# Patient Record
Sex: FEMALE | Race: WHITE | Employment: OTHER | URBAN - METROPOLITAN AREA
[De-identification: names, ages, dates, MRNs, and addresses within clinical notes are randomized per-mention and may not be internally consistent; named-entity substitution may affect disease eponyms.]

---

## 2017-10-24 ENCOUNTER — HOSPITAL ENCOUNTER (OUTPATIENT)
Dept: CT IMAGING | Age: 72
Discharge: HOME | End: 2017-10-24
Attending: INTERNAL MEDICINE
Payer: MEDICARE

## 2017-10-24 DIAGNOSIS — Z87.891 PERSONAL HISTORY OF TOBACCO USE, PRESENTING HAZARDS TO HEALTH: ICD-10-CM

## 2017-10-24 PROCEDURE — G0297 LDCT FOR LUNG CA SCREEN: HCPCS

## 2020-03-14 ENCOUNTER — HOSPITAL ENCOUNTER (EMERGENCY)
Age: 75
Discharge: HOME | End: 2020-03-14
Attending: EMERGENCY MEDICINE | Admitting: EMERGENCY MEDICINE
Payer: MEDICARE

## 2020-03-14 ENCOUNTER — APPOINTMENT (OUTPATIENT)
Dept: GENERAL RADIOLOGY | Age: 75
End: 2020-03-14
Attending: PHYSICIAN ASSISTANT
Payer: MEDICARE

## 2020-03-14 VITALS
RESPIRATION RATE: 20 BRPM | DIASTOLIC BLOOD PRESSURE: 86 MMHG | BODY MASS INDEX: 25.61 KG/M2 | SYSTOLIC BLOOD PRESSURE: 127 MMHG | HEART RATE: 94 BPM | HEIGHT: 64 IN | WEIGHT: 150 LBS | TEMPERATURE: 99.5 F | OXYGEN SATURATION: 93 %

## 2020-03-14 DIAGNOSIS — R68.83 CHILLS: Primary | ICD-10-CM

## 2020-03-14 DIAGNOSIS — R52 BODY ACHES: ICD-10-CM

## 2020-03-14 LAB
APPEARANCE UR: ABNORMAL
BACTERIA URNS QL MICRO: NEGATIVE /HPF
BILIRUB UR QL: NEGATIVE
COLOR UR: ABNORMAL
EPITH CASTS URNS QL MICRO: ABNORMAL /LPF
FLUAV AG NPH QL IA: NEGATIVE
FLUBV AG NOSE QL IA: NEGATIVE
GLUCOSE UR STRIP.AUTO-MCNC: NEGATIVE MG/DL
HGB UR QL STRIP: NEGATIVE
HYALINE CASTS URNS QL MICRO: ABNORMAL /LPF (ref 0–5)
KETONES UR QL STRIP.AUTO: NEGATIVE MG/DL
LEUKOCYTE ESTERASE UR QL STRIP.AUTO: ABNORMAL
NITRITE UR QL STRIP.AUTO: NEGATIVE
PH UR STRIP: 7 [PH] (ref 5–8)
PROT UR STRIP-MCNC: NEGATIVE MG/DL
RBC #/AREA URNS HPF: ABNORMAL /HPF (ref 0–5)
SP GR UR REFRACTOMETRY: 1.01 (ref 1–1.03)
UR CULT HOLD, URHOLD: NORMAL
UROBILINOGEN UR QL STRIP.AUTO: 0.2 EU/DL (ref 0.2–1)
WBC URNS QL MICRO: ABNORMAL /HPF (ref 0–4)

## 2020-03-14 PROCEDURE — 81001 URINALYSIS AUTO W/SCOPE: CPT

## 2020-03-14 PROCEDURE — 71046 X-RAY EXAM CHEST 2 VIEWS: CPT

## 2020-03-14 PROCEDURE — 87804 INFLUENZA ASSAY W/OPTIC: CPT

## 2020-03-14 PROCEDURE — 99283 EMERGENCY DEPT VISIT LOW MDM: CPT

## 2020-03-14 PROCEDURE — 87086 URINE CULTURE/COLONY COUNT: CPT

## 2020-03-14 RX ORDER — OSELTAMIVIR PHOSPHATE 75 MG/1
75 CAPSULE ORAL 2 TIMES DAILY
Qty: 10 CAP | Refills: 0 | Status: SHIPPED | OUTPATIENT
Start: 2020-03-14 | End: 2020-03-19

## 2020-03-14 NOTE — ED NOTES
Discharge instructions reviewed by provider and signed by patient and RN. Patient in no acute distress. Patient discharged ambulatory.

## 2020-03-14 NOTE — ED PROVIDER NOTES
66 y.o. female with significant past medical history for HTN, HLD and COPD who presents ambulatory to the ED with chief complaint of myalgias. Patient reports onset of myalgias with associated subjective fever and chills that began this morning. Patient notes that she took ASA today with some relief of her symptoms. She notes that she has some nasal congestion, but reports that she usually has some congestion secondary to seasonal allergies. She denies cough, vomiting, diarrhea, dysuria, urgency, frequency or abdominal pain. Patient denies receiving the flu shot this year. She also denies recent travel. There are no other acute medical concerns at this time. Social Hx: yes Tobacco use, no EtOH use, no Illicit Drug use  PCP: Christine Bruno MD    Note written by Juan Flores, as dictated by WESTLEY Zhang 11:53 AM      The history is provided by the patient. No  was used. No past medical history on file. No past surgical history on file. No family history on file.     Social History     Socioeconomic History    Marital status: SINGLE     Spouse name: Not on file    Number of children: Not on file    Years of education: Not on file    Highest education level: Not on file   Occupational History    Not on file   Social Needs    Financial resource strain: Not on file    Food insecurity     Worry: Not on file     Inability: Not on file    Transportation needs     Medical: Not on file     Non-medical: Not on file   Tobacco Use    Smoking status: Not on file   Substance and Sexual Activity    Alcohol use: Not on file    Drug use: Not on file    Sexual activity: Not on file   Lifestyle    Physical activity     Days per week: Not on file     Minutes per session: Not on file    Stress: Not on file   Relationships    Social connections     Talks on phone: Not on file     Gets together: Not on file     Attends Restoration service: Not on file     Active member of club or organization: Not on file     Attends meetings of clubs or organizations: Not on file     Relationship status: Not on file    Intimate partner violence     Fear of current or ex partner: Not on file     Emotionally abused: Not on file     Physically abused: Not on file     Forced sexual activity: Not on file   Other Topics Concern    Not on file   Social History Narrative    Not on file         ALLERGIES: Betadine [povidone-iodine] and Red dye    Review of Systems   Constitutional: Positive for chills and fever (subjective). HENT: Positive for congestion (baseline secondary to seasonal allergies). Negative for sore throat. Respiratory: Negative for cough and shortness of breath. Cardiovascular: Negative for chest pain. Gastrointestinal: Negative for abdominal pain, diarrhea, nausea and vomiting. Genitourinary: Negative for dysuria, flank pain, frequency and urgency. Musculoskeletal: Positive for myalgias. Neurological: Negative for syncope and light-headedness. All other systems reviewed and are negative. Vitals:    03/14/20 1148   BP: 127/86   Pulse: 94   Resp: 20   Temp: 99.5 °F (37.5 °C)   SpO2: 93%   Weight: 68 kg (150 lb)   Height: 5' 4\" (1.626 m)            Physical Exam  Vitals signs and nursing note reviewed. Constitutional:       General: She is not in acute distress. Appearance: She is well-developed. She is not diaphoretic. HENT:      Head: Normocephalic and atraumatic. Mouth/Throat:      Mouth: Mucous membranes are moist.      Pharynx: Oropharynx is clear. Uvula midline. No pharyngeal swelling, oropharyngeal exudate, posterior oropharyngeal erythema or uvula swelling. Tonsils: No tonsillar abscesses. Eyes:      General:         Right eye: No discharge. Left eye: No discharge. Conjunctiva/sclera: Conjunctivae normal.   Neck:      Musculoskeletal: Normal range of motion and neck supple.    Cardiovascular:      Rate and Rhythm: Normal rate and regular rhythm. Heart sounds: Normal heart sounds. Pulmonary:      Effort: Pulmonary effort is normal. No respiratory distress. Breath sounds: Wheezing (faint expiratory wheezes bilaterally) present. No rales. Abdominal:      General: There is no distension. Palpations: Abdomen is soft. Tenderness: There is no abdominal tenderness. There is no guarding or rebound. Lymphadenopathy:      Cervical: No cervical adenopathy. Skin:     General: Skin is warm and dry. Neurological:      Mental Status: She is alert and oriented to person, place, and time. MDM  Number of Diagnoses or Management Options  Body aches:   Chills:      Amount and/or Complexity of Data Reviewed  Clinical lab tests: ordered and reviewed  Tests in the radiology section of CPT®: ordered and reviewed  Discuss the patient with other providers: yes (Dr. Mauricio Turner, ED attending)  Independent visualization of images, tracings, or specimens: yes (CXR)    Patient Progress  Patient progress: stable         Procedures        66 y.o. female with significant past medical history for HTN, HLD and COPD who presents ambulatory to the ED with chief complaint of myalgias. The patient is well-appearing in no acute distress, does not appear toxic or septic. Vitals within normal limits. Influenza test is negative. CXR, read by radiology and independently visualized and interpreted by myself, reveals no evidence of pneumonia or other acute cardiopulmonary abnormalities. UA reflects likely contaminated specimen, doubt UTI but will add urine culture. Safe for discharge home with Rx for tamiflu to cover for potential false negative flu test, refill of albuterol inhaler for COPD. The patient has an appointment with her PCP scheduled this week and will follow up at that time. Strict ED return precautions discussed and provided in writing at time of discharge. The patient verbalized understanding and agreement with this plan.

## 2020-03-16 LAB
BACTERIA SPEC CULT: NORMAL
CC UR VC: NORMAL
SERVICE CMNT-IMP: NORMAL

## 2020-03-19 ENCOUNTER — TELEPHONE (OUTPATIENT)
Dept: FAMILY MEDICINE CLINIC | Age: 75
End: 2020-03-19

## 2020-03-19 NOTE — TELEPHONE ENCOUNTER
I called [facility] to touched base with them with regards to their routine/non-urgent scheduled visit. I asked if there are any pertinent issues or medication refills that were needed. Patient was scheduled for today with Dr. Arelis Bowles to establish care. She is currently healthy and has no concerns other than med refills. She states that she already received a call from Dr. Arelis Bowles but I do not see anything documented in the chart. Will follow up with Dr. Arelis Bowles to make sure patient's needs were addressed. Patient understands that this encounter was a temporary measure, and the importance of further follow up and examination was emphasized.   Patient verbalized understanding.       - Kecia Bauer LPN

## 2020-03-19 NOTE — TELEPHONE ENCOUNTER
I called [facility] to touched base with them with regards to their routine/non-urgent scheduled visit. I asked if there are any pertinent issues or medication refills that were needed. S: Patient states she is asymptomatic. Would like to establish care to get medications refilled. Previous PCP Dr. Giovanni Salas does not take her new insurance so would like to establish care at our practice. O: Patient speaking in complete sentences without effort. Normal speech and cooperative. A/P:Mrs. Manny Saez is a 66 y.o F with COPD and hx of breast cancer wanting to establish care at our practice. Patient is almost running out of medications and is requesting the earliest appointment available as she is scared to get exposed to COVID-19.  - Scheduled appointment on 3/20 at 8am with Dr. Marilyn Nathan. No orders of the defined types were placed in this encounter. Patient understands that this encounter was a temporary measure, and the importance of further follow up and examination was emphasized. Patient verbalized understanding.          Keshia Oneal MD

## 2020-03-20 ENCOUNTER — HOSPITAL ENCOUNTER (OUTPATIENT)
Dept: LAB | Age: 75
Discharge: HOME | End: 2020-03-20

## 2020-03-20 ENCOUNTER — OFFICE VISIT (OUTPATIENT)
Dept: FAMILY MEDICINE CLINIC | Age: 75
End: 2020-03-20

## 2020-03-20 VITALS
TEMPERATURE: 98.6 F | SYSTOLIC BLOOD PRESSURE: 117 MMHG | DIASTOLIC BLOOD PRESSURE: 80 MMHG | BODY MASS INDEX: 26.12 KG/M2 | WEIGHT: 153 LBS | OXYGEN SATURATION: 94 % | RESPIRATION RATE: 16 BRPM | HEIGHT: 64 IN | HEART RATE: 81 BPM

## 2020-03-20 DIAGNOSIS — E78.5 DYSLIPIDEMIA: ICD-10-CM

## 2020-03-20 DIAGNOSIS — I10 ESSENTIAL HYPERTENSION: ICD-10-CM

## 2020-03-20 DIAGNOSIS — F41.1 GAD (GENERALIZED ANXIETY DISORDER): ICD-10-CM

## 2020-03-20 DIAGNOSIS — G47.01 INSOMNIA DUE TO MEDICAL CONDITION: ICD-10-CM

## 2020-03-20 DIAGNOSIS — Z00.00 HEALTHCARE MAINTENANCE: ICD-10-CM

## 2020-03-20 DIAGNOSIS — M81.0 OSTEOPOROSIS, UNSPECIFIED OSTEOPOROSIS TYPE, UNSPECIFIED PATHOLOGICAL FRACTURE PRESENCE: ICD-10-CM

## 2020-03-20 DIAGNOSIS — Z85.3 HISTORY OF BREAST CANCER: ICD-10-CM

## 2020-03-20 DIAGNOSIS — J43.9 PULMONARY EMPHYSEMA, UNSPECIFIED EMPHYSEMA TYPE (HCC): ICD-10-CM

## 2020-03-20 DIAGNOSIS — I10 ESSENTIAL HYPERTENSION: Primary | ICD-10-CM

## 2020-03-20 LAB
ALBUMIN UR QL STRIP: 30 MG/L
ANION GAP SERPL CALC-SCNC: 7 MMOL/L (ref 5–15)
BUN SERPL-MCNC: 13 MG/DL (ref 6–20)
BUN/CREAT SERPL: 15 (ref 12–20)
CALCIUM SERPL-MCNC: 9 MG/DL (ref 8.5–10.1)
CHLORIDE SERPL-SCNC: 107 MMOL/L (ref 97–108)
CO2 SERPL-SCNC: 25 MMOL/L (ref 21–32)
CREAT SERPL-MCNC: 0.87 MG/DL (ref 0.55–1.02)
CREATININE, URINE POC: 200 MG/DL
GLUCOSE SERPL-MCNC: 97 MG/DL (ref 65–100)
MICROALBUMIN/CREAT RATIO POC: <30 MG/G
POTASSIUM SERPL-SCNC: 4.2 MMOL/L (ref 3.5–5.1)
SODIUM SERPL-SCNC: 139 MMOL/L (ref 136–145)

## 2020-03-20 RX ORDER — CITALOPRAM 40 MG/1
TABLET, FILM COATED ORAL
Qty: 60 TAB | Refills: 1 | Status: SHIPPED | OUTPATIENT
Start: 2020-03-20 | End: 2020-07-29

## 2020-03-20 RX ORDER — LISINOPRIL 10 MG/1
TABLET ORAL
COMMUNITY
End: 2020-03-20 | Stop reason: DRUGHIGH

## 2020-03-20 RX ORDER — ATORVASTATIN CALCIUM 40 MG/1
TABLET, FILM COATED ORAL
Qty: 90 TAB | Refills: 1 | Status: SHIPPED | OUTPATIENT
Start: 2020-03-20 | End: 2020-03-25 | Stop reason: SDUPTHER

## 2020-03-20 RX ORDER — ATORVASTATIN CALCIUM 40 MG/1
TABLET, FILM COATED ORAL
COMMUNITY
End: 2020-03-20 | Stop reason: SDUPTHER

## 2020-03-20 RX ORDER — TRIAZOLAM 0.25 MG/1
TABLET ORAL
COMMUNITY
Start: 2020-03-05 | End: 2020-03-20 | Stop reason: SDUPTHER

## 2020-03-20 RX ORDER — VERAPAMIL HYDROCHLORIDE 120 MG/1
TABLET, FILM COATED, EXTENDED RELEASE ORAL
COMMUNITY
End: 2020-12-04 | Stop reason: ALTCHOICE

## 2020-03-20 RX ORDER — LISINOPRIL 5 MG/1
5 TABLET ORAL DAILY
Qty: 90 TAB | Refills: 1 | Status: SHIPPED | OUTPATIENT
Start: 2020-03-20 | End: 2020-06-19 | Stop reason: SDUPTHER

## 2020-03-20 RX ORDER — CITALOPRAM 40 MG/1
TABLET, FILM COATED ORAL
COMMUNITY
Start: 2020-01-03 | End: 2020-03-20 | Stop reason: SDUPTHER

## 2020-03-20 RX ORDER — TRIAZOLAM 0.25 MG/1
TABLET ORAL
Qty: 30 TAB | Refills: 1 | Status: SHIPPED | OUTPATIENT
Start: 2020-03-20 | End: 2020-03-20 | Stop reason: CLARIF

## 2020-03-20 NOTE — PROGRESS NOTES
[address] [place] [address]    Subjective:       HPI:who pree  Elizabeth Clifford is a 66 y.o. female with history of COPD, Arthritis, HTN, Insomnia, Osteoperosis and Breast Cancer who presents to clinic today to establish care. She has no acute complaints or concerns at this time. She is requesting refill of many chronic medications at this time. PMH: COPD, Arthritis, HTN, Insomnia, Osteoperosis and Breast Cancer. Allergies: Red dye, betadine  Medications: Lisinopril 5mg, Lipitor 40mg, Celexa 40mg, Halcion 0.25mg  Family Hx: Non-contributory  Surgical history: Left breast mastectomy in 2012    COPD: Pulmonologist is Dr. Binh Oneal. Diagnosed with 6 yrs ago. Initially thought to be due to chemo and radiation which was later refuted. Current therapy includes Spiriva and night time oxygen @ 2L. Has PRN albuterol which she seldomly use. She denies any chest pain or shortness of breath at this time. Breast Cancer: Left breast in 2002. Lumpectomy, chemo and radiation. Had reoccurrence in 2012 and had radical mastectomy. Had resultant fatigue and chronic insomnia following radiation which is tolerabel with halcion. Last Mammogram was 1.5 yrs ago and states she is due for one. HTN: Diagnosed  35 yrs ago. Initially on Verapamil and later Lisinopril. Was admitted to [address] [place] [address] for abdominl pain 1 yr ago. Had low BPs and home BP meds were discontinued. Continued to have normal BPs until 4 months ago and Lisinopril was resumed at 5 mg. She does not check her home. Compliant with her medication and denies any medication side effects. She denies any associated headaches, chest pain, palpitations, lower extremity edema, PND or orthopnea. Insomnia and POOL: Patient endorses a history of anxiety and insomnia since 2002 after diagnosis and treatment for breast cancer. She has tried several different medications for insomnia. She is currently on Halcion which she states works for her.   She does not need a refill at this time but still will need prior authorization for her next refill. She was previously followed by a counselor for anxiety but states her symptoms have been stable on Celexa. She currently does not see a counselor. Requesting refill of Celexa. Her POOL score today is a 6. Health Maintenance:  Has had 2 normal colonoscopies. Has had shingles in past but has not had vaccine. Had bone scan per pt which showed osteoporosis. Not on any therapy for osteoporosis. .     Past Medical History:   Diagnosis Date    Arthritis     [facility] ([facility]     COPD (chronic obstructive pulmonary disease) ([facility]        Past Medical History:   Diagnosis Date    Arthritis     Breast cancer ([facility]     [facility]chronic obstructive pulmonary disease[facility] ([facility]      Allergies   Allergen Reactions    Betadine [Povidone-Iodine] Hives    Red Dye Hives     Current Outpatient Medications on File Prior to Visit   Medication Sig Dispense Refill    verapamil ER (CALAN-SR) 120 mg tablet verapamil ER (SR) 120 mg tablet,extended release      [DISCONTINUED] lisinopriL (PRINIVIL, ZESTRIL) 10 mg tablet lisinopril 10 mg tablet      [DISCONTINUED] atorvastatin (LIPITOR) 40 mg tablet atorvastatin 40 mg tablet      [DISCONTINUED] citalopram (CELEXA) 40 mg tablet TAKE 1 TABLET BY MOUTH ONCE DAILY      [DISCONTINUED] triazolam (HALCION) 0.25 mg tablet TAKE 1 TABLET BY MOUTH AT BEDTIME AS NEEDED FOR 30 DAYS      albuterol sulfate 90 mcg/actuation aepb Take 1-2 Puffs by inhalation every six (6) hours as needed for Wheezing. 1 Inhaler 0    [] oseltamivir (Tamiflu) 75 mg capsule Take 1 Cap by mouth two (2) times a day for 5 days. 10 Cap 0     No current facility-administered medications on file prior to visit. History reviewed. No pertinent family history.   Social History     Socioeconomic History    Marital status: UNKNOWN     Spouse name: Not on file    Number of children: Not on file    Years of education: Not on file    Highest education level: Not on file   Tobacco Use    Smoking status: Current Every Day Smoker    Smokeless tobacco: Never Used    Tobacco comment: [employer] jarquin   Substance and Sexual Activity    Drug use: Never     History reviewed. No pertinent surgical history. There is no problem list on file for this patient. Review of Systems   Constitutional: Negative for malaise/fatigue and weight loss. Eyes: Negative for blurred vision and double vision. Respiratory: Negative for shortness of breath. Cardiovascular: Negative for chest pain, palpitations, orthopnea, leg swelling and PND. Gastrointestinal: Negative for nausea and vomiting. Musculoskeletal: Negative for myalgias. Neurological: Negative for sensory change and headaches. Psychiatric/Behavioral: Negative for depression, hallucinations, memory loss, substance abuse and suicidal ideas. The patient has insomnia. The patient is not nervous/anxious. Objective:     Visit Vitals  /80 (BP 1 Location: Left arm, BP Patient Position: Sitting)   Pulse 81   Temp 98.6 °F (37 °C)   Resp 16   Ht 5' 4\" (1.626 m)   Wt 153 lb (69.4 kg)   SpO2 94%   BMI 26.26 kg/m²        Physical Exam  Constitutional:       Appearance: Normal appearance. HENT:      Head: Normocephalic and atraumatic. Right Ear: Tympanic membrane and ear canal normal.      Left Ear: Tympanic membrane and ear canal normal.      Nose: Nose normal. No congestion or rhinorrhea. Mouth/Throat:      Mouth: Mucous membranes are moist.   Eyes:      Extraocular Movements: Extraocular movements intact. Conjunctiva/sclera: Conjunctivae normal.      Pupils: Pupils are equal, round, and reactive to light. Neck:      Musculoskeletal: Normal range of motion. Cardiovascular:      Rate and Rhythm: Normal rate and regular rhythm. Pulses: Normal pulses. Heart sounds: Normal heart sounds. No murmur.    Pulmonary:      Effort: Pulmonary effort is normal.      Breath sounds: Normal breath sounds. Abdominal:      General: Bowel sounds are normal.      Palpations: Abdomen is soft. Musculoskeletal: Normal range of motion. General: No swelling. Right lower leg: No edema. Left lower leg: No edema. Skin:     General: Skin is warm and dry. Neurological:      General: No focal deficit present. Mental Status: She is alert and oriented to person, place, and time. Mental status is at baseline. Cranial Nerves: No cranial nerve deficit. Pertinent Labs/Studies:      Assessment and orders:     Diagnoses and all orders for this visit:    1. Essential hypertension  -Well-controlled. Plan to continue lisinopril 5 mg. Check metabolic panel and urine microalbumin today. -     METABOLIC PANEL, BASIC; Future  -     AMB POC URINE, MICROALBUMIN, SEMIQUANT (3 RESULTS); Future  -     lisinopriL (PRINIVIL, ZESTRIL) 5 mg tablet; Take 1 Tab by mouth daily. , Normal, Disp-90 Tab, R-1  -     AMB POC URINE, MICROALBUMIN, SEMIQUANT (3 RESULTS)    2. Pulmonary emphysema, unspecified emphysema type ([facility]  -Not acute exacerbation. Wean the oxygen use, albuterol and Spiriva  -Strict sanitary and isolation precautions were given to patient given current viral outbreak. 3. POOL (generalized anxiety disorder)  -Stable. Continue Celexa. encouraged patient to continue counseling.  -     citalopram (CELEXA) 40 mg tablet; TAKE 1 TABLET BY MOUTH ONCE DAILY, Normal, Disp-60 Tab, R-1    4. Dyslipidemia  -Last LDL was 80 in January 2020. Continue lipitor.  -     atorvastatin (LIPITOR) 40 mg tablet; atorvastatin 40 mg tablet, Normal, Disp-90 Tab, R-1    5. Osteoporosis, unspecified osteoporosis type, unspecified pathological fracture presence  -Initiate over-the-counter calcium and vitamin D.  -Fall precautions given. 6. History of breast cancer  -Plan for diagnostic mammogram in near future. -     [facility] MAMMOGRAM DIAG BILAT SAME DAY INCL CAD; Future    7.  Healthcare maintenance  - [facility] MAMMOGRAM DIAG BILAT SAME DAY INCL CAD; Future  -     varicella-zoster recombinant, PF, (SHINGRIX) 50 mcg/0.5 mL susr injection; Administer Dose now and repeat in 6 months, Print, Disp-0.5 mL, R-1          I have reviewed patient medical and social history and medications. I have reviewed pertinent labs results and other data. I have discussed the diagnosis with the patient and the intended plan as seen in the above orders. The patient has received an after-visit summary and questions were answered concerning future plans. I have discussed medication side effects and warnings with the patient as well.     Manda Rodriguez MD  Resident [address] [facility]  03/20/20    Patient discussed with Dr. Shaka Prasad, Attending Physician

## 2020-03-25 ENCOUNTER — TELEPHONE (OUTPATIENT)
Dept: FAMILY MEDICINE CLINIC | Age: 75
End: 2020-03-25

## 2020-03-25 DIAGNOSIS — E78.5 DYSLIPIDEMIA: ICD-10-CM

## 2020-03-25 RX ORDER — ATORVASTATIN CALCIUM 40 MG/1
TABLET, FILM COATED ORAL
Qty: 90 TAB | Refills: 0 | Status: SHIPPED | OUTPATIENT
Start: 2020-03-25 | End: 2020-06-19 | Stop reason: SDUPTHER

## 2020-03-27 DIAGNOSIS — E78.5 DYSLIPIDEMIA: ICD-10-CM

## 2020-03-27 DIAGNOSIS — I10 ESSENTIAL HYPERTENSION: ICD-10-CM

## 2020-04-03 ENCOUNTER — TELEPHONE (OUTPATIENT)
Dept: FAMILY MEDICINE CLINIC | Age: 75
End: 2020-04-03

## 2020-04-03 DIAGNOSIS — G47.00 INSOMNIA, UNSPECIFIED TYPE: Primary | ICD-10-CM

## 2020-04-03 RX ORDER — TRIAZOLAM 0.25 MG/1
0.25 TABLET ORAL
Qty: 60 TAB | Refills: 0 | Status: SHIPPED | OUTPATIENT
Start: 2020-04-03 | End: 2020-06-26 | Stop reason: SDUPTHER

## 2020-04-03 RX ORDER — TRIAZOLAM 0.25 MG/1
0.25 TABLET ORAL
Qty: 60 TAB | Refills: 0 | OUTPATIENT
Start: 2020-04-03

## 2020-04-03 NOTE — TELEPHONE ENCOUNTER
PT requesting a call from Dr. Francisco Chua on if she can get a 60 day supply.  Also, don't see the med on her list, please give PT a call    Med is Triazolam .25 mg  Needs to go to [place]

## 2020-04-20 DIAGNOSIS — G47.00 INSOMNIA, UNSPECIFIED TYPE: ICD-10-CM

## 2020-04-20 NOTE — TELEPHONE ENCOUNTER
----- Message from Ria Sands sent at 4/20/2020 11:57 AM EDT -----  Regarding: Dr. Emerald Mcnamara first and last name: Kristian Simon  Reason for call:   f/up on how doctor was supposed to give preauthroization for a sleeping medication on 3/20/2020, triazalam, to be sent to her insurance company at [phone]. Pt would also like to talk to the doctor as well.    Callback required yes/no and why: yes  Best contact number(s):  [phone]  Details to clarify the request:

## 2020-04-21 NOTE — TELEPHONE ENCOUNTER
Called and spoke with the patient of another [facility] message received in which to offer her an appointment per Dr. Becky Lazaro. She said she already had a scheduled appointment with you but was informed [facility] did not schedule any appointment but sent a message to the office to the doctor. She told me that the doctor should have already done this prior auth or ordered the medication for her at the recent visit of 3/20. Said she will not make any appointment because she has concerns about this which should have already been done. Said she will be out of medication very soon. She only wants to speak with the doctor. Dr. Edmonds Course   Received: Yesterday      Call patient   Message Contents   Dayna Yañez [facility] Front Office             Env General Message/Vendor Calls     Caller's first and last name:       Reason for call: Requesting a phone call back regarding a prior authorization for Rx.      Call back required yes/no and why: Yes     Best contact number(s): [phone]     Details to clarify the request:     Maggy Monet

## 2020-04-22 ENCOUNTER — TELEPHONE (OUTPATIENT)
Dept: FAMILY MEDICINE CLINIC | Age: 75
End: 2020-04-22

## 2020-04-22 NOTE — TELEPHONE ENCOUNTER
Prior authorization for Triazolam completed and approved. Pt notified via phone. Script available at pharmacy.

## 2020-06-19 ENCOUNTER — TELEPHONE (OUTPATIENT)
Dept: FAMILY MEDICINE CLINIC | Age: 75
End: 2020-06-19

## 2020-06-19 DIAGNOSIS — G47.00 INSOMNIA, UNSPECIFIED TYPE: ICD-10-CM

## 2020-06-19 DIAGNOSIS — E78.5 DYSLIPIDEMIA: ICD-10-CM

## 2020-06-19 DIAGNOSIS — I10 ESSENTIAL HYPERTENSION: ICD-10-CM

## 2020-06-19 NOTE — TELEPHONE ENCOUNTER
----- Message from Kerry Borrero sent at 6/19/2020  8:40 AM EDT -----  Regarding: Dr Hilliard Pro: [phone]  General Message/Vendor Calls    Caller's first and last name:Spring Vincent      Reason for call:a form will be sent from Medicare to be filled out for her breast form. Pt had a mastectomy 10 years ago.       Callback required yes/no and why:yes      Best contact number(s):[phone]      Details to clarify the request:      Kerry Borrero

## 2020-06-19 NOTE — TELEPHONE ENCOUNTER
Dr Burt/refill   Received: Today      Call patient   Message Contents   [facility], [address] [place] [address] Office   Phone Number: [phone]             Medication Refill     Caller (if not patient):       Relationship of caller (if not patient):       Best contact number(s):[phone]       Name of medication and dosage if known:Triazolam 25 mg, Lisinopril 5 mg and Atorvastatin 40 mg       Is patient out of this medication (yes/no):yes       Pharmacy name:[facility] pharmacy     Pharmacy listed in chart? (yes/no):yes   Pharmacy phone number:       Details to clarify the request:90 day supply . Pt needs the email address for the doctors as well.

## 2020-06-24 DIAGNOSIS — G47.00 INSOMNIA, UNSPECIFIED TYPE: ICD-10-CM

## 2020-06-24 RX ORDER — LISINOPRIL 5 MG/1
5 TABLET ORAL DAILY
Qty: 60 TAB | Refills: 1 | Status: SHIPPED | OUTPATIENT
Start: 2020-06-24 | End: 2020-09-17

## 2020-06-24 RX ORDER — ATORVASTATIN CALCIUM 40 MG/1
TABLET, FILM COATED ORAL
Qty: 90 TAB | Refills: 0 | Status: SHIPPED | OUTPATIENT
Start: 2020-06-24 | End: 2020-06-26 | Stop reason: SDUPTHER

## 2020-06-24 RX ORDER — TRIAZOLAM 0.25 MG/1
0.25 TABLET ORAL
Qty: 60 TAB | Refills: 0 | OUTPATIENT
Start: 2020-06-24

## 2020-06-24 NOTE — TELEPHONE ENCOUNTER
Dr. Angela Low   Received: [facility] [address] Office   Phone Number: [phone]             Caller's first and last name: N/A   Reason for call:  Pt stated she called for refills 6/19/20. Pharmacy stated they did not receive prescriptions request.  Medications: \"Triazolam .25 mg\" \"Atorvastatin 40 mg\" \"Lisinopril 5 mg\". Pt requesting 90 day refills. Callback required yes/no and why: Yes, to inform.    Best contact number(s): [phone]   Details to clarify the request: Pharmacy: [facility] number: [phone]

## 2020-06-24 NOTE — TELEPHONE ENCOUNTER
[phone]      Patient called and wants to speak with the doctor only about this medication order. Sharif Preciado it has been since last Friday request and she needs this medication.

## 2020-06-24 NOTE — TELEPHONE ENCOUNTER
[phone]  Patient called to say the pharmacy has still not received the medication orders. She was advised chart reflecting receipt of pharmacy but they do not have it. She wants the office to call the pharmacy as she wants the medication now.

## 2020-06-25 ENCOUNTER — TELEPHONE (OUTPATIENT)
Dept: FAMILY MEDICINE CLINIC | Age: 75
End: 2020-06-25

## 2020-06-25 DIAGNOSIS — E78.5 DYSLIPIDEMIA: ICD-10-CM

## 2020-06-25 NOTE — TELEPHONE ENCOUNTER
[phone]    Patient called again. She has concerns and will be out of medication tomorrow.     Medication the pharmacy did not get;  triazoliam    Medication the pharmacy said had no direction:  Atorvastatin

## 2020-06-26 ENCOUNTER — TELEPHONE (OUTPATIENT)
Dept: FAMILY MEDICINE CLINIC | Age: 75
End: 2020-06-26

## 2020-06-26 DIAGNOSIS — G47.00 INSOMNIA, UNSPECIFIED TYPE: ICD-10-CM

## 2020-06-26 DIAGNOSIS — E78.5 DYSLIPIDEMIA: ICD-10-CM

## 2020-06-26 RX ORDER — ATORVASTATIN CALCIUM 40 MG/1
TABLET, FILM COATED ORAL
Qty: 90 TAB | Refills: 0 | Status: SHIPPED | OUTPATIENT
Start: 2020-06-26 | End: 2020-06-30 | Stop reason: DRUGHIGH

## 2020-06-26 RX ORDER — TRIAZOLAM 0.25 MG/1
0.25 TABLET ORAL
Qty: 10 TAB | Refills: 0 | Status: SHIPPED | OUTPATIENT
Start: 2020-06-26 | End: 2020-07-02 | Stop reason: SDUPTHER

## 2020-06-26 RX ORDER — TRIAZOLAM 0.25 MG/1
TABLET ORAL
Qty: 30 TAB | Refills: 0 | OUTPATIENT
Start: 2020-06-26

## 2020-06-26 NOTE — TELEPHONE ENCOUNTER
[phone]    Patient called again  She wants this medication of atorvastatin corrected today at the pharmacy  She said it was resent again as wrong. The pharmacy told her is still does not have directions on it. She took her last pill yesterday and wants the medication now. She was advised of Dr. Ese Zavaleta message about the other medication order and needing an appointment. Relayed to her this message in the chart of scheduling an appointment before and she said she never got it. ([facility] to call and schedule a telephone visit per -[id number])    She will call back next week to schedule for July.

## 2020-06-26 NOTE — TELEPHONE ENCOUNTER
Will provide short term refill of Triazolam. Pt will need a follow-up appointment for further refills as she was previously instructed. Goal is to wean her off this medication and this has been previously discussed with her. Please reiterate plan with pt if and when she calls back.

## 2020-06-26 NOTE — TELEPHONE ENCOUNTER
Dr Chuckie Ortega:    [facility] -- this med was sent to the pharmacy with no directions. ... Please correct & resend to them. ...

## 2020-06-29 ENCOUNTER — TELEPHONE (OUTPATIENT)
Dept: FAMILY MEDICINE CLINIC | Age: 75
End: 2020-06-29

## 2020-06-29 NOTE — TELEPHONE ENCOUNTER
Patient called again and wants this medication corrected at the pharmacy. She did schedule an appointment for this week with you.

## 2020-06-30 RX ORDER — ATORVASTATIN CALCIUM 40 MG/1
40 TABLET, FILM COATED ORAL DAILY
Qty: 90 TAB | Refills: 1 | Status: SHIPPED | OUTPATIENT
Start: 2020-06-30 | End: 2020-12-21 | Stop reason: SDUPTHER

## 2020-07-02 ENCOUNTER — VIRTUAL VISIT (OUTPATIENT)
Dept: FAMILY MEDICINE CLINIC | Age: 75
End: 2020-07-02

## 2020-07-02 DIAGNOSIS — G89.29 CHRONIC PAIN OF RIGHT KNEE: Primary | ICD-10-CM

## 2020-07-02 DIAGNOSIS — E78.5 DYSLIPIDEMIA: ICD-10-CM

## 2020-07-02 DIAGNOSIS — G47.00 INSOMNIA, UNSPECIFIED TYPE: ICD-10-CM

## 2020-07-02 DIAGNOSIS — M25.561 CHRONIC PAIN OF RIGHT KNEE: Primary | ICD-10-CM

## 2020-07-02 RX ORDER — TRIAZOLAM 0.25 MG/1
0.25 TABLET ORAL
Qty: 30 TAB | Refills: 0 | Status: SHIPPED | OUTPATIENT
Start: 2020-07-02 | End: 2020-09-17

## 2020-07-02 RX ORDER — TRIAZOLAM 0.25 MG/1
0.25 TABLET ORAL
Qty: 30 TAB | Refills: 1 | Status: SHIPPED | OUTPATIENT
Start: 2020-07-02 | End: 2020-07-02

## 2020-07-02 NOTE — PROGRESS NOTES
Chun Leslie  66 y.o. female  1945  [address] [facility] [address] [facility] [id number]  [id number]   [address]:    Telemedicine Progress Note  Nevaeh Hernandez MD       Encounter Date and Time: July 5, 2020 at 2:25 PM    Consent: Chun Leslie, who was seen by synchronous (real-time) audio-video technology, and/or her healthcare decision maker, is aware that this patient-initiated, Telehealth encounter on 7/2/2020 is a billable service, with coverage as determined by her insurance carrier. She is aware that she may receive a bill and has provided verbal consent to proceed: Yes. CC: Medication Refill. History of Present Illness   Chun Leslie is a 66 y.o. female was evaluated by synchronous (real-time) audio-video technology from home, through a secure patient portal.    Pleasant 66 yr old female requesting medication refills. Has known hx of dyslipidemia for which she takes lipitor 10mg. Pt also requesting refill of Triazolam 0.25mg which she has been taking for 10 yrs. States she has never been weaned off medication in the past and is apprehensive to weaning off medication eventhough we have discussed this in the past.  Right knee pain: has known hx of right knee oa. Has had increased pain and decreased rom in the last month. Wants her knee evaluated in person. No recent injury. Has had limited mobility aroung her house. Review of Systems   Review of Systems   Gastrointestinal: Negative for abdominal pain, nausea and vomiting. Musculoskeletal: Positive for joint pain. Neurological: Negative for dizziness, sensory change and headaches. Psychiatric/Behavioral: Negative for substance abuse. The patient is not nervous/anxious.         Vitals/Objective:     General: alert, cooperative, no distress   Mental  status: mental status: alert, oriented to person, place, and time, normal mood, behavior, speech, dress, motor activity, and thought processes   Resp: resp: normal effort and no respiratory distress   Neuro: neuro: no gross deficits   Skin: skin: no discoloration or lesions of concern on visible areas   Due to this being a TeleHealth evaluation, many elements of the physical examination are unable to be assessed. Assessment and Plan:   Time-based coding, delete if not needed: I spent at least 40 minutes with this established patient, and >50% of the time was spent counseling and/or coordinating care regarding med refills    1. Chronic pain of right knee  - Recommended Steroid injection for OA  - Pt to make in-person clinic appt  - XR KNEE RT MAX 2 VWS; Future    2. Insomnia, unspecified type  - Plan to wean off Triazolam when due for next refill.  -  reviewed and appropriate  - Pt will likely be reluctant to weaning off medication  - CHeck uds  - triazolam (HALCION) 0.25 mg tablet; Take 1 Tab by mouth nightly as needed (Insomnia). Max Daily Amount: 0.25 mg. Dispense: 30 Tab; Refill: 0  - DRUG SCREEN, URINE; Future    3. Dyslipidemia  - Lipitor refilled      Assessment/Plan:    Time spent in direct conversation with the patient to include medical condition(s) discussed, assessment and treatment plan:       We discussed the expected course, resolution and complications of the diagnosis(es) in detail. Medication risks, benefits, costs, interactions, and alternatives were discussed as indicated. I advised her to contact the office if her condition worsens, changes or fails to improve as anticipated. She expressed understanding with the diagnosis(es) and plan. Patient understands that this encounter was a temporary measure, and the importance of further follow up and examination was emphasized. Patient verbalized understanding. Patient informed to follow up: Rafy Mccray Follow-up and Dispositions  ·   Return if symptoms worsen or fail to improve.          Electronically Signed: Nancy Noonan MD    CPT Codes 01584-63715 for Established Patients may apply to this Telehealth Visit. POS code: 18. Modifier GT    Megan Crespo is a 66 y.o. female who was evaluated by an audio-video encounter for concerns as above. Patient identification was verified prior to start of the visit. A caregiver was present when appropriate. Due to this being a TeleHealth encounter (During ESO-80 public health emergency), evaluation of the following organ systems was limited: Vitals/Constitutional/EENT/Resp/CV/GI//MS/Neuro/Skin/Heme-Lymph-Imm. Pursuant to the emergency declaration under the [address] [place] [id number], [facility] waiver authority and the [employer] Act, this Virtual Visit was conducted, with patient's (and/or legal guardian's) consent, to reduce the patient's risk of exposure to COVID-19 and provide necessary medical care. Services were provided through a synchronous discussion virtually to substitute for in-person clinic visit. I was at home. The patient was at home. History   Patients past medical, surgical and family histories were reviewed and updated. Past Medical History:   Diagnosis Date    Arthritis     [facility] ([facility]     COPD (chronic obstructive pulmonary disease) ([facility]      No past surgical history on file. No family history on file.   Social History     Socioeconomic History    Marital status: UNKNOWN     Spouse name: Not on file    Number of children: Not on file    Years of education: Not on file    Highest education level: Not on file   Occupational History    Not on file   Social Needs    Financial resource strain: Not on file    Food insecurity     Worry: Not on file     Inability: Not on file    Transportation needs     Medical: Not on file     Non-medical: Not on file   Tobacco Use    Smoking status: Current Every Day Smoker    Smokeless tobacco: Never Used    Tobacco comment: MOSES jarquin   Substance and Sexual Activity    Alcohol use: Not on file    Drug use: Never    Sexual activity: Not on file   Lifestyle    Physical activity     Days per week: Not on file     Minutes per session: Not on file    Stress: Not on file   Relationships    Social connections     Talks on phone: Not on file     Gets together: Not on file     Attends [employer] service: Not on file     Active member of club or organization: Not on file     Attends meetings of clubs or organizations: Not on file     Relationship status: Not on file    Intimate partner violence     Fear of current or ex partner: Not on file     Emotionally abused: Not on file     Physically abused: Not on file     Forced sexual activity: Not on file   Other Topics Concern    Not on file   Social History Narrative    Not on file     There is no problem list on file for this patient. Current Medications/Allergies   Medications and Allergies reviewed:    Current Outpatient Medications   Medication Sig Dispense Refill    triazolam (HALCION) 0.25 mg tablet Take 1 Tab by mouth nightly as needed (Insomnia). Max Daily Amount: 0.25 mg. 30 Tab 0    atorvastatin (LIPITOR) 40 mg tablet Take 1 Tab by mouth daily. atorvastatin 40 mg tablet 90 Tab 1    lisinopriL (PRINIVIL, ZESTRIL) 5 mg tablet Take 1 Tab by mouth daily. 60 Tab 1    verapamil ER (CALAN-SR) 120 mg tablet verapamil ER (SR) 120 mg tablet,extended release      citalopram (CELEXA) 40 mg tablet TAKE 1 TABLET BY MOUTH ONCE DAILY 60 Tab 1    varicella-zoster recombinant, PF, (SHINGRIX) 50 mcg/0.5 mL susr injection Administer Dose now and repeat in 6 months 0.5 mL 1    albuterol sulfate 90 mcg/actuation aepb Take 1-2 Puffs by inhalation every six (6) hours as needed for Wheezing.  1 Inhaler 0     Allergies   Allergen Reactions    Betadine [Povidone-Iodine] Hives    Red Dye Hives

## 2020-07-10 ENCOUNTER — TELEPHONE (OUTPATIENT)
Dept: FAMILY MEDICINE CLINIC | Age: 75
End: 2020-07-10

## 2020-07-10 NOTE — TELEPHONE ENCOUNTER
----- Message from Cuauhtemoc Barrett sent at 7/10/2020 10:57 AM EDT -----  Appointment not available    Caller's first and last name and relationship to patient (if not the patient):      Best contact number: [phone]      Preferred date and time: within the next week      Scheduled appointment date and time: N/A      Reason for appointment: injection      Details to clarify the request:      Cuauhtemoc Barrett

## 2020-07-13 ENCOUNTER — TELEPHONE (OUTPATIENT)
Dept: FAMILY MEDICINE CLINIC | Age: 75
End: 2020-07-13

## 2020-07-13 DIAGNOSIS — M25.561 CHRONIC PAIN OF RIGHT KNEE: Primary | ICD-10-CM

## 2020-07-13 DIAGNOSIS — G89.29 CHRONIC PAIN OF RIGHT KNEE: Primary | ICD-10-CM

## 2020-07-14 ENCOUNTER — OFFICE VISIT (OUTPATIENT)
Dept: FAMILY MEDICINE CLINIC | Age: 75
End: 2020-07-14

## 2020-07-14 VITALS
HEART RATE: 89 BPM | DIASTOLIC BLOOD PRESSURE: 81 MMHG | HEIGHT: 64 IN | OXYGEN SATURATION: 95 % | BODY MASS INDEX: 27.31 KG/M2 | SYSTOLIC BLOOD PRESSURE: 121 MMHG | RESPIRATION RATE: 20 BRPM | WEIGHT: 160 LBS | TEMPERATURE: 97.7 F

## 2020-07-14 DIAGNOSIS — G89.29 CHRONIC PAIN OF RIGHT KNEE: ICD-10-CM

## 2020-07-14 DIAGNOSIS — M17.11 OSTEOARTHRITIS OF RIGHT KNEE, UNSPECIFIED OSTEOARTHRITIS TYPE: ICD-10-CM

## 2020-07-14 DIAGNOSIS — R30.0 DYSURIA: Primary | ICD-10-CM

## 2020-07-14 DIAGNOSIS — M25.561 CHRONIC PAIN OF RIGHT KNEE: ICD-10-CM

## 2020-07-14 LAB
BILIRUB UR QL STRIP: NEGATIVE
GLUCOSE UR-MCNC: NEGATIVE MG/DL
KETONES P FAST UR STRIP-MCNC: NEGATIVE MG/DL
PH UR STRIP: 5.5 [PH] (ref 4.6–8)
PROT UR QL STRIP: NEGATIVE
SP GR UR STRIP: 1.01 (ref 1–1.03)
UA UROBILINOGEN AMB POC: NORMAL (ref 0.2–1)
URINALYSIS CLARITY POC: CLEAR
URINALYSIS COLOR POC: YELLOW
URINE BLOOD POC: NEGATIVE
URINE LEUKOCYTES POC: NORMAL
URINE NITRITES POC: NEGATIVE

## 2020-07-14 RX ORDER — BETAMETHASONE SODIUM PHOSPHATE AND BETAMETHASONE ACETATE 3; 3 MG/ML; MG/ML
6 INJECTION, SUSPENSION INTRA-ARTICULAR; INTRALESIONAL; INTRAMUSCULAR; SOFT TISSUE ONCE
Qty: 2 ML | Refills: 0
Start: 2020-07-14 | End: 2020-07-14

## 2020-07-14 RX ORDER — LIDOCAINE HYDROCHLORIDE 10 MG/ML
3 INJECTION INFILTRATION; PERINEURAL ONCE
Qty: 3 ML | Refills: 0
Start: 2020-07-14 | End: 2020-07-14

## 2020-07-14 NOTE — PROGRESS NOTES
Chief Complaint   Patient presents with    Knee Pain     Right knee pain with swelling and popping,. Pain is intermittent and get progressively worse with extension and standing. x 5 months     1. Have you been to the ER, urgent care clinic since your last visit? Hospitalized since your last visit? No    2. Have you seen or consulted any other health care providers outside of the [address] [place] [address] since your last visit? Include any pap smears or colon screening.  No    Visit Vitals  /81 (BP 1 Location: Right arm, BP Patient Position: Sitting)   Pulse 89   Temp 97.7 °F (36.5 °C) (Oral)   Resp 20   Ht 5' 4\" (1.626 m)   Wt 160 lb (72.6 kg)   SpO2 95%   BMI 27.46 kg/m²     [facility]  OFFICE PROCEDURE PROGRESS NOTE        Chart reviewed for the following:   I, Dr. Pippa Acosta, have reviewed the History, Physical and updated the Allergic reactions for [address] performed immediately prior to start of procedure:   I, Dr. Pippa Acosta, have performed the following reviews on Angela Stake prior to the start of the procedure:            * Patient was identified by name and date of birth   * Agreement on procedure being performed was verified  * Risks and Benefits explained to the patient  * Procedure site verified and marked as necessary  * Patient was positioned for comfort  * Consent was signed and verified     Time: 11:04am      Date of procedure: 7/14/2020    Procedure performed by:  Rocco Gaffney MD    Provider assisted by: Marlo Ware    Patient assisted by: self    How tolerated by patient: tolerated the procedure well with no complications    Post Procedural Pain Scale: 0 - No Hurt    Comments: none

## 2020-07-14 NOTE — PROGRESS NOTES
HPI:  aMria Del Carmen Poole is a 66 y.o. female who presents with right knee pain. Pain is chronically intermittent. No injury or trauma. Pain mostly over the medial and anterior knee. Intermittent dysuria. No fever. No abd pain. Past Medical History:   Diagnosis Date    Arthritis     [facility] ([facility]     COPD [facility]chronic obstructive [facility] disease) ([facility]        Current Outpatient Medications:     betamethasone (Celestone Soluspan) 6 mg/mL injection, 1 mL by Intra artICUlar route once for 1 dose. 2ml of celestone with 3ml of lidocaine for right knee injection, Disp: 2 mL, Rfl: 0    lidocaine (XYLOCAINE) 10 mg/mL (1 %) injection, 3 mL by IntraDERMal route once for 1 dose. 2ml of celestone with 3ml of lidocaine for right knee injection, Disp: 3 mL, Rfl: 0    triazolam (HALCION) 0.25 mg tablet, Take 1 Tab by mouth nightly as needed (Insomnia). Max Daily Amount: 0.25 mg., Disp: 30 Tab, Rfl: 0    atorvastatin (LIPITOR) 40 mg tablet, Take 1 Tab by mouth daily. atorvastatin 40 mg tablet, Disp: 90 Tab, Rfl: 1    lisinopriL (PRINIVIL, ZESTRIL) 5 mg tablet, Take 1 Tab by mouth daily. , Disp: 60 Tab, Rfl: 1    citalopram (CELEXA) 40 mg tablet, TAKE 1 TABLET BY MOUTH ONCE DAILY, Disp: 60 Tab, Rfl: 1    verapamil ER (CALAN-SR) 120 mg tablet, verapamil ER (SR) 120 mg tablet,extended release, Disp: , Rfl:     varicella-zoster recombinant, PF, (SHINGRIX) 50 mcg/0.5 mL susr injection, Administer Dose now and repeat in 6 months, Disp: 0.5 mL, Rfl: 1    albuterol sulfate 90 mcg/actuation aepb, Take 1-2 Puffs by inhalation every six (6) hours as needed for Wheezing., Disp: 1 Inhaler, Rfl: 0  Allergies   Allergen Reactions    Betadine [Povidone-Iodine] Hives    Red Dye Hives     Past Medical History:   Diagnosis Date    Arthritis     Breast cancer ([facility]     COPD (chronic obstructive pulmonary disease) ([facility])      No family history on file. ROS: As per HPI otherwise negative.     Objective:   Visit Vitals  /81 (BP 1 Location: Right arm, BP Patient Position: Sitting)   Pulse 89   Temp 97.7 °F (36.5 °C) (Oral)   Resp 20   Ht 5' 4\" (1.626 m)   Wt 160 lb (72.6 kg)   SpO2 95%   BMI 27.46 kg/m²     Gen: Well appearing. No apparent distress. Alert and oriented. Responds to all questions appropriately. Lungs: No labored respirations. Talking in complete sentences without difficulty. Musculoskeletal:  Knee: right  Knee Effusion: None  Quadriceps atrophy: None     ROM:  Flexion: 100  Extension: 0   Hip IR/ER: FROM without pain    Dynamic Test:  Gait: antalgic   Assistive devices: None    Palpation:   Patella tenderness: None  Patellar tendon tenderness: None  Quad tendon tenderness: None  Medial joint line tenderness: tender  Lateral joint line tenderness: None  Medial facet tenderness: tender  Lateral facet tenderness: None  Condyle tenderness: None  Tibia tubercle tenderness: None  Proximal fibula tenderness: None    Strength (0-5/5):   Flexion: Left: 5/5    Right: 5/5    Extension: Left: 5/5    Right: 5/5    Hip abduction: 5/5    Hip adduction: 5/5      Neuro/Vascular : Pulses intact, no edema, and neurologically intact . Skin: No obvious rash or skin breakdown. Recent Results (from the past 12 hour(s))   AMB POC URINALYSIS DIP STICK AUTO W/O MICRO    Collection Time: 07/14/20 10:50 AM   Result Value Ref Range    Color (UA POC) Yellow     Clarity (UA POC) Clear     Glucose (UA POC) Negative Negative    Bilirubin (UA POC) Negative Negative    Ketones (UA POC) Negative Negative    Specific gravity (UA POC) 1.015 1.001 - 1.035    Blood (UA POC) Negative Negative    pH (UA POC) 5.5 4.6 - 8.0    Protein (UA POC) Negative Negative    Urobilinogen (UA POC) 0.2 mg/dL 0.2 - 1    Nitrites (UA POC) Negative Negative    Leukocyte esterase (UA POC) Trace Negative         Imaging: Radiographs of the right knee personally reviewed and demonstrates no obvious fracture or dislocation.   Degenerative changes present most prominent in the medial compartment. PROCEDURE NOTE:     Informed consent obtained verbally and risks, benefits and alternatives discussed. Time out performed, cross checking patient ID and procedure. The right knee was cleaned and prepped with sterile technique using Chloraprep x3 and anesthetized with ethyl chloride spray. The patella, quad tendon, femur and the superior joint space were identified with ultrasound and the knee was injected from a superior-lateral approach with Celestone 12mg and 3ml of 1% lidocaine under sterile conditions using ultrasound guidance. The patient tolerated the procedure well and there were no complications. ASSESSMENT:    ICD-10-CM ICD-9-CM    1. Dysuria  R30.0 788.1 AMB POC URINALYSIS DIP STICK AUTO W/O MICRO   2. Chronic pain of right knee  M25.561 719.46 LIDOCAINE INJECTION    G89.29 338.29 BETAMETHASONE ACETATE & SODIUM PHOSPHATE INJECTION 3 MG EA.   3. Osteoarthritis of right knee, unspecified osteoarthritis type  M17.11 715.96 AMB POS US DRAIN/INJECT LARGE JOINT/BURSA       PLAN:  Knee pain: DJD  1. Home Exercise Program as per handout. 2. Ice 15 minutes, three times a day PRN and after exercise. Can alternate with heat for 15 minutes. 3. CSI as above    Medications:    1. Acetaminophen (Tylenol):  325 mg 1-2 tablets every 6 hours as needed for pain. Dysuria: UA WNL. Monitor.      RTC: PRN

## 2020-07-15 ENCOUNTER — HOSPITAL ENCOUNTER (OUTPATIENT)
Dept: LAB | Age: 75
Discharge: HOME | End: 2020-07-15

## 2020-07-15 DIAGNOSIS — G47.00 INSOMNIA, UNSPECIFIED TYPE: ICD-10-CM

## 2020-07-15 LAB
AMPHET UR QL SCN: NEGATIVE
BARBITURATES UR QL SCN: NEGATIVE
BENZODIAZ UR QL: POSITIVE
CANNABINOIDS UR QL SCN: NEGATIVE
COCAINE UR QL SCN: NEGATIVE
COMMENT, HOLDF: NORMAL
DRUG SCRN COMMENT,DRGCM: ABNORMAL
METHADONE UR QL: NEGATIVE
OPIATES UR QL: NEGATIVE
PCP UR QL: NEGATIVE
SAMPLES BEING HELD,HOLD: NORMAL

## 2020-07-28 NOTE — PROGRESS NOTES
[address] Medicine Residency Attending Addendum:  Dr. Lucia Epstein MD,  the patient and I were not physically present during this encounter. The resident and I are concurrently monitoring the patient care through appropriate telecommunication technology. I discussed the findings, assessment and plan with the resident and agree with the resident's findings and plan as documented in the resident's note.       Walker Ospina MD

## 2020-08-25 ENCOUNTER — TELEPHONE (OUTPATIENT)
Dept: FAMILY MEDICINE CLINIC | Age: 75
End: 2020-08-25

## 2020-09-04 ENCOUNTER — HOSPITAL ENCOUNTER (OUTPATIENT)
Dept: MAMMOGRAPHY | Age: 75
Discharge: HOME | End: 2020-09-04
Attending: STUDENT IN AN ORGANIZED HEALTH CARE EDUCATION/TRAINING PROGRAM
Payer: MEDICARE

## 2020-09-04 DIAGNOSIS — Z12.31 VISIT FOR SCREENING MAMMOGRAM: ICD-10-CM

## 2020-09-04 PROCEDURE — 77067 SCR MAMMO BI INCL CAD: CPT

## 2020-09-04 PROCEDURE — 77063 BREAST TOMOSYNTHESIS BI: CPT

## 2020-09-14 ENCOUNTER — VIRTUAL VISIT (OUTPATIENT)
Dept: FAMILY MEDICINE CLINIC | Age: 75
End: 2020-09-14
Payer: MEDICARE

## 2020-09-14 DIAGNOSIS — M17.11 ARTHRITIS OF RIGHT KNEE: Primary | ICD-10-CM

## 2020-09-14 DIAGNOSIS — Z00.00 HEALTHCARE MAINTENANCE: ICD-10-CM

## 2020-09-14 DIAGNOSIS — I10 ESSENTIAL HYPERTENSION: ICD-10-CM

## 2020-09-14 PROCEDURE — 99443 PR PHYS/QHP TELEPHONE EVALUATION 21-30 MIN: CPT | Performed by: STUDENT IN AN ORGANIZED HEALTH CARE EDUCATION/TRAINING PROGRAM

## 2020-09-14 RX ORDER — DICLOFENAC SODIUM 30 MG/G
GEL TOPICAL 2 TIMES DAILY
Qty: 100 G | Refills: 0 | Status: SHIPPED | OUTPATIENT
Start: 2020-09-14 | End: 2020-12-04

## 2020-09-14 NOTE — PROGRESS NOTES
Threasa Goltz  66 y.o. female  1945  [id number] [address] [address] [place] [id number]  [address]:    Telemedicine Progress Note  Francisco Toledo MD       Encounter Date and Time: September 16, 2020 at 3:19 PM    Consent: Threasa Goltz, who was seen by synchronous (real-time) audio-video technology, and/or her healthcare decision maker, is aware that this patient-initiated, Telehealth encounter on 9/14/2020 is a billable service, with coverage as determined by her insurance carrier. She is aware that she may receive a bill and has provided verbal consent to proceed: Yes. Chief Complaint   Patient presents with    Knee Pain    Follow Up Chronic Condition     History of Present Illness   Threasa Goltz is a 66 y.o. female was evaluated by synchronous (real-time) audio-video technology from home, through a secure patient portal. Patient with PMH remarkable for  Right knee OA HTN, HLD and COPD. Patient complaining of right knee pain. Patient states she has presented with R knee \"for a while\". States the right knee is worse with physical activity, located in anterior portion of the knee. Was evaluated in clinic two months ago. Xray was obtained and was remarkable for narrowing of the medial tibiofemoral joint space and osteophytes suggestive for OA. Steroid injection was placed at that time. Patient states that she initially felt relief after steroid injection however for the past three weeks has been experiencing pain again. HTN:  Patient states she occasionally monitors BP  at home and ranges from 735A-306L systolic. Today was 135/84. Does not keep log. Denies any CP, SOB, palpitations or focal neurological deficits. Insomnia:  Patient has been taking triazolam for over ten years. Dr. Spring Hale had counseled patient extensively during her last visit in July about risks associated with use of benzos.  Patient was reluctant to wean off triazolam. Today patient states she would consider weaning off as long as it is after the covid pandemic is over as she is very anxious about that and is unable to sleep as it is. States she sleeps 5-6 hours and feels if she discontinues it she would sleep even less and that would be detrimental to her health. Patient is open to transitioning to an alternative after COVID. States she understands the risks and why providers want her to stop taking it however is not ready at this point. Review of Systems   Review of Systems   Constitutional: Negative for chills and fever. HENT: Negative for congestion and sore throat. Eyes: Negative for blurred vision and double vision. Respiratory: Negative for cough, hemoptysis, sputum production, shortness of breath and wheezing. Cardiovascular: Negative for chest pain, palpitations and leg swelling. Gastrointestinal: Negative for abdominal pain, blood in stool, constipation, diarrhea, heartburn, melena, nausea and vomiting. Genitourinary: Negative for dysuria and urgency. Musculoskeletal: Negative for back pain and joint pain. Right knee pain   Skin: Negative for rash. Neurological: Negative for dizziness, focal weakness and headaches. Psychiatric/Behavioral: Negative for suicidal ideas. Vitals/Objective:     General: alert, cooperative, no distress   Mental  status: mental status: alert, oriented to person, place, and time, normal mood, behavior, speech, dress, motor activity, and thought processes   Resp: resp: normal effort and no respiratory distress   Neuro: neuro: no gross deficits   Skin: skin: no discoloration or lesions of concern on visible areas   Due to this being a TeleHealth evaluation, many elements of the physical examination are unable to be assessed.       Assessment and Plan:   Time-based coding, delete if not needed: I spent at least 25 minutes with this established patient, and >50% of the time was spent counseling and/or coordinating care regarding right knee pain and HTN      1. Arthritis of right knee: Xray was obtained and was remarkable for narrowing of the medial tibiofemoral joint space and osteophytes suggestive for OA. Received steroid injection two months ago however is presenting with R knee pain again.   - REFERRAL TO PHYSICAL THERAPY  - diclofenac (SOLARAZE) 3 % topical gel; Apply  to affected area two (2) times a day. Dispense: 100 g; Refill: 0  - Follow up with Dr. Nesha Allen. 2. HTN: BP today 138/84. Per patient usually in 120s-130s however does not measure it daily.   - Patient was advised to keep BP log at home and monitor BP twice a day. - Continue taking lisinopril 5 mg tab daily  -  METABOLIC PANEL, COMPREHENSIVE; Future  - MICROALBUMIN, UR, RAND W/ MICROALB/CREAT RATIO; Future  - Follow up in a month to review BP log. 3. Healthcare maintenance  - LIPID PANEL; Future  - HEMOGLOBIN A1C WITH EAG; Future  - CBC WITH AUTOMATED DIFF; Future  - METABOLIC PANEL, COMPREHENSIVE; Future  - MICROALBUMIN, UR, RAND W/ MICROALB/CREAT RATIO; Future  - Patient due for medicare yearly exam, will send message to front office to schedule. 4. Insomnia: Patient on Triazolam for over a decade.  appropriate. UDS was appropriately positive a month ago. - Patient is willing to wean off of triazolam after COVID pandemic and as long as an alternative is started. - Plan to wean of and transition to trazadone for next refill  - Patient expresses understanding of possible consequences of taking benzodiazepines. Time spent in direct conversation with the patient to include medical condition(s) discussed, assessment and treatment plan:       We discussed the expected course, resolution and complications of the diagnosis(es) in detail. Medication risks, benefits, costs, interactions, and alternatives were discussed as indicated.   I advised her to contact the office if her condition worsens, changes or fails to improve as anticipated. She expressed understanding with the diagnosis(es) and plan. Patient understands that this encounter was a temporary measure, and the importance of further follow up and examination was emphasized. Patient verbalized understanding. Patient informed to follow up: one month. Electronically Signed: Cristian Loo MD    CPT Codes [address]4419-[address]677 for Established Patients may apply to this Telehealth Visit. POS code: 18. Modifier GT    Zahraa Lang is a 66 y.o. female who was evaluated by an audio only encounter for concerns as above. Patient identification was verified prior to start of the visit. A caregiver was present when appropriate. Due to this being a TeleHealth encounter (During [place] [address] public health emergency), evaluation of the following organ systems was limited: Vitals/Constitutional/EENT/Resp/CV/GI//MS/Neuro/Skin/Heme-Lymph-Imm. Pursuant to the emergency declaration under the [address] [place] [address], [facility] waiver authority and the [employer] and [employer] Act, this Virtual Visit was conducted, with patient's (and/or legal guardian's) consent, to reduce the patient's risk of exposure to COVID-19 and provide necessary medical care. Services were provided through a synchronous discussion virtually to substitute for in-person clinic visit. I was at home. The patient was at home. History   Patients past medical, surgical and family histories were reviewed and updated. Past Medical History:   Diagnosis Date    Arthritis     [facility] ([facility]     COPD (chronic obstructive pulmonary disease) ([facility]      Past Surgical History:   Procedure Laterality Date    HX BREAST LUMPECTOMY Left     2003    HX MASTECTOMY Left     2013     No family history on file.   Social History     Socioeconomic History    Marital status: UNKNOWN     Spouse name: Not on file    Number of children: Not on file    Years of education: Not on file    Highest education level: Not on file   Occupational History    Not on file   Social Needs    Financial resource strain: Not on file    Food insecurity     Worry: Not on file     Inability: Not on file    Transportation needs     Medical: Not on file     Non-medical: Not on file   Tobacco Use    Smoking status: Current Every Day Smoker    Smokeless tobacco: Never Used    Tobacco comment: MOSES jarquin   Substance and Sexual Activity    Alcohol use: Not on file    Drug use: Never    Sexual activity: Not on file   Lifestyle    Physical activity     Days per week: Not on file     Minutes per session: Not on file    Stress: Not on file   Relationships    Social connections     Talks on phone: Not on file     Gets together: Not on file     Attends Yarsani service: Not on file     Active member of club or organization: Not on file     Attends meetings of clubs or organizations: Not on file     Relationship status: Not on file    Intimate partner violence     Fear of current or ex partner: Not on file     Emotionally abused: Not on file     Physically abused: Not on file     Forced sexual activity: Not on file   Other Topics Concern    Not on file   Social History Narrative    Not on file     There is no problem list on file for this patient. Current Medications/Allergies   Medications and Allergies reviewed:    Current Outpatient Medications   Medication Sig Dispense Refill    diclofenac (SOLARAZE) 3 % topical gel Apply  to affected area two (2) times a day. 100 g 0    tiotropium bromide (SPIRIVA RESPIMAT) 2.5 mcg/actuation inhaler Spiriva Respimat 2.5 mcg/actuation solution for inhalation      citalopram (CELEXA) 40 mg tablet Take 1 tablet by mouth once daily 90 Tab 0    triazolam (HALCION) 0.25 mg tablet Take 1 Tab by mouth nightly as needed (Insomnia). Max Daily Amount: 0.25 mg. 30 Tab 0    atorvastatin (LIPITOR) 40 mg tablet Take 1 Tab by mouth daily.  atorvastatin 40 mg tablet 90 Tab 1    lisinopriL (PRINIVIL, ZESTRIL) 5 mg tablet Take 1 Tab by mouth daily. 60 Tab 1    verapamil ER (CALAN-SR) 120 mg tablet verapamil ER (SR) 120 mg tablet,extended release      albuterol sulfate 90 mcg/actuation aepb Take 1-2 Puffs by inhalation every six (6) hours as needed for Wheezing.  1 Inhaler 0     Allergies   Allergen Reactions    Betadine [Povidone-Iodine] Hives    Red Dye Hives

## 2020-09-21 ENCOUNTER — TELEPHONE (OUTPATIENT)
Dept: FAMILY MEDICINE CLINIC | Age: 75
End: 2020-09-21

## 2020-09-21 DIAGNOSIS — G47.00 INSOMNIA, UNSPECIFIED TYPE: ICD-10-CM

## 2020-09-21 DIAGNOSIS — I10 ESSENTIAL HYPERTENSION: ICD-10-CM

## 2020-09-21 NOTE — TELEPHONE ENCOUNTER
Pt calling to office frustrated as she states the pharmacy sent her request to Dr. Yana Gerardo in error. She states it should have been sent to Dr. Nereida Calabrese as it was discussed she would be taking over when Dr. Jesús Niño. She states med refills were addressed incorrectly. She states the lisinopril 5 mg was to be for 90 days in that the triazolam was to be 30 days with 2 refills. She states this was discussed with Dr. Nereida Calabrese prior.       She ask that this be addressed ASAP today

## 2020-09-22 RX ORDER — TRIAZOLAM 0.25 MG/1
TABLET ORAL
Qty: 30 TAB | Refills: 2 | Status: SHIPPED | OUTPATIENT
Start: 2020-09-22 | End: 2020-11-24 | Stop reason: SDUPTHER

## 2020-09-22 RX ORDER — LISINOPRIL 5 MG/1
40 TABLET ORAL DAILY
Qty: 90 TAB | Refills: 3 | Status: SHIPPED | OUTPATIENT
Start: 2020-09-22 | End: 2020-09-25

## 2020-09-22 RX ORDER — LISINOPRIL 40 MG/1
40 TABLET ORAL DAILY
Qty: 30 TAB | Refills: 3 | Status: SHIPPED | OUTPATIENT
Start: 2020-09-22 | End: 2020-09-22

## 2020-09-22 NOTE — TELEPHONE ENCOUNTER
During previous encounter spoke to patient at length regarding discontinuing triazolam and transitioning to trazadone instead. Patient states she has been taking triazolam for over ten years and will consider switching medications once COVID is over given this is causing anxiety. States she has tried to discontinue trazolam in the past with no success. Will provide with two refill however will continue to advise patient to discontinue triazolam for future refill requests.

## 2020-09-25 ENCOUNTER — TELEPHONE (OUTPATIENT)
Dept: FAMILY MEDICINE CLINIC | Age: 75
End: 2020-09-25

## 2020-09-25 DIAGNOSIS — I10 ESSENTIAL HYPERTENSION: ICD-10-CM

## 2020-09-25 RX ORDER — LISINOPRIL 5 MG/1
5 TABLET ORAL DAILY
Qty: 90 TAB | Refills: 3 | Status: SHIPPED | OUTPATIENT
Start: 2020-09-25 | End: 2020-12-21 | Stop reason: SDUPTHER

## 2020-09-25 NOTE — PROGRESS NOTES
[address] Medicine Residency Attending Addendum:  Dr. Peggy Valles MD,  the patient and I were not physically present during this encounter. The resident and I are concurrently monitoring the patient care through appropriate telecommunication technology. I discussed the findings, assessment and plan with the resident and agree with the resident's findings and plan as documented in the resident's note.       Dylan Navas MD

## 2020-09-25 NOTE — TELEPHONE ENCOUNTER
----- Message from Dina Hickey sent at 9/24/2020 12:06 PM EDT -----  Regarding: Dr. Abdulaziz Adams (if not patient): n/a  Relationship of caller (if not patient): self  Best contact number(s): [phone]  Name of medication and dosage if known: Lisinoprel 5mg  Is patient out of this medication (yes/no): yes  Pharmacy name: [address] listed in chart? (yes/no): yes  Pharmacy phone number: [phone]  Date of last visit: 9/14/20  Details: Ms. Elodie Sandifer is calling as her Rx \"Lisinoprel 5mg\" was filled but the wrong mg 40. Ms. Elodie Sandifer needs to know has the dosage changed or will a new one Rx be called in with 5mg. Ms. Elodie Sandifer is requesting 90 day supply but only received 30 day and would like it changed back to 90 days.

## 2020-10-01 ENCOUNTER — TELEPHONE (OUTPATIENT)
Dept: FAMILY MEDICINE CLINIC | Age: 75
End: 2020-10-01

## 2020-10-01 DIAGNOSIS — Z85.3 HISTORY OF BREAST CANCER: Primary | ICD-10-CM

## 2020-10-01 NOTE — TELEPHONE ENCOUNTER
Indeterminate Mammogram. Proceed with Dx Mamm per Radiology Recs. Pt notified by Radiology. Will have this clinic notify her as well.

## 2020-10-14 ENCOUNTER — HOSPITAL ENCOUNTER (OUTPATIENT)
Dept: MAMMOGRAPHY | Age: 75
Discharge: HOME | End: 2020-10-14
Attending: STUDENT IN AN ORGANIZED HEALTH CARE EDUCATION/TRAINING PROGRAM
Payer: MEDICARE

## 2020-10-14 ENCOUNTER — TRANSCRIBE ORDER (OUTPATIENT)
Dept: MAMMOGRAPHY | Age: 75
End: 2020-10-14

## 2020-10-14 DIAGNOSIS — R92.8 ABNORMALITY OF RIGHT BREAST ON SCREENING MAMMOGRAM: ICD-10-CM

## 2020-10-14 DIAGNOSIS — R92.8 ABNORMALITY OF RIGHT BREAST ON SCREENING MAMMOGRAM: Primary | ICD-10-CM

## 2020-10-14 DIAGNOSIS — Z85.3 HISTORY OF BREAST CANCER: ICD-10-CM

## 2020-10-14 PROCEDURE — 77065 DX MAMMO INCL CAD UNI: CPT

## 2020-10-14 PROCEDURE — 76642 ULTRASOUND BREAST LIMITED: CPT

## 2020-10-16 ENCOUNTER — TELEPHONE (OUTPATIENT)
Dept: FAMILY MEDICINE CLINIC | Age: 75
End: 2020-10-16

## 2020-10-16 DIAGNOSIS — R92.8 ABNORMAL MAMMOGRAM: Primary | ICD-10-CM

## 2020-10-16 NOTE — TELEPHONE ENCOUNTER
Received request from radiology for ultrasound guided biopsy order that has already been scheduled on 10/29. Order placed.

## 2020-10-19 ENCOUNTER — TRANSCRIBE ORDER (OUTPATIENT)
Dept: MAMMOGRAPHY | Age: 75
End: 2020-10-19

## 2020-10-19 ENCOUNTER — TELEPHONE (OUTPATIENT)
Dept: FAMILY MEDICINE CLINIC | Age: 75
End: 2020-10-19

## 2020-10-19 DIAGNOSIS — R92.8 ABNORMAL MAMMOGRAM: Primary | ICD-10-CM

## 2020-10-19 NOTE — TELEPHONE ENCOUNTER
Called [facility] at [phone] to make sure a prior authorization is not needed to proceed with biopsy. She states that a prior Digna Madura is not needed. She also confirmed order is in. Spoke to Rosa Kimball. Will also put in a referral for  Dr. Rere Oliver. Spoke to patient and states she will call and make appointment.

## 2020-10-23 DIAGNOSIS — F41.1 GAD (GENERALIZED ANXIETY DISORDER): ICD-10-CM

## 2020-10-26 RX ORDER — CITALOPRAM 40 MG/1
TABLET, FILM COATED ORAL
Qty: 90 TAB | Refills: 0 | Status: SHIPPED | OUTPATIENT
Start: 2020-10-26 | End: 2020-12-21 | Stop reason: SDUPTHER

## 2020-10-28 ENCOUNTER — VIRTUAL VISIT (OUTPATIENT)
Dept: FAMILY MEDICINE CLINIC | Age: 75
End: 2020-10-28
Payer: MEDICARE

## 2020-10-28 DIAGNOSIS — F41.1 GAD (GENERALIZED ANXIETY DISORDER): ICD-10-CM

## 2020-10-28 DIAGNOSIS — I10 ESSENTIAL HYPERTENSION: Primary | ICD-10-CM

## 2020-10-28 DIAGNOSIS — E78.5 DYSLIPIDEMIA: ICD-10-CM

## 2020-10-28 PROCEDURE — 99213 OFFICE O/P EST LOW 20 MIN: CPT | Performed by: FAMILY MEDICINE

## 2020-10-28 NOTE — PROGRESS NOTES
Neil Armstrong  66 y.o. female  1945  [address] [facility] [address] [id number]  [address]:    Telemedicine Progress Note  Bo Campos MD       Encounter Date and Time: October 28, 2020 at 9:01 AM     Consent: Neil Armstrong, who was seen by synchronous (real-time) audio-video technology, and/or her healthcare decision maker, is aware that this patient-initiated, Telehealth encounter on 10/28/2020 is a billable service, with coverage as determined by her insurance carrier. She is aware that she may receive a bill and has provided verbal consent to proceed: Yes. Chief Complaint   Patient presents with    Medication Refill     History of Present Illness   Neil Armstrong is a 66 y.o. female was evaluated by synchronous (real-time) audio-video technology from home, through a secure patient portal.    HTN:   Keeps the log. SBP is 129-149s and DBP in 80-90s. This morning it was 129/79. Patient takes Lisinopril 5 mg. She checks BP right away, when sits up, uses regular size cuff. Denies HA, dizziness, palpitations, SOB, KELIN, abd pain. She was supposed to get fasting labs done in September, but she was afraid to come to clinic, and then got busy with breast cancer follow ups. Patient is scheduled to get breast biopsy tomorrow and is anxious about this, but is able to control her anxiety. She would like to come next week for labs. COVID Questions:   Experiencing any of the following symptoms: -fever, -chills, -cough, - SOB, -diarrhea, -URI symptoms. Denies any Sick contacts with fever, cough, diarrhea, SOB, URI symptoms. Denies travel out of state or out of country. Review of Systems   Review of Systems   Constitutional: Negative for chills, fever and malaise/fatigue. HENT: Negative for congestion and sore throat. Eyes: Negative for blurred vision. Respiratory: Negative for cough and shortness of breath.     Cardiovascular: Negative for chest pain, palpitations and leg swelling. Gastrointestinal: Negative for abdominal pain. Neurological: Negative for headaches. Vitals/Objective:     General: alert, cooperative, no distress   Mental  status: mental status: alert, oriented to person, place, and time, normal mood, behavior, speech, dress, motor activity, and thought processes   Resp: resp: normal effort and no respiratory distress   Neuro: neuro: no gross deficits   Skin: skin: no discoloration or lesions of concern on visible areas   Due to this being a TeleHealth evaluation, many elements of the physical examination are unable to be assessed. Assessment and Plan:   Time-based coding, delete if not needed: I spent at least 15 minutes with this established patient, and >50% of the time was spent counseling and/or coordinating care regarding plan of care    1. Essential hypertension  - Discussed proper techniques to check BP  - Stay on same dose of medication for now  - Fasting labs and follow up visit scheduled   - Continue healthy diet and physical activity as tolerated d/t knee pain  - Pt will bring her cuff to compare with office cuff, her BP in clinic tens to be in 120/80, but average home BP seems to be higher. 2. Dyslipidemia  - No changes to meds for now  - Follow up as above     3. POOL (generalized anxiety disorder)  - Patient appears reasonable and calm, she is able to cope with stress well.   -Offer counseling frequently   - Follow up closely for POOL to make sure its' well controlled, especially in lieu of stressful event with possible recurrent cancer. We discussed the expected course, resolution and complications of the diagnosis(es) in detail. Medication risks, benefits, costs, interactions, and alternatives were discussed as indicated. I advised her to contact the office if her condition worsens, changes or fails to improve as anticipated.  She expressed understanding with the diagnosis(es) and plan. Patient understands that this encounter was a temporary measure, and the importance of further follow up and examination was emphasized. Patient verbalized understanding. Patient informed to follow up: Follow-up and Dispositions  ·   Return in about 5 days (around 11/2/2020) for fasting labs. RTC to f/u with Dr Rolo Feng on labs, BP and knee pain on 11/6/20 at 10.30 am.         Electronically Signed: Melanie Sanabria MD    CPT Codes 35610-61427 for Established Patients may apply to this Telehealth Visit. POS code: 18. Modifier GT    Luis Manuel Lynch is a 66 y.o. female who was evaluated by an audio-video encounter for concerns as above. Patient identification was verified prior to start of the visit. A caregiver was present when appropriate. Due to this being a TeleHealth encounter (During [place]-[id number] public health emergency), evaluation of the following organ systems was limited: Vitals/Constitutional/EENT/Resp/CV/GI//MS/Neuro/Skin/Heme-Lymph-Imm. Pursuant to the emergency declaration under the [facility], [facility] waiver authority and the [employer] and [employer] Act, this Virtual Visit was conducted, with patient's (and/or legal guardian's) consent, to reduce the patient's risk of exposure to COVID-19 and provide necessary medical care. Services were provided through a synchronous discussion virtually to substitute for in-person clinic visit. I was at home. The patient was at home. History   Patients past medical, surgical and family histories were reviewed and updated. Past Medical History:   Diagnosis Date    Arthritis     [facility] ([facility] [address]     COPD (chronic obstructive pulmonary disease) ([facility] [address] [facility]      Past Surgical History:   Procedure Laterality Date    HX BREAST LUMPECTOMY Left     2003    HX MASTECTOMY Left     2013     No family history on file.   Social History     Socioeconomic History    Marital status: UNKNOWN     Spouse name: Not on file    Number of children: Not on file    Years of education: Not on file    Highest education level: Not on file   Occupational History    Not on file   Social Needs    Financial resource strain: Not on file    Food insecurity     Worry: Not on file     Inability: Not on file    Transportation needs     Medical: Not on file     Non-medical: Not on file   Tobacco Use    Smoking status: Current Every Day Smoker    Smokeless tobacco: Never Used    Tobacco comment: MOSES jarquin   Substance and Sexual Activity    Alcohol use: Not on file    Drug use: Never    Sexual activity: Not on file   Lifestyle    Physical activity     Days per week: Not on file     Minutes per session: Not on file    Stress: Not on file   Relationships    Social connections     Talks on phone: Not on file     Gets together: Not on file     Attends Restorationist service: Not on file     Active member of club or organization: Not on file     Attends meetings of clubs or organizations: Not on file     Relationship status: Not on file    Intimate partner violence     Fear of current or ex partner: Not on file     Emotionally abused: Not on file     Physically abused: Not on file     Forced sexual activity: Not on file   Other Topics Concern    Not on file   Social History Narrative    Not on file     There is no problem list on file for this patient. Current Medications/Allergies   Medications and Allergies reviewed:    Current Outpatient Medications   Medication Sig Dispense Refill    citalopram (CELEXA) 40 mg tablet Take 1 tablet by mouth once daily 90 Tab 0    lisinopriL (PRINIVIL, ZESTRIL) 5 mg tablet Take 1 Tab by mouth daily. 90 Tab 3    triazolam (HALCION) 0.25 mg tablet TAKE 1 TABLET BY MOUTH NIGHTLY AS NEEDED . MAX  DAILY  AMOUNT  0.25  MG 30 Tab 2    diclofenac (SOLARAZE) 3 % topical gel Apply  to affected area two (2) times a day.  100 g 0    tiotropium bromide (SPIRIVA RESPIMAT) 2.5 mcg/actuation inhaler Spiriva Respimat 2.5 mcg/actuation solution for inhalation      atorvastatin (LIPITOR) 40 mg tablet Take 1 Tab by mouth daily. atorvastatin 40 mg tablet 90 Tab 1    verapamil ER (CALAN-SR) 120 mg tablet verapamil ER (SR) 120 mg tablet,extended release      albuterol sulfate 90 mcg/actuation aepb Take 1-2 Puffs by inhalation every six (6) hours as needed for Wheezing.  1 Inhaler 0     Allergies   Allergen Reactions    Betadine [Povidone-Iodine] Hives    Red Dye Hives

## 2020-10-28 NOTE — Clinical Note
Lab visit 11/2/20 for fasting labs at 8 am  Office visit with Dr Quinn Trejo on 11/6/20 at 10.30 am for BP check and knee pain  ThanksSTEPHANIA

## 2020-10-29 ENCOUNTER — HOSPITAL ENCOUNTER (OUTPATIENT)
Dept: MAMMOGRAPHY | Age: 75
Discharge: HOME | End: 2020-10-29
Attending: STUDENT IN AN ORGANIZED HEALTH CARE EDUCATION/TRAINING PROGRAM
Payer: MEDICARE

## 2020-10-29 ENCOUNTER — TRANSCRIBE ORDER (OUTPATIENT)
Dept: MAMMOGRAPHY | Age: 75
End: 2020-10-29

## 2020-10-29 DIAGNOSIS — N63.12 MASS OF UPPER INNER QUADRANT OF RIGHT BREAST: ICD-10-CM

## 2020-10-29 DIAGNOSIS — N63.12 MASS OF UPPER INNER QUADRANT OF RIGHT BREAST: Primary | ICD-10-CM

## 2020-10-29 DIAGNOSIS — R92.8 ABNORMAL MAMMOGRAM: ICD-10-CM

## 2020-10-29 PROCEDURE — 88360 TUMOR IMMUNOHISTOCHEM/MANUAL: CPT

## 2020-10-29 PROCEDURE — 88305 TISSUE EXAM BY PATHOLOGIST: CPT

## 2020-10-29 PROCEDURE — 19083 BX BREAST 1ST LESION US IMAG: CPT

## 2020-10-29 PROCEDURE — 74011000250 HC RX REV CODE- 250: Performed by: RADIOLOGY

## 2020-10-29 PROCEDURE — 77065 DX MAMMO INCL CAD UNI: CPT

## 2020-10-29 RX ORDER — SODIUM BICARBONATE 42 MG/ML
5 INJECTION, SOLUTION INTRAVENOUS
Status: COMPLETED | OUTPATIENT
Start: 2020-10-29 | End: 2020-10-29

## 2020-10-29 RX ORDER — LIDOCAINE HYDROCHLORIDE AND EPINEPHRINE 10; 10 MG/ML; UG/ML
8 INJECTION, SOLUTION INFILTRATION; PERINEURAL ONCE
Status: COMPLETED | OUTPATIENT
Start: 2020-10-29 | End: 2020-10-29

## 2020-10-29 RX ORDER — LIDOCAINE HYDROCHLORIDE 10 MG/ML
12 INJECTION INFILTRATION; PERINEURAL
Status: COMPLETED | OUTPATIENT
Start: 2020-10-29 | End: 2020-10-29

## 2020-10-29 RX ADMIN — SODIUM BICARBONATE 210 MG: 42 SOLUTION INTRAVENOUS at 09:54

## 2020-10-29 RX ADMIN — LIDOCAINE HYDROCHLORIDE 12 ML: 10 INJECTION, SOLUTION INFILTRATION; PERINEURAL at 09:54

## 2020-10-29 RX ADMIN — LIDOCAINE HYDROCHLORIDE AND EPINEPHRINE 80 MG: 10; 10 INJECTION, SOLUTION INFILTRATION; PERINEURAL at 09:54

## 2020-10-29 NOTE — PROGRESS NOTES
9:35am patient received for right breast ultrasound biopsy. Procedure explained to patient as well as risks associated with procedure. Patient states she has had biopsy in past as well as breast surgery. Post biopsy discharge instructions reviewed with patient. Patient prefers to receive results at Dr Alondra Marshall office on upcoming appointment. 10:00am patient tolerated procedure well. Minimal bleeding noted. Pressure held to biopsy site for 5 minutes.

## 2020-10-30 NOTE — PROGRESS NOTES
[address] Medicine Residency Attending Addendum:  Dr. La Cornell MD,  the patient and I were not physically present during this encounter. The resident and I are concurrently monitoring the patient care through appropriate telecommunication technology. I discussed the findings, assessment and plan with the resident and agree with the resident's findings and plan as documented in the resident's note.       Lala Morgan MD

## 2020-11-02 ENCOUNTER — DOCUMENTATION ONLY (OUTPATIENT)
Dept: SURGERY | Age: 75
End: 2020-11-02

## 2020-11-02 NOTE — PROGRESS NOTES
Patient cancelled breast talk. Her biopsy results are not in and she is overwhelmed at this time, her provider scheduled her appointment before speaking with her. She will call back and schedule.

## 2020-11-03 ENCOUNTER — TELEPHONE (OUTPATIENT)
Dept: FAMILY MEDICINE CLINIC | Age: 75
End: 2020-11-03

## 2020-11-03 NOTE — TELEPHONE ENCOUNTER
----- Message from Adriana Durand sent at 11/2/2020  4:37 PM EST -----  Regarding: Dr. Cameron Bowman first and last name: n/a  Reason for call: test results  Callback required yes/no and why: yes  Best contact number(s): [phone]  Details to clarify the request:  Ms. Alisia Bence is requesting the results of her test from 10/29 from Dr. Jose Sosa.

## 2020-11-03 NOTE — PROGRESS NOTES
Pathology results in and reviewed by Dr Amy Dominique and faxed to Dr Gray Nguyen. Patient contacted by phone with pathology results by Dr Amy Dominique. Patient to see Dr Shelly Cuevas for breast talk. Patient to be contacted with appointment details by Dr Annalisa Mata' s office.

## 2020-11-03 NOTE — TELEPHONE ENCOUNTER
Spoke to . Jaqueline Fernández. States she is aware of surcical pathology result    Invasive ductal carcinoma. Patient had cancelled appointment with Dr. Dioni Simons stating she is very nervous with everytjing going on including her diagnsois, COVID and the elections. She had appointment on 11/6 initially which was then rescheduled for tomorrow but patient then cancelled tomorrow's appointment as well. After speaking with her she understands importance of keeping Vist. She asked me to call Dr. Keyshawn James office to reschedule appointment. Spoke to Mateus De La Garza who scheduled her appointment for tomorrow at 3:45 pm at [facility].

## 2020-11-04 ENCOUNTER — OFFICE VISIT (OUTPATIENT)
Dept: SURGERY | Age: 75
End: 2020-11-04
Payer: MEDICARE

## 2020-11-04 VITALS
DIASTOLIC BLOOD PRESSURE: 90 MMHG | BODY MASS INDEX: 27.31 KG/M2 | HEART RATE: 73 BPM | HEIGHT: 64 IN | WEIGHT: 160 LBS | SYSTOLIC BLOOD PRESSURE: 152 MMHG | TEMPERATURE: 98.2 F

## 2020-11-04 DIAGNOSIS — Z85.3 HISTORY OF LEFT BREAST CANCER: ICD-10-CM

## 2020-11-04 DIAGNOSIS — N63.10 BREAST MASS, RIGHT: ICD-10-CM

## 2020-11-04 DIAGNOSIS — C50.911 INVASIVE DUCTAL CARCINOMA OF BREAST, RIGHT (HCC): Primary | ICD-10-CM

## 2020-11-04 PROCEDURE — G8755 DIAS BP > OR = 90: HCPCS | Performed by: SURGERY

## 2020-11-04 PROCEDURE — G8753 SYS BP > OR = 140: HCPCS | Performed by: SURGERY

## 2020-11-04 PROCEDURE — 1101F PT FALLS ASSESS-DOCD LE1/YR: CPT | Performed by: SURGERY

## 2020-11-04 PROCEDURE — G8536 NO DOC ELDER MAL SCRN: HCPCS | Performed by: SURGERY

## 2020-11-04 PROCEDURE — G8419 CALC BMI OUT NRM PARAM NOF/U: HCPCS | Performed by: SURGERY

## 2020-11-04 PROCEDURE — G8432 DEP SCR NOT DOC, RNG: HCPCS | Performed by: SURGERY

## 2020-11-04 PROCEDURE — G8427 DOCREV CUR MEDS BY ELIG CLIN: HCPCS | Performed by: SURGERY

## 2020-11-04 PROCEDURE — 76642 ULTRASOUND BREAST LIMITED: CPT | Performed by: SURGERY

## 2020-11-04 PROCEDURE — 99205 OFFICE O/P NEW HI 60 MIN: CPT | Performed by: SURGERY

## 2020-11-04 PROCEDURE — 1090F PRES/ABSN URINE INCON ASSESS: CPT | Performed by: SURGERY

## 2020-11-04 PROCEDURE — G8400 PT W/DXA NO RESULTS DOC: HCPCS | Performed by: SURGERY

## 2020-11-04 RX ORDER — ANASTROZOLE 1 MG/1
1 TABLET ORAL DAILY
Qty: 30 TAB | Refills: 5 | Status: SHIPPED | OUTPATIENT
Start: 2020-11-04 | End: 2021-04-19

## 2020-11-04 NOTE — PATIENT INSTRUCTIONS
Anastrozole (By mouth) Anastrozole (an-AS-troe-zole) Treats breast cancer. Brand Name(s): Arimidex There may be other brand names for this medicine. When This Medicine Should Not Be Used: This medicine is not right for everyone. Do not use it if you had an allergic reaction to anastrozole, if you are pregnant, or if you have not stopped menstruating (premenopausal). How to Use This Medicine:  
Tablet · Medicines used to treat cancer are very strong and can have many side effects. Before receiving this medicine, make sure you understand all the risks and benefits. It is important for you to work closely with your doctor during your treatment. · Your doctor will tell you how much medicine to use. Do not use more than directed. · Read and follow the patient instructions that come with this medicine. Talk to your doctor or pharmacist if you have any questions. · Missed dose: Take a dose as soon as you remember. If it is almost time for your next dose, wait until then and take a regular dose. Do not take extra medicine to make up for a missed dose. · If you vomit after taking your medicine, call your doctor or pharmacist for instructions. · Store the medicine in a closed container at room temperature, away from heat, moisture, and direct light. · Ask your pharmacist, doctor, or health caregiver about the best way to dispose of any leftover medicine after you have finished your treatment. You will also need to throw away old medicine after the expiration date has passed. Drugs and Foods to Avoid: Ask your doctor or pharmacist before using any other medicine, including over-the-counter medicines, vitamins, and herbal products. · Some medicines can affect how anastrozole works. Tell your doctor if you are taking any of the following: ¨ Medicine that contains estrogen ¨ Tamoxifen Warnings While Using This Medicine: · Pregnancy after menopause is not likely, but if you think you could be pregnant, tell your doctor. This medicine could harm an unborn baby. · Tell your doctor if you are breastfeeding, or if you have liver disease, bone problems (such as osteoporosis), high cholesterol, or heart disease. · This medicine may cause the following problems:  
¨ Increased risk for weak bones or osteoporosis ¨ Increased cholesterol levels ¨ Increased risk for heart attack or stroke · Your doctor will do lab tests at regular visits to check on the effects of this medicine. Keep all appointments. · Cancer medicine can cause nausea or vomiting, sometimes even after you receive medicine to prevent these effects. Ask your doctor or nurse about other ways to control any nausea or vomiting that might happen. · Keep all medicine out of the reach of children. Never share your medicine with anyone. Possible Side Effects While Using This Medicine:  
Call your doctor right away if you notice any of these side effects: · Allergic reaction: Itching or hives, swelling in your face or hands, swelling or tingling in your mouth or throat, chest tightness, trouble breathing · Back, bone, joint, or muscle pain · Blistering, peeling, or red skin rash · Chest pain or shortness of breath · Fever, chills, cough, sore throat, and body aches · Numbness, tingling, or burning pain in your hands, arms, legs, or feet · Rapid weight gain, or swelling in your hands, ankles, or feet · Severe diarrhea, nausea, or vomiting · Vaginal bleeding or discharge · Yellowing of your skin or the whites of your eyes If you notice these less serious side effects, talk with your doctor: · Breast pain · Dizziness, headache, tiredness, or weakness · Mood changes, anxiety, or irritability · Trouble sleeping · Warmth or redness in your face, neck, arms, or upper chest 
If you notice other side effects that you think are caused by this medicine, tell your doctor. Call your doctor for medical advice about side effects.  You may report side effects to FDA at 8-144-FDA-7575 © 2017 [facility] Information is for End User's use only and may not be sold, redistributed or otherwise used for commercial purposes. The above information is an  only. It is not intended as medical advice for individual conditions or treatments. Talk to your doctor, nurse or pharmacist before following any medical regimen to see if it is safe and effective for you.

## 2020-11-04 NOTE — PROGRESS NOTES
HISTORY OF PRESENT ILLNESS  Ruth Paez is a 66 y.o. female. HPI  NEW patient presents for consultation at the request of Merlene Villafuerte for new diagnosis of RIGHT breast cancer diagnosed after an abnormal screening mammogram.  She is not feeling any breast lumps, has no nipple/skin changes. 10/29/20: RIGHT breast bx. PATH: IDC, at least 2.5mm, favor grade 2, ER+(100%)/[place]+(99%)/HER2-. Clinical stage 1.      2003: LEFT breast cancer. Lumpectomy, chemo, and XRT, followed by 5 years of Tamoxifen. 2012: LEFT breast cancer. Mastectomy, followed by five years of Anastrozole. She has a history of LEFT breast cancer in 2003 and 2012. Had a lumpectomy, chemo and XRT in 2003 followed by five years of tamoxifen, and then a mastectomy followed by five years of anastrozole in 2012. FH - no FH of breast or ovarian cancer. STEFF Results (most recent):       Results from [place] encounter on 10/29/20   STEFF POST BX IMAGING RT INCL CAD     Addendum Addendum:   The pathology reveals invasive ductal carcinoma. Findings are concordant. The findings were discussed with the patient by telephone by Dr. Franklin Everett. Surgical consultation recommended. She will follow-up with Dr. Zheng Read. Ale Parker MD 11/3/2020  3:45 PM           Narrative History: Right breast mass.     After complete explanation of the procedure, including risks, benefits, and  complications, informed consent was obtained. The patient was placed on the  ultrasound table and using standard aseptic technique, and lidocaine for local  anesthesia, the mass within the periareolar 12:00 position of the right breast  was vacuum sampled under ultrasound. A clip was left in place. A follow-up  digital mammogram demonstrates appropriate clip placement.  The patient left the  department in stable condition.        Impression IMPRESSION: Successful ultrasound guided vacuum core biopsy right breast mass.                    Past Medical History:   Diagnosis Date    Arthritis     Breast cancer ([facility] 10/2020    Breast cancer, left breast ([facility] 2003, 2012    lumpectomy/chemo/XRT, then mastectomy and anastrozole in 2012    COPD (chronic obstructive pulmonary disease) ([facility]     Hypertension        Past Surgical History:   Procedure Laterality Date    HX BREAST LUMPECTOMY Left     2003    HX MASTECTOMY Left     2013       Social History     Socioeconomic History    Marital status: UNKNOWN     Spouse name: Not on file    Number of children: Not on file    Years of education: Not on file    Highest education level: Not on file   Occupational History    Not on file   Social Needs    Financial resource strain: Not on file    Food insecurity     Worry: Not on file     Inability: Not on file    Transportation needs     Medical: Not on file     Non-medical: Not on file   Tobacco Use    Smoking status: Current Every Day Smoker     Types: Cigarettes    Smokeless tobacco: Never Used    Tobacco comment: E-cigarettes   Substance and Sexual Activity    Alcohol use:  Yes    Drug use: Never    Sexual activity: Not on file   Lifestyle    Physical activity     Days per week: Not on file     Minutes per session: Not on file    Stress: Not on file   Relationships    Social connections     Talks on phone: Not on file     Gets together: Not on file     Attends [employer] service: Not on file     Active member of club or organization: Not on file     Attends meetings of clubs or organizations: Not on file     Relationship status: Not on file    Intimate partner violence     Fear of current or ex partner: Not on file     Emotionally abused: Not on file     Physically abused: Not on file     Forced sexual activity: Not on file   Other Topics Concern    Not on file   Social History Narrative    Not on file       Current Outpatient Medications on File Prior to Visit   Medication Sig Dispense Refill    citalopram (CELEXA) 40 mg tablet Take 1 tablet by mouth once daily 90 Tab 0    lisinopriL (PRINIVIL, ZESTRIL) 5 mg tablet Take 1 Tab by mouth daily. 90 Tab 3    triazolam (HALCION) 0.25 mg tablet TAKE 1 TABLET BY MOUTH NIGHTLY AS NEEDED . MAX  DAILY  AMOUNT  0.25  MG 30 Tab 2    tiotropium bromide (SPIRIVA RESPIMAT) 2.5 mcg/actuation inhaler Spiriva Respimat 2.5 mcg/actuation solution for inhalation      atorvastatin (LIPITOR) 40 mg tablet Take 1 Tab by mouth daily. atorvastatin 40 mg tablet 90 Tab 1    diclofenac (SOLARAZE) 3 % topical gel Apply  to affected area two (2) times a day. 100 g 0    verapamil ER (CALAN-SR) 120 mg tablet verapamil ER (SR) 120 mg tablet,extended release       No current facility-administered medications on file prior to visit. Allergies   Allergen Reactions    Betadine [Povidone-Iodine] Hives    Red Dye Hives       OB History    No obstetric history on file. Obstetric Comments   Menarche 11, LMP 0, # of children 2, age of 4st delivery 23, Hysterectomy/oophorectomy No/No, Breast bx Yes, LEFT and RIGHT, history of breast feeding Yes, BCP No, Hormone therapy No               ROS  Constitutional: Negative. HENT: Negative. Eyes: Negative. Respiratory: Positive for shortness of breath. Cardiovascular: Negative. Gastrointestinal: Negative. Genitourinary: Negative. Musculoskeletal: Positive for joint pain. Skin: Negative. Neurological: Negative. Endo/Heme/Allergies: Negative. Psychiatric/Behavioral: The patient is nervous/anxious. Physical Exam  Exam conducted with a chaperone present. Cardiovascular:      Rate and Rhythm: Normal rate and regular rhythm. Heart sounds: Normal heart sounds. Pulmonary:      Breath sounds: Normal breath sounds. Chest:      Breasts: Breasts are symmetrical.         Right: Skin change (ecchymosis s/p bx) present. No swelling, bleeding, inverted nipple, mass, nipple discharge or tenderness. Left: Absent. No mass or skin change. Lymphadenopathy:      Cervical:      Right cervical: No superficial, deep or posterior cervical adenopathy. Left cervical: No superficial, deep or posterior cervical adenopathy. Upper Body:      Right upper body: No supraclavicular or axillary adenopathy. Left upper body: No supraclavicular or axillary adenopathy. BREAST ULTRASOUND, Pre-op Planning  Indication: Pre-op planning, RIGHT breast   Technique: The area was scanned using a high-frequency linear-array near-field transducer  Findings: Small mass and clip behind the nipple, and normal axillary LN  Impression: Breast cancer  Disposition: Neoadjuvant hormonal therapy      ASSESSMENT and PLAN    ICD-10-CM ICD-9-CM    1. Invasive ductal carcinoma of breast, right (HCC)  C50.911 174.9    2. Breast mass, right  N63.10 611.72    3. History of left breast cancer  Z85.3 V10.3       Pt presents for consultation for treatment of RIGHT breast IDC, ER+/NV+/HER2-, clinical stage 1. Palpable thickening on exam behind the RIGHT nipple, with ecchymosis s/p bx. US visualizes small mass and clip behind the nipple, and normal axillary LN. Well healed incision s/p LEFT mastectomy, no evidence of recurrence. We had a long discussion of options for treatment. A total of 60 minutes were spent face-to-face with the patient, accompanied by her daughter, during this encounter, and over half of that time was spent on counseling and coordination of care. We discussed in depth the pathology results, and the need for treatment. The goals of treatment are to treat the breast, and to reduce risk of local or distant recurrence. Discussed surgical options with risks, complications, benefits, and limitations. Due to location of mass, we discussed central lumpectomy. Mastectomy is a bigger operation with increased risk given pulmonary disease. I do not favor SLNBx given normal axillary US, and because results would not likely change treatment plan.  Pt states that she is nervous about surgery given COPD and emphysema. We therefore discussed neoadjuvant hormonal therapy with AI, and close clinical observation. If mass progresses on AI, would proceed with surgery. Chemo and XRT are not recommended due to pulmonary disease. Will refer to medical oncology, and send prescription for Arimidex to pt's pharmacy. Follow up in 1 month. This plan was reviewed with the patient and patient agrees. All questions were answered.     Written by Delia Luna, as dictated by Dr. Yobany Alvarenga MD.

## 2020-11-04 NOTE — LETTER
11/16/2020 1:33 PM 
 
Patient:  Renetta Barrientos YOB: 1945 Date of Visit: 11/4/2020 Dear Dr. Arcelia Shaw: Thank you for referring Ms. Renetta Barrientos to me for evaluation/treatment. Below are the relevant portions of my assessment and plan of care. If you have questions, please do not hesitate to call me. I look forward to following Ms. Malorie Stephens along with you.  
 
 
 
Sincerely, 
 
 
Judith Pollack MD

## 2020-11-04 NOTE — PROGRESS NOTES
HISTORY OF PRESENT ILLNESS Bashir Yousif is a 66 y.o. female. HPI   NEW patient presents for consultation at the request of Haylee Marcum for new diagnosis of RIGHT breast cancer diagnosed after an abnormal screening mammogram.  She is not feeling any breast lumps, has no nipple/skin changes. 10/29/20: RIGHT breast bx. PATH: IDC, at least 2.5mm, favor grade 2, ER+(100%)/[place]+(99%)/HER2-. She has a history of LEFT breast cancer in 2003 and 2012. Had a lumpectomy, chemo and XRT in 2003 and then a mastectomy followed by five years of anastrozole in 2012. FH - no FH of breast or ovarian cancer. STEFF Results (most recent): 
Results from Hospital Encounter encounter on 10/29/20 STEFF POST BX IMAGING RT INCL CAD Addendum Addendum:   The pathology reveals invasive ductal carcinoma. Findings are concordant. The findings were discussed with the patient by telephone by Dr. Cristobal Ponce. Surgical consultation recommended. She will follow-up with Dr. Afua Luna. Marco Celaya MD 11/3/2020  3:45 PM        
 Narrative History: Right breast mass. After complete explanation of the procedure, including risks, benefits, and 
complications, informed consent was obtained. The patient was placed on the 
ultrasound table and using standard aseptic technique, and lidocaine for local 
anesthesia, the mass within the periareolar 12:00 position of the right breast 
was vacuum sampled under ultrasound. A clip was left in place. A follow-up 
digital mammogram demonstrates appropriate clip placement. The patient left the 
department in stable condition. Impression IMPRESSION: Successful ultrasound guided vacuum core biopsy right breast mass. Review of Systems Constitutional: Negative. HENT: Negative. Eyes: Negative. Respiratory: Positive for shortness of breath. Cardiovascular: Negative. Gastrointestinal: Negative. Genitourinary: Negative. Musculoskeletal: Positive for joint pain. Skin: Negative. Neurological: Negative. Endo/Heme/Allergies: Negative. Psychiatric/Behavioral: The patient is nervous/anxious. Physical Exam 
 
ASSESSMENT and PLAN 
{ASSESSMENT/PLAN:85838}

## 2020-11-16 DIAGNOSIS — C50.911 MALIGNANT NEOPLASM OF RIGHT FEMALE BREAST, UNSPECIFIED ESTROGEN RECEPTOR STATUS, UNSPECIFIED SITE OF BREAST (HCC): Primary | ICD-10-CM

## 2020-11-23 ENCOUNTER — TELEPHONE (OUTPATIENT)
Dept: FAMILY MEDICINE CLINIC | Age: 75
End: 2020-11-23

## 2020-11-23 DIAGNOSIS — G47.00 INSOMNIA, UNSPECIFIED TYPE: ICD-10-CM

## 2020-11-23 NOTE — TELEPHONE ENCOUNTER
Mayte with [facility] calling with patient on the line. She states that Dr. Grady Salas is not in network to write Rx. The patient then began speaking in states she wants to know why she wasn't aware of this prior to medication being sent in. Patient wants to be contacted ASAP.       triazolam (HALCION) 0.25 mg tablet  30 Tab  2  9/22/2020      Sig: TAKE 1 TABLET BY MOUTH NIGHTLY AS NEEDED .  MAX  DAILY  AMOUNT  0.25  MG     Sent to pharmacy as: triazolam 0.25 mg tablet (HALCION)     E-Prescribing Status: Receipt confirmed by pharmacy (9/22/2020 11:49 AM EDT)

## 2020-11-24 RX ORDER — TRIAZOLAM 0.25 MG/1
TABLET ORAL
Qty: 30 TAB | Refills: 0 | Status: SHIPPED | OUTPATIENT
Start: 2020-11-24 | End: 2020-12-21 | Stop reason: SDUPTHER

## 2020-11-24 NOTE — TELEPHONE ENCOUNTER
Barbie Brizuela called back again on 11/23/2020 about attending doing this refill. Dr. Jeffrey Gonzalez   Received: Sonia Altman, [address] [facility] [address] [facility]               Caller's first and last name: [facility]   Reason for call: change dr on rx   Callback required yes/no and why: yes   Best contact number(s): [phone]   Details to clarify the request: [facility] are requesting a new Rx for \"Trizolam\" be rewritten with Dr. Bouchra Friedman name on as the current one is with Dr. Kalpana Hoover a non network provider.  Ms. Claudetta Broaden states the phone #[phone] is for [address] @ [facility] to call in Rx.

## 2020-11-24 NOTE — TELEPHONE ENCOUNTER
I called patient to let her know that the medication was filled and sent. She is aware. I told her if she had additional questions to give us a callback.

## 2020-11-24 NOTE — TELEPHONE ENCOUNTER
Will refill the medication for one time only while Dr. Zach Keen is out of clinic. Future refills per Dr. Zach Keen.    was reviewed an appropriate.

## 2020-12-04 ENCOUNTER — OFFICE VISIT (OUTPATIENT)
Dept: SURGERY | Age: 75
End: 2020-12-04
Payer: MEDICARE

## 2020-12-04 VITALS
HEIGHT: 64 IN | BODY MASS INDEX: 27.31 KG/M2 | DIASTOLIC BLOOD PRESSURE: 78 MMHG | WEIGHT: 160 LBS | SYSTOLIC BLOOD PRESSURE: 127 MMHG | HEART RATE: 76 BPM | TEMPERATURE: 97.9 F

## 2020-12-04 DIAGNOSIS — N63.10 BREAST MASS, RIGHT: ICD-10-CM

## 2020-12-04 DIAGNOSIS — Z85.3 HISTORY OF LEFT BREAST CANCER: ICD-10-CM

## 2020-12-04 DIAGNOSIS — C50.911 INVASIVE DUCTAL CARCINOMA OF BREAST, RIGHT (HCC): Primary | ICD-10-CM

## 2020-12-04 PROCEDURE — G8432 DEP SCR NOT DOC, RNG: HCPCS | Performed by: SURGERY

## 2020-12-04 PROCEDURE — 1101F PT FALLS ASSESS-DOCD LE1/YR: CPT | Performed by: SURGERY

## 2020-12-04 PROCEDURE — 99213 OFFICE O/P EST LOW 20 MIN: CPT | Performed by: SURGERY

## 2020-12-04 PROCEDURE — G8536 NO DOC ELDER MAL SCRN: HCPCS | Performed by: SURGERY

## 2020-12-04 PROCEDURE — G8754 DIAS BP LESS 90: HCPCS | Performed by: SURGERY

## 2020-12-04 PROCEDURE — G8400 PT W/DXA NO RESULTS DOC: HCPCS | Performed by: SURGERY

## 2020-12-04 PROCEDURE — 1090F PRES/ABSN URINE INCON ASSESS: CPT | Performed by: SURGERY

## 2020-12-04 PROCEDURE — G8419 CALC BMI OUT NRM PARAM NOF/U: HCPCS | Performed by: SURGERY

## 2020-12-04 PROCEDURE — G8752 SYS BP LESS 140: HCPCS | Performed by: SURGERY

## 2020-12-04 PROCEDURE — G8427 DOCREV CUR MEDS BY ELIG CLIN: HCPCS | Performed by: SURGERY

## 2020-12-04 NOTE — PROGRESS NOTES
HISTORY OF PRESENT ILLNESS  Porfirio Stokes is a 66 y.o. female. HPI  ESTABLISHED patient here for follow-up of RIGHT breast cancer, on neoadjuvant anastrozole. Having no problems with this medication other than an occasional hot flash.       10/29/20: RIGHT breast bx. PATH: IDC, at least 2.5mm, favor grade 2, ER+(100%)/[place]+(99%)/HER2-. Clinical stage 1.    11/2020 - : Anastrozole. (Dr. Pk Morrow)       2256: LEFT breast cancer. Lumpectomy, chemo, and XRT, followed by 5 years of Tamoxifen. 2012: LEFT breast cancer. Mastectomy, followed by five years of Anastrozole. Past Medical History:   Diagnosis Date    Arthritis     Breast [facility] ([facility] 10/2020    Breast cancer, left breast ([facility] 2003, 2012    lumpectomy/chemo/XRT, then mastectomy and anastrozole in 2012    COPD (chronic obstructive pulmonary disease) ([facility]     Hypertension        Past Surgical History:   Procedure Laterality Date    HX BREAST LUMPECTOMY Left     2003    HX MASTECTOMY Left     2013       Social History     Socioeconomic History    Marital status: UNKNOWN     Spouse name: Not on file    Number of children: Not on file    Years of education: Not on file    Highest education level: Not on file   Occupational History    Not on file   Social Needs    Financial resource strain: Not on file    Food insecurity     Worry: Not on file     Inability: Not on file    Transportation needs     Medical: Not on file     Non-medical: Not on file   Tobacco Use    Smoking status: Current Every Day Smoker     Types: Cigarettes    Smokeless tobacco: Never Used    Tobacco comment: E-cigarettes   Substance and Sexual Activity    Alcohol use:  Yes    Drug use: Never    Sexual activity: Not on file   Lifestyle    Physical activity     Days per week: Not on file     Minutes per session: Not on file    Stress: Not on file   Relationships    Social connections     Talks on phone: Not on file     Gets together: Not on file     Attends Jain service: Not on file     Active member of club or organization: Not on file     Attends meetings of clubs or organizations: Not on file     Relationship status: Not on file    Intimate partner violence     Fear of current or ex partner: Not on file     Emotionally abused: Not on file     Physically abused: Not on file     Forced sexual activity: Not on file   Other Topics Concern    Not on file   Social History Narrative    Not on file       Current Outpatient Medications on File Prior to Visit   Medication Sig Dispense Refill    triazolam (HALCION) 0.25 mg tablet TAKE 1 TABLET BY MOUTH NIGHTLY AS NEEDED . MAX  DAILY  AMOUNT  0.25  MG 30 Tab 0    anastrozole (Arimidex) 1 mg tablet Take 1 mg by mouth daily. Indications: hormone receptor positive breast cancer 30 Tab 5    citalopram (CELEXA) 40 mg tablet Take 1 tablet by mouth once daily 90 Tab 0    lisinopriL (PRINIVIL, ZESTRIL) 5 mg tablet Take 1 Tab by mouth daily. 90 Tab 3    diclofenac (SOLARAZE) 3 % topical gel Apply  to affected area two (2) times a day. 100 g 0    tiotropium bromide (SPIRIVA RESPIMAT) 2.5 mcg/actuation inhaler Spiriva Respimat 2.5 mcg/actuation solution for inhalation      atorvastatin (LIPITOR) 40 mg tablet Take 1 Tab by mouth daily. atorvastatin 40 mg tablet 90 Tab 1    verapamil ER (CALAN-SR) 120 mg tablet verapamil ER (SR) 120 mg tablet,extended release       No current facility-administered medications on file prior to visit. Allergies   Allergen Reactions    Betadine [Povidone-Iodine] Hives    Red Dye Hives       OB History    No obstetric history on file. Obstetric Comments   Menarche 11, LMP 0, # of children 2, age of 2st delivery 23, Hysterectomy/oophorectomy No/No, Breast bx Yes, LEFT and RIGHT, history of breast feeding Yes, BCP No, Hormone therapy No               ROS    Physical Exam  Exam conducted with a chaperone present.    Cardiovascular:      Rate and Rhythm: Normal rate and regular rhythm. Heart sounds: Normal heart sounds. Pulmonary:      Breath sounds: Normal breath sounds. Chest:      Breasts: Breasts are symmetrical.         Right: Tenderness present. No swelling, bleeding, inverted nipple, mass, nipple discharge or skin change. Left: Absent. No mass or skin change. Lymphadenopathy:      Cervical:      Right cervical: No superficial, deep or posterior cervical adenopathy. Left cervical: No superficial, deep or posterior cervical adenopathy. Upper Body:      Right upper body: No supraclavicular or axillary adenopathy. Left upper body: No supraclavicular or axillary adenopathy. ASSESSMENT and PLAN    ICD-10-CM ICD-9-CM    1. Invasive ductal carcinoma of breast, right (HCC)  C50.911 174.9    2. Breast mass, right  N63.10 611.72    3. History of left breast cancer  Z85.3 V10.3       Patient presents to f/u on RIGHT breast IDC, and is doing well overall. Well healed incision s/p LEFT mastectomy, no evidence of recurrence. No palpable mass on RIGHT breast exam, but pt states the RIGHT breast is . RIGHT breast US visualizes small mass directly behind the nipple. Pt notes preference for excision of mass, but is hesitant about GA. Discussed central lumpectomy under sedation, which is an outpatient operation. Will not plan for SLNBx, given small size of mass and negative LNs on imaging. Given concerns about COVID-19, there is no risk to continue on neoadjuvant Anastrozole, and plan for surgery in a few months. F/U in about 2 months for further planning. This plan was reviewed with the patient and patient agrees. All questions were answered.     Written by Prateek Buchanan, as dictated by Dr. Veronica Carvajal MD.

## 2020-12-04 NOTE — PATIENT INSTRUCTIONS
Anastrozole (By mouth)   Anastrozole (an-AS-troe-zole)  Treats breast cancer. Brand Name(s): Arimidex   There may be other brand names for this medicine. When This Medicine Should Not Be Used: This medicine is not right for everyone. Do not use it if you had an allergic reaction to anastrozole, if you are pregnant, or if you have not stopped menstruating (premenopausal). How to Use This Medicine:   Tablet  · Medicines used to treat cancer are very strong and can have many side effects. Before receiving this medicine, make sure you understand all the risks and benefits. It is important for you to work closely with your doctor during your treatment. · Your doctor will tell you how much medicine to use. Do not use more than directed. · Read and follow the patient instructions that come with this medicine. Talk to your doctor or pharmacist if you have any questions. · Missed dose: Take a dose as soon as you remember. If it is almost time for your next dose, wait until then and take a regular dose. Do not take extra medicine to make up for a missed dose. · If you vomit after taking your medicine, call your doctor or pharmacist for instructions. · Store the medicine in a closed container at room temperature, away from heat, moisture, and direct light. · Ask your pharmacist, doctor, or health caregiver about the best way to dispose of any leftover medicine after you have finished your treatment. You will also need to throw away old medicine after the expiration date has passed. Drugs and Foods to Avoid:   Ask your doctor or pharmacist before using any other medicine, including over-the-counter medicines, vitamins, and herbal products. · Some medicines can affect how anastrozole works.  Tell your doctor if you are taking any of the following:   ¨ Medicine that contains estrogen  ¨ Tamoxifen  Warnings While Using This Medicine:   · Pregnancy after menopause is not likely, but if you think you could be pregnant, tell your doctor. This medicine could harm an unborn baby. · Tell your doctor if you are breastfeeding, or if you have liver disease, bone problems (such as osteoporosis), high cholesterol, or heart disease. · This medicine may cause the following problems:   ¨ Increased risk for weak bones or osteoporosis  ¨ Increased cholesterol levels  ¨ Increased risk for heart attack or stroke  · Your doctor will do lab tests at regular visits to check on the effects of this medicine. Keep all appointments. · Cancer medicine can cause nausea or vomiting, sometimes even after you receive medicine to prevent these effects. Ask your doctor or nurse about other ways to control any nausea or vomiting that might happen. · Keep all medicine out of the reach of children. Never share your medicine with anyone. Possible Side Effects While Using This Medicine:   Call your doctor right away if you notice any of these side effects:  · Allergic reaction: Itching or hives, swelling in your face or hands, swelling or tingling in your mouth or throat, chest tightness, trouble breathing  · Back, bone, joint, or muscle pain  · Blistering, peeling, or red skin rash  · Chest pain or shortness of breath  · Fever, chills, cough, sore throat, and body aches  · Numbness, tingling, or burning pain in your hands, arms, legs, or feet  · Rapid weight gain, or swelling in your hands, ankles, or feet  · Severe diarrhea, nausea, or vomiting  · Vaginal bleeding or discharge  · Yellowing of your skin or the whites of your eyes  If you notice these less serious side effects, talk with your doctor:   · Breast pain  · Dizziness, headache, tiredness, or weakness  · Mood changes, anxiety, or irritability  · Trouble sleeping  · Warmth or redness in your face, neck, arms, or upper chest  If you notice other side effects that you think are caused by this medicine, tell your doctor. Call your doctor for medical advice about side effects.  You may report side effects to FDA at 4-346-FDA-8355  © 2017 [facility] Information is for End User's use only and may not be sold, redistributed or otherwise used for commercial purposes. The above information is an  only. It is not intended as medical advice for individual conditions or treatments. Talk to your doctor, nurse or pharmacist before following any medical regimen to see if it is safe and effective for you. Anastrozole (By mouth)   Anastrozole (an-AS-troe-zole)  Treats breast cancer. Brand Name(s): Arimidex   There may be other brand names for this medicine. When This Medicine Should Not Be Used: This medicine is not right for everyone. Do not use it if you had an allergic reaction to anastrozole, if you are pregnant, or if you have not stopped menstruating (premenopausal). How to Use This Medicine:   Tablet  · Medicines used to treat cancer are very strong and can have many side effects. Before receiving this medicine, make sure you understand all the risks and benefits. It is important for you to work closely with your doctor during your treatment. · Your doctor will tell you how much medicine to use. Do not use more than directed. · Read and follow the patient instructions that come with this medicine. Talk to your doctor or pharmacist if you have any questions. · Missed dose: Take a dose as soon as you remember. If it is almost time for your next dose, wait until then and take a regular dose. Do not take extra medicine to make up for a missed dose. · If you vomit after taking your medicine, call your doctor or pharmacist for instructions. · Store the medicine in a closed container at room temperature, away from heat, moisture, and direct light. · Ask your pharmacist, doctor, or health caregiver about the best way to dispose of any leftover medicine after you have finished your treatment.  You will also need to throw away old medicine after the expiration date has passed. Drugs and Foods to Avoid:   Ask your doctor or pharmacist before using any other medicine, including over-the-counter medicines, vitamins, and herbal products. · Some medicines can affect how anastrozole works. Tell your doctor if you are taking any of the following:   ¨ Medicine that contains estrogen  ¨ Tamoxifen  Warnings While Using This Medicine:   · Pregnancy after menopause is not likely, but if you think you could be pregnant, tell your doctor. This medicine could harm an unborn baby. · Tell your doctor if you are breastfeeding, or if you have liver disease, bone problems (such as osteoporosis), high cholesterol, or heart disease. · This medicine may cause the following problems:   ¨ Increased risk for weak bones or osteoporosis  ¨ Increased cholesterol levels  ¨ Increased risk for heart attack or stroke  · Your doctor will do lab tests at regular visits to check on the effects of this medicine. Keep all appointments. · Cancer medicine can cause nausea or vomiting, sometimes even after you receive medicine to prevent these effects. Ask your doctor or nurse about other ways to control any nausea or vomiting that might happen. · Keep all medicine out of the reach of children. Never share your medicine with anyone.   Possible Side Effects While Using This Medicine:   Call your doctor right away if you notice any of these side effects:  · Allergic reaction: Itching or hives, swelling in your face or hands, swelling or tingling in your mouth or throat, chest tightness, trouble breathing  · Back, bone, joint, or muscle pain  · Blistering, peeling, or red skin rash  · Chest pain or shortness of breath  · Fever, chills, cough, sore throat, and body aches  · Numbness, tingling, or burning pain in your hands, arms, legs, or feet  · Rapid weight gain, or swelling in your hands, ankles, or feet  · Severe diarrhea, nausea, or vomiting  · Vaginal bleeding or discharge  · Yellowing of your skin or the whites of your eyes  If you notice these less serious side effects, talk with your doctor:   · Breast pain  · Dizziness, headache, tiredness, or weakness  · Mood changes, anxiety, or irritability  · Trouble sleeping  · Warmth or redness in your face, neck, arms, or upper chest  If you notice other side effects that you think are caused by this medicine, tell your doctor. Call your doctor for medical advice about side effects. You may report side effects to FDA at 1-235-HTJ-7394  © 2017 [facility] Information is for End User's use only and may not be sold, redistributed or otherwise used for commercial purposes. The above information is an  only. It is not intended as medical advice for individual conditions or treatments. Talk to your doctor, nurse or pharmacist before following any medical regimen to see if it is safe and effective for you.

## 2020-12-04 NOTE — PROGRESS NOTES
HISTORY OF PRESENT ILLNESS  Michele Pastor is a 66 y.o. female. HPI  ESTABLISHED patient here for follow-up of RIGHT breast cancer, on neoadjuvant anastrozole. Having no problems with this medication other than an occasional hot flash. 10/29/20: RIGHT breast bx. PATH: IDC, at least 2.5mm, favor grade 2, ER+(100%)/SC+(99%)/HER2-. Clinical stage 1.  11/2020 - : Anastrozole. (Dr. Venus Starr)    5840: LEFT breast cancer. Lumpectomy, chemo, and XRT, followed by 5 years of Tamoxifen. 2012: LEFT breast cancer. Mastectomy, followed by five years of Anastrozole.     ROS    Physical Exam    ASSESSMENT and PLAN  {ASSESSMENT/PLAN:10502}

## 2020-12-09 ENCOUNTER — OFFICE VISIT (OUTPATIENT)
Dept: ONCOLOGY | Age: 75
End: 2020-12-09
Payer: MEDICARE

## 2020-12-09 DIAGNOSIS — C50.911 INVASIVE DUCTAL CARCINOMA OF BREAST, RIGHT (HCC): Primary | ICD-10-CM

## 2020-12-09 DIAGNOSIS — Z99.81 ON SUPPLEMENTAL OXYGEN THERAPY: ICD-10-CM

## 2020-12-09 DIAGNOSIS — Z85.3 HISTORY OF LEFT BREAST CANCER: ICD-10-CM

## 2020-12-09 DIAGNOSIS — J44.9 CHRONIC OBSTRUCTIVE PULMONARY DISEASE, UNSPECIFIED COPD TYPE (HCC): ICD-10-CM

## 2020-12-09 DIAGNOSIS — Z90.12 H/O MASTECTOMY, LEFT: ICD-10-CM

## 2020-12-09 PROCEDURE — G8400 PT W/DXA NO RESULTS DOC: HCPCS | Performed by: INTERNAL MEDICINE

## 2020-12-09 PROCEDURE — G8419 CALC BMI OUT NRM PARAM NOF/U: HCPCS | Performed by: INTERNAL MEDICINE

## 2020-12-09 PROCEDURE — 1101F PT FALLS ASSESS-DOCD LE1/YR: CPT | Performed by: INTERNAL MEDICINE

## 2020-12-09 PROCEDURE — 1090F PRES/ABSN URINE INCON ASSESS: CPT | Performed by: INTERNAL MEDICINE

## 2020-12-09 PROCEDURE — G8536 NO DOC ELDER MAL SCRN: HCPCS | Performed by: INTERNAL MEDICINE

## 2020-12-09 PROCEDURE — G8427 DOCREV CUR MEDS BY ELIG CLIN: HCPCS | Performed by: INTERNAL MEDICINE

## 2020-12-09 PROCEDURE — G8432 DEP SCR NOT DOC, RNG: HCPCS | Performed by: INTERNAL MEDICINE

## 2020-12-09 PROCEDURE — 99203 OFFICE O/P NEW LOW 30 MIN: CPT | Performed by: INTERNAL MEDICINE

## 2020-12-09 PROCEDURE — G8756 NO BP MEASURE DOC: HCPCS | Performed by: INTERNAL MEDICINE

## 2020-12-09 NOTE — PROGRESS NOTES
[facility]        Reason for Visit:   Kim Cheung is a 66 y.o. female who is seen by synchronous (real-time) audio-video technology in consultation at the request of Dr. Daisy Soliz for evaluation of breast cancer. Treatment History:   RIGHT breast biopsy 10/20. STAGE:  Clinical 1 ER+ HER2 negative    History of Present Illness:   Kim Cheung is a pleasant 66 y.o. female who presents today for evaluation of RIGHT breast cancer ER + Her2 negative by biopsy only. RIGHT mammo done 10/20: IMPRESSION: 1.2 cm x 1.4 cm right breast retroareolar lesion, as described  above. BI-RADS 4. Suspicious abnormality. Recommend histologic correlation    10/29/20: RIGHT breast bx. PATH: IDC, at least 2.5mm, favor grade 2, ER+(100%)/WV+(99%)/HER2-. Clinical stage 1. Met with breast surgery. Pt was not ready to do surgery/ not sure about surgery due to pt's COPD on 02. Can do lumpectomy with local sedation. 11/2020 - started Anastrozole. (Dr. Daisy Soliz)  Pt is tolerating AI fine. Stays home. No F/C/CP/N/V/D. Chronic SOB on 02. No pain.       breast cancer hx:   2003: LEFT breast cancer. Lumpectomy, chemo, and XRT, followed by 5 years of Tamoxifen. 2012: LEFT breast cancer. Mastectomy, followed by five years of Anastrozole. Hx of skin melanomas also. Past Medical History:   Diagnosis Date    Arthritis     Breast [facility] [address] 10/2020    Breast cancer, left breast ([facility] [address] 2003, 2012    lumpectomy/chemo/XRT, then mastectomy and anastrozole in 2012    COPD (chronic obstructive pulmonary disease) ([facility] [address]     Hypertension       Past Surgical History:   Procedure Laterality Date    HX BREAST LUMPECTOMY Left     2003    HX MASTECTOMY Left     2013      Social History     Tobacco Use    Smoking status: Current Every Day Smoker     Types: Cigarettes    Smokeless tobacco: Never Used    Tobacco comment: E-cigarettes   Substance Use Topics    Alcohol use:  Yes No family history on file. Current Outpatient Medications   Medication Sig    triazolam (HALCION) 0.25 mg tablet TAKE 1 TABLET BY MOUTH NIGHTLY AS NEEDED . MAX  DAILY  AMOUNT  0.25  MG    anastrozole (Arimidex) 1 mg tablet Take 1 mg by mouth daily. Indications: hormone receptor positive breast cancer    citalopram (CELEXA) 40 mg tablet Take 1 tablet by mouth once daily    lisinopriL (PRINIVIL, ZESTRIL) 5 mg tablet Take 1 Tab by mouth daily.  tiotropium bromide (SPIRIVA RESPIMAT) 2.5 mcg/actuation inhaler Spiriva Respimat 2.5 mcg/actuation solution for inhalation    atorvastatin (LIPITOR) 40 mg tablet Take 1 Tab by mouth daily. atorvastatin 40 mg tablet     No current facility-administered medications for this visit. Allergies   Allergen Reactions    Betadine [Povidone-Iodine] Hives    Red Dye Hives        Review of Systems: A complete review of systems was obtained, negative except as described above. Physical Exam:   There were no vitals taken for this visit. On home 02.      Constitutional: [x] Appears well-developed and well-nourished [x] No apparent distress      [] Abnormal -     Mental status: [x] Alert and awake  [x] Oriented to person/place/time [x] Able to follow commands    [] Abnormal -     Eyes:   EOM    [x]  Normal    [] Abnormal -   Sclera  [x]  Normal    [] Abnormal -          Discharge [x]  None visible   [] Abnormal -     HENT: [x] Normocephalic, atraumatic  [] Abnormal -   [x] Mouth/Throat: Mucous membranes are moist    External Ears [x] Normal  [] Abnormal -    Neck: [x] No visualized mass [] Abnormal -     Pulmonary/Chest: [x] Respiratory effort normal   [x] No visualized signs of difficulty breathing or respiratory distress        [] Abnormal -      Musculoskeletal:   [x] Normal gait with no signs of ataxia         [x] Normal range of motion of neck        [] Abnormal -     Neurological:        [x] No Facial Asymmetry (Cranial nerve 7 motor function) (limited exam due to video visit)          [x] No gaze palsy        [] Abnormal -          Skin:        [x] No significant exanthematous lesions or discoloration noted on facial skin         [] Abnormal -            Psychiatric:       [x] Normal Affect [] Abnormal -        [x] No Hallucinations    Other pertinent observable physical exam findings:-    Due to this being a TeleHealth evaluation (During [facility] public health emergency), many elements of the physical examination are unable to be assessed. Evaluation of the following organ systems was limited: Vitals/Constitutional/EENT/Resp/CV/GI//MS/Neuro/Skin/Heme-Lymph-Imm. Results:   No results found for: WBC, HGB, HCT, PLT, MCV, ANEU, HGBPOC, HCTPOC, HGBEXT, HCTEXT, PLTEXT, HGBEXT, HCTEXT, PLTEXT  Lab Results   Component Value Date/Time    Sodium 139 03/20/2020 09:37 AM    Potassium 4.2 03/20/2020 09:37 AM    Chloride 107 03/20/2020 09:37 AM    CO2 25 03/20/2020 09:37 AM    Glucose 97 03/20/2020 09:37 AM    BUN 13 03/20/2020 09:37 AM    Creatinine 0.87 03/20/2020 09:37 AM    GFR est AA >60 03/20/2020 09:37 AM    GFR est non-AA >60 03/20/2020 09:37 AM    Calcium 9.0 03/20/2020 09:37 AM     No results found for: TBILI, ALT, AP, TP, ALB, GLOB      Records reviewed and summarized above. Pathology report(s) reviewed above. Radiology report(s) reviewed above. Assessment/PLAN:     1)  Clinical stage 1 RIGHT breast cancer  Records reviewed. Reviewed path and data today. Reviewed dx, stage and treatment of breast cancer. Pt has hx of LEFT breast cancer in past with mastectomy 2012 and hormonal therapy use. Pt presently has had biopsy only. Has several medical problems including COPD on 02. Seen today virtually due to this and COVID. Wants to delay breast surgery due to COVID risk. Does not want any chemo. Has started anastrozole and is tolerating this fine. Continue neoadjuvant AI.    Pt will work with surgery to find a safe time to do surgery in next few months pending COVID. Clinically pt is stable today. 2) COPD on home 02/  arthritis/ HTN. Per PCP. 3) breast cancer hx:   2003: LEFT breast cancer. Lumpectomy, chemo, and XRT, followed by 5 years of Tamoxifen. 2012: LEFT breast cancer. Mastectomy, followed by five years of Anastrozole. 4) psychosocial.  Mood good. Lives with daughter/son. Has good support. On home 02. Staying home during [place]. Call if questions  Pt will follow with surgery and us as needed    I appreciate the opportunity to participate in Ms. Norberto Childs care. Signed By: Haim Wheeler DO      I was in office while conducting this encounter. The patient was at her home. Consent:  She and/or her healthcare decision maker is aware that this patient-initiated Telehealth encounter is a billable service, with coverage as determined by her insurance carrier. She is aware that she may receive a bill and has provided verbal consent to proceed: yes    Pursuant to the emergency declaration under the [employer] and the [facility], 1135 waiver authority and the [employer] Act, this Virtual  Visit was conducted, with patient's (and/or legal guardian's) consent, to reduce the patient's risk of exposure to COVID-19 and provide necessary medical care. Services were provided through a video synchronous discussion virtually to substitute for in-person clinic visit.

## 2020-12-13 ENCOUNTER — DOCUMENTATION ONLY (OUTPATIENT)
Dept: ONCOLOGY | Age: 75
End: 2020-12-13

## 2020-12-14 ENCOUNTER — TELEPHONE (OUTPATIENT)
Dept: FAMILY MEDICINE CLINIC | Age: 75
End: 2020-12-14

## 2020-12-15 ENCOUNTER — DOCUMENTATION ONLY (OUTPATIENT)
Dept: ONCOLOGY | Age: 75
End: 2020-12-15

## 2020-12-21 ENCOUNTER — VIRTUAL VISIT (OUTPATIENT)
Dept: FAMILY MEDICINE CLINIC | Age: 75
End: 2020-12-21
Payer: MEDICARE

## 2020-12-21 DIAGNOSIS — I10 ESSENTIAL HYPERTENSION: ICD-10-CM

## 2020-12-21 DIAGNOSIS — E78.5 DYSLIPIDEMIA: ICD-10-CM

## 2020-12-21 DIAGNOSIS — G47.00 INSOMNIA, UNSPECIFIED TYPE: ICD-10-CM

## 2020-12-21 DIAGNOSIS — J43.9 PULMONARY EMPHYSEMA, UNSPECIFIED EMPHYSEMA TYPE (HCC): Primary | ICD-10-CM

## 2020-12-21 DIAGNOSIS — F41.1 GAD (GENERALIZED ANXIETY DISORDER): ICD-10-CM

## 2020-12-21 PROCEDURE — 99215 OFFICE O/P EST HI 40 MIN: CPT | Performed by: STUDENT IN AN ORGANIZED HEALTH CARE EDUCATION/TRAINING PROGRAM

## 2020-12-21 RX ORDER — TRIAZOLAM 0.25 MG/1
TABLET ORAL
Qty: 30 TAB | Refills: 0 | Status: CANCELLED | OUTPATIENT
Start: 2020-12-21

## 2020-12-21 RX ORDER — CITALOPRAM 40 MG/1
TABLET, FILM COATED ORAL
Qty: 90 TAB | Refills: 0 | Status: SHIPPED | OUTPATIENT
Start: 2020-12-21 | End: 2021-04-26 | Stop reason: SDUPTHER

## 2020-12-21 RX ORDER — TRIAZOLAM 0.25 MG/1
TABLET ORAL
Qty: 30 TAB | Refills: 0 | Status: SHIPPED | OUTPATIENT
Start: 2020-12-21 | End: 2021-01-15

## 2020-12-21 RX ORDER — ATORVASTATIN CALCIUM 40 MG/1
40 TABLET, FILM COATED ORAL DAILY
Qty: 90 TAB | Refills: 1 | Status: SHIPPED | OUTPATIENT
Start: 2020-12-21 | End: 2021-04-26 | Stop reason: SDUPTHER

## 2020-12-21 RX ORDER — LISINOPRIL 5 MG/1
5 TABLET ORAL DAILY
Qty: 90 TAB | Refills: 3 | Status: SHIPPED | OUTPATIENT
Start: 2020-12-21 | End: 2021-04-26 | Stop reason: SDUPTHER

## 2020-12-21 NOTE — PROGRESS NOTES
Rob Albrecht  66 y.o. female  1945  [id number] [facility] [address] [id number]  [address]:    Telemedicine Progress Note  Shamar Rivas MD       Encounter Date and Time: December 21, 2020 at 3:10 PM    Consent: Rob Albrecht, who was seen by synchronous (real-time) audio-video technology, and/or her healthcare decision maker, is aware that this patient-initiated, Telehealth encounter on 12/21/2020 is a billable service, with coverage as determined by her insurance carrier. She is aware that she may receive a bill and has provided verbal consent to proceed: Yes. Chief Complaint   Patient presents with    Medication Refill    Anxiety     History of Present Illness   Rob Albrecht is a 66 y.o. female was evaluated by synchronous (real-time) audio-video technology from home, through a secure patient portal.     Patient has a PMH remarkable for HTN, COPD, Left Breast Cancer, hx of R breast cancer, insomnia, depression, arthritis and anxiety. Patient is requesting a refill for :  Lisinopril 5 mg daily 90 day with 3 refill   Triazolam 0.25 mg tab 90 day with 3 refill  Atorvastatin 40 mg 90 day with 3  Celexa 40 mg tab 90 day supply with 3 refills    Depression and anxiety:  Patient states that she has a lot going on. She was recently diagnosed with L breast cancer and her ex  was recently admitted in the hospital for over a month for CHF exacerbation and is now very weak and is unable to care for himself. Patient is compliant with Celexa 40 mg tab and uses triazolam 0.25 mg for sleep. Patient has been on triazolam for many years now and has successfully weaned down to 0.25 mg. Patient was open do transitioning to alternative medication however with her new diagnosis and everything happening in her life she refuses to discontinue them as this is the only medication that allows her to sleep at night.    Depression      Pleasure in doing things? 0   Feeling down or depressed? 1 \"situationally depressed\"   Trouble sleeping? 0   No energy or fatigue? 0   Poor appetite or overeating? 1 \"eating things im not supposed to\"   Feeling you have let others down or you are a failure? 0   Trouble concentrating? 0   Moving slowly or fidgety all the time? 0   Thinking you would be better off dead or thoughts of hurting yourself? 0      Total:2    POOL 7    Feeling nervous anxious, or on edge? 0  Not being able to stop or control worrying? 0  Worrying too much about different things? 1  Trouble relaxing? 0  Being so restless unable to sit still? 0  Easily annoyed or irritable? 0  Feeling afraid as if something awful will happen? 1\" covid and cancer\"    Total:2    R Breast Cancer  Patient was recently diagnosed with R breast cancer a couple of months ago on routine mammogram. Patient met with Dr. Lorena Burton and Dr. David Cui oncology. Patient decided against breast surgery at this time due to COPD and COVID. Patient is current on anastrazole. Patient has history of L Breast cancer which was diagnosed I 2003 which was initially treated with lumpectomy, chemo and tamoxifen. Had recurrence so had a mastectomy fand five days on anastrozole. On celxa and triazolam      HTN:  Usually ranges in 677A-298A systolic. Is compliant with lisinopril 5 mg tab daily. Health maintenance:  - Last glaucoma screen was 1 year ago however postponed it due to covid. - Due for DEXA however refusing due to Ever  - Patient refusing annual labs due to Ever. Will get them once she gets vaccinated against COVID . Review of Systems   Review of Systems   Constitutional: Negative for chills and fever. HENT: Negative for congestion and sore throat. Eyes: Negative for blurred vision and double vision. Respiratory: Negative for cough, hemoptysis, sputum production, shortness of breath and wheezing. Cardiovascular: Negative for chest pain, palpitations and leg swelling. Gastrointestinal: Negative for abdominal pain, blood in stool, constipation, diarrhea, heartburn, melena, nausea and vomiting. Genitourinary: Negative for dysuria and urgency. Musculoskeletal: Positive for joint pain. Negative for back pain. Skin: Negative for rash. Neurological: Negative for dizziness, focal weakness and headaches. Psychiatric/Behavioral: Positive for depression. Negative for suicidal ideas. The patient is nervous/anxious and has insomnia. Vitals/Objective:     General: alert, cooperative, no distress   Mental  status: mental status: alert, oriented to person, place, and time, normal mood, behavior, speech, dress, motor activity, and thought processes   Resp: resp: normal effort and no respiratory distress   Neuro: neuro: no gross deficits   Skin: skin: no discoloration or lesions of concern on visible areas   Due to this being a TeleHealth evaluation, many elements of the physical examination are unable to be assessed. Assessment and Plan:   Time-based coding, delete if not needed: I spent at least 40 minutes with this established patient, and >50% of the time was spent counseling and/or coordinating care regarding Medication refill, health maintenance    1. Insomnia/ Anxiety/ Depression: PHQ 9 =2 GAD7=2  - triazolam (HALCION) 0.25 mg tablet; TAKE 1 TABLET BY MOUTH NIGHTLY AS NEEDED . MAX  DAILY  AMOUNT  0.25  MG  Dispense: 30 Tab; Refill: 0  - citalopram (CELEXA) 40 mg tablet; Take one tab daily  Dispense: 90 Tab; Refill: 0    2. Essential hypertension: Well controlled  - lisinopriL (PRINIVIL, ZESTRIL) 5 mg tablet; Take 1 Tab by mouth daily. Dispense: 90 Tab; Refill: 3    3. Dyslipidemia  - atorvastatin (LIPITOR) 40 mg tablet; Take 1 Tab by mouth daily. atorvastatin 40 mg tablet  Dispense: 90 Tab; Refill: 1    4.  Pulmonary emphysema, unspecified emphysema type (HCC)  - tiotropium bromide (SPIRIVA RESPIMAT) 2.5 mcg/actuation inhaler; Spiriva Respimat 2.5 mcg/actuation solution for inhalation  Dispense: 1 Inhaler; Refill: 3    Time spent in direct conversation with the patient to include medical condition(s) discussed, assessment and treatment plan:       We discussed the expected course, resolution and complications of the diagnosis(es) in detail. Medication risks, benefits, costs, interactions, and alternatives were discussed as indicated. I advised her to contact the office if her condition worsens, changes or fails to improve as anticipated. She expressed understanding with the diagnosis(es) and plan. Patient understands that this encounter was a temporary measure, and the importance of further follow up and examination was emphasized. Patient verbalized understanding. Patient informed to follow up: for labs in two months. .    Electronically Signed: Antonio Mishra MD    CPT Codes 44981-43590 for Established Patients may apply to this Telehealth Visit. POS code: 18. Modifier GT    Michele Pastor is a 66 y.o. female who was evaluated by an audio only encounter for concerns as above. Patient identification was verified prior to start of the visit. A caregiver was present when appropriate. Due to this being a TeleHealth encounter (During MZKAG-67 public health emergency), evaluation of the following organ systems was limited: Vitals/Constitutional/EENT/Resp/CV/GI//MS/Neuro/Skin/Heme-Lymph-Imm. Pursuant to the emergency declaration under the [facility], [facility]5 waiver authority and the [employer] and [employer] General Act, this Virtual Visit was conducted, with patient's (and/or legal guardian's) consent, to reduce the patient's risk of exposure to COVID-19 and provide necessary medical care. Services were provided through a synchronous discussion virtually to substitute for in-person clinic visit. I was at home. The patient was at home.      History   Patients past medical, surgical and family histories were reviewed and updated. Past Medical History:   Diagnosis Date    Arthritis     [facility] ([facility] 10/2020    Breast cancer, left breast ([facility] 2003, 2012    lumpectomy/chemo/XRT, then mastectomy and anastrozole in 2012    COPD (chronic obstructive pulmonary disease) ([facility]     Hypertension      Past Surgical History:   Procedure Laterality Date    HX BREAST LUMPECTOMY Left     2003    HX MASTECTOMY Left     2013     No family history on file. Social History     Socioeconomic History    Marital status: UNKNOWN     Spouse name: Not on file    Number of children: Not on file    Years of education: Not on file    Highest education level: Not on file   Occupational History    Not on file   Social Needs    Financial resource strain: Not on file    Food insecurity     Worry: Not on file     Inability: Not on file    Transportation needs     Medical: Not on file     Non-medical: Not on file   Tobacco Use    Smoking status: Current Every Day Smoker     Types: Cigarettes    Smokeless tobacco: Never Used    Tobacco comment: E-cigarettes   Substance and Sexual Activity    Alcohol use:  Yes    Drug use: Never    Sexual activity: Not on file   Lifestyle    Physical activity     Days per week: Not on file     Minutes per session: Not on file    Stress: Not on file   Relationships    Social connections     Talks on phone: Not on file     Gets together: Not on file     Attends Congregational service: Not on file     Active member of club or organization: Not on file     Attends meetings of clubs or organizations: Not on file     Relationship status: Not on file    Intimate partner violence     Fear of current or ex partner: Not on file     Emotionally abused: Not on file     Physically abused: Not on file     Forced sexual activity: Not on file   Other Topics Concern    Not on file   Social History Narrative    Not on file     Patient Active Problem List   Diagnosis Code    Invasive ductal carcinoma of breast, right (HCC) C50.911    History of left breast cancer Z85.3          Current Medications/Allergies   Medications and Allergies reviewed:    Current Outpatient Medications   Medication Sig Dispense Refill    triazolam (HALCION) 0.25 mg tablet TAKE 1 TABLET BY MOUTH NIGHTLY AS NEEDED . MAX  DAILY  AMOUNT  0.25  MG 30 Tab 0    citalopram (CELEXA) 40 mg tablet Take one tab daily 90 Tab 0    lisinopriL (PRINIVIL, ZESTRIL) 5 mg tablet Take 1 Tab by mouth daily. 90 Tab 3    atorvastatin (LIPITOR) 40 mg tablet Take 1 Tab by mouth daily. atorvastatin 40 mg tablet 90 Tab 1    tiotropium bromide (SPIRIVA RESPIMAT) 2.5 mcg/actuation inhaler Spiriva Respimat 2.5 mcg/actuation solution for inhalation 1 Inhaler 3    anastrozole (Arimidex) 1 mg tablet Take 1 mg by mouth daily.  Indications: hormone receptor positive breast cancer 30 Tab 5     Allergies   Allergen Reactions    Betadine [Povidone-Iodine] Hives    Red Dye Hives

## 2020-12-29 ENCOUNTER — TELEPHONE (OUTPATIENT)
Dept: FAMILY MEDICINE CLINIC | Age: 75
End: 2020-12-29

## 2020-12-29 NOTE — TELEPHONE ENCOUNTER
----- Message from Milton Bruner sent at 12/29/2020  1:59 PM EST -----  Regarding: /Telephone    General Message/Vendor Calls    Caller's first and last name: Self      Reason for call: [facility] paperwork       Callback required yes/no and why: Yes with questions      Best contact number(s): [phone]      Details to clarify the request: Pt's daughter will be faxing [facility] paperwork to be completed by Dr. Anila Ibrahim so the daughter can be home to help pt who is ill with her COPD, breast cancer, and having surgery in beginning of February. The dates needed on the paperwork will be Monday January 4th to February 28th.        Milton Bruner

## 2021-01-03 ENCOUNTER — TELEPHONE (OUTPATIENT)
Dept: FAMILY MEDICINE CLINIC | Age: 76
End: 2021-01-03

## 2021-01-05 NOTE — TELEPHONE ENCOUNTER
Per call from patient's daughter/  [facility]) [phone]      She states because she received FMLA Forms back late, that start date need to be changed to 1/11/21. She also states she can't read the writing on the form in she can only see a few things on it. She notes her mother is to have breast cancer surgery in the next few weeks and that form is needed ASAP as she have to care for her mother around the clock.

## 2021-01-14 DIAGNOSIS — G47.00 INSOMNIA, UNSPECIFIED TYPE: ICD-10-CM

## 2021-01-15 RX ORDER — TRIAZOLAM 0.25 MG/1
TABLET ORAL
Qty: 30 TAB | Refills: 0 | Status: SHIPPED | OUTPATIENT
Start: 2021-01-15 | End: 2021-02-16 | Stop reason: SDUPTHER

## 2021-01-15 NOTE — TELEPHONE ENCOUNTER
Good morning ,  I received a message stating that the patient is requesting a refill for:    Triazolam 0.25 MG Oral Tablet  To be sent to the pharmacy. Can you help me with this please?     Thank you

## 2021-01-15 NOTE — TELEPHONE ENCOUNTER
Dr. Franklin Fraga  Received: Yesterday  Message Contents   Done, Ronaldo Navarrete [facility] [address] [place] [address] Message/Vendor Calls     Caller's first and last name: Pt       Reason for call: Nurse Call       Callback required yes/no and why: Yes       Best contact number(s): [phone]       Details to clarify the request: Requesting to speak to Nurse in regards to medication refills.        Fatou Range Done

## 2021-01-29 ENCOUNTER — OFFICE VISIT (OUTPATIENT)
Dept: SURGERY | Age: 76
End: 2021-01-29
Payer: MEDICARE

## 2021-01-29 VITALS
DIASTOLIC BLOOD PRESSURE: 74 MMHG | HEIGHT: 64 IN | SYSTOLIC BLOOD PRESSURE: 128 MMHG | BODY MASS INDEX: 27.31 KG/M2 | TEMPERATURE: 97.6 F | HEART RATE: 76 BPM | WEIGHT: 160 LBS

## 2021-01-29 DIAGNOSIS — C50.911 INVASIVE DUCTAL CARCINOMA OF BREAST, RIGHT (HCC): Primary | ICD-10-CM

## 2021-01-29 DIAGNOSIS — Z85.3 HISTORY OF LEFT BREAST CANCER: ICD-10-CM

## 2021-01-29 PROCEDURE — G8427 DOCREV CUR MEDS BY ELIG CLIN: HCPCS | Performed by: SURGERY

## 2021-01-29 PROCEDURE — G8536 NO DOC ELDER MAL SCRN: HCPCS | Performed by: SURGERY

## 2021-01-29 PROCEDURE — 1101F PT FALLS ASSESS-DOCD LE1/YR: CPT | Performed by: SURGERY

## 2021-01-29 PROCEDURE — 76642 ULTRASOUND BREAST LIMITED: CPT | Performed by: SURGERY

## 2021-01-29 PROCEDURE — 1090F PRES/ABSN URINE INCON ASSESS: CPT | Performed by: SURGERY

## 2021-01-29 PROCEDURE — 99213 OFFICE O/P EST LOW 20 MIN: CPT | Performed by: SURGERY

## 2021-01-29 PROCEDURE — G8432 DEP SCR NOT DOC, RNG: HCPCS | Performed by: SURGERY

## 2021-01-29 PROCEDURE — G8419 CALC BMI OUT NRM PARAM NOF/U: HCPCS | Performed by: SURGERY

## 2021-01-29 PROCEDURE — G8400 PT W/DXA NO RESULTS DOC: HCPCS | Performed by: SURGERY

## 2021-01-29 NOTE — PROGRESS NOTES
HISTORY OF PRESENT ILLNESS Lucio Light is a 66 y.o. female. HPI  ESTABLISHED patient here for follow up RIGHT breast cancer. She is not having any problems. 10/29/20: RIGHT breast bx. PATH: IDC, at least 2.5mm, favor grade 2, ER+(100%)/AZ+(99%)/HER2-. Clinical stage 1. 
 
11/2020 - : Anastrozole. (Dr. Debra Garcia) 2003: LEFT breast cancer. Lumpectomy, chemo, and XRT, followed by 5 years of Tamoxifen. 2012: LEFT breast cancer. Mastectomy, followed by five years of Anastrozole. ROS Physical Exam 
 
ASSESSMENT and PLAN 
{ASSESSMENT/PLAN:[id number]}

## 2021-01-29 NOTE — PATIENT INSTRUCTIONS

## 2021-01-29 NOTE — PROGRESS NOTES
HISTORY OF PRESENT ILLNESS  Chantelle Bell is a 66 y.o. female. HPI  ESTABLISHED patient here for follow up RIGHT breast cancer. She is not having any problems. 10/29/20: RIGHT breast bx. PATH: IDC, at least 2.5mm, favor grade 2, ER+(100%)/NM+(99%)/HER2-. Clinical stage 1.    11/2020 - : Neoadjuvant Anastrozole. (Dr. Blas Alanis)      0314: LEFT breast cancer. Lumpectomy, chemo, and XRT, followed by 5 years of Tamoxifen. 2012: LEFT breast cancer. Mastectomy, followed by five years of Anastrozole. Past Medical History:   Diagnosis Date    Arthritis     [facility]    [facility] 2003, 2012    lumpectomy/chemo/XRT, then mastectomy and anastrozole in 2012    COPD [facility]chronic obstructive [facility] disease) [facility]     Hypertension        Past Surgical History:   Procedure Laterality Date    HX BREAST LUMPECTOMY Left     2003    HX MASTECTOMY Left     2013       Social History     Socioeconomic History    Marital status: UNKNOWN     Spouse name: Not on file    Number of children: Not on file    Years of education: Not on file    Highest education level: Not on file   Occupational History    Not on file   Social Needs    Financial resource strain: Not on file    Food insecurity     Worry: Not on file     Inability: Not on file    Transportation needs     Medical: Not on file     Non-medical: Not on file   Tobacco Use    Smoking status: Current Every Day Smoker     Types: Cigarettes    Smokeless tobacco: Never Used    Tobacco comment: E-cigarettes   Substance and Sexual Activity    Alcohol use:  Yes    Drug use: Never    Sexual activity: Not on file   Lifestyle    Physical activity     Days per week: Not on file     Minutes per session: Not on file    Stress: Not on file   Relationships    Social connections     Talks on phone: Not on file     Gets together: Not on file     Attends Voodoo service: Not on file     Active member of club or organization: Not on file     Attends meetings of clubs or organizations: Not on file     Relationship status: Not on file   • Intimate partner violence     Fear of current or ex partner: Not on file     Emotionally abused: Not on file     Physically abused: Not on file     Forced sexual activity: Not on file   Other Topics Concern   • Not on file   Social History Narrative   • Not on file       Current Outpatient Medications on File Prior to Visit   Medication Sig Dispense Refill   • triazolam (HALCION) 0.25 mg tablet TAKE 1 TABLET BY MOUTH NIGHTLY AS NEEDED .  MAX  DAILY  AMOUNT  0.25  MG 30 Tab 0   • citalopram (CELEXA) 40 mg tablet Take one tab daily 90 Tab 0   • lisinopriL (PRINIVIL, ZESTRIL) 5 mg tablet Take 1 Tab by mouth daily. 90 Tab 3   • atorvastatin (LIPITOR) 40 mg tablet Take 1 Tab by mouth daily. atorvastatin 40 mg tablet 90 Tab 1   • tiotropium bromide (SPIRIVA RESPIMAT) 2.5 mcg/actuation inhaler Spiriva Respimat 2.5 mcg/actuation solution for inhalation 1 Inhaler 3   • anastrozole (Arimidex) 1 mg tablet Take 1 mg by mouth daily. Indications: hormone receptor positive breast cancer 30 Tab 5     No current facility-administered medications on file prior to visit.        Allergies   Allergen Reactions   • Betadine [Povidone-Iodine] Hives   • Red Dye Hives       OB History    No obstetric history on file.      Obstetric Comments   Menarche 13, LMP 1997, # of children 2, age of 1st delivery 22, Hysterectomy/oophorectomy No/No, Breast bx Yes, LEFT and RIGHT, history of breast feeding Yes, BCP No, Hormone therapy No               ROS    Physical Exam  Exam conducted with a chaperone present.   Cardiovascular:      Rate and Rhythm: Normal rate and regular rhythm.      Heart sounds: Normal heart sounds.   Pulmonary:      Breath sounds: Normal breath sounds.   Chest:      Breasts: Breasts are symmetrical.         Right: Normal. No swelling, bleeding, inverted nipple, mass, nipple discharge, skin change or  tenderness. Left: Absent. No mass or skin change. Lymphadenopathy:      Cervical:      Right cervical: No superficial, deep or posterior cervical adenopathy. Left cervical: No superficial, deep or posterior cervical adenopathy. Upper Body:      Right upper body: No supraclavicular or axillary adenopathy. Left upper body: No supraclavicular or axillary adenopathy. BREAST ULTRASOUND  Indication: RIGHT breast cancer  Technique: The area was scanned using a high-frequency linear-array near-field transducer  Findings: Small mass with bx clip behind the nipple  Impression: Known cancer  Disposition: Continue AI      ASSESSMENT and PLAN    ICD-10-CM ICD-9-CM    1. Invasive ductal carcinoma of breast, right (HCC)  C50.911 174.9    2. History of left breast cancer  Z85.3 V10.3       Patient presents to follow up on RIGHT breast IDC, and is doing well overall. No palpable mass on exam. RIGHT breast US visualizes small mass with bx clip behind the nipple. Discussed central lumpectomy due to location of mass and clip behind the nipple. Pt to continue neoadjuvant AI, and will plan for surgery after pt has received her COVID-19 vaccine. Advised pt to follow up with her eye doctor for macular degeneration of the RIGHT eye, which seems to have progressed. F/U with me in 2 months. This plan was reviewed with the patient and patient agrees. All questions were answered. Total time spent was 20 minutes.     Written by Solnakul Bautista, as dictated by Dr. Sierra Calle MD.

## 2021-02-16 DIAGNOSIS — G47.00 INSOMNIA, UNSPECIFIED TYPE: ICD-10-CM
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
## 2021-02-17 RX ORDER — TRIAZOLAM 0.25 MG/1
TABLET ORAL
Qty: 30 TAB | Refills: 0 | Status: SHIPPED | OUTPATIENT
Start: 2021-02-17 | End: 2021-03-25

## 2021-03-25 ENCOUNTER — VIRTUAL VISIT (OUTPATIENT)
Dept: FAMILY MEDICINE CLINIC | Age: 76
End: 2021-03-25
Payer: MEDICARE

## 2021-03-25 DIAGNOSIS — G89.29 CHRONIC PAIN OF RIGHT KNEE: Primary | ICD-10-CM

## 2021-03-25 DIAGNOSIS — Z12.2 ENCOUNTER FOR SCREENING FOR LUNG CANCER: ICD-10-CM

## 2021-03-25 DIAGNOSIS — M25.561 CHRONIC PAIN OF RIGHT KNEE: Primary | ICD-10-CM

## 2021-03-25 DIAGNOSIS — M17.9 OSTEOARTHRITIS OF KNEE, UNSPECIFIED LATERALITY, UNSPECIFIED OSTEOARTHRITIS TYPE: ICD-10-CM

## 2021-03-25 DIAGNOSIS — F32.A DEPRESSION, UNSPECIFIED DEPRESSION TYPE: ICD-10-CM

## 2021-03-25 DIAGNOSIS — C50.911 INVASIVE DUCTAL CARCINOMA OF BREAST, RIGHT (HCC): ICD-10-CM

## 2021-03-25 DIAGNOSIS — F41.1 GAD (GENERALIZED ANXIETY DISORDER): ICD-10-CM

## 2021-03-25 PROCEDURE — 1101F PT FALLS ASSESS-DOCD LE1/YR: CPT | Performed by: STUDENT IN AN ORGANIZED HEALTH CARE EDUCATION/TRAINING PROGRAM

## 2021-03-25 PROCEDURE — G8400 PT W/DXA NO RESULTS DOC: HCPCS | Performed by: STUDENT IN AN ORGANIZED HEALTH CARE EDUCATION/TRAINING PROGRAM

## 2021-03-25 PROCEDURE — G8432 DEP SCR NOT DOC, RNG: HCPCS | Performed by: STUDENT IN AN ORGANIZED HEALTH CARE EDUCATION/TRAINING PROGRAM

## 2021-03-25 PROCEDURE — G8428 CUR MEDS NOT DOCUMENT: HCPCS | Performed by: STUDENT IN AN ORGANIZED HEALTH CARE EDUCATION/TRAINING PROGRAM

## 2021-03-25 PROCEDURE — 99214 OFFICE O/P EST MOD 30 MIN: CPT | Performed by: STUDENT IN AN ORGANIZED HEALTH CARE EDUCATION/TRAINING PROGRAM

## 2021-03-25 PROCEDURE — 1090F PRES/ABSN URINE INCON ASSESS: CPT | Performed by: STUDENT IN AN ORGANIZED HEALTH CARE EDUCATION/TRAINING PROGRAM

## 2021-03-25 NOTE — PROGRESS NOTES
Kathryn Miller  66 y.o. female  1945  [address] [place] [facility] [id number]  [address]:    Telemedicine Progress Note  Marichuy Burgess MD       Encounter Date and Time: March 25, 2021 at 3:05 PM    Consent: Kathryn Miller, who was seen by synchronous (real-time) audio-video technology, and/or her healthcare decision maker, is aware that this patient-initiated, Telehealth encounter on 3/25/2021 is a billable service, with coverage as determined by her insurance carrier. She is aware that she may receive a bill and has provided verbal consent to proceed: Yes. Chief Complaint   Patient presents with    Medication Refill    Depression    Anxiety    Knee Pain     History of Present Illness   Kathryn Miller is a 66 y.o. female was evaluated by synchronous (real-time) audio-video technology from home, through a secure patient portal.    Breast cancer:  Patient was diagnosed with R breast cancer a couple of months ago after being in remission. Patient follows with Dr. Chika Lama and Dr. Darlyn Ramirez oncology. Patient initially decided against breast surgery, however will have lumpectomy. Patient is currently on anastrazole. Patient has history of L Breast cancer which was diagnosed I 2003 which was initially treated with lumpectomy, chemo and tamoxifen. Depression and anxiety:  Patient currently on triazolam 0.25 mg qhs, celexa 40 mg daily. Was open to weaning off triazolam prior to being diagnosed with breast cancer however is no reluctant to do so. Ex  recently passed away and is going through a difficult time. Patient understands potential side effects of triazolam however does not want to wean off. Depression                            Pleasure in doing things? 0              Feeling down or depressed? 0              Trouble sleeping? 0              No energy or fatigue? 0              Poor appetite or overeating?  1              Feeling you have let others down or you are a failure? 0              Trouble concentrating? 0              Moving slowly or fidgety all the time? 0              Thinking you would be better off dead or thoughts of hurting yourself? 0                            Total:0, previosly2     POOL 7     Feeling nervous anxious, or on edge? 0  Not being able to stop or control worrying? 0  Worrying too much about different things? 1  Trouble relaxing? 0  Being so restless unable to sit still? 0  Easily annoyed or irritable? 0  Feeling afraid as if something awful will happen? 1     Total:2, same as before     R Breast Cancer  Patient was recently diagnosed with R breast cancer a couple of months ago on routine mammogram. Patient met with Dr. Veda Prado and Dr. Mehta Emeigh oncology. Patient decided against breast surgery at this time due to COPD and COVID. Patient is current on anastrazole. Patient has history of L Breast cancer which was diagnosed I 2003 which was initially treated with lumpectomy, chemo and tamoxifen. Had recurrence so had a mastectomy fand five days on anastrozole. On celxa and triazolam    R knee pain  Patient has had R knee pain for years. Was diagnosed with OA and was told she would need a replacement however due to severe COPD is not a surgical candidate. Has also received knee injections in the past which minimally improved symptoms. Has not tried physical therapy and would like to try it. Health maintenance:  - Last glaucoma screen was 1 year ago however postponed it due to covid. 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 Will get them once she gets vaccinated against COVID .  - Due for CT scan, has significant smoking history of > 30 pack years and quit 10 years ago. Review of Systems   Review of Systems   Constitutional: Negative for chills and fever. HENT: Negative for congestion and sore throat.     Eyes: Negative for blurred vision and double vision. Respiratory: Negative for cough, hemoptysis, sputum production, shortness of breath and wheezing. Cardiovascular: Negative for chest pain, palpitations and leg swelling. Gastrointestinal: Negative for abdominal pain, blood in stool, constipation, diarrhea, heartburn, melena, nausea and vomiting. Genitourinary: Negative for dysuria and urgency. Musculoskeletal: Positive for joint pain. Negative for back pain. Skin: Negative for rash. Neurological: Negative for dizziness, focal weakness and headaches. Psychiatric/Behavioral: Negative for suicidal ideas. Vitals/Objective:     General: alert, cooperative, no distress   Mental  status: mental status: alert, oriented to person, place, and time, normal mood, behavior, speech, dress, motor activity, and thought processes   Resp: resp: normal effort and no respiratory distress   Neuro: neuro: no gross deficits   Skin: skin: no discoloration or lesions of concern on visible areas   Due to this being a TeleHealth evaluation, many elements of the physical examination are unable to be assessed. Assessment and Plan:   Time-based coding, delete if not needed: I spent at least 40 minutes with this established patient, and >50% of the time was spent counseling and/or coordinating care regarding Anxiety, depression, Breast cancer, Knee OA  1. Anxiety/ Depression: PHQ 9 =0, previosuly 2 GAD7=2, previously2  - triazolam (HALCION) 0.25 mg tablet; TAKE 1 TABLET BY MOUTH NIGHTLY AS NEEDED . MAX  DAILY  AMOUNT  0.25  MG  Dispense: 30 Tab; Refill: 0  - citalopram (CELEXA) 40 mg tablet; Take one tab daily  Dispense: 90 Tab; Refill: 0     2. R breast cancer: Follows with Dr. Areli Patrick and Dr. Peter Acosta. Decided on lumpectomy. Has follow up appointment with Dr. Areli Patrick this Friday. On anostrazole. 3. R knee OA: Patient was diagnosed years ago. Tried joint injections. Not a candidate for surgery given COPD.  Open to trying PT.   - REFERRAL TO PHYSICAL THERAPY     Encounter for screening for lung cancer: Due for CT scan, has significant smoking history of > 30 pack years and quit 10 years ago. Personally reviewed previous CT scan performed on 2017, was advised to repeat q1 year. - CT LOW DOSE LUNG CANCER SCREENING; Future    Time spent in direct conversation with the patient to include medical condition(s) discussed, assessment and treatment plan:       We discussed the expected course, resolution and complications of the diagnosis(es) in detail. Medication risks, benefits, costs, interactions, and alternatives were discussed as indicated. I advised her to contact the office if her condition worsens, changes or fails to improve as anticipated. She expressed understanding with the diagnosis(es) and plan. Patient understands that this encounter was a temporary measure, and the importance of further follow up and examination was emphasized. Patient verbalized understanding. Patient informed to follow up: in two months. .    Electronically Signed: Alfonso Stovall MD    CPT Codes 32562-59008 for Established Patients may apply to this Telehealth Visit. POS code: 18. Modifier LOKI Stevenson is a 66 y.o. female who was evaluated by an audio-video encounter for concerns as above. Patient identification was verified prior to start of the visit. A caregiver was present when appropriate. Due to this being a TeleHealth encounter (During [place] [address] public [facility] emergency), evaluation of the following organ systems was limited: Vitals/Constitutional/EENT/Resp/CV/GI//MS/Neuro/Skin/Heme-Lymph-Imm.   Pursuant to the emergency declaration under the [facility], [facility]5 waiver authority and the [employer] Act, this Virtual Visit was conducted, with patient's (and/or legal guardian's) consent, to reduce the patient's risk of exposure to COVID-19 and provide necessary medical care. Services were provided through a synchronous discussion virtually to substitute for in-person clinic visit. I was at home. The patient was at home. History   Patients past medical, surgical and family histories were reviewed and updated. Past Medical History:   Diagnosis Date    Arthritis     Breast cancer ([facility] 10/2020    Breast cancer, left breast ([facility] 2003, 2012    lumpectomy/chemo/XRT, then mastectomy and anastrozole in 2012    COPD (chronic obstructive pulmonary disease) ([facility]     Hypertension      Past Surgical History:   Procedure Laterality Date    HX BREAST LUMPECTOMY Left     2003    HX MASTECTOMY Left     2013     No family history on file. Social History     Tobacco Use    Smoking status: Current Every Day Smoker     Types: Cigarettes    Smokeless tobacco: Never Used    Tobacco comment: E-cigarettes   Substance Use Topics    Alcohol use: Yes    Drug use: Never     Patient Active Problem List   Diagnosis Code    Invasive ductal carcinoma of breast, right ([facility] C50.911    History of left breast cancer Z85.3          Current Medications/Allergies   Medications and Allergies reviewed:    Current Outpatient Medications   Medication Sig Dispense Refill    triazolam (HALCION) 0.25 mg tablet TAKE 1 TABLET BY MOUTH AT NIGHT AS NEEDED 30 Tab 2    citalopram (CELEXA) 40 mg tablet Take one tab daily 90 Tab 0    lisinopriL (PRINIVIL, ZESTRIL) 5 mg tablet Take 1 Tab by mouth daily. 90 Tab 3    atorvastatin (LIPITOR) 40 mg tablet Take 1 Tab by mouth daily. atorvastatin 40 mg tablet 90 Tab 1    tiotropium bromide (SPIRIVA RESPIMAT) 2.5 mcg/actuation inhaler Spiriva Respimat 2.5 mcg/actuation solution for inhalation 1 Inhaler 3    anastrozole (Arimidex) 1 mg tablet Take 1 mg by mouth daily.  Indications: hormone receptor positive breast cancer 30 Tab 5     Allergies   Allergen Reactions    Betadine [Povidone-Iodine] Hives    Red Dye Hives

## 2021-03-27 NOTE — PROGRESS NOTES
[address] Medicine Residency Attending Addendum:  Dr. Karlo Partida MD,  the patient and I were not physically present during this encounter. The resident and I are concurrently monitoring the patient care through appropriate telecommunication technology. I discussed the findings, assessment and plan with the resident and agree with the resident's findings and plan as documented in the resident's note.       Royce Taylor MD

## 2021-03-29 ENCOUNTER — TELEPHONE (OUTPATIENT)
Dept: FAMILY MEDICINE CLINIC | Age: 76
End: 2021-03-29

## 2021-03-29 NOTE — TELEPHONE ENCOUNTER
----- Message from [place] sent at 3/26/2021 10:56 AM EDT -----  Regarding: Dr. Ab Kimball Message/Vendor Calls    Caller's first and last name:  Noreene Severance      Reason for call:  Requesting a call back to discuss a medication.        Callback required yes/no and why: yes      Best contact number(s):[phone]      Details to clarify the request:  Selvin Roa [facility]

## 2021-03-31 ENCOUNTER — OFFICE VISIT (OUTPATIENT)
Dept: SURGERY | Age: 76
End: 2021-03-31
Payer: MEDICARE

## 2021-03-31 VITALS
BODY MASS INDEX: 27.31 KG/M2 | DIASTOLIC BLOOD PRESSURE: 62 MMHG | WEIGHT: 160 LBS | TEMPERATURE: 97.1 F | HEIGHT: 64 IN | SYSTOLIC BLOOD PRESSURE: 138 MMHG

## 2021-03-31 DIAGNOSIS — Z85.3 HISTORY OF LEFT BREAST CANCER: ICD-10-CM

## 2021-03-31 DIAGNOSIS — C50.911 INVASIVE DUCTAL CARCINOMA OF BREAST, RIGHT (HCC): Primary | ICD-10-CM

## 2021-03-31 PROCEDURE — G8400 PT W/DXA NO RESULTS DOC: HCPCS | Performed by: SURGERY

## 2021-03-31 PROCEDURE — 1090F PRES/ABSN URINE INCON ASSESS: CPT | Performed by: SURGERY

## 2021-03-31 PROCEDURE — G8427 DOCREV CUR MEDS BY ELIG CLIN: HCPCS | Performed by: SURGERY

## 2021-03-31 PROCEDURE — G8432 DEP SCR NOT DOC, RNG: HCPCS | Performed by: SURGERY

## 2021-03-31 PROCEDURE — 99213 OFFICE O/P EST LOW 20 MIN: CPT | Performed by: SURGERY

## 2021-03-31 PROCEDURE — G8536 NO DOC ELDER MAL SCRN: HCPCS | Performed by: SURGERY

## 2021-03-31 PROCEDURE — G8419 CALC BMI OUT NRM PARAM NOF/U: HCPCS | Performed by: SURGERY

## 2021-03-31 PROCEDURE — 1101F PT FALLS ASSESS-DOCD LE1/YR: CPT | Performed by: SURGERY

## 2021-03-31 NOTE — PROGRESS NOTES
HISTORY OF PRESENT ILLNESS Ronny Melo is a 66 y.o. female. HPI   ESTABLISHED patient here for follow-up of RIGHT breast cancer, on neoadjuvant anastrozole. Has some hot flashes and joint pain, otherwise is doing well. Cannot feel the mass any longer. Has some pain at the site of her LEFT mastectomy site. Notices that the tissue on the side left from her previous mastectomy occasionally becomes bright red, but she has no pain. On oxygen continuously. 10/29/20: RIGHT breast bx. PATH: IDC, at least 2.5mm, favor grade 2, ER+(100%)/IL+(99%)/HER2-. Clinical stage 1. 
 
11/2020 - : Neoadjuvant Anastrozole. (Dr. Lissette Gloria) 2003: LEFT breast cancer. Lumpectomy, chemo, and XRT, followed by 5 years of Tamoxifen. 2012: LEFT breast cancer. Mastectomy, followed by five years of Anastrozole. ROS Physical Exam 
 
ASSESSMENT and PLAN 
{ASSESSMENT/PLAN:75628}

## 2021-03-31 NOTE — PATIENT INSTRUCTIONS
Anastrozole (By mouth) Anastrozole (an-AS-troe-zole) Treats breast cancer. Brand Name(s): Arimidex There may be other brand names for this medicine. When This Medicine Should Not Be Used: This medicine is not right for everyone. Do not use it if you had an allergic reaction to anastrozole, if you are pregnant, or if you have not stopped menstruating (premenopausal). How to Use This Medicine:  
Tablet · Medicines used to treat cancer are very strong and can have many side effects. Before receiving this medicine, make sure you understand all the risks and benefits. It is important for you to work closely with your doctor during your treatment. · Your doctor will tell you how much medicine to use. Do not use more than directed. · Read and follow the patient instructions that come with this medicine. Talk to your doctor or pharmacist if you have any questions. · Missed dose: Take a dose as soon as you remember. If it is almost time for your next dose, wait until then and take a regular dose. Do not take extra medicine to make up for a missed dose. · If you vomit after taking your medicine, call your doctor or pharmacist for instructions. · Store the medicine in a closed container at room temperature, away from heat, moisture, and direct light. · Ask your pharmacist, doctor, or health caregiver about the best way to dispose of any leftover medicine after you have finished your treatment. You will also need to throw away old medicine after the expiration date has passed. Drugs and Foods to Avoid: Ask your doctor or pharmacist before using any other medicine, including over-the-counter medicines, vitamins, and herbal products. · Some medicines can affect how anastrozole works. Tell your doctor if you are taking any of the following: ¨ Medicine that contains estrogen ¨ Tamoxifen Warnings While Using This Medicine: · Pregnancy after menopause is not likely, but if you think you could be pregnant, tell your doctor. This medicine could harm an unborn baby. · Tell your doctor if you are breastfeeding, or if you have liver disease, bone problems (such as osteoporosis), high cholesterol, or heart disease. · This medicine may cause the following problems:  
¨ Increased risk for weak bones or osteoporosis ¨ Increased cholesterol levels ¨ Increased risk for heart attack or stroke · Your doctor will do lab tests at regular visits to check on the effects of this medicine. Keep all appointments. · Cancer medicine can cause nausea or vomiting, sometimes even after you receive medicine to prevent these effects. Ask your doctor or nurse about other ways to control any nausea or vomiting that might happen. · Keep all medicine out of the reach of children. Never share your medicine with anyone. Possible Side Effects While Using This Medicine:  
Call your doctor right away if you notice any of these side effects: · Allergic reaction: Itching or hives, swelling in your face or hands, swelling or tingling in your mouth or throat, chest tightness, trouble breathing · Back, bone, joint, or muscle pain · Blistering, peeling, or red skin rash · Chest pain or shortness of breath · Fever, chills, cough, sore throat, and body aches · Numbness, tingling, or burning pain in your hands, arms, legs, or feet · Rapid weight gain, or swelling in your hands, ankles, or feet · Severe diarrhea, nausea, or vomiting · Vaginal bleeding or discharge · Yellowing of your skin or the whites of your eyes If you notice these less serious side effects, talk with your doctor: · Breast pain · Dizziness, headache, tiredness, or weakness · Mood changes, anxiety, or irritability · Trouble sleeping · Warmth or redness in your face, neck, arms, or upper chest 
If you notice other side effects that you think are caused by this medicine, tell your doctor. Call your doctor for medical advice about side effects.  You may report side effects to FDA at 2-653-FDA-7752 © 2017 [facility] Information is for End User's use only and may not be sold, redistributed or otherwise used for commercial purposes. The above information is an  only. It is not intended as medical advice for individual conditions or treatments. Talk to your doctor, nurse or pharmacist before following any medical regimen to see if it is safe and effective for you.

## 2021-03-31 NOTE — PROGRESS NOTES
HISTORY OF PRESENT ILLNESS  Zach Jones is a 66 y.o. female. HPI  ESTABLISHED patient here for follow-up of RIGHT breast cancer, on neoadjuvant anastrozole. Has some hot flashes and joint pain, otherwise is doing well. Cannot feel the mass any longer. Has some pain at the site of her LEFT mastectomy site. Notices that the tissue on the side left from her previous mastectomy occasionally becomes bright red, but she has no pain. On oxygen continuously.       10/29/20: RIGHT breast bx. PATH: IDC, at least 2.5mm, favor grade 2, ER+(100%)/AL+(99%)/HER2-. Clinical stage 1.     11/2020 - : Neoadjuvant Anastrozole. (Dr. Brayan Stephenson)     9627: LEFT breast cancer. Lumpectomy, chemo, and XRT, followed by 5 years of Tamoxifen.     2012: LEFT breast cancer. Mastectomy, followed by five years of Anastrozole. Past Medical History:   Diagnosis Date    Arthritis     [facility] 10/[facility]    Breast cancer, left breast ([facility] 2003, 2012    lumpectomy/chemo/XRT, then mastectomy and anastrozole in 2012    COPD (chronic obstructive pulmonary disease) ([facility]     Hypertension        Past Surgical History:   Procedure Laterality Date    HX BREAST LUMPECTOMY Left     2003    HX MASTECTOMY Left     2013       Social History     Socioeconomic History    Marital status: SINGLE     Spouse name: Not on file    Number of children: Not on file    Years of education: Not on file    Highest education level: Not on file   Occupational History    Not on file   Social Needs    Financial resource strain: Not on file    Food insecurity     Worry: Not on file     Inability: Not on file    Transportation needs     Medical: Not on file     Non-medical: Not on file   Tobacco Use    Smoking status: Current Every Day Smoker     Types: Cigarettes    Smokeless tobacco: Never Used    Tobacco comment: E-cigarettes   Substance and Sexual Activity    Alcohol use:  Yes    Drug use: Never    Sexual activity: Not on file Lifestyle    Physical activity     Days per week: Not on file     Minutes per session: Not on file    Stress: Not on file   Relationships    Social connections     Talks on phone: Not on file     Gets together: Not on file     Attends [employer] service: Not on file     Active member of club or organization: Not on file     Attends meetings of clubs or organizations: Not on file     Relationship status: Not on file    Intimate partner violence     Fear of current or ex partner: Not on file     Emotionally abused: Not on file     Physically abused: Not on file     Forced sexual activity: Not on file   Other Topics Concern    Not on file   Social History Narrative    Not on file       Current Outpatient Medications on File Prior to Visit   Medication Sig Dispense Refill    triazolam (HALCION) 0.25 mg tablet TAKE 1 TABLET BY MOUTH AT NIGHT AS NEEDED 30 Tab 2    citalopram (CELEXA) 40 mg tablet Take one tab daily 90 Tab 0    lisinopriL (PRINIVIL, ZESTRIL) 5 mg tablet Take 1 Tab by mouth daily. 90 Tab 3    atorvastatin (LIPITOR) 40 mg tablet Take 1 Tab by mouth daily. atorvastatin 40 mg tablet 90 Tab 1    tiotropium bromide (SPIRIVA RESPIMAT) 2.5 mcg/actuation inhaler Spiriva Respimat 2.5 mcg/actuation solution for inhalation 1 Inhaler 3    anastrozole (Arimidex) 1 mg tablet Take 1 mg by mouth daily. Indications: hormone receptor positive breast cancer 30 Tab 5     No current facility-administered medications on file prior to visit. Allergies   Allergen Reactions    Betadine [Povidone-Iodine] Hives    Red Dye Hives       OB History    No obstetric history on file. Obstetric Comments   Menarche 11, LMP 0, # of children 2, age of 4st delivery 23, Hysterectomy/oophorectomy No/No, Breast bx Yes, LEFT and RIGHT, history of breast feeding Yes, BCP No, Hormone therapy No               ROS    Physical Exam  Exam conducted with a chaperone present.    Cardiovascular:      Rate and Rhythm: Normal rate and regular rhythm. Heart sounds: Normal heart sounds. Pulmonary:      Breath sounds: Normal breath sounds. Chest:      Breasts: Breasts are symmetrical.         Right: Normal. No swelling, bleeding, inverted nipple, mass, nipple discharge, skin change or tenderness. Left: Absent. No mass or skin change. Lymphadenopathy:      Cervical:      Right cervical: No superficial, deep or posterior cervical adenopathy. Left cervical: No superficial, deep or posterior cervical adenopathy. Upper Body:      Right upper body: No supraclavicular or axillary adenopathy. Left upper body: No supraclavicular or axillary adenopathy. ASSESSMENT and PLAN    ICD-10-CM ICD-9-CM    1. Invasive ductal carcinoma of breast, right (HCC)  C50.911 174.9    2. History of left breast cancer  Z85.3 V10.3       Patient presents to follow up on RIGHT breast IDC, and is doing well overall. No palpable mass on exam. Pt has gotten both doses of her COVID-19 vaccine, an is ready to schedule surgery. Pt is a good candidate for central lumpectomy based on tumor location. Discussed this surgery with MAC and local anesthesia. Pt to stop anastrozole and Aspirin 5 days prior to surgery. Pt understands and consents to surgery. Will schedule for the near future at [facility], and scheduling will f/u with the date. This plan was reviewed with the patient and patient agrees. All questions were answered. Total time spent was 20 minutes.     Written by Inez Hearn, as dictated by Dr. Belinda Hoffman MD.

## 2021-04-05 ENCOUNTER — TELEPHONE (OUTPATIENT)
Dept: FAMILY MEDICINE CLINIC | Age: 76
End: 2021-04-05

## 2021-04-05 ENCOUNTER — TRANSCRIBE ORDER (OUTPATIENT)
Dept: REGISTRATION | Age: 76
End: 2021-04-05

## 2021-04-05 DIAGNOSIS — C50.911 MALIGNANT NEOPLASM OF RIGHT FEMALE BREAST, UNSPECIFIED ESTROGEN RECEPTOR STATUS, UNSPECIFIED SITE OF BREAST (HCC): Primary | ICD-10-CM

## 2021-04-05 DIAGNOSIS — Z01.812 PRE-PROCEDURE LAB EXAM: Primary | ICD-10-CM

## 2021-04-05 NOTE — TELEPHONE ENCOUNTER
----- Message from Carson Hamilton sent at 4/1/2021  5:54 PM EDT -----  Regarding: Dr. Ruchi Melgoza: [phone]  Pt requesting callback to schedule labs and for further clarification on upcoming lung screening scheduled 4.7.21. 1:00pm

## 2021-04-05 NOTE — TELEPHONE ENCOUNTER
Lab orders are of SEPT, have  per [facility]. Do you wish to place new orders? Also the patient has another concern in her message to be addressed.

## 2021-04-06 NOTE — TELEPHONE ENCOUNTER
Dr. Cleary Stage  Received: 04/05/21 1:44 PM   Message Contents   Madalyn Orellana [facility] [address] [place] [address] Message/Vendor Calls     Caller's first and last name: Chitra Quarles       Reason for call: Callback request       Callback required yes/no and why: yes/pt request       Best contact number(s): [phone]       Details to clarify the request: pt is scheduled for ct scan @ [facility] 04/07/2021 @ 1 pm/pt wants to confirm which location she is allowed to go to/pt prefers \"[place]\"

## 2021-04-07 ENCOUNTER — HOSPITAL ENCOUNTER (OUTPATIENT)
Dept: CT IMAGING | Age: 76
Discharge: HOME | End: 2021-04-07
Attending: STUDENT IN AN ORGANIZED HEALTH CARE EDUCATION/TRAINING PROGRAM
Payer: MEDICARE

## 2021-04-07 DIAGNOSIS — Z12.2 ENCOUNTER FOR SCREENING FOR LUNG CANCER: ICD-10-CM

## 2021-04-07 PROCEDURE — 71271 CT THORAX LUNG CANCER SCR C-: CPT

## 2021-04-09 ENCOUNTER — HOSPITAL ENCOUNTER (OUTPATIENT)
Dept: PREADMISSION TESTING | Age: 76
Discharge: HOME | End: 2021-04-09
Payer: MEDICARE

## 2021-04-09 VITALS
SYSTOLIC BLOOD PRESSURE: 125 MMHG | BODY MASS INDEX: 28.23 KG/M2 | TEMPERATURE: 97.8 F | WEIGHT: 165.34 LBS | RESPIRATION RATE: 12 BRPM | HEART RATE: 78 BPM | OXYGEN SATURATION: 93 % | DIASTOLIC BLOOD PRESSURE: 76 MMHG | HEIGHT: 64 IN

## 2021-04-09 LAB
ATRIAL RATE: 64 BPM
BASOPHILS # BLD: 0.1 K/UL (ref 0–0.1)
BASOPHILS NFR BLD: 1 % (ref 0–1)
CALCULATED P AXIS, ECG09: 104 DEGREES
CALCULATED R AXIS, ECG10: -20 DEGREES
CALCULATED T AXIS, ECG11: 50 DEGREES
DIAGNOSIS, 93000: NORMAL
DIFFERENTIAL METHOD BLD: ABNORMAL
EOSINOPHIL # BLD: 0.1 K/UL (ref 0–0.4)
EOSINOPHIL NFR BLD: 2 % (ref 0–7)
ERYTHROCYTE [DISTWIDTH] IN BLOOD BY AUTOMATED COUNT: 13 % (ref 11.5–14.5)
HCT VFR BLD AUTO: 40.6 % (ref 35–47)
HGB BLD-MCNC: 13.2 G/DL (ref 11.5–16)
IMM GRANULOCYTES # BLD AUTO: 0 K/UL (ref 0–0.04)
IMM GRANULOCYTES NFR BLD AUTO: 0 % (ref 0–0.5)
LYMPHOCYTES # BLD: 1.3 K/UL (ref 0.8–3.5)
LYMPHOCYTES NFR BLD: 19 % (ref 12–49)
MCH RBC QN AUTO: 32.6 PG (ref 26–34)
MCHC RBC AUTO-ENTMCNC: 32.5 G/DL (ref 30–36.5)
MCV RBC AUTO: 100.2 FL (ref 80–99)
MONOCYTES # BLD: 0.5 K/UL (ref 0–1)
MONOCYTES NFR BLD: 8 % (ref 5–13)
NEUTS SEG # BLD: 4.5 K/UL (ref 1.8–8)
NEUTS SEG NFR BLD: 70 % (ref 32–75)
NRBC # BLD: 0 K/UL (ref 0–0.01)
NRBC BLD-RTO: 0 PER 100 WBC
P-R INTERVAL, ECG05: 186 MS
PLATELET # BLD AUTO: 231 K/UL (ref 150–400)
PMV BLD AUTO: 10 FL (ref 8.9–12.9)
Q-T INTERVAL, ECG07: 426 MS
QRS DURATION, ECG06: 76 MS
QTC CALCULATION (BEZET), ECG08: 439 MS
RBC # BLD AUTO: 4.05 M/UL (ref 3.8–5.2)
VENTRICULAR RATE, ECG03: 64 BPM
WBC # BLD AUTO: 6.4 K/UL (ref 3.6–11)

## 2021-04-09 PROCEDURE — 36415 COLL VENOUS BLD VENIPUNCTURE: CPT

## 2021-04-09 PROCEDURE — 85025 COMPLETE CBC W/AUTO DIFF WBC: CPT

## 2021-04-09 PROCEDURE — 93005 ELECTROCARDIOGRAM TRACING: CPT

## 2021-04-09 RX ORDER — ASPIRIN 325 MG
325 TABLET ORAL
COMMUNITY

## 2021-04-11 ENCOUNTER — HOSPITAL ENCOUNTER (OUTPATIENT)
Dept: PREADMISSION TESTING | Age: 76
Discharge: HOME | End: 2021-04-11
Payer: MEDICARE

## 2021-04-11 DIAGNOSIS — Z01.812 PRE-PROCEDURE LAB EXAM: ICD-10-CM

## 2021-04-11 PROCEDURE — U0003 INFECTIOUS AGENT DETECTION BY NUCLEIC ACID (DNA OR RNA); SEVERE ACUTE RESPIRATORY SYNDROME CORONAVIRUS 2 (SARS-COV-2) (CORONAVIRUS DISEASE [COVID-19]), AMPLIFIED PROBE TECHNIQUE, MAKING USE OF HIGH THROUGHPUT TECHNOLOGIES AS DESCRIBED BY CMS-2020-01-R: HCPCS

## 2021-04-12 LAB — SARS-COV-2, COV2NT: NOT DETECTED

## 2021-04-12 NOTE — PERIOP NOTES
EKG SENT TO [facility]ESIA FOR REVIEW; DR. Sukumar GORDON APPROVED EKG UPON REVIEWING. COPY OF EKG PLACED ON CHART. DR. Yuri Starks OFFICE CALLED AGAIN TO HAVE MOST RECENT PULMONARY NOTES FAXED TO US; FAX NUMBER PROVIDED.

## 2021-04-13 NOTE — PROGRESS NOTES
CT lung with no concerning pulmonary nodules. Mild to moderate emphysema.  Pulmonary artery hypertension

## 2021-04-15 ENCOUNTER — HOSPITAL ENCOUNTER (OUTPATIENT)
Age: 76
Setting detail: OUTPATIENT SURGERY
Discharge: HOME | End: 2021-04-15
Attending: SURGERY | Admitting: SURGERY
Payer: MEDICARE

## 2021-04-15 ENCOUNTER — ANESTHESIA EVENT (OUTPATIENT)
Dept: MEDSURG UNIT | Age: 76
End: 2021-04-15
Payer: MEDICARE

## 2021-04-15 ENCOUNTER — ANESTHESIA (OUTPATIENT)
Dept: MEDSURG UNIT | Age: 76
End: 2021-04-15
Payer: MEDICARE

## 2021-04-15 VITALS
DIASTOLIC BLOOD PRESSURE: 73 MMHG | OXYGEN SATURATION: 97 % | RESPIRATION RATE: 17 BRPM | WEIGHT: 165 LBS | HEIGHT: 64 IN | HEART RATE: 64 BPM | BODY MASS INDEX: 28.17 KG/M2 | SYSTOLIC BLOOD PRESSURE: 125 MMHG | TEMPERATURE: 97.7 F

## 2021-04-15 DIAGNOSIS — C50.911 INVASIVE DUCTAL CARCINOMA OF BREAST, RIGHT (HCC): ICD-10-CM

## 2021-04-15 DIAGNOSIS — C50.911 MALIGNANT NEOPLASM OF RIGHT FEMALE BREAST, UNSPECIFIED ESTROGEN RECEPTOR STATUS, UNSPECIFIED SITE OF BREAST (HCC): ICD-10-CM

## 2021-04-15 PROCEDURE — 88360 TUMOR IMMUNOHISTOCHEM/MANUAL: CPT

## 2021-04-15 PROCEDURE — 77030002996 HC SUT SLK J&J -A: Performed by: SURGERY

## 2021-04-15 PROCEDURE — 74011000250 HC RX REV CODE- 250: Performed by: SURGERY

## 2021-04-15 PROCEDURE — 77030040361 HC SLV COMPR DVT MDII -B: Performed by: SURGERY

## 2021-04-15 PROCEDURE — 77030008552 HC TBNG SMK EVAC BFLF -A: Performed by: SURGERY

## 2021-04-15 PROCEDURE — 19301 PARTIAL MASTECTOMY: CPT | Performed by: SURGERY

## 2021-04-15 PROCEDURE — 2709999900 HC NON-CHARGEABLE SUPPLY

## 2021-04-15 PROCEDURE — 77030038552 HC DRN WND MDII -A: Performed by: SURGERY

## 2021-04-15 PROCEDURE — 76030000000 HC AMB SURG OR TIME 0.5 TO 1: Performed by: SURGERY

## 2021-04-15 PROCEDURE — 88374 M/PHMTRC ALYS ISHQUANT/SEMIQ: CPT

## 2021-04-15 PROCEDURE — 76210000034 HC AMBSU PH I REC 0.5 TO 1 HR: Performed by: SURGERY

## 2021-04-15 PROCEDURE — 74011250636 HC RX REV CODE- 250/636: Performed by: NURSE ANESTHETIST, CERTIFIED REGISTERED

## 2021-04-15 PROCEDURE — 74011000258 HC RX REV CODE- 258: Performed by: NURSE ANESTHETIST, CERTIFIED REGISTERED

## 2021-04-15 PROCEDURE — 77030040922 HC BLNKT HYPOTHRM STRY -A

## 2021-04-15 PROCEDURE — 77030031139 HC SUT VCRL2 J&J -A: Performed by: SURGERY

## 2021-04-15 PROCEDURE — 74011000250 HC RX REV CODE- 250: Performed by: NURSE ANESTHETIST, CERTIFIED REGISTERED

## 2021-04-15 PROCEDURE — 77030011267 HC ELECTRD BLD COVD -A: Performed by: SURGERY

## 2021-04-15 PROCEDURE — 2709999900 HC NON-CHARGEABLE SUPPLY: Performed by: SURGERY

## 2021-04-15 PROCEDURE — 88307 TISSUE EXAM BY PATHOLOGIST: CPT

## 2021-04-15 PROCEDURE — 76060000061 HC AMB SURG ANES 0.5 TO 1 HR: Performed by: SURGERY

## 2021-04-15 PROCEDURE — 77030039266 HC ADH SKN EXOFIN S2SG -A: Performed by: SURGERY

## 2021-04-15 PROCEDURE — 88342 IMHCHEM/IMCYTCHM 1ST ANTB: CPT

## 2021-04-15 PROCEDURE — 77030002933 HC SUT MCRYL J&J -A: Performed by: SURGERY

## 2021-04-15 RX ORDER — MORPHINE SULFATE 2 MG/ML
2 INJECTION, SOLUTION INTRAMUSCULAR; INTRAVENOUS
Status: DISCONTINUED | OUTPATIENT
Start: 2021-04-15 | End: 2021-04-15 | Stop reason: HOSPADM

## 2021-04-15 RX ORDER — LIDOCAINE HYDROCHLORIDE 10 MG/ML
0.1 INJECTION, SOLUTION EPIDURAL; INFILTRATION; INTRACAUDAL; PERINEURAL AS NEEDED
Status: DISCONTINUED | OUTPATIENT
Start: 2021-04-15 | End: 2021-04-15 | Stop reason: HOSPADM

## 2021-04-15 RX ORDER — FENTANYL CITRATE 50 UG/ML
50 INJECTION, SOLUTION INTRAMUSCULAR; INTRAVENOUS AS NEEDED
Status: DISCONTINUED | OUTPATIENT
Start: 2021-04-15 | End: 2021-04-15 | Stop reason: HOSPADM

## 2021-04-15 RX ORDER — ROPIVACAINE HYDROCHLORIDE 5 MG/ML
150 INJECTION, SOLUTION EPIDURAL; INFILTRATION; PERINEURAL AS NEEDED
Status: DISCONTINUED | OUTPATIENT
Start: 2021-04-15 | End: 2021-04-15 | Stop reason: HOSPADM

## 2021-04-15 RX ORDER — DIPHENHYDRAMINE HYDROCHLORIDE 50 MG/ML
12.5 INJECTION, SOLUTION INTRAMUSCULAR; INTRAVENOUS AS NEEDED
Status: DISCONTINUED | OUTPATIENT
Start: 2021-04-15 | End: 2021-04-15 | Stop reason: HOSPADM

## 2021-04-15 RX ORDER — BUPIVACAINE HYDROCHLORIDE AND EPINEPHRINE 5; 5 MG/ML; UG/ML
30 INJECTION, SOLUTION EPIDURAL; INTRACAUDAL; PERINEURAL ONCE
Status: DISCONTINUED | OUTPATIENT
Start: 2021-04-15 | End: 2021-04-15 | Stop reason: HOSPADM

## 2021-04-15 RX ORDER — OXYCODONE HYDROCHLORIDE 5 MG/1
5 TABLET ORAL AS NEEDED
Status: DISCONTINUED | OUTPATIENT
Start: 2021-04-15 | End: 2021-04-15 | Stop reason: HOSPADM

## 2021-04-15 RX ORDER — MIDAZOLAM HYDROCHLORIDE 1 MG/ML
0.5 INJECTION, SOLUTION INTRAMUSCULAR; INTRAVENOUS
Status: DISCONTINUED | OUTPATIENT
Start: 2021-04-15 | End: 2021-04-15 | Stop reason: HOSPADM

## 2021-04-15 RX ORDER — HYDROMORPHONE HYDROCHLORIDE 1 MG/ML
0.2 INJECTION, SOLUTION INTRAMUSCULAR; INTRAVENOUS; SUBCUTANEOUS
Status: DISCONTINUED | OUTPATIENT
Start: 2021-04-15 | End: 2021-04-15 | Stop reason: HOSPADM

## 2021-04-15 RX ORDER — SODIUM CHLORIDE 9 MG/ML
25 INJECTION, SOLUTION INTRAVENOUS CONTINUOUS
Status: DISCONTINUED | OUTPATIENT
Start: 2021-04-15 | End: 2021-04-15 | Stop reason: HOSPADM

## 2021-04-15 RX ORDER — MIDAZOLAM HYDROCHLORIDE 1 MG/ML
1 INJECTION, SOLUTION INTRAMUSCULAR; INTRAVENOUS AS NEEDED
Status: DISCONTINUED | OUTPATIENT
Start: 2021-04-15 | End: 2021-04-15 | Stop reason: HOSPADM

## 2021-04-15 RX ORDER — EPHEDRINE SULFATE/0.9% NACL/PF 50 MG/5 ML
5 SYRINGE (ML) INTRAVENOUS AS NEEDED
Status: DISCONTINUED | OUTPATIENT
Start: 2021-04-15 | End: 2021-04-15 | Stop reason: HOSPADM

## 2021-04-15 RX ORDER — PROPOFOL 10 MG/ML
INJECTION, EMULSION INTRAVENOUS AS NEEDED
Status: DISCONTINUED | OUTPATIENT
Start: 2021-04-15 | End: 2021-04-15 | Stop reason: HOSPADM

## 2021-04-15 RX ORDER — SODIUM CHLORIDE, SODIUM LACTATE, POTASSIUM CHLORIDE, CALCIUM CHLORIDE 600; 310; 30; 20 MG/100ML; MG/100ML; MG/100ML; MG/100ML
100 INJECTION, SOLUTION INTRAVENOUS CONTINUOUS
Status: DISCONTINUED | OUTPATIENT
Start: 2021-04-15 | End: 2021-04-15 | Stop reason: HOSPADM

## 2021-04-15 RX ORDER — HYDROCODONE BITARTRATE AND ACETAMINOPHEN 5; 325 MG/1; MG/1
1 TABLET ORAL
Qty: 15 TAB | Refills: 0 | Status: SHIPPED | OUTPATIENT
Start: 2021-04-15 | End: 2021-04-22

## 2021-04-15 RX ORDER — FENTANYL CITRATE 50 UG/ML
25 INJECTION, SOLUTION INTRAMUSCULAR; INTRAVENOUS
Status: DISCONTINUED | OUTPATIENT
Start: 2021-04-15 | End: 2021-04-15 | Stop reason: HOSPADM

## 2021-04-15 RX ORDER — SODIUM CHLORIDE, SODIUM LACTATE, POTASSIUM CHLORIDE, CALCIUM CHLORIDE 600; 310; 30; 20 MG/100ML; MG/100ML; MG/100ML; MG/100ML
1000 INJECTION, SOLUTION INTRAVENOUS CONTINUOUS
Status: DISCONTINUED | OUTPATIENT
Start: 2021-04-15 | End: 2021-04-15 | Stop reason: HOSPADM

## 2021-04-15 RX ORDER — LIDOCAINE HYDROCHLORIDE AND EPINEPHRINE 10; 10 MG/ML; UG/ML
30 INJECTION, SOLUTION INFILTRATION; PERINEURAL ONCE
Status: DISCONTINUED | OUTPATIENT
Start: 2021-04-15 | End: 2021-04-15 | Stop reason: HOSPADM

## 2021-04-15 RX ORDER — PROPOFOL 10 MG/ML
INJECTION, EMULSION INTRAVENOUS
Status: DISCONTINUED | OUTPATIENT
Start: 2021-04-15 | End: 2021-04-15 | Stop reason: HOSPADM

## 2021-04-15 RX ORDER — SODIUM CHLORIDE, SODIUM LACTATE, POTASSIUM CHLORIDE, CALCIUM CHLORIDE 600; 310; 30; 20 MG/100ML; MG/100ML; MG/100ML; MG/100ML
INJECTION, SOLUTION INTRAVENOUS
Status: DISCONTINUED | OUTPATIENT
Start: 2021-04-15 | End: 2021-04-15 | Stop reason: HOSPADM

## 2021-04-15 RX ORDER — ACETAMINOPHEN 325 MG/1
650 TABLET ORAL ONCE
Status: DISCONTINUED | OUTPATIENT
Start: 2021-04-15 | End: 2021-04-15 | Stop reason: HOSPADM

## 2021-04-15 RX ORDER — LIDOCAINE HYDROCHLORIDE 20 MG/ML
INJECTION, SOLUTION EPIDURAL; INFILTRATION; INTRACAUDAL; PERINEURAL AS NEEDED
Status: DISCONTINUED | OUTPATIENT
Start: 2021-04-15 | End: 2021-04-15 | Stop reason: HOSPADM

## 2021-04-15 RX ORDER — ONDANSETRON 2 MG/ML
4 INJECTION INTRAMUSCULAR; INTRAVENOUS AS NEEDED
Status: DISCONTINUED | OUTPATIENT
Start: 2021-04-15 | End: 2021-04-15 | Stop reason: HOSPADM

## 2021-04-15 RX ADMIN — DEXMEDETOMIDINE HYDROCHLORIDE 5 MCG: 100 INJECTION, SOLUTION, CONCENTRATE INTRAVENOUS at 11:02

## 2021-04-15 RX ADMIN — PROPOFOL 40 MG: 10 INJECTION, EMULSION INTRAVENOUS at 11:00

## 2021-04-15 RX ADMIN — PROPOFOL 50 MG: 10 INJECTION, EMULSION INTRAVENOUS at 11:09

## 2021-04-15 RX ADMIN — LIDOCAINE HYDROCHLORIDE 40 MG: 20 INJECTION, SOLUTION EPIDURAL; INFILTRATION; INTRACAUDAL; PERINEURAL at 11:09

## 2021-04-15 RX ADMIN — DEXMEDETOMIDINE HYDROCHLORIDE 5 MCG: 100 INJECTION, SOLUTION, CONCENTRATE INTRAVENOUS at 10:59

## 2021-04-15 RX ADMIN — PROPOFOL 30 MG: 10 INJECTION, EMULSION INTRAVENOUS at 11:21

## 2021-04-15 RX ADMIN — PROPOFOL 40 MG: 10 INJECTION, EMULSION INTRAVENOUS at 11:29

## 2021-04-15 RX ADMIN — CEFAZOLIN SODIUM 2 G: 10 INJECTION, POWDER, FOR SOLUTION INTRAVENOUS at 11:10

## 2021-04-15 RX ADMIN — PROPOFOL 30 MG: 10 INJECTION, EMULSION INTRAVENOUS at 11:12

## 2021-04-15 RX ADMIN — PROPOFOL 50 MCG/KG/MIN: 10 INJECTION, EMULSION INTRAVENOUS at 11:10

## 2021-04-15 RX ADMIN — SODIUM CHLORIDE, POTASSIUM CHLORIDE, SODIUM LACTATE AND CALCIUM CHLORIDE: 600; 310; 30; 20 INJECTION, SOLUTION INTRAVENOUS at 10:59

## 2021-04-15 RX ADMIN — PROPOFOL 50 MG: 10 INJECTION, EMULSION INTRAVENOUS at 11:25

## 2021-04-15 RX ADMIN — PROPOFOL 40 MG: 10 INJECTION, EMULSION INTRAVENOUS at 11:38

## 2021-04-15 NOTE — ANESTHESIA PREPROCEDURE EVALUATION
Relevant Problems   PERSONAL HX & FAMILY HX OF CANCER   (+) Invasive ductal carcinoma of breast, right (HCC)       Anesthetic History   No history of anesthetic complications            Review of Systems / Medical History  Patient summary reviewed, nursing notes reviewed and pertinent labs reviewed    Pulmonary    COPD: severe      Smoker         Neuro/Psych   Within defined limits           Cardiovascular    Hypertension                   GI/Hepatic/Renal  Within defined limits              Endo/Other        Arthritis and cancer     Other Findings            Physical Exam    Airway  Mallampati: II  TM Distance: > 6 cm  Neck ROM: normal range of motion   Mouth opening: Normal     Cardiovascular  Regular rate and rhythm,  S1 and S2 normal,  no murmur, click, rub, or gallop             Dental  No notable dental hx       Pulmonary  Breath sounds clear to auscultation               Abdominal  GI exam deferred       Other Findings            Anesthetic Plan    ASA: 3  Anesthesia type: MAC          Induction: Intravenous  Anesthetic plan and risks discussed with: Patient

## 2021-04-15 NOTE — PERIOP NOTES
Discharge instructions called to the patients daughter. Opportunity for questions. Verbalized understanding. Paper copy of instructions given for home.

## 2021-04-15 NOTE — ANESTHESIA POSTPROCEDURE EVALUATION
Post-Anesthesia Evaluation and Assessment    Patient: Jyoti Burk MRN: [id number]  SSN: [id number]    YOB: 1945  Age: 66 y.o. Sex: female      I have evaluated the patient and they are stable and ready for discharge from the PACU. Cardiovascular Function/Vital Signs  Visit Vitals  /77   Pulse 62   Temp 36.5 °C (97.7 °F)   Resp 15   Ht 5' 4\" (1.626 m)   Wt 74.8 kg (165 lb)   SpO2 99%   BMI 28.32 kg/m²       Patient is status post MAC anesthesia for Procedure(s):  RIGHT BREAST LUMPECTOMY WITH ULTRASOUND. Nausea/Vomiting: None    Postoperative hydration reviewed and adequate. Pain:  Pain Scale 1: FLACC (04/15/21 1157)  Pain Intensity 1: 0 (04/15/21 1157)   Managed    Neurological Status:   Neuro (WDL): Within Defined Limits (04/15/21 1157)  Neuro  Neurologic State: Unresponsive (04/15/21 1157)   At baseline    Mental Status, Level of Consciousness: Alert and  oriented to person, place, and time    Pulmonary Status:   O2 Device: Oral airway (04/15/21 1200)   Adequate oxygenation and airway patent    Complications related to anesthesia: None    Post-anesthesia assessment completed. No concerns    Signed By: Elaina Lombard, MD     April 15, 2021              Procedure(s):  RIGHT BREAST LUMPECTOMY WITH ULTRASOUND. MAC    <BSHSIANPOST>    INITIAL Post-op Vital signs:   Vitals Value Taken Time   /73 04/15/21 1230   Temp 36.5 °C (97.7 °F) 04/15/21 1200   Pulse 64 04/15/21 1232   Resp 16 04/15/21 1232   SpO2 91 % 04/15/21 1232   Vitals shown include unvalidated device data.

## 2021-04-15 NOTE — OP NOTES
[address] [facility]  OPERATIVE REPORT    Name:  Jane Altman  MR#:  [id number]  :  1945  ACCOUNT #:  [de-identified]  DATE OF SERVICE:  04/15/2021    PREOPERATIVE DIAGNOSIS:  Carcinoma of the right breast.    POSTOPERATIVE DIAGNOSIS:  Carcinoma of the right breast.    PROCEDURE PERFORMED:  Right lumpectomy with intraoperative ultrasound. SURGEON:  Caterina Morales MD    ASSISTANT:  Keiry Montoya    ANESTHESIA:  General.    COMPLICATIONS:  None. SPECIMENS REMOVED:  Right breast mass with margins. IMPLANTS:  None. ESTIMATED BLOOD LOSS:  Minimal.    INDICATIONS:  The patient is a 29-year-old female with a small carcinoma of the right breast located in the retroareolar. She is admitted for central lumpectomy. PROCEDURE:  After adequate IV sedation and sterile prep and drape, local anesthesia of 1% lidocaine mixed with 0.5% Marcaine. An elliptical incision was made around the nipple areolar complex. It was deepened through the subcutaneous tissue with Bovie cautery. The mass was excised in its entirety and additional deep margin was obtained. All specimens were oriented and sent to Pathology. All dissection planes were hemostatic. The wound was anesthetized with 0.5% Marcaine and was closed with inner layer of 3-0 Vicryl and a running subcuticular 4-0 Monocryl on the skin. The patient tolerated the procedure well with no complications. She was taken to the recovery room in stable condition. Shira Castro MD      [id number]/  D:  04/15/2021 12:23  T:  04/15/2021 15:16  JOB #:  [id number]  CC:   MD Ketty Berry MD Delberta Limbo, MD

## 2021-04-15 NOTE — BRIEF OP NOTE
Brief Postoperative Note    Patient: Tania Wilkinson  YOB: 1945  MRN: [id number]    Date of Procedure: 4/15/2021     Pre-Op Diagnosis: RIGHT BEAST CANCER    Post-Op Diagnosis: Same as preoperative diagnosis.       Procedure(s):  RIGHT BREAST LUMPECTOMY WITH ULTRASOUND    Surgeon(s):  Waldemar Cortez MD    Surgical Assistant: Surg Asst-1: Valerio VACA    Anesthesia: MAC     Estimated Blood Loss (mL): Minimal    Complications: None    Specimens:   ID Type Source Tests Collected by Time Destination   1 : right breast lumpectomy  Fresh Breast  Waldemar Cortez MD 4/15/2021 1135 Pathology   2 : right breast superior and deep margin  Fresh Breast  Waldemar Cortez MD 4/15/2021 1135 Pathology        Implants: * No implants in log *    Drains: * No LDAs found *    Findings: spec sono pos clip and mass    Electronically Signed by Johnny Segovia MD on 4/57/7474 at 12:01 PM

## 2021-04-15 NOTE — DISCHARGE INSTRUCTIONS
Discharge Instructions from Dr. Brennan Givens    · I will call you with the pathology results, typically within 1 week from today. · You may shower, but no hot tubs, swimming pools, or baths until your incision is healed. · No heavy lifting with the affected extremity (nothing greater than 5 pounds), and limit its use for the next 4-5 days. · You may use an ice pack for comfort for the next couple of days, but do not place ice directly on the skin. Rather, use a towel or clothing to serve as a barrier between skin and ice to prevent injury. · If I placed a drain, follow the drain instructions provided, especially as you keep a record of the drain output. · Follow medication instructions carefully. · Watch for signs of infection as listed below. · Redness  · Swelling  · Drainage from the incision or from your nipple that appears infected  · Fever over 101 degrees for consecutive readings, or over 99.5 if you are currently undergoing chemotherapy. · Call our office (number is below) for a follow-up appointment. · If you have any problems, our phone number is [phone]. DISCHARGE SUMMARY from Nurse    PATIENT INSTRUCTIONS:    After general anesthesia or intravenous sedation, for 24 hours or while taking prescription Narcotics:  · Limit your activities  · Do not drive and operate hazardous machinery  · Do not make important personal or business decisions  · Do  not drink alcoholic beverages  · If you have not urinated within 8 hours after discharge, please contact your surgeon on call.     Report the following to your surgeon:  · Excessive pain, swelling, redness or odor of or around the surgical area  · Temperature over 100.5  · Nausea and vomiting lasting longer than 4 hours or if unable to take medications  · Any signs of decreased circulation or nerve impairment to extremity: change in color, persistent  numbness, tingling, coldness or increase pain  · Any questions    What to do at Home:  Recommended activity: See surgical instructions,     If you experience any of the following symptoms as instrtucted, please follow up with Dr Dylon Gordon. *  Please give a list of your current medications to your Primary Care Provider. *  Please update this list whenever your medications are discontinued, doses are      changed, or new medications (including over-the-counter products) are added. *  Please carry medication information at all times in case of emergency situations. These are general instructions for a healthy lifestyle:    No smoking/ No tobacco products/ Avoid exposure to second hand smoke  Surgeon General's Warning:  Quitting smoking now greatly reduces serious risk to your health. Obesity, smoking, and sedentary lifestyle greatly increases your risk for illness    A healthy diet, regular physical exercise & weight monitoring are important for maintaining a healthy lifestyle    You may be retaining fluid if you have a history of heart failure or if you experience any of the following symptoms:  Weight gain of 3 pounds or more overnight or 5 pounds in a week, increased swelling in our hands or feet or shortness of breath while lying flat in bed. Please call your doctor as soon as you notice any of these symptoms; do not wait until your next office visit. The discharge information has been reviewed with the patient and daughter. The patient and daughter verbalized understanding. Discharge medications reviewed with the patient and daughter and appropriate educational materials and side effects teaching were provided.   ___________________________________________________________________________________________________________________________________

## 2021-04-15 NOTE — H&P
HISTORY OF PRESENT ILLNESS  Sergo Kathleen is a 66 y.o. female. HPI  ESTABLISHED patient here for follow-up of RIGHT breast cancer, on neoadjuvant anastrozole.  Has some hot flashes and joint pain, otherwise is doing well.  Cannot feel the mass any longer.   Has some pain at the site of her LEFT mastectomy site.  Notices that the tissue on the side left from her previous mastectomy occasionally becomes bright red, but she has no pain.   On oxygen continuously.       10/29/20: RIGHT breast bx. PATH: IDC, at least 2.5mm, favor grade 2, ER+(100%)/RI+(99%)/HER2-. Clinical stage 1.     11/2020 - : Neoadjuvant Anastrozole. (Dr. Tomasa Giraldo)     4752: LEFT breast cancer. Lumpectomy, chemo, and XRT, followed by 5 years of Tamoxifen.     2012: LEFT breast cancer. Mastectomy, followed by five years of Anastrozole.             Past Medical History:   Diagnosis Date    Arthritis      Breast [facility] ([facility] 10/2020    Breast cancer, left breast ([facility] 2003, 2012     lumpectomy/chemo/XRT, then mastectomy and anastrozole in 2012    COPD (chronic obstructive pulmonary disease) ([facility]      Hypertension                 Past Surgical History:   Procedure Laterality Date    HX BREAST LUMPECTOMY Left       2003    HX MASTECTOMY Left       2013         Social History            Socioeconomic History    Marital status: SINGLE       Spouse name: Not on file    Number of children: Not on file    Years of education: Not on file    Highest education level: Not on file   Occupational History    Not on file   Social Needs    Financial resource strain: Not on file    Food insecurity       Worry: Not on file       Inability: Not on file    Transportation needs       Medical: Not on file       Non-medical: Not on file   Tobacco Use    Smoking status: Current Every Day Smoker       Types: Cigarettes    Smokeless tobacco: Never Used    Tobacco comment: E-cigarettes   Substance and Sexual Activity    Alcohol use:  Yes    Drug use: Never    Sexual activity: Not on file   Lifestyle    Physical activity       Days per week: Not on file       Minutes per session: Not on file    Stress: Not on file   Relationships    Social connections       Talks on phone: Not on file       Gets together: Not on file       Attends Orthodoxy service: Not on file       Active member of club or organization: Not on file       Attends meetings of clubs or organizations: Not on file       Relationship status: Not on file    Intimate partner violence       Fear of current or ex partner: Not on file       Emotionally abused: Not on file       Physically abused: Not on file       Forced sexual activity: Not on file   Other Topics Concern    Not on file   Social History Narrative    Not on file                Current Outpatient Medications on File Prior to Visit   Medication Sig Dispense Refill    triazolam (HALCION) 0.25 mg tablet TAKE 1 TABLET BY MOUTH AT NIGHT AS NEEDED 30 Tab 2    citalopram (CELEXA) 40 mg tablet Take one tab daily 90 Tab 0    lisinopriL (PRINIVIL, ZESTRIL) 5 mg tablet Take 1 Tab by mouth daily. 90 Tab 3    atorvastatin (LIPITOR) 40 mg tablet Take 1 Tab by mouth daily. atorvastatin 40 mg tablet 90 Tab 1    tiotropium bromide (SPIRIVA RESPIMAT) 2.5 mcg/actuation inhaler Spiriva Respimat 2.5 mcg/actuation solution for inhalation 1 Inhaler 3    anastrozole (Arimidex) 1 mg tablet Take 1 mg by mouth daily.  Indications: hormone receptor positive breast cancer 30 Tab 5      No current facility-administered medications on file prior to visit.               Allergies   Allergen Reactions    Betadine [Povidone-Iodine] Hives    Red Dye Hives         OB History    No obstetric history on file.       Obstetric Comments   Menarche 11, LMP 1997, # of children 2, age of 4st delivery 23, Hysterectomy/oophorectomy No/No, Breast bx Yes, LEFT and RIGHT, history of breast feeding Yes, BCP No, Hormone therapy No                   ROS     Physical Exam  Exam conducted with a chaperone present. Cardiovascular:      Rate and Rhythm: Normal rate and regular rhythm. Heart sounds: Normal heart sounds. Pulmonary:      Breath sounds: Normal breath sounds. Chest:      Breasts: Breasts are symmetrical.         Right: Normal. No swelling, bleeding, inverted nipple, mass, nipple discharge, skin change or tenderness. Left: Absent. No mass or skin change. Lymphadenopathy:      Cervical:      Right cervical: No superficial, deep or posterior cervical adenopathy. Left cervical: No superficial, deep or posterior cervical adenopathy. Upper Body:      Right upper body: No supraclavicular or axillary adenopathy. Left upper body: No supraclavicular or axillary adenopathy.       ASSESSMENT and PLAN      ICD-10-CM ICD-9-CM     1. Invasive ductal carcinoma of breast, right ([facility]  C50.911 174. 9     2. History of left breast cancer  Z85.3 V10.3        Patient presents to follow up on RIGHT breast IDC, and is doing well overall. No palpable mass on exam. Pt has gotten both doses of her COVID-19 vaccine, an is ready to schedule surgery. Pt is a good candidate for central lumpectomy based on tumor location. Discussed this surgery with MAC and local anesthesia. Pt to stop anastrozole and Aspirin 5 days prior to surgery. Pt understands and consents to surgery. Will schedule for the near future at [facility], and scheduling will f/u with the date. This plan was reviewed with the patient and patient agrees. All questions were answered.

## 2021-04-16 ENCOUNTER — TELEPHONE (OUTPATIENT)
Dept: SURGERY | Age: 76
End: 2021-04-16

## 2021-04-19 RX ORDER — ANASTROZOLE 1 MG/1
TABLET ORAL
Qty: 30 TAB | Refills: 0 | Status: SHIPPED | OUTPATIENT
Start: 2021-04-19 | End: 2022-02-05

## 2021-04-21 ENCOUNTER — TELEPHONE (OUTPATIENT)
Dept: SURGERY | Age: 76
End: 2021-04-21

## 2021-04-26 ENCOUNTER — VIRTUAL VISIT (OUTPATIENT)
Dept: FAMILY MEDICINE CLINIC | Age: 76
End: 2021-04-26
Payer: MEDICARE

## 2021-04-26 DIAGNOSIS — J43.9 PULMONARY EMPHYSEMA, UNSPECIFIED EMPHYSEMA TYPE (HCC): ICD-10-CM

## 2021-04-26 DIAGNOSIS — Z13.1 SCREENING FOR DIABETES MELLITUS: ICD-10-CM

## 2021-04-26 DIAGNOSIS — G47.00 INSOMNIA, UNSPECIFIED TYPE: ICD-10-CM

## 2021-04-26 DIAGNOSIS — Z00.00 HEALTHCARE MAINTENANCE: ICD-10-CM

## 2021-04-26 DIAGNOSIS — I10 ESSENTIAL HYPERTENSION: ICD-10-CM

## 2021-04-26 DIAGNOSIS — E78.5 DYSLIPIDEMIA: ICD-10-CM

## 2021-04-26 DIAGNOSIS — F41.1 GAD (GENERALIZED ANXIETY DISORDER): ICD-10-CM

## 2021-04-26 DIAGNOSIS — Z79.899 CONTROLLED SUBSTANCE AGREEMENT SIGNED: ICD-10-CM

## 2021-04-26 DIAGNOSIS — M17.11 OSTEOARTHRITIS OF RIGHT KNEE, UNSPECIFIED OSTEOARTHRITIS TYPE: Primary | ICD-10-CM

## 2021-04-26 PROCEDURE — G8400 PT W/DXA NO RESULTS DOC: HCPCS | Performed by: STUDENT IN AN ORGANIZED HEALTH CARE EDUCATION/TRAINING PROGRAM

## 2021-04-26 PROCEDURE — G8428 CUR MEDS NOT DOCUMENT: HCPCS | Performed by: STUDENT IN AN ORGANIZED HEALTH CARE EDUCATION/TRAINING PROGRAM

## 2021-04-26 PROCEDURE — G8756 NO BP MEASURE DOC: HCPCS | Performed by: STUDENT IN AN ORGANIZED HEALTH CARE EDUCATION/TRAINING PROGRAM

## 2021-04-26 PROCEDURE — G8432 DEP SCR NOT DOC, RNG: HCPCS | Performed by: STUDENT IN AN ORGANIZED HEALTH CARE EDUCATION/TRAINING PROGRAM

## 2021-04-26 PROCEDURE — 99214 OFFICE O/P EST MOD 30 MIN: CPT | Performed by: STUDENT IN AN ORGANIZED HEALTH CARE EDUCATION/TRAINING PROGRAM

## 2021-04-26 PROCEDURE — 1090F PRES/ABSN URINE INCON ASSESS: CPT | Performed by: STUDENT IN AN ORGANIZED HEALTH CARE EDUCATION/TRAINING PROGRAM

## 2021-04-26 PROCEDURE — 1101F PT FALLS ASSESS-DOCD LE1/YR: CPT | Performed by: STUDENT IN AN ORGANIZED HEALTH CARE EDUCATION/TRAINING PROGRAM

## 2021-04-26 RX ORDER — ATORVASTATIN CALCIUM 40 MG/1
40 TABLET, FILM COATED ORAL DAILY
Qty: 90 TAB | Refills: 1 | Status: SHIPPED | OUTPATIENT
Start: 2021-04-26 | End: 2021-06-25 | Stop reason: SDUPTHER

## 2021-04-26 RX ORDER — CITALOPRAM 20 MG/1
20 TABLET, FILM COATED ORAL DAILY
Qty: 90 TAB | Refills: 0 | Status: SHIPPED | OUTPATIENT
Start: 2021-04-26 | End: 2021-06-25 | Stop reason: SDUPTHER

## 2021-04-26 RX ORDER — TRIAZOLAM 0.25 MG/1
TABLET ORAL
Qty: 30 TAB | Refills: 2 | Status: SHIPPED | OUTPATIENT
Start: 2021-04-26 | End: 2021-06-25

## 2021-04-26 RX ORDER — LISINOPRIL 5 MG/1
5 TABLET ORAL DAILY
Qty: 90 TAB | Refills: 3 | Status: SHIPPED | OUTPATIENT
Start: 2021-04-26 | End: 2022-04-07

## 2021-04-26 NOTE — PROGRESS NOTES
Korey Gomes  66 y.o. female  1945  [address] [facility] [address] [address] [facility] [address] [id number]  [address]:    Telemedicine Progress Note  Cy Smart MD       Encounter Date and Time: April 26, 2021 at 2:07 PM    Consent: Korey Gomes, who was seen by synchronous (real-time) audio-video technology, and/or her healthcare decision maker, is aware that this patient-initiated, Telehealth encounter on 4/26/2021 is a billable service, with coverage as determined by her insurance carrier. She is aware that she may receive a bill and has provided verbal consent to proceed: Yes. Chief Complaint   Patient presents with    Medication Refill    Knee Pain     History of Present Illness   Korey Gomes is a 66 y.o. female was evaluated by synchronous (real-time) audio-video technology from home, through a secure patient portal.    R Knee pain:   Patient states she has had R knee pain for years. Was diagnosed with OA and was told she would need a replacement however due to severe COPD is not a surgical candidate. Has also received knee injections in the past which minimally improved symptoms. Has not tried physical therapy and would like to try it. Denies any redness, swelling, tenderness to palpation or fevers. HTN:  Patient is compliant on lisinopril 5 mg tab daily. Tries to keep low salt diet. Measures BP daily and is usually in the 130s-140s/ 90s. Has not been able ot exercise like before due to R knee pain. HLD:  Patient on lipitor 40 mg tab qhs. Tolerating it well. Due for Labs. No lipid panel on file. Labs were ordered over six months ago however has not been able to get them done. Anxiety/Panic attacks  Patient states she feels \"a lot better\" since having the lumpectomy performed and getting COVID vaccines. Has not completely recovered from sister and ex  passing away recently however feels better.  Patient understands potential side effects of triazolam however does not want to wean off however is open to trying a lower dose of celexa. Patient currently on Celexa 40 mg daily and triazolam 0.25 mg qhs. PHQ 9  Pleasure in doing things? 0              Feeling down or depressed? 0              Trouble sleeping? 0              No energy or fatigue? 0              Poor appetite or overeating? 1              Feeling you have let others down or you are a failure? 0              Trouble concentrating? 0              Moving slowly or fidgety all the time? 0              Thinking you would be better off dead or thoughts of hurting yourself? 0                            Total:1, previosly 2     POOL 7     Feeling nervous anxious, or on edge? 0  Not being able to stop or control worrying? 0  Worrying too much about different things? 1  Trouble relaxing? 0  Being so restless unable to sit still? 0  Easily annoyed or irritable? 0  Feeling afraid as if something awful will happen? 1     Total:2, same as before    Review of Systems   Review of Systems   Constitutional: Negative for chills and fever. HENT: Negative for congestion and sore throat. Eyes: Negative for blurred vision and double vision. Respiratory: Negative for cough, hemoptysis, sputum production, shortness of breath and wheezing. Cardiovascular: Negative for chest pain, palpitations and leg swelling. Gastrointestinal: Negative for abdominal pain, blood in stool, constipation, diarrhea, heartburn, melena, nausea and vomiting. Genitourinary: Negative for dysuria and urgency. Musculoskeletal: Positive for joint pain. Negative for back pain. Skin: Negative for rash. Neurological: Negative for dizziness, focal weakness and headaches. Psychiatric/Behavioral: Negative for suicidal ideas.        Vitals/Objective:     General: alert, cooperative, no distress   Mental  status: mental status: alert, oriented to person, place, and time, normal mood, behavior, speech, dress, motor activity, and thought processes   Resp: resp: normal effort and no respiratory distress   Neuro: neuro: no gross deficits   Skin: skin: no discoloration or lesions of concern on visible areas   Due to this being a TeleHealth evaluation, many elements of the physical examination are unable to be assessed. Assessment and Plan:       1. Osteoarthritis of right knee:  Previously told required knee replacement however not a surgical candidate due to COPD. Knee injections in the past with no relief. Would like to try PT given she has already received both covid vaccines. - REFERRAL TO PHYSICAL THERAPY    2. POOL/ Panic attacks: Well controlled on Celexa 40 and triazolam 0.25 mg. Patient aware of potential side effects ot triazolam however not open to weaning off. PDMP reviewed and appropriate. - triazolam (HALCION) 0.25 mg tablet; TAKE 1 TABLET BY MOUTH AT NIGHT AS NEEDED  Dispense: 30 Tab; Refill: 2  - citalopram (CELEXA) 20 mg tablet; Take 1 Tab by mouth daily for 90 days. Take one tab daily  Dispense: 90 Tab; Refill: 0  - REFERRAL TO DERMATOLOGY  - UDS ordered    3. Essential hypertension: Well controlled  - lisinopriL (PRINIVIL, ZESTRIL) 5 mg tablet; Take 1 Tab by mouth daily. Dispense: 90 Tab; Refill: 3  - METABOLIC PANEL, COMPREHENSIVE; Future  - MICROALBUMIN, UR, RAND W/ MICROALB/CREAT RATIO; Future  - HEMOGLOBIN A1C WITH EAG; Future    4. Dyslipidemia  - atorvastatin (LIPITOR) 40 mg tablet; Take 1 Tab by mouth daily. atorvastatin 40 mg tablet  Dispense: 90 Tab; Refill: 1  - LIPID PANEL; Future  - HEMOGLOBIN A1C WITH EAG; Future        Time spent in direct conversation with the patient to include medical condition(s) discussed, assessment and treatment plan:       We discussed the expected course, resolution and complications of the diagnosis(es) in detail. Medication risks, benefits, costs, interactions, and alternatives were discussed as indicated.   I advised her to contact the office if her condition worsens, changes or fails to improve as anticipated. She expressed understanding with the diagnosis(es) and plan. Patient understands that this encounter was a temporary measure, and the importance of further follow up and examination was emphasized. Patient verbalized understanding. Patient informed to follow up: In one month for medicare yearly visit. Electronically Signed: Hiral Godwin MD    CPT Codes 58833-04728 for Established Patients may apply to this Telehealth Visit. POS code: 18. Modifier GT    Keith Culp is a 66 y.o. female who was evaluated by an audio-video encounter for concerns as above. Patient identification was verified prior to start of the visit. A caregiver was present when appropriate. Due to this being a TeleHealth encounter (During XPFV-52 public health emergency), evaluation of the following organ systems was limited: Vitals/Constitutional/EENT/Resp/CV/GI//MS/Neuro/Skin/Heme-Lymph-Imm. Pursuant to the emergency declaration under the [address] [place] [address], [address] waiver authority and the [employer] Act, this Virtual Visit was conducted, with patient's (and/or legal guardian's) consent, to reduce the patient's risk of exposure to COVID-19 and provide necessary medical care. Services were provided through a synchronous discussion virtually to substitute for in-person clinic visit. I was at home. The patient was at home. History   Patients past medical, surgical and family histories were reviewed and updated.       Past Medical History:   Diagnosis Date    Arthritis     Breast cancer ([facility] 10/2020    Breast cancer, left breast ([facility] 2003, 2012    lumpectomy/chemo/XRT, then mastectomy and anastrozole in 2012    Cancer [facility])     SEVERAL SKIN - MELANOMA    COPD (chronic obstructive pulmonary disease) ([facility]     Hypertension     Ill-defined condition     MACULAR DEGENERATION     Past Surgical History:   Procedure Laterality Date    HX APPENDECTOMY      HX BREAST LUMPECTOMY Left         HX BREAST LUMPECTOMY Right 4/15/2021    RIGHT BREAST LUMPECTOMY WITH ULTRASOUND performed by Thais Grullon MD at [address] [place] [address] AMBULATORY OR    HX  SECTION      HX COLONOSCOPY      HX MASTECTOMY Left          Family History   Problem Relation Age of Onset    Thyroid Disease Mother     Heart Disease Father     Cancer Father         SKIN    Cancer Sister         MELANOMA, LUNG    Abdominal aortic aneurysm Brother     Anesth Problems Neg Hx      Social History     Tobacco Use    Smoking status: Former Smoker     Packs/day: 1.00     Years: 20.00     Pack years: 20.00     Types: Cigarettes     Quit date: 2011     Years since quitting: 10.0    Smokeless tobacco: Never Used   Substance Use Topics    Alcohol use: Yes     Alcohol/week: 7.0 standard drinks     Types: 7 Glasses of wine per week    Drug use: Never     Patient Active Problem List   Diagnosis Code    [facility] carcinoma [facility] C50.911    History of left breast cancer Z85.3          Current Medications/Allergies   Medications and Allergies reviewed:    Current Outpatient Medications   Medication Sig Dispense Refill    citalopram (CELEXA) 20 mg tablet Take 1 Tab by mouth daily for 90 days. Take one tab daily 90 Tab 0    lisinopriL (PRINIVIL, ZESTRIL) 5 mg tablet Take 1 Tab by mouth daily. 90 Tab 3    atorvastatin (LIPITOR) 40 mg tablet Take 1 Tab by mouth daily. atorvastatin 40 mg tablet 90 Tab 1    triazolam (HALCION) 0.25 mg tablet TAKE 1 TABLET BY MOUTH AT NIGHT AS NEEDED 30 Tab 2    anastrozole (ARIMIDEX) 1 mg tablet Take 1 tablet by mouth once daily 30 Tab 0    vit A/vit C/vit E/zinc/copper (PRESERVISION AREDS PO) Take 1 Tab by mouth daily.  aspirin (ASPIRIN) 325 mg tablet Take 325 mg by mouth every six (6) hours as needed for Pain.       tiotropium bromide (SPIRIVA RESPIMAT) 2.5 mcg/actuation inhaler Spiriva Respimat 2.5 mcg/actuation solution for inhalation (Patient taking differently: Take 2 Puffs by inhalation daily.  Spiriva Respimat 2.5 mcg/actuation solution for inhalation) 1 Inhaler 3     Allergies   Allergen Reactions    Betadine [Povidone-Iodine] Hives    Red Dye Hives    Tape [Adhesive] Rash

## 2021-04-27 NOTE — PROGRESS NOTES
[address] Medicine Residency Attending Addendum:  Dr. Poly Zarate MD,  the patient and I were not physically present during this encounter. The resident and I are concurrently monitoring the patient care through appropriate telecommunication technology. I discussed the findings, assessment and plan with the resident and agree with the resident's findings and plan as documented in the resident's note.       Karlene Peck MD

## 2021-04-28 ENCOUNTER — OFFICE VISIT (OUTPATIENT)
Dept: SURGERY | Age: 76
End: 2021-04-28
Payer: MEDICARE

## 2021-04-28 VITALS
SYSTOLIC BLOOD PRESSURE: 149 MMHG | WEIGHT: 165 LBS | HEART RATE: 70 BPM | DIASTOLIC BLOOD PRESSURE: 94 MMHG | HEIGHT: 64 IN | BODY MASS INDEX: 28.17 KG/M2

## 2021-04-28 DIAGNOSIS — C50.911 INVASIVE DUCTAL CARCINOMA OF BREAST, RIGHT (HCC): Primary | ICD-10-CM

## 2021-04-28 DIAGNOSIS — Z85.3 HISTORY OF RIGHT BREAST CANCER: ICD-10-CM

## 2021-04-28 DIAGNOSIS — Z85.3 HISTORY OF LEFT BREAST CANCER: ICD-10-CM

## 2021-04-28 PROCEDURE — 99024 POSTOP FOLLOW-UP VISIT: CPT | Performed by: SURGERY

## 2021-04-28 NOTE — PROGRESS NOTES
HISTORY OF PRESENT ILLNESS  Vadim Roy is a 66 y.o. female. HPI  ESTABLISHED patient here for post op visit s/p RIGHT breast lumpectomy. She is healing well. Has been holding anastrozole and is aware that she needs an appt with Dr. Thiago Jensen to discuss this. She has been having increased joint pain to shoulders and knee. 10/29/20: RIGHT breast bx. PATH: IDC, at least 2.5mm, favor grade 2, ER+(100%)/MA+(99%)/HER2-. Clinical stage 1.    2020 - 2021: Neoadjuvant Anastrozole. (Dr. Nava Green)    : RIGHT breast lumpectomy. PATH: Multifocal IDC (6.0mm grade 1, and 3.0mm grade 1), DCIS (cribriform and solid types, with microcalcifications, nuclear grade 2), negative margins. Pathologic stage: ypT1b(m), ypNX. - IDC, 6.0mm: ER+(95%)/MA+(30%), HER2- by FISH. - IDC, 3.0mm: ER+(95%)/MA+(20%), HER2- by FISH. - DCIS: ER+(95%)/MA+(90%). 2003: LEFT breast cancer. Lumpectomy, chemo, and XRT, followed by 5 years of Tamoxifen.       : LEFT breast cancer. Mastectomy, followed by five years of Anastrozole.       Past Medical History:   Diagnosis Date    Arthritis     [facility] 10/2020    Breast cancer, left breast ([address] ,     lumpectomy/chemo/XRT, then mastectomy and anastrozole in     Cancer ([address]     SEVERAL SKIN - MELANOMA    COPD (chronic obstructive pulmonary disease) ([address]     Hypertension     Ill-defined condition     MACULAR DEGENERATION       Past Surgical History:   Procedure Laterality Date    HX APPENDECTOMY      HX BREAST LUMPECTOMY Left         HX BREAST LUMPECTOMY Right 4/15/2021    RIGHT BREAST LUMPECTOMY WITH ULTRASOUND performed by Ying [facility] at [facility] AM[facility]ULATORY OR    HX  SECTION      HX COLONOSCOPY      HX MASTECTOMY Left            Social History     Socioeconomic History    Marital status: SINGLE     Spouse name: Not on file    Number of children: Not on file    Years of education: Not on file    Highest education level: Not on file   Occupational History    Not on file   Social Needs    Financial resource strain: Not on file    Food insecurity     Worry: Not on file     Inability: Not on file    Transportation needs     Medical: Not on file     Non-medical: Not on file   Tobacco Use    Smoking status: Former Smoker     Packs/day: 1.00     Years: 20.00     Pack years: 20.00     Types: Cigarettes     Quit date: 4/9/2011     Years since quitting: 10.0    Smokeless tobacco: Never Used   Substance and Sexual Activity    Alcohol use: Yes     Alcohol/week: 7.0 standard drinks     Types: 7 Glasses of wine per week    Drug use: Never    Sexual activity: Not on file   Lifestyle    Physical activity     Days per week: Not on file     Minutes per session: Not on file    Stress: Not on file   Relationships    Social connections     Talks on phone: Not on file     Gets together: Not on file     Attends Alevism service: Not on file     Active member of club or organization: Not on file     Attends meetings of clubs or organizations: Not on file     Relationship status: Not on file    Intimate partner violence     Fear of current or ex partner: Not on file     Emotionally abused: Not on file     Physically abused: Not on file     Forced sexual activity: Not on file   Other Topics Concern    Not on file   Social History Narrative    Not on file       Current Outpatient Medications on File Prior to Visit   Medication Sig Dispense Refill    citalopram (CELEXA) 20 mg tablet Take 1 Tab by mouth daily for 90 days. Take one tab daily 90 Tab 0    lisinopriL (PRINIVIL, ZESTRIL) 5 mg tablet Take 1 Tab by mouth daily. 90 Tab 3    triazolam (HALCION) 0.25 mg tablet TAKE 1 TABLET BY MOUTH AT NIGHT AS NEEDED 30 Tab 2    vit A/vit C/vit E/zinc/copper (PRESERVISION AREDS PO) Take 1 Tab by mouth daily.  aspirin (ASPIRIN) 325 mg tablet Take 325 mg by mouth every six (6) hours as needed for Pain.       tiotropium bromide (SPIRIVA RESPIMAT) 2.5 mcg/actuation inhaler Spiriva Respimat 2.5 mcg/actuation solution for inhalation (Patient taking differently: Take 2 Puffs by inhalation daily. Spiriva Respimat 2.5 mcg/actuation solution for inhalation) 1 Inhaler 3    atorvastatin (LIPITOR) 40 mg tablet Take 1 Tab by mouth daily. atorvastatin 40 mg tablet 90 Tab 1    anastrozole (ARIMIDEX) 1 mg tablet Take 1 tablet by mouth once daily 30 Tab 0     No current facility-administered medications on file prior to visit. Allergies   Allergen Reactions    Betadine [Povidone-Iodine] Hives    Red Dye Hives    Tape [Adhesive] Rash       OB History    No obstetric history on file. Obstetric Comments   Menarche 11, LMP 0, # of children 2, age of 4st delivery 23, Hysterectomy/oophorectomy No/No, Breast bx Yes, LEFT and RIGHT, history of breast feeding Yes, BCP No, Hormone therapy No               ROS    Physical Exam  Exam conducted with a chaperone present. Cardiovascular:      Rate and Rhythm: Normal rate and regular rhythm. Heart sounds: Normal heart sounds. Pulmonary:      Breath sounds: Normal breath sounds. Chest:      Breasts: Breasts are symmetrical.         Right: Normal. No swelling, bleeding, mass, skin change or tenderness. Left: Absent. No mass or skin change. Lymphadenopathy:      Cervical:      Right cervical: No superficial, deep or posterior cervical adenopathy. Left cervical: No superficial, deep or posterior cervical adenopathy. Upper Body:      Right upper body: No supraclavicular or axillary adenopathy. Left upper body: No supraclavicular or axillary adenopathy. ASSESSMENT and PLAN    ICD-10-CM ICD-9-CM    1. Invasive ductal carcinoma of breast, right (HCC)  C50.911 174.9    2. History of right breast cancer  Z85.3 V10.3    3.  History of left breast cancer  Z85.3 V10.3       Patient presents for f/u s/p RIGHT breast lumpectomy on 04/15/21, and is doing well overall. Well healed incision s/p central lumpectomy, no evidence of local recurrence. Pt may re-start her Aspirin. Pt will see medical oncology to discuss continuing AI, and should also discuss treatment for joint pain while on AI. F/U in 6 months with Ayaka Thorpe NP, after RIGHT diagnostic mammogram. This plan was reviewed with the patient and patient agrees. All questions were answered.     Written by Jonel Lackey, as dictated by Dr. Jeancarlos Lyles MD.

## 2021-04-28 NOTE — PATIENT INSTRUCTIONS
Lumpectomy: What to Expect at [facility] Your Recovery Breast-conserving surgery (lumpectomy) removes the cancer and just enough tissue to get all the cancer. For 1 or 2 days after the surgery, you will probably feel tired and have some pain. The skin around the cut (incision) may feel firm, swollen, and tender, and be bruised. Tenderness should go away in about 2 or 3 days, and the bruising within 2 weeks. Firmness and swelling may last for 3 to 6 months. You may feel a soft lump in your breast that gradually turns hard. This is the incision healing. It is not cancer. Women should wear a well-fitted and supportive bra, even during the night, for 1 week. You will probably be able to go back to work or your normal routine in 1 to 3 weeks after the surgery. This may depend on whether you have more treatment. Your doctor may have removed some lymph nodes in your armpit to see if the cancer has spread. If so, you may feel either numbness or tingling (\"pins and needles\") in your armpit or on the inside of your upper arm. This should improve over the next several weeks. Some people have numbness for a longer time. When you find out that you have cancer, you may feel many emotions and may need some help coping. Seek out family, friends, and counselors for support. You also can do things at home to make yourself feel better while you go through treatment. Call the [address] [place], [place] [address]ada [employer] (4-159.311.9533) or visit its website at [address] [employer] for more information. This care sheet gives you a general idea about how long it will take for you to recover. But each person recovers at a different pace. Follow the steps below to get better as quickly as possible. How can you care for yourself at home? Activity 
  · Rest when you feel tired. Getting enough sleep will help you recover. You may want to sleep on the side that has not been operated on.  A woman may want to use a pillow to support the affected breast while lying on her side.  
  · Avoid strenuous activities, such as biking, jogging, weightlifting, or aerobic exercise, for 1 month or until your doctor says it is okay. This may include housework, such as washing windows, especially if you have to use the arm next to the affected breast.  
  · Most people can return to their normal activities within 2 weeks.  
  · Try to walk each day. Start out by walking a little more than you did the day before. Bit by bit, increase the amount you walk. Walking boosts blood flow and helps prevent pneumonia and constipation.  
  · For 1 to 2 weeks, avoid lifting anything over 10 to 15 pounds or that would make you strain. This may include heavy grocery bags and milk containers, a heavy briefcase or backpack, cat litter or dog food bags, a vacuum , or a child.  
  · You may drive when you are no longer taking pain medicine and can use your arm without pain. Talk to your doctor about when to start driving, especially if you are having radiation treatments.  
  · You will probably be able to go back to work or your normal routine in 1 to 3 weeks. It may be longer, depending on the type of work you do and whether you are having radiation or chemotherapy.  
  · You may shower 24 to 48 hours after surgery, if your doctor okays it. Pat the incision dry. Do not take a bath for the first 2 weeks, or until your doctor tells you it is okay. Diet 
  · You can eat your normal diet. If your stomach is upset, try bland, low-fat foods like plain rice, broiled chicken, toast, and yogurt.  
  · You may notice that your bowel movements are not regular right after your surgery. This is common. Try to avoid constipation and straining with bowel movements. You may want to take a fiber supplement every day. If you have not had a bowel movement after a couple of days, ask your doctor about taking a mild laxative. Medicines 
  · Your doctor will tell you if and when you can restart your medicines. He or she will also give you instructions about taking any new medicines.  
  · If you take aspirin or some other blood thinner, ask your doctor if and when to start taking it again. Make sure that you understand exactly what your doctor wants you to do.  
  · Take pain medicines exactly as directed. ? Your doctor may have given you a medicine to numb the area inside and around your cut (incision). The numbness will last from 6 to 12 hours. If you went home right after the surgery, you may want to take pain medicine before this wears off. 
? If the doctor gave you a prescription medicine for pain, take it as prescribed. ? If you are not taking a prescription pain medicine, ask your doctor if you can take an over-the-counter medicine.  
  · If your doctor prescribed antibiotics, take them as directed. Do not stop taking them just because you feel better. You need to take the full course of antibiotics.  
  · If you think your pain medicine is making you sick to your stomach: 
? Take your medicine after meals (unless your doctor has told you not to). ? Ask your doctor for a different pain medicine. Incision care 
  · If you have strips of tape on the cut the doctor made (incision), leave the tape on for a week or until it falls off.  
  · When you can shower, wash the area daily with warm, soapy water and pat it dry. Follow-up care is a key part of your treatment and safety. Be sure to make and go to all appointments, and call your doctor if you are having problems. It's also a good idea to know your test results and keep a list of the medicines you take. When should you call for help? Call 911 anytime you think you may need emergency care. For example, call if: 
  · You passed out (lost consciousness).  
  · You have chest pain, are short of breath, or cough up blood.   
Call your doctor now or seek immediate medical care if: 
  · You are sick to your stomach or cannot drink fluids.  
  · You cannot pass stools or gas.  
  · You have pain that does not get better after you take your pain medicine.  
  · You have loose stitches, or your incision comes open.  
  · Bright red blood has soaked through the bandage over your incision.  
  · You have signs of a blood clot in your leg (called a deep vein thrombosis), such as: 
? Pain in your calf, back of the knee, thigh, or groin. ? Redness or swelling in your leg.  
  · You have signs of infection, such as: 
? Increased pain, swelling, warmth, or redness. ? Red streaks leading from the incision. ? Pus draining from the incision. ? A fever. Watch closely for changes in your health, and be sure to contact your doctor if: 
  · You have any problems.  
  · You have new or worse swelling or pain in your arm. Where can you learn more? Go to http://www.gray.com/ Enter D222 in the search box to learn more about \"Lumpectomy: What to Expect at Home. \" Current as of: December 17, 2020               Content Version: 12.8 © 2006-2021 [employer]. Care instructions adapted under license by [employer] (which disclaims liability or warranty for this information). If you have questions about a medical condition or this instruction, always ask your healthcare professional. [address] any warranty or liability for your use of this information.

## 2021-04-28 NOTE — PROGRESS NOTES
HISTORY OF PRESENT ILLNESS  Claudio Feldman is a 66 y.o. female. HPI ESTABLISHED patient here for post op visit s/p RIGHT breast lumpectomy. She is healing well. Has been holding anastrozole and is aware that she needs an appt with Dr. Duane Newton to discuss this. She has been having increased joint pain to shoulders and knee. 2003: LEFT breast cancer. Lumpectomy, chemo, and XRT, followed by 5 years of Tamoxifen. 2012: LEFT breast cancer. Mastectomy, followed by five years of Anastrozole. 10/29/20: RIGHT breast bx. PATH: IDC, at least 2.5mm, favor grade 2, ER+(100%)/CT+(99%)/HER2-. Clinical stage 1.  11/2020 - 04/2021: Neoadjuvant Anastrozole. (Dr. Foster [place])  58/43/12: RIGHT breast lumpectomy. PATH: Multifocal IDC (6.0mm grade 1, and 3.0mm grade 1), DCIS (cribriform and solid types, with microcalcifications, nuclear grade 2), negative margins. Pathologic stage: ypT1b(m), ypNX. - IDC, 6.0mm: ER+(95%)/CT+(30%), HER2 equivocal, FISH pending.  - IDC, 3.0mm: ER+(95%)/CT+(20%), HER2 equivocal, FISH pending.  - DCIS: ER+(95%)/CT+(90%). - HER2- by FISH (which specimen?).     ROS    Physical Exam    ASSESSMENT and PLAN  {ASSESSMENT/PLAN:26170}

## 2021-04-30 DIAGNOSIS — C50.911 MALIGNANT NEOPLASM OF RIGHT FEMALE BREAST, UNSPECIFIED ESTROGEN RECEPTOR STATUS, UNSPECIFIED SITE OF BREAST (HCC): Primary | ICD-10-CM

## 2021-05-04 ENCOUNTER — TELEPHONE (OUTPATIENT)
Dept: ONCOLOGY | Age: 76
End: 2021-05-04

## 2021-05-04 ENCOUNTER — TELEPHONE (OUTPATIENT)
Dept: FAMILY MEDICINE CLINIC | Age: 76
End: 2021-05-04

## 2021-05-04 NOTE — TELEPHONE ENCOUNTER
Updated patient's PT ref order and routed over to [facility] for scheduling    Close enc    Thanks  Rupinder Arnett S/PSR  [facility] Referral Coordinator

## 2021-05-04 NOTE — TELEPHONE ENCOUNTER
Follow up call to patient, ID verified X 2. Patient upcoming OV with MD has been changed per request to virtual platform. Reviewed with patient that link to appt will be sent out to her early that morning but does not need to log onto [employer] until about 10 minutes before her appt. Understanding verbalized. Update to Provider.

## 2021-05-04 NOTE — TELEPHONE ENCOUNTER
----- Message from Mitch France sent at 5/4/2021  9:02 AM EDT -----  Regarding: Dr. Herb Ochoa Message/Vendor Calls    Caller's first and last name:      Reason for call:status of PT referral      Callback required yes/no and why: yes      Best contact number(s): [phone]      Details to clarify the request:what is the status of PT referral      Mitch France

## 2021-05-04 NOTE — TELEPHONE ENCOUNTER
Pt called and stated she'd like to switch her apt over to a VV since she has a little ways of a drive to make.     Cb# [phone]

## 2021-05-04 NOTE — TELEPHONE ENCOUNTER
Spoke to patient who wanted to know about this referral as no contact has been made with her. She was told [facility] was in the order in which this is way too far from her home. She also said she told the doctor to place order close to a company to her home. She suggested [facility] next door to us and call her to discuss.

## 2021-05-06 ENCOUNTER — TELEPHONE (OUTPATIENT)
Dept: FAMILY MEDICINE CLINIC | Age: 76
End: 2021-05-06

## 2021-05-06 NOTE — TELEPHONE ENCOUNTER
----- Message from [facility] sent at 5/5/2021 10:59 AM EDT -----  Regarding: Dr. Edith Christiansen    Patient return call    Caller's first and last name and relationship (if not the patient): Pt      Best contact number(s): [phone]      Whose call is being returned: n/a      Details to clarify the request: pt confirmed lab appt scheduled 05/11/2021 11:00 am/pt is fine with that/pt also requesting a callback pertaining to referral for physical therapy      [place] [employer] [facility] [facility]

## 2021-05-06 NOTE — TELEPHONE ENCOUNTER
Called patient and let her know I routed over her info to  for scheduling on 5.4.21 and gave her the phone # to call and schedule her appt and to call me back with her appt info    Thanks  Lisset Trevino S/PSR  [facility] Referral Coordinator

## 2021-05-11 ENCOUNTER — LAB ONLY (OUTPATIENT)
Dept: FAMILY MEDICINE CLINIC | Age: 76
End: 2021-05-11

## 2021-05-11 DIAGNOSIS — I10 ESSENTIAL HYPERTENSION: ICD-10-CM

## 2021-05-11 DIAGNOSIS — E78.5 DYSLIPIDEMIA: ICD-10-CM

## 2021-05-11 DIAGNOSIS — Z00.00 HEALTHCARE MAINTENANCE: ICD-10-CM

## 2021-05-11 DIAGNOSIS — Z13.1 SCREENING FOR DIABETES MELLITUS: ICD-10-CM

## 2021-05-11 DIAGNOSIS — Z79.899 CONTROLLED SUBSTANCE AGREEMENT SIGNED: ICD-10-CM

## 2021-05-11 LAB
CREAT UR-MCNC: 66.1 MG/DL
MICROALBUMIN UR-MCNC: 0.52 MG/DL
MICROALBUMIN/CREAT UR-RTO: 8 MG/G (ref 0–30)

## 2021-05-12 ENCOUNTER — DOCUMENTATION ONLY (OUTPATIENT)
Dept: ONCOLOGY | Age: 76
End: 2021-05-12

## 2021-05-12 ENCOUNTER — VIRTUAL VISIT (OUTPATIENT)
Dept: ONCOLOGY | Age: 76
End: 2021-05-12
Payer: MEDICARE

## 2021-05-12 DIAGNOSIS — Z98.890 HISTORY OF LUMPECTOMY OF RIGHT BREAST: ICD-10-CM

## 2021-05-12 DIAGNOSIS — J44.9 CHRONIC OBSTRUCTIVE PULMONARY DISEASE, UNSPECIFIED COPD TYPE (HCC): ICD-10-CM

## 2021-05-12 DIAGNOSIS — C50.911 INVASIVE DUCTAL CARCINOMA OF BREAST, RIGHT (HCC): Primary | ICD-10-CM

## 2021-05-12 DIAGNOSIS — M19.90 ARTHRITIS: ICD-10-CM

## 2021-05-12 DIAGNOSIS — Z99.81 ON SUPPLEMENTAL OXYGEN THERAPY: ICD-10-CM

## 2021-05-12 LAB
ALBUMIN SERPL-MCNC: 4 G/DL (ref 3.5–5)
ALBUMIN/GLOB SERPL: 1.2 {RATIO} (ref 1.1–2.2)
ALP SERPL-CCNC: 89 U/L (ref 45–117)
ALT SERPL-CCNC: 28 U/L (ref 12–78)
ANION GAP SERPL CALC-SCNC: 8 MMOL/L (ref 5–15)
AST SERPL-CCNC: 20 U/L (ref 15–37)
BILIRUB SERPL-MCNC: 0.4 MG/DL (ref 0.2–1)
BUN SERPL-MCNC: 10 MG/DL (ref 6–20)
BUN/CREAT SERPL: 14 (ref 12–20)
CALCIUM SERPL-MCNC: 9 MG/DL (ref 8.5–10.1)
CHLORIDE SERPL-SCNC: 105 MMOL/L (ref 97–108)
CHOLEST SERPL-MCNC: 142 MG/DL
CO2 SERPL-SCNC: 25 MMOL/L (ref 21–32)
CREAT SERPL-MCNC: 0.69 MG/DL (ref 0.55–1.02)
EST. AVERAGE GLUCOSE BLD GHB EST-MCNC: 120 MG/DL
GLOBULIN SER CALC-MCNC: 3.4 G/DL (ref 2–4)
GLUCOSE SERPL-MCNC: 93 MG/DL (ref 65–100)
HBA1C MFR BLD: 5.8 % (ref 4–5.6)
HCV AB SERPL QL IA: NONREACTIVE
HCV COMMENT,HCGAC: NORMAL
HDLC SERPL-MCNC: 55 MG/DL
HDLC SERPL: 2.6 {RATIO} (ref 0–5)
LDLC SERPL CALC-MCNC: 65.8 MG/DL (ref 0–100)
LIPID PROFILE,FLP: NORMAL
POTASSIUM SERPL-SCNC: 4.5 MMOL/L (ref 3.5–5.1)
PROT SERPL-MCNC: 7.4 G/DL (ref 6.4–8.2)
SODIUM SERPL-SCNC: 138 MMOL/L (ref 136–145)
TRIGL SERPL-MCNC: 106 MG/DL (ref ?–150)
VLDLC SERPL CALC-MCNC: 21.2 MG/DL

## 2021-05-12 PROCEDURE — G8400 PT W/DXA NO RESULTS DOC: HCPCS | Performed by: INTERNAL MEDICINE

## 2021-05-12 PROCEDURE — G8756 NO BP MEASURE DOC: HCPCS | Performed by: INTERNAL MEDICINE

## 2021-05-12 PROCEDURE — G8419 CALC BMI OUT NRM PARAM NOF/U: HCPCS | Performed by: INTERNAL MEDICINE

## 2021-05-12 PROCEDURE — 99215 OFFICE O/P EST HI 40 MIN: CPT | Performed by: INTERNAL MEDICINE

## 2021-05-12 PROCEDURE — G8427 DOCREV CUR MEDS BY ELIG CLIN: HCPCS | Performed by: INTERNAL MEDICINE

## 2021-05-12 PROCEDURE — 1090F PRES/ABSN URINE INCON ASSESS: CPT | Performed by: INTERNAL MEDICINE

## 2021-05-12 PROCEDURE — 1101F PT FALLS ASSESS-DOCD LE1/YR: CPT | Performed by: INTERNAL MEDICINE

## 2021-05-12 PROCEDURE — G8432 DEP SCR NOT DOC, RNG: HCPCS | Performed by: INTERNAL MEDICINE

## 2021-05-12 PROCEDURE — G8536 NO DOC ELDER MAL SCRN: HCPCS | Performed by: INTERNAL MEDICINE

## 2021-05-12 NOTE — PROGRESS NOTES
[facility] [place] at [facility]        Reason for Visit:   Keith Culp is a 66 y.o. female who is seen by synchronous (real-time) audio-video technology for fu breast cancer. Treatment History:   RIGHT breast biopsy 10/20. STAGE:  Clinical 1 ER+ HER2 negative    History of Present Illness:   Keith Culp is a pleasant 66 y.o. female seen today virtually per pt preference for RIGHT breast cancer ER + Her2 negative post lumpectomy 4/15/21. Did well with surgery. Does not want to take AI as too many joint side effects. Did not take tamoxifen in the past as record states. Last seen by us 12/20. Pt is limited by COPD and arthritis. No fevers/ chills/ chest pain/ SOB/ nausea/ vomiting/diarrhea/      Last visit:  by biopsy only. RIGHT mammo done 10/20: IMPRESSION: 1.2 cm x 1.4 cm right breast retroareolar lesion, as described  above. BI-RADS 4. Suspicious abnormality. Recommend histologic correlation    10/29/20: RIGHT breast bx. PATH: IDC, at least 2.5mm, favor grade 2, ER+(100%)/[place]+(99%)/HER2-. Clinical stage 1. Met with breast surgery. Pt was not ready to do surgery/ not sure about surgery due to pt's COPD on 02. Can do lumpectomy with local sedation. 11/2020 - started Anastrozole. (Dr. Annalisa Hoang)  Pt is tolerating AI fine. Stays home. No F/C/CP/N/V/D. Chronic SOB on 02. No pain.       breast cancer hx:   2003: LEFT breast cancer. Lumpectomy, chemo, and XRT. No hormonal therapy. 2012: LEFT breast cancer. Mastectomy, followed by 3 1/2 years of Anastrozole. Hx of skin melanomas also.        Past Medical History:   Diagnosis Date    Arthritis     Breast cancer ([address] 10/2020    Breast cancer, left breast ([address] 2003, 2012    lumpectomy/chemo/XRT, then mastectomy and anastrozole in 2012    Cancer [facility])     SEVERAL SKIN - MELANOMA    COPD (chronic obstructive pulmonary disease) [facility]     Hypertension     Ill-defined condition     MACULAR DEGENERATION      Past Surgical History:   Procedure Laterality Date    HX APPENDECTOMY      HX BREAST LUMPECTOMY Left         HX BREAST LUMPECTOMY Right 4/15/2021    RIGHT BREAST LUMPECTOMY WITH ULTRASOUND performed by [facility] Augustin [facility] at [facility] AM[facility]ULATORY OR    HX  SECTION      HX COLONOSCOPY      HX MASTECTOMY Left           Social History     Tobacco Use    Smoking status: Former Smoker     Packs/day: 1.00     Years: 20.00     Pack years: 20.00     Types: Cigarettes     Quit date: 2011     Years since quitting: 10.0    Smokeless tobacco: Never Used   Substance Use Topics    Alcohol use: Yes     Alcohol/week: 7.0 standard drinks     Types: 7 Glasses of wine per week      Family History   Problem Relation Age of Onset    Thyroid Disease Mother     Heart Disease Father     Cancer Father         SKIN    Cancer Sister         MELANOMA, LUNG    Abdominal aortic aneurysm Brother     Anesth Problems Neg Hx      Current Outpatient Medications   Medication Sig    citalopram (CELEXA) 20 mg tablet Take 1 Tab by mouth daily for 90 days. Take one tab daily    lisinopriL (PRINIVIL, ZESTRIL) 5 mg tablet Take 1 Tab by mouth daily.  atorvastatin (LIPITOR) 40 mg tablet Take 1 Tab by mouth daily. atorvastatin 40 mg tablet    triazolam (HALCION) 0.25 mg tablet TAKE 1 TABLET BY MOUTH AT NIGHT AS NEEDED    anastrozole (ARIMIDEX) 1 mg tablet Take 1 tablet by mouth once daily    vit A/vit C/vit E/zinc/copper (PRESERVISION AREDS PO) Take 1 Tab by mouth daily.  aspirin (ASPIRIN) 325 mg tablet Take 325 mg by mouth every six (6) hours as needed for Pain.  tiotropium bromide (SPIRIVA RESPIMAT) 2.5 mcg/actuation inhaler Spiriva Respimat 2.5 mcg/actuation solution for inhalation (Patient taking differently: Take 2 Puffs by inhalation daily. Spiriva Respimat 2.5 mcg/actuation solution for inhalation)     No current facility-administered medications for this visit.        Allergies Allergen Reactions    Betadine [Povidone-Iodine] Hives    Red Dye Hives    Tape [Adhesive] Rash        Review of Systems: A complete review of systems was obtained, negative except as described above. Physical Exam:   There were no vitals taken for this visit. General: alert, cooperative, no distress   Mental  status: normal mood, behavior, speech, dress, motor activity, and thought processes, able to follow commands   HENT: NCAT   Neck: no visualized mass   Resp: no respiratory distress   Neuro: no gross deficits   Skin: no discoloration or lesions of concern on visible areas   Psychiatric: normal affect, consistent with stated mood, no evidence of hallucinations       Due to this being a TeleHealth evaluation (During QAK-04 public health emergency), many elements of the physical examination are unable to be assessed. Evaluation of the following organ systems was limited: Vitals/Constitutional/EENT/Resp/CV/GI//MS/Neuro/Skin/Heme-Lymph-Imm. Results:     Lab Results   Component Value Date/Time    WBC 6.4 04/09/2021 02:34 PM    HGB 13.2 04/09/2021 02:34 PM    HCT 40.6 04/09/2021 02:34 PM    PLATELET 855 08/36/4899 02:34 PM    .2 (H) 04/09/2021 02:34 PM    ABS. NEUTROPHILS 4.5 04/09/2021 02:34 PM     Lab Results   Component Value Date/Time    Sodium 138 05/11/2021 01:10 PM    Potassium 4.5 05/11/2021 01:10 PM    Chloride 105 05/11/2021 01:10 PM    CO2 25 05/11/2021 01:10 PM    Glucose 93 05/11/2021 01:10 PM    BUN 10 05/11/2021 01:10 PM    Creatinine 0.69 05/11/2021 01:10 PM    GFR est AA >60 05/11/2021 01:10 PM    GFR est non-AA >60 05/11/2021 01:10 PM    Calcium 9.0 05/11/2021 01:10 PM     Lab Results   Component Value Date/Time    Bilirubin, total 0.4 05/11/2021 01:10 PM    ALT (SGPT) 28 05/11/2021 01:10 PM    Alk.  phosphatase 89 05/11/2021 01:10 PM    Protein, total 7.4 05/11/2021 01:10 PM    Albumin 4.0 05/11/2021 01:10 PM    Globulin 3.4 05/11/2021 01:10 PM         Records reviewed and summarized above. Pathology report(s) reviewed above. Radiology report(s) reviewed above. Assessment/PLAN:     1)  stage 1 RIGHT breast cancer ER+ HER2 negative post lumpectomy 4/21  Pt has hx of LEFT breast cancer in past with mastectomy 2012 and hormonal therapy use. Pt presently has had biopsy only. Has several medical problems including COPD on 02. Seen today virtually due to this and COVID. Wants to delay breast surgery due to COVID risk. Does not want any chemo. Seen today virtually per pt preference. Took neoadjuvant anastrozole and then stopped this prior to surgery. Had lumpectomy 4/21 and this showed multifocal disease with negative margins. Reviewed path with pt today. Pt has multiple medical problems COPD/ arthritis with limited activity. Pt does not want to take AI again due to joint side effects/ did not tolerate. Has significant knee arthritis and could not walk on AI. Discussed adjuvant tamoxifen today also. Pt has limited activity and risk of blood clot does exist.   Long d/w pt today about adjuvant hormonal therapy. Pt and I decided no hormonal therapy today due to medical problems. Pt wants to continue course of surveillance. 6 mo mammo and fu with surgery. Clinically pt is stable today. 2) COPD on home 02/  Arthritis that limits walking/ uses a cane / HTN. Per PCP. 3) breast cancer hx:   2003: LEFT breast cancer. Lumpectomy, chemo, and XRT. No tamoxifen/ AI. ER negative. 2012: LEFT breast cancer. Mastectomy, followed by 3 1/2 years of Anastrozole. 4) psychosocial.  Mood good. Lives with daughter/son. Has good support. On home 02. Staying home during [place] [place] [facility]\"". Fu with us in 6 months  Call if questions    The patient was evaluated through a synchronous (real-time) audio-video encounter. The patient (or guardian if applicable) is aware that this is a billable service. Verbal consent to proceed has been obtained within the past 12 months. The visit was conducted pursuant to the emergency declaration under the [facility], [address] [place] [address] authority and the [place] [employer] Act. Patient identification was verified, and a caregiver was present when appropriate. The patient was located in a state where the provider was credentialed to provide care. I appreciate the opportunity to participate in Ms. Madina Penn's care.     Signed By: Sarah Nicole DO

## 2021-05-13 ENCOUNTER — TELEPHONE (OUTPATIENT)
Dept: ONCOLOGY | Age: 76
End: 2021-05-13

## 2021-05-14 ENCOUNTER — TELEPHONE (OUTPATIENT)
Dept: FAMILY MEDICINE CLINIC | Age: 76
End: 2021-05-14

## 2021-05-14 NOTE — PROGRESS NOTES
Hep C NR  A1c 5.8 in prediabetic range, will encourage lifestyle modifications. CMP wnl  Lipid panel with LDL <70 so not able to claculate ASCVD risk. Will continue with current statin regimen.      Will notify via [employer]

## 2021-05-15 LAB — DRUGS UR: NORMAL

## 2021-05-20 ENCOUNTER — TELEPHONE (OUTPATIENT)
Dept: FAMILY MEDICINE CLINIC | Age: 76
End: 2021-05-20

## 2021-05-20 NOTE — TELEPHONE ENCOUNTER
Tried calling patient for lab results. Please let her know if she calls back to check mychart and I will mail her a letter.

## 2021-05-26 ENCOUNTER — TELEPHONE (OUTPATIENT)
Dept: FAMILY MEDICINE CLINIC | Age: 76
End: 2021-05-26

## 2021-05-27 ENCOUNTER — APPOINTMENT (OUTPATIENT)
Dept: PHYSICAL THERAPY | Age: 76
End: 2021-05-27

## 2021-05-28 ENCOUNTER — TELEPHONE (OUTPATIENT)
Dept: FAMILY MEDICINE CLINIC | Age: 76
End: 2021-05-28

## 2021-05-28 NOTE — TELEPHONE ENCOUNTER
Appointment Information   Name: Denver Cho MRN: [id number]    Date: 6/10/2021 Status: [facility]    Time: 10:55 AM Length: 20 [id number]   Visit Type: ACUTE CARE [[id number]] Copay: $0.00    Provider: Jana Newell DO Department: [facility]    Referral #:   Referral Status:      Referring Provider:   Patient Type:      Notes: Patient wants to be seen in the office

## 2021-05-28 NOTE — TELEPHONE ENCOUNTER
----- Message from Nataly Palacios sent at 5/28/2021  1:52 PM EDT -----  Regarding: Dr. Castillo Do: [phone]  Appointment not available    Caller's first and last name and relationship to patient (if not the patient): N/A      Best contact number:[phone]      Preferred date and time: 2nd week of Maddison, Tuesday, Wednesday or Thursday mid day. Scheduled appointment date and time: N/A      Reason for appointment: Routine      Details to clarify the request: Patient wants to be seen in the office.         Nataly Palacios

## 2021-06-01 NOTE — TELEPHONE ENCOUNTER
Called patient and [facility] her appt to:    Appointment Information   Name: Sal Nieto MRN: [id number]    Date: 6/11/2021 Status: [facility]    Time: 1:30 PM Length: 5 [phone]   Visit Type: ACUTE CARE [[id number]] Copay: $0.00    Provider: Josh Delgado MD Department: [facility]    Referral #:   Referral Status:      Referring Provider:   Patient Type:      Notes: Patient wants to be seen in the office [facility] to [facility]      Close enc    Thanks  Pawel Espinosa S/PSR  [facility] Referral Coordinator

## 2021-06-15 ENCOUNTER — HOSPITAL ENCOUNTER (OUTPATIENT)
Dept: PHYSICAL THERAPY | Age: 76
Discharge: HOME | End: 2021-06-15
Payer: MEDICARE

## 2021-06-15 PROCEDURE — 97162 PT EVAL MOD COMPLEX 30 MIN: CPT | Performed by: PHYSICAL THERAPIST

## 2021-06-15 NOTE — PROGRESS NOTES
Physical Therapy at [facility],   a part of [facility] [address] [facility], [address]  Phone: [phone]  Fax: [phone]    Plan of Care/Statement of Necessity for Physical Therapy Services  2-15    Patient name: Brittany Herrera  : 1945  Provider#: [id number]  Referral source: Delfino Beebe MD      Medical/Treatment Diagnosis: Acute pain of right knee [M25.561]     Prior Hospitalization: see medical history     Comorbidities: 2 L O2 at night or with exertion, COPD, Breast CA, Lumpectomy, scoliosis, osteoporosis  Prior Level of Function: Pt has not been doing much for fun because of Covid  Medications: Verified on Patient Summary List  Start of Care: 6/15/21      Onset Date: \"several years ago\"   The Plan of Care and following information is based on the information from the initial evaluation. Assessment/ key information: The patient presents with R knee OA contributing to pain which has progressively worsened over the past year due to inactivity. The patient's condition affects her ability to walk, stand, change positions, perform certain ADLs and complete household chores. The patient would benefit from outpatient PT to improve strength and stability to improve gait mechanics and improve activity tolerance for improved QOL. Evaluation Complexity History HIGH Complexity :3+ comorbidities / personal factors will impact the outcome/ POC ; Examination HIGH Complexity : 4+ Standardized tests and measures addressing body structure, function, activity limitation and / or participation in recreation  ;Presentation MEDIUM Complexity : Evolving with changing characteristics  ; Clinical Decision Making MEDIUM Complexity : FOTO score of 26-74  Overall Complexity Rating: MEDIUM    Problem List: pain affecting function, decrease ROM, decrease strength, edema affecting function, impaired gait/ balance, decrease ADL/ functional abilitiies, decrease activity tolerance, decrease flexibility/ joint mobility and decrease transfer abilities   Treatment Plan may include any combination of the following: Therapeutic exercise, Therapeutic activities, Neuromuscular re-education, Physical agent/modality, Gait/balance training, Manual therapy, Patient education, Self Care training, Functional mobility training, Home safety training and Stair training  Patient / Family readiness to learn indicated by: asking questions, trying to perform skills and interest  Persons(s) to be included in education: patient (P)  Barriers to Learning/Limitations: None  Patient Goal (s): to be able to walk with a minimum amount of pain  Patient Self Reported Health Status: good  Rehabilitation Potential: good    Short Term Goals: To be accomplished in 4 weeks:  1) The patient will be independent with introductory HEP  2) The patient will ambulate 300 feet with LRAD on level surfaces without a significant increase in pain  3) The patient will improve ankle DF AROM to 5 deg B to improve gait mechanics  Long Term Goals: To be accomplished in 12 weeks:  1) The patient will report ability to get in and out of her car without an increase in pain  2) The patient will ambulate 500 feet with LRAD on level surfaces without a significant increase in pain  3) The patient will report ability to complete light household chores without a significant increase in pain  Frequency / Duration: Patient to be seen 2 times per week for 12 weeks. Patient/ Caregiver education and instruction: self care, activity modification and exercises    [x]  Plan of care has been reviewed with PTA    Certification Period: 6/15/21-9/15/21  Jessie Rome, PT 6/15/2021     ________________________________________________________________________    I certify that the above Therapy Services are being furnished while the patient is under my care.  I agree with the treatment plan and certify that this therapy is necessary.     Physician's Signature:____________________  Date:____________Time: _________         Walker Holter, MD

## 2021-06-15 NOTE — PROGRESS NOTES
PT INITIAL EVALUATION NOTE - [facility] 2-15    Patient Name: Sergo Kathleen  Date:6/15/2021  : 1945  [x]  Patient  Verified  Payor: [facility] MEDICARE / Plan: [facility] COMMUNITY PLAN MCR / Product Type: Managed Care Medicare /    In time: 9:50 AM  Out time: 10:40 AM  Total Treatment Time (min): 50  Total Timed Codes (min): 5  1:1 Treatment Time ( only): 5   Visit #: 1     Treatment Area: Acute pain of right knee [M25.561]    SUBJECTIVE  Pain Level (0-10 scale): 0 at rest  Any medication changes, allergies to medications, adverse drug reactions, diagnosis change, or new procedure performed?: [] No    [x] Yes (see summary sheet for update)  Subjective:    Pt c/o R knee pain \"for years\" Pt reports pain increased with kneeling and cleaning. Pt saw her MD over a year ago and she sent her to have injections from an orthopaedist but that didn't help at all. X-ray of knee reveals osteophytes and mod-severe osteoarthritis. Pt has pain when she stands and puts pressure through her knee. Pt has been using a walker since yesterday. Prior to that she was using a cane. Pain going up the stairs \"but I can do it\"  Pain is worse in the morning  PLOF: Pt has not been doing much for fun because of Covid  Mechanism of Injury: Insidious onset  Previous Treatment/Compliance: none  PMHx/Surgical Hx: 2 L O2 at night or with exertion, COPD, Breast CA, Lumpectomy, scoliosis, osteoporosis  Work Hx: not working  Living Situation: Pt lives with her daughter on the first floor of her home, 3 steps to enter the house  Pt Goals:  \"to be able to walk without minimum pain\"  Barriers: Complex PMH   Motivation: good  Substance use: none  Cognition: A & O x 3        OBJECTIVE/EXAMINATION  Gait and Functional Mobility:  Pt ambulates 130 feet with RW. She ambulates with step through gait pattern with limited heel strike and R knee buckling. RW is too tall and she requires verbal cues to push through RW instead of lifting it.  Oxygen dropped to 90 when ambulating without oxygen so supplemental oxygen was used    Lower Extremity AROM:        R   L  Knee Flexion  140  140       Knee Extension 0  0        Ankle DF  Lacking 5 deg  Lacking 5 deg    LOWER QUARTER   MUSCLE STRENGTH  KEY       R  L  0 - No Contraction  L1, L2 Psoas  5 p!  5 p!    1 - Trace   L3 Quads  5  5    2 - Poor   L4 Tib Ant  5  5    3 - Fair    L5 EHL  -  -    4 - Good   S1 FHL  5  5    5 - Normal   S2 Hams  5  5            MMT: See above  Hip abduction 4/4 B  Hip adduction 4/5 B    SLR R 70 L 70      5 min Therapeutic Exercise:  [x] See flow sheet :   Rationale: increase ROM, increase strength and improve coordination to improve the patients ability to sit, stand, transfer, ambulate, lift, carry, reach, complete ADLs       With   [x] TE   [] TA   [] Neuro   [] SC   [] other: Patient Education: [x] Review HEP    [] Progressed/Changed HEP based on:   [x] positioning   [x] body mechanics   [] transfers   [x] heat/ice application    [] other:      Other Objective/Functional Measures: FOTO Functional Measure: 28/100              Very severe           Pain Level (0-10 scale) post treatment: 0    ASSESSMENT/Changes in Function:     [x]  See Plan of [address] [place] [address], PT 6/15/2021

## 2021-06-17 ENCOUNTER — HOSPITAL ENCOUNTER (OUTPATIENT)
Dept: PHYSICAL THERAPY | Age: 76
Discharge: HOME | End: 2021-06-17
Payer: MEDICARE

## 2021-06-17 PROCEDURE — 97110 THERAPEUTIC EXERCISES: CPT | Performed by: PHYSICAL MEDICINE & REHABILITATION

## 2021-06-17 NOTE — PROGRESS NOTES
PT DAILY TREATMENT NOTE - [facility] 2-15    Patient Name: Janet Jaramillo  Date:2021  : 1945  [x]  Patient  Verified  Payor: [facility] MEDICARE / Plan: [facility] COMMUNITY PLAN MCR / Product Type: Managed Care Medicare /    In time:  Out time:1110a  Total Treatment Time (min): 55  Total Timed Codes (min): 45  1:1 Treatment Time ([facility] only): 39   Visit #: 2      Treatment Area: Acute pain of right knee [M25.561]    SUBJECTIVE  Pain Level (0-10 scale): 0/10 unless in weight bearing   Any medication changes, allergies to medications, adverse drug reactions, diagnosis change, or new procedure performed?: [x] No    [] Yes (see summary sheet for update)  Subjective functional status/changes:   [] No changes reported  Patient reported she only has pain while in weight bearing.  Patient stated she had an increase in knee pain after doing the HEP (Active HS)    OBJECTIVE    Modality rationale: decrease inflammation, decrease pain and increase tissue extensibility to improve the patients ability to ADLs, standing and walking tolerance   Min Type Additional Details    [] Estim: []Att   []Unatt        []TENS instruct                  []IFC  []Premod   []NMES                     []Other:  []w/US   []w/ice   []w/heat  Position:  Location:    []  Traction: [] Cervical       []Lumbar                       [] Prone          []Supine                       []Intermittent   []Continuous Lbs:  [] before manual  [] after manual  []w/heat    []  Ultrasound: []Continuous   [] Pulsed at:                           []1MHz   []3MHz Location:  W/cm2:    [] Paraffin         Location:   []w/heat   10 []  Ice     [x]  Heat  []  Ice massage Position: supine  Location: B knees    []  Laser  []  Other: Position:  Location:      []  Vasopneumatic Device Pressure:       [] lo [] med [] hi   Temperature:      [x] Skin assessment post-treatment:  [x]intact []redness- no adverse reaction    []redness  adverse reaction:     45 min Therapeutic Exercise:  [x] See flow sheet :   Rationale: increase ROM, increase strength and improve coordination to improve the patients ability to ADLs, standing and walking tolerance          With   [x] TE   [] TA   [] neuro   [] other: Patient Education: [x] Review HEP    [] Progressed/Changed HEP based on:   [] positioning   [] body mechanics   [] transfers   [] heat/ice application    [] other:      Other Objective/Functional Measures:   Patient tolerated all exercises well today without an increase in pain, mod fatigue. Pain Level (0-10 scale) post treatment: 0/10    ASSESSMENT/Changes in Function:     Patient will continue to benefit from skilled PT services to modify and progress therapeutic interventions, address functional mobility deficits, address ROM deficits, address strength deficits, analyze and address soft tissue restrictions, analyze and cue movement patterns, analyze and modify body mechanics/ergonomics and assess and modify postural abnormalities to attain remaining goals. []  See Plan of Care  []  See progress note/recertification  []  See Discharge Summary         Progress towards goals / Updated goals:  Continued difficulty with weight bearing tolerance.     PLAN  [x]  Upgrade activities as tolerated     [x]  Continue plan of care  [x]  Update interventions per flow sheet       []  Discharge due to:_  []  Other:_      Julius Fletcher PTA, OPTA, CPT  6/17/2021

## 2021-06-22 ENCOUNTER — APPOINTMENT (OUTPATIENT)
Dept: PHYSICAL THERAPY | Age: 76
End: 2021-06-22
Payer: MEDICARE

## 2021-06-24 ENCOUNTER — HOSPITAL ENCOUNTER (OUTPATIENT)
Dept: PHYSICAL THERAPY | Age: 76
Discharge: HOME | End: 2021-06-24
Payer: MEDICARE

## 2021-06-24 PROCEDURE — 97110 THERAPEUTIC EXERCISES: CPT | Performed by: PHYSICAL MEDICINE & REHABILITATION

## 2021-06-24 NOTE — PROGRESS NOTES
PT DAILY TREATMENT NOTE - [facility] 2-15    Patient Name: Dejuan Ramirez  Date:2021  : 1945  [x]  Patient  Verified  Payor: [facility] MEDICARE / Plan: [facility] COMMUNITY PLAN MCR / Product Type: Managed Care Medicare /    In time:1145a  Out time:1245p  Total Treatment Time (min): 60  Total Timed Codes (min): 45  1:1 Treatment Time ( [address] only): 39   Visit #: 3      Treatment Area: Acute pain of right knee [M25.561]    SUBJECTIVE  Pain Level (0-10 scale): \"sore\"  Any medication changes, allergies to medications, adverse drug reactions, diagnosis change, or new procedure performed?: [x] No    [] Yes (see summary sheet for update)  Subjective functional status/changes:   [] No changes reported  Patient reported she is sore from last visit but overall is feeling a little better.     OBJECTIVE    Modality rationale: decrease inflammation, decrease pain and increase tissue extensibility to improve the patients ability to ADLs, standing and walking tolerance   Min Type Additional Details    [] Estim: []Att   []Unatt        []TENS instruct                  []IFC  []Premod   []NMES                     []Other:  []w/US   []w/ice   []w/heat  Position:  Location:    []  Traction: [] Cervical       []Lumbar                       [] Prone          []Supine                       []Intermittent   []Continuous Lbs:  [] before manual  [] after manual  []w/heat    []  Ultrasound: []Continuous   [] Pulsed at:                           []1MHz   []3MHz Location:  W/cm2:    [] Paraffin         Location:   []w/heat   15 []  Ice     [x]  Heat  []  Ice massage Position: supine  Location: B knees    []  Laser  []  Other: Position:  Location:      []  Vasopneumatic Device Pressure:       [] lo [] med [] hi   Temperature:      [x] Skin assessment post-treatment:  [x]intact []redness- no adverse reaction    []redness  adverse reaction:     45 min Therapeutic Exercise:  [x] See flow sheet :   Rationale: increase ROM, increase strength and improve coordination to improve the patients ability to ADLs, standing and walking tolerance          With   [x] TE   [] TA   [] neuro   [] other: Patient Education: [x] Review HEP    [] Progressed/Changed HEP based on:   [] positioning   [] body mechanics   [] transfers   [] heat/ice application    [] other:      Other Objective/Functional Measures:   Great performance with sit to stands today with mod fatigue present. Pain Level (0-10 scale) post treatment: 0/10    ASSESSMENT/Changes in Function:     Patient will continue to benefit from skilled PT services to modify and progress therapeutic interventions, address functional mobility deficits, address ROM deficits, address strength deficits, analyze and address soft tissue restrictions, analyze and cue movement patterns, analyze and modify body mechanics/ergonomics and assess and modify postural abnormalities to attain remaining goals. []  See Plan of Care  []  See progress note/recertification  []  See Discharge Summary         Progress towards goals / Updated goals:  Continued difficulty with weight bearing tolerance.     PLAN  [x]  Upgrade activities as tolerated     [x]  Continue plan of care  [x]  Update interventions per flow sheet       []  Discharge due to:_  []  Other:_      Sharri Demarco PTA, OPTA, CPT  6/24/2021

## 2021-06-25 ENCOUNTER — OFFICE VISIT (OUTPATIENT)
Dept: FAMILY MEDICINE CLINIC | Age: 76
End: 2021-06-25
Payer: MEDICARE

## 2021-06-25 VITALS
WEIGHT: 163.4 LBS | HEART RATE: 70 BPM | HEIGHT: 64 IN | TEMPERATURE: 98 F | OXYGEN SATURATION: 94 % | BODY MASS INDEX: 27.9 KG/M2 | SYSTOLIC BLOOD PRESSURE: 112 MMHG | RESPIRATION RATE: 16 BRPM | DIASTOLIC BLOOD PRESSURE: 75 MMHG

## 2021-06-25 DIAGNOSIS — G47.00 INSOMNIA, UNSPECIFIED TYPE: ICD-10-CM

## 2021-06-25 DIAGNOSIS — F41.1 GAD (GENERALIZED ANXIETY DISORDER): ICD-10-CM

## 2021-06-25 DIAGNOSIS — E78.5 DYSLIPIDEMIA: ICD-10-CM

## 2021-06-25 PROCEDURE — G8427 DOCREV CUR MEDS BY ELIG CLIN: HCPCS | Performed by: STUDENT IN AN ORGANIZED HEALTH CARE EDUCATION/TRAINING PROGRAM

## 2021-06-25 PROCEDURE — G8400 PT W/DXA NO RESULTS DOC: HCPCS | Performed by: STUDENT IN AN ORGANIZED HEALTH CARE EDUCATION/TRAINING PROGRAM

## 2021-06-25 PROCEDURE — 99214 OFFICE O/P EST MOD 30 MIN: CPT | Performed by: STUDENT IN AN ORGANIZED HEALTH CARE EDUCATION/TRAINING PROGRAM

## 2021-06-25 PROCEDURE — 1090F PRES/ABSN URINE INCON ASSESS: CPT | Performed by: STUDENT IN AN ORGANIZED HEALTH CARE EDUCATION/TRAINING PROGRAM

## 2021-06-25 PROCEDURE — G8754 DIAS BP LESS 90: HCPCS | Performed by: STUDENT IN AN ORGANIZED HEALTH CARE EDUCATION/TRAINING PROGRAM

## 2021-06-25 PROCEDURE — 1101F PT FALLS ASSESS-DOCD LE1/YR: CPT | Performed by: STUDENT IN AN ORGANIZED HEALTH CARE EDUCATION/TRAINING PROGRAM

## 2021-06-25 PROCEDURE — G8536 NO DOC ELDER MAL SCRN: HCPCS | Performed by: STUDENT IN AN ORGANIZED HEALTH CARE EDUCATION/TRAINING PROGRAM

## 2021-06-25 PROCEDURE — G8432 DEP SCR NOT DOC, RNG: HCPCS | Performed by: STUDENT IN AN ORGANIZED HEALTH CARE EDUCATION/TRAINING PROGRAM

## 2021-06-25 PROCEDURE — G8419 CALC BMI OUT NRM PARAM NOF/U: HCPCS | Performed by: STUDENT IN AN ORGANIZED HEALTH CARE EDUCATION/TRAINING PROGRAM

## 2021-06-25 PROCEDURE — G8752 SYS BP LESS 140: HCPCS | Performed by: STUDENT IN AN ORGANIZED HEALTH CARE EDUCATION/TRAINING PROGRAM

## 2021-06-25 RX ORDER — CITALOPRAM 40 MG/1
40 TABLET, FILM COATED ORAL DAILY
Qty: 90 TABLET | Refills: 2 | Status: SHIPPED | OUTPATIENT
Start: 2021-06-25 | End: 2021-06-25

## 2021-06-25 RX ORDER — TRIAZOLAM 0.25 MG/1
TABLET ORAL
Qty: 30 TABLET | Refills: 0 | Status: SHIPPED | OUTPATIENT
Start: 2021-06-25 | End: 2021-09-15 | Stop reason: SDUPTHER

## 2021-06-25 RX ORDER — ATORVASTATIN CALCIUM 40 MG/1
40 TABLET, FILM COATED ORAL DAILY
Qty: 90 TABLET | Refills: 1 | Status: SHIPPED | OUTPATIENT
Start: 2021-06-25 | End: 2021-06-29

## 2021-06-25 RX ORDER — CITALOPRAM 20 MG/1
20 TABLET, FILM COATED ORAL DAILY
Qty: 120 TABLET | Refills: 3 | Status: SHIPPED | OUTPATIENT
Start: 2021-06-25 | End: 2022-02-05

## 2021-06-25 NOTE — PROGRESS NOTES
Subjective   Ronal Herrera is an 66 y.o. female presents for anxiety follow up and to discuss labs. Anxiety/panic attacks:  Patient has been doing well on celexa 40 mg and triazolam 0.25 mg tab qhs. Patient was advised to decrease celexa to 20 mg (given age max dose is 20 mg daily). States that she \"noticed the difference\" on the lower dose so decided to take 40 mg tab instead of 20 mg. Triazolam was also being weaned however was unable to discontinue given patient was diagnosed with breast cancer, ex  & sister passed away this past year so felt like she needed her triazolam. Has not had a panic attack in years. PHQ 9=0 and POOL 7= 1    HLD:  Patient taking lipitor 40 mg tab daily and is tolerating well. Tries to remain active and is now going to PT. Tries keeping low carb diet however has been difficult to do so given COVID. Review of Systems   Review of Systems   Constitutional: Negative for chills and fever. HENT: Negative for congestion and sore throat. Eyes: Negative for blurred vision and double vision. Respiratory: Negative for cough, hemoptysis, sputum production, shortness of breath and wheezing. Cardiovascular: Negative for chest pain, palpitations and leg swelling. Gastrointestinal: Negative for abdominal pain, blood in stool, constipation, diarrhea, heartburn, melena, nausea and vomiting. Genitourinary: Negative for dysuria and urgency. Musculoskeletal: Negative for back pain and joint pain. Skin: Negative for rash. Neurological: Negative for dizziness, focal weakness and headaches. Psychiatric/Behavioral: Negative for depression and suicidal ideas. The patient is nervous/anxious. Allergies   Allergies   Allergen Reactions    Betadine [Povidone-Iodine] Hives    Red Dye Hives    Tape [Adhesive] Rash       Medications  Current Outpatient Medications   Medication Sig    atorvastatin (LIPITOR) 40 mg tablet Take 1 Tablet by mouth daily. atorvastatin 40 mg tablet    citalopram (CELEXA) 20 mg tablet Take 1 Tablet by mouth daily.  triazolam (HALCION) 0.25 mg tablet TAKE 1 TABLET BY MOUTH AT NIGHT AS NEEDED    lisinopriL (PRINIVIL, ZESTRIL) 5 mg tablet Take 1 Tab by mouth daily.  vit A/vit C/vit E/zinc/copper (PRESERVISION AREDS PO) Take 1 Tablet by mouth two (2) times a day.  aspirin (ASPIRIN) 325 mg tablet Take 325 mg by mouth every six (6) hours as needed for Pain.  tiotropium bromide (SPIRIVA RESPIMAT) 2.5 mcg/actuation inhaler Spiriva Respimat 2.5 mcg/actuation solution for inhalation (Patient taking differently: Take 2 Puffs by inhalation daily. Spiriva Respimat 2.5 mcg/actuation solution for inhalation)    anastrozole (ARIMIDEX) 1 mg tablet Take 1 tablet by mouth once daily (Patient not taking: Reported on 2021)     No current facility-administered medications for this visit. Medical History  Past Medical History:   Diagnosis Date    Arthritis     Breast cancer ([address] 10/2020    Breast cancer, left breast ([address] ,     lumpectomy/chemo/XRT, then mastectomy and anastrozole in     Cancer ([address]     SEVERAL SKIN - MELANOMA    COPD (chronic obstructive pulmonary disease) ([address]     Hypertension     Ill-defined condition     MACULAR DEGENERATION       Immunizations     There is no immunization history on file for this patient.        Past Surgical History:   Procedure Laterality Date    HX APPENDECTOMY      HX BREAST LUMPECTOMY Left         HX BREAST LUMPECTOMY Right 4/15/2021    RIGHT BREAST LUMPECTOMY WITH ULTRASOUND performed by Jose [facility]mundo [facility] AM[facility]UL[facility]    HX  SECTION      HX COLONOSCOPY      HX MASTECTOMY Left          Family History   Problem Relation Age of Onset    Thyroid Disease Mother     Heart Disease Father     Cancer Father         SKIN    Cancer Sister         MELANOMA, LUNG    Abdominal aortic aneurysm Brother     Anesth Problems Neg Hx Social History     Tobacco Use    Smoking status: Former Smoker     Packs/day: 1.00     Years: 20.00     Pack years: 20.00     Types: Cigarettes     Quit date: 4/9/2011     Years since quitting: 10.2    Smokeless tobacco: Never Used   Substance Use Topics    Alcohol use: Yes     Alcohol/week: 7.0 standard drinks     Types: 7 Glasses of wine per week       Objective   Vital Signs  Visit Vitals  /75 (BP 1 Location: Right upper arm, BP Patient Position: Sitting, BP Cuff Size: Adult)   Pulse 70   Temp 98 °F (36.7 °C) (Oral)   Resp 16   Ht 5' 4\" (1.626 m)   Wt 163 lb 6.4 oz (74.1 kg)   SpO2 94% Comment: O2 2 L/min NC   BMI 28.05 kg/m²       Physical Exam  General appearance - Alert, NAD. Head: Atraumatic. Normocephalic. No lymphadenopathy  Eyes: EOMI. Sclera white. Respiratory - LCTAB. No wheeze/rale/rhonchi  Heart - Normal rate, regular rhythm. No m/r/r  Abdomen - Soft, non tender. Non distended. Neurological - No focal deficits. Speech normal.   Musculoskeletal - Normal ROM, Gait normal.    Extremities - No LE edema. Distal pulses intact  Skin - normal coloration and normal turgor. No cyanosis, no rash. Jose E Aden is a 66 y.o. who presents for anxiety/panic attacks and dyslipidemia. Plan   1. Dyslipidemia: personally reviewed most recent labs LDL at goal on most recent lipid panel on 5/11/21. Continue current regimen.   - atorvastatin (LIPITOR) 40 mg tablet; Take 1 Tablet by mouth daily. atorvastatin 40 mg tablet  Dispense: 90 Tablet; Refill: 1    2. POOL/panic attacks: well controlled on celexa 40 mg tab and triazolam 0.25 mg qhs. Patient reluctant to wean off triazolam given life stressors (recent breast cancer diagnosis and loss of sister and ex ). Patient tried celexa 20 mg that was previously decreased form 40 mg however \"felt a difference\" but did not have any panic attacks. Currently on 40 mg.  Patient will alternate taking 40 mg and 20 mg every other day, if doing well on that dose with no breakthrough panic attacks will try 20 mg daily. UDS up to date and PDMP appropriate. - citalopram (CELEXA) 20 mg tablet; Take 1 Tablet by mouth daily. Dispense: 120 Tablet; Refill: 3  - triazolam (HALCION) 0.25 mg tablet; TAKE 1 TABLET BY MOUTH AT NIGHT AS NEEDED  Dispense: 30 Tablet; Refill: 0    - follow up in one month             I have discussed the aforementioned diagnoses and plan with the patient in detail. I have provided information in person and/or in AVS. All questions answered prior to discharge.       I discussed this patient with Dr. Kenyon Bright (Attending Physician)   Signed By:  Sebastián Ren MD    Family Medicine Resident

## 2021-06-25 NOTE — PROGRESS NOTES
Identified pt with two pt identifiers(name and ). Reviewed record in preparation for visit and have obtained necessary documentation. Chief Complaint   Patient presents with    Follow-up     follow up on labs from may 11th. is currently taking 40 mg of celexa, would like to know if she can take 30 mg or stay at the 40mg    Medication Refill     would like refill of lipitor            Coordination of Care Questionnaire:  :   1) Have you been to an emergency room, urgent care, or hospitalized since your last visit? If yes, where when, and reason for visit? no       2)  Have seen or consulted any other health care provider since your last visit? If yes, where when, and reason for visit?   NO

## 2021-06-29 RX ORDER — ATORVASTATIN CALCIUM 40 MG/1
TABLET, FILM COATED ORAL
Qty: 90 TABLET | Refills: 0 | Status: SHIPPED | OUTPATIENT
Start: 2021-06-29 | End: 2021-12-17

## 2021-06-30 ENCOUNTER — HOSPITAL ENCOUNTER (OUTPATIENT)
Dept: PHYSICAL THERAPY | Age: 76
Discharge: HOME | End: 2021-06-30
Payer: MEDICARE

## 2021-06-30 PROCEDURE — 97110 THERAPEUTIC EXERCISES: CPT

## 2021-06-30 NOTE — PROGRESS NOTES
PT DAILY TREATMENT NOTE - [facility] 2-15    Patient Name: Hamilton Ochoa  Date:2021  : 1945  [x]  Patient  Verified  Payor: Ilya Armando Kavin / Plan: [facility] PLAN MCR / Product Type: Managed Care Medicare /    In time:10:00a  Out time: 11:00a  Total Treatment Time (min): 60  Total Timed Codes (min): 60  1:1 Treatment Time ( only): 54   Visit #: 4      Treatment Area: Acute pain of right knee [M25.561]    SUBJECTIVE  Pain Level (0-10 scale): \"sore\"  Any medication changes, allergies to medications, adverse drug reactions, diagnosis change, or new procedure performed?: [x] No    [] Yes (see summary sheet for update)  Subjective functional status/changes:   [] No changes reported  Patient reports she feels she can walk a little better every week and her first step out of bed is not as painful as it used to be.      OBJECTIVE    Modality rationale: declined   Min Type Additional Details    [] Estim: []Att   []Unatt        []TENS instruct                  []IFC  []Premod   []NMES                     []Other:  []w/US   []w/ice   []w/heat  Position:  Location:    []  Traction: [] Cervical       []Lumbar                       [] Prone          []Supine                       []Intermittent   []Continuous Lbs:  [] before manual  [] after manual  []w/heat    []  Ultrasound: []Continuous   [] Pulsed at:                           []1MHz   []3MHz Location:  W/cm2:    [] Paraffin         Location:   []w/heat   NT []  Ice     [x]  Heat  []  Ice massage Position: supine  Location: B knees    []  Laser  []  Other: Position:  Location:      []  Vasopneumatic Device Pressure:       [] lo [] med [] hi   Temperature:      [x] Skin assessment post-treatment:  [x]intact []redness- no adverse reaction    []redness  adverse reaction:     60 min Therapeutic Exercise:  [x] See flow sheet :   Rationale: increase ROM, increase strength and improve coordination to improve the patients ability to ADLs, standing and walking tolerance          With   [x] TE   [] TA   [] neuro   [] other: Patient Education: [x] Review HEP    [] Progressed/Changed HEP based on:   [] positioning   [] body mechanics   [] transfers   [] heat/ice application    [] other:      Other Objective/Functional Measures:   Great performance with sit to stands today with mod fatigue present, able to perform 5x without foam and no increased pain    Continued difficulty and increased pain with RLE weightbearing stance     Pain Level (0-10 scale) post treatment: 0/10    ASSESSMENT/Changes in Function:     Patient will continue to benefit from skilled PT services to modify and progress therapeutic interventions, address functional mobility deficits, address ROM deficits, address strength deficits, analyze and address soft tissue restrictions, analyze and cue movement patterns, analyze and modify body mechanics/ergonomics and assess and modify postural abnormalities to attain remaining goals. []  See Plan of Care  []  See progress note/recertification  []  See Discharge Summary         Progress towards goals / Updated goals: Patient continues to demonstrate increased difficulty with exercises involving stance on RLE.       PLAN  [x]  Upgrade activities as tolerated     [x]  Continue plan of care  [x]  Update interventions per flow sheet       []  Discharge due to:_  []  Other:_      Alberto Della PTA   6/30/2021

## 2021-06-30 NOTE — TELEPHONE ENCOUNTER
/Telephone  Received: Today  [place] [address] [address] Message/Vendor Calls     Caller's first and last name:self       Reason for call:Pt advised checking on the status citalopram (CELEXA) 20 mg tablet refill. Callback required yes/no and why:yes, to clarify status       Best contact number(s):[phone]       Details to clarify the request:pt advised pharmacy stated that pt's pcp cancelled the medication. Pt advised that she will be out of the medication this weekend.        Clearance Heber

## 2021-07-06 ENCOUNTER — APPOINTMENT (OUTPATIENT)
Dept: PHYSICAL THERAPY | Age: 76
End: 2021-07-06

## 2021-07-08 ENCOUNTER — APPOINTMENT (OUTPATIENT)
Dept: PHYSICAL THERAPY | Age: 76
End: 2021-07-08

## 2021-07-14 ENCOUNTER — APPOINTMENT (OUTPATIENT)
Dept: PHYSICAL THERAPY | Age: 76
End: 2021-07-14

## 2021-07-16 ENCOUNTER — APPOINTMENT (OUTPATIENT)
Dept: PHYSICAL THERAPY | Age: 76
End: 2021-07-16

## 2021-07-23 ENCOUNTER — APPOINTMENT (OUTPATIENT)
Dept: PHYSICAL THERAPY | Age: 76
End: 2021-07-23

## 2021-07-26 ENCOUNTER — APPOINTMENT (OUTPATIENT)
Dept: PHYSICAL THERAPY | Age: 76
End: 2021-07-26

## 2021-07-28 ENCOUNTER — APPOINTMENT (OUTPATIENT)
Dept: PHYSICAL THERAPY | Age: 76
End: 2021-07-28

## 2021-07-30 ENCOUNTER — OFFICE VISIT (OUTPATIENT)
Dept: FAMILY MEDICINE CLINIC | Age: 76
End: 2021-07-30
Payer: MEDICARE

## 2021-07-30 VITALS
HEIGHT: 63 IN | SYSTOLIC BLOOD PRESSURE: 126 MMHG | WEIGHT: 160 LBS | DIASTOLIC BLOOD PRESSURE: 82 MMHG | BODY MASS INDEX: 28.35 KG/M2 | HEART RATE: 68 BPM | RESPIRATION RATE: 16 BRPM | OXYGEN SATURATION: 95 % | TEMPERATURE: 98.4 F

## 2021-07-30 DIAGNOSIS — F41.0 PANIC ATTACKS: Primary | ICD-10-CM

## 2021-07-30 DIAGNOSIS — F51.04 CHRONIC INSOMNIA: ICD-10-CM

## 2021-07-30 PROCEDURE — G8536 NO DOC ELDER MAL SCRN: HCPCS | Performed by: STUDENT IN AN ORGANIZED HEALTH CARE EDUCATION/TRAINING PROGRAM

## 2021-07-30 PROCEDURE — G8752 SYS BP LESS 140: HCPCS | Performed by: STUDENT IN AN ORGANIZED HEALTH CARE EDUCATION/TRAINING PROGRAM

## 2021-07-30 PROCEDURE — 1101F PT FALLS ASSESS-DOCD LE1/YR: CPT | Performed by: STUDENT IN AN ORGANIZED HEALTH CARE EDUCATION/TRAINING PROGRAM

## 2021-07-30 PROCEDURE — G8419 CALC BMI OUT NRM PARAM NOF/U: HCPCS | Performed by: STUDENT IN AN ORGANIZED HEALTH CARE EDUCATION/TRAINING PROGRAM

## 2021-07-30 PROCEDURE — G8754 DIAS BP LESS 90: HCPCS | Performed by: STUDENT IN AN ORGANIZED HEALTH CARE EDUCATION/TRAINING PROGRAM

## 2021-07-30 PROCEDURE — G8432 DEP SCR NOT DOC, RNG: HCPCS | Performed by: STUDENT IN AN ORGANIZED HEALTH CARE EDUCATION/TRAINING PROGRAM

## 2021-07-30 PROCEDURE — G8427 DOCREV CUR MEDS BY ELIG CLIN: HCPCS | Performed by: STUDENT IN AN ORGANIZED HEALTH CARE EDUCATION/TRAINING PROGRAM

## 2021-07-30 PROCEDURE — 1090F PRES/ABSN URINE INCON ASSESS: CPT | Performed by: STUDENT IN AN ORGANIZED HEALTH CARE EDUCATION/TRAINING PROGRAM

## 2021-07-30 PROCEDURE — G8400 PT W/DXA NO RESULTS DOC: HCPCS | Performed by: STUDENT IN AN ORGANIZED HEALTH CARE EDUCATION/TRAINING PROGRAM

## 2021-07-30 NOTE — PATIENT INSTRUCTIONS

## 2021-07-30 NOTE — PROGRESS NOTES
Chief Complaint   Patient presents with    Follow-up     Patient presents to follow up on panic attacks. Visit Vitals  /82 (BP 1 Location: Right upper arm, BP Patient Position: Sitting, BP Cuff Size: Adult)   Pulse 68   Temp 98.4 °F (36.9 °C) (Oral)   Resp 16   Ht 5' 2.75\" (1.594 m)   Wt 160 lb (72.6 kg)   SpO2 95%   BMI 28.57 kg/m²        1. Have you been to the ER, urgent care clinic since your last visit? Hospitalized since your last visit? No     2. Have you seen or consulted any other health care providers outside of the [address] [place] [address] since your last visit? Include any pap smears or colon screening.  No

## 2021-07-30 NOTE — PROGRESS NOTES
Discussed with the lab number and color of tubes to send ordered labs, Labs drawn and sent. Subjective   Rema Stone is a 66 y.o. female who presents for follow up on panic attacks. Patients states feeling well after restarting Citalopram 40 mg daily (previously on 20 mg). She feels a lot better since being on that specific dose. Current symptoms:  None  Panic attacks? Last panic attack was 20 years. Current medication:  Citalopram 40 mg, Triazolam 0.25 mg HS for chronic insomnia. Side effects:  None  Current stressors: Nothing, just the COVID pandemic and Rt. knee pain and discomfort. She recently started PT which has been helping. Counseling: Yes  Sleeping:  Yes  Eating:  Yes  Exercise: Exercises with PT. Depression or leyla symptoms:  No  Drugs and alcohol: No  Social support:  Yes. She lives with her daughter and . She has a lot of friends and misses them. No suicidal or homicidal thoughts    Patient has a diagnosis of chronic insomnia for years (after her breast CA diagnosis and treatment) and she is on a stable dose of Triazolam 0.25 mg. She wants her refills to be for a 3 month supply. Review of Systems  Review of Systems   Constitutional: Negative for chills, fatigue and fever. HENT: Negative. Respiratory: Negative for cough and shortness of breath. Dyspnea on exertion (chronic)   Gastrointestinal: Negative for abdominal pain, constipation and diarrhea. Genitourinary: Negative for difficulty urinating. Musculoskeletal: Positive for gait problem. Neurological: Negative for dizziness. Psychiatric/Behavioral: Negative for behavioral problems and confusion. The patient is not nervous/anxious.        Past Medical History  Past Medical History:   Diagnosis Date    Arthritis     Breast cancer ([facility] 10/2020    Breast cancer, left breast ([facility] 2003, 2012    lumpectomy/chemo/XRT, then mastectomy and anastrozole in 2012    Cancer [facility])     SEVERAL SKIN - MELANOMA    COPD (chronic obstructive pulmonary disease) ([facility]     Hypertension     Ill-defined condition     MACULAR DEGENERATION       Current Medications  Current Outpatient Medications   Medication Sig Dispense Refill    atorvastatin (LIPITOR) 40 mg tablet Take 1 tablet by mouth once daily 90 Tablet 0    citalopram (CELEXA) 20 mg tablet Take 1 Tablet by mouth daily. 120 Tablet 3    triazolam (HALCION) 0.25 mg tablet TAKE 1 TABLET BY MOUTH AT NIGHT AS NEEDED 30 Tablet 0    lisinopriL (PRINIVIL, ZESTRIL) 5 mg tablet Take 1 Tab by mouth daily. 90 Tab 3    vit A/vit C/vit E/zinc/copper (PRESERVISION AREDS PO) Take 1 Tablet by mouth two (2) times a day.  aspirin (ASPIRIN) 325 mg tablet Take 325 mg by mouth every six (6) hours as needed for Pain.  tiotropium bromide (SPIRIVA RESPIMAT) 2.5 mcg/actuation inhaler Spiriva Respimat 2.5 mcg/actuation solution for inhalation (Patient taking differently: Take 2 Puffs by inhalation daily. Spiriva Respimat 2.5 mcg/actuation solution for inhalation) 1 Inhaler 3    anastrozole (ARIMIDEX) 1 mg tablet Take 1 tablet by mouth once daily (Patient not taking: Reported on 6/25/2021) 30 Tab 0       Allergies  Allergies   Allergen Reactions    Betadine [Povidone-Iodine] Hives    Red Dye Hives    Tape [Adhesive] Rash       Social History   Social History     Socioeconomic History    Marital status: SINGLE     Spouse name: Not on file    Number of children: Not on file    Years of education: Not on file    Highest education level: Not on file   Occupational History    Not on file   Tobacco Use    Smoking status: Former Smoker     Packs/day: 1.00     Years: 20.00     Pack years: 20.00     Types: Cigarettes     Quit date: 4/9/2011     Years since quitting: 10.3    Smokeless tobacco: Never Used   Vaping Use    Vaping Use: Every day    Substances: Nicotine    Devices: Disposable   Substance and Sexual Activity    Alcohol use:  Yes     Alcohol/week: 2.0 standard drinks     Types: 2 Glasses of wine per week    Drug use: Never    Sexual activity: Not on file Other Topics Concern    Not on file   Social History Narrative    Not on file     Social Determinants of Health     Financial Resource Strain:     Difficulty of Paying Living Expenses:    Food Insecurity:     Worried About Running Out of Food in the Last Year:     [address] in the Last Year:    Transportation Needs:     Lack of Transportation (Medical):  Lack of Transportation (Non-Medical):    Physical Activity:     Days of Exercise per Week:     Minutes of Exercise per Session:    Stress:     Feeling of Stress :    Social Connections:     Frequency of Communication with Friends and Family:     Frequency of Social Gatherings with Friends and Family:     Attends Islam Services:     Active Member of Clubs or Organizations:     Attends Club or Organization Meetings:     Marital Status:    Intimate Partner Violence:     Fear of Current or Ex-Partner:     Emotionally Abused:     Physically Abused:     Sexually Abused:        Family History  Family History   Problem Relation Age of Onset    Thyroid Disease Mother     Heart Disease Father     Cancer Father         SKIN    Cancer Sister         MELANOMA, LUNG    Abdominal aortic aneurysm Brother     Anesth Problems Neg Hx        Objective   Vital Signs  Visit Vitals  /82 (BP 1 Location: Right upper arm, BP Patient Position: Sitting, BP Cuff Size: Adult)   Pulse 68   Temp 98.4 °F (36.9 °C) (Oral)   Resp 16   Ht 5' 2.75\" (1.594 m)   Wt 160 lb (72.6 kg)   SpO2 95%   BMI 28.57 kg/m²       Physical Examination  Physical Exam  Constitutional:       General: She is not in acute distress. Appearance: Normal appearance. She is not toxic-appearing. HENT:      Head: Normocephalic and atraumatic. Cardiovascular:      Rate and Rhythm: Normal rate and regular rhythm. Pulses: Normal pulses. Heart sounds: Normal heart sounds. Pulmonary:      Effort: Pulmonary effort is normal.      Breath sounds: Normal breath sounds. Abdominal:      General: Abdomen is flat. There is no distension. Tenderness: There is no abdominal tenderness. There is no guarding. Musculoskeletal:      Cervical back: Normal range of motion. Right lower leg: No edema. Left lower leg: No edema. Lymphadenopathy:      Cervical: No cervical adenopathy. Skin:     General: Skin is warm. Neurological:      General: No focal deficit present. Mental Status: She is alert and oriented to person, place, and time. Gait: Gait abnormal.      Deep Tendon Reflexes: Reflexes normal.      Comments: Patient uses rolling walker when she leaves her house   Psychiatric:         Mood and Affect: Mood normal.         Behavior: Behavior normal.        Assessment   Lora Coronado is a 66 y.o. who is here for follow up on panic attacks. Patient is well controlled on Citalopram 40 mg for years. Patient also struggles with chronic insomnia that she has had for years. She has been on a stable dose of Triazolam 0.25 mg HS. Patient is asking for a 3 month supply. PDMP shows she just had a refill on 07/25/2021. Ms. Sita Cheema refused to sign controlled substance agreement stating that she is not a drug addict and she feels insulted by the fact that she is asked to sign that document. I explained to her that this the clinic's policy and that I might not be able to refill her controlled medications without her compliance with this policy. Patient stated she will find another PCP. Plan   Diagnoses and all orders for this visit:    1. Panic attacks  Comments:  Continue Citalopram 40 mg daily. 2. Chronic insomnia  Comments:  Continue home medication. Patient is counseled to follow up as recommended and to inform the office if any changes in treatment are recommended.      I discussed this patient with Dr. Viky Rodriguez (Attending Physician)       Signed By:  Chelle Bacon MD    Family Medicine Resident, PGY2

## 2021-07-31 PROBLEM — F51.04 CHRONIC INSOMNIA: Status: ACTIVE | Noted: 2021-07-31

## 2021-07-31 PROBLEM — Z86.59 HISTORY OF PANIC ATTACKS: Status: ACTIVE | Noted: 2021-07-31

## 2021-08-05 ENCOUNTER — TELEPHONE (OUTPATIENT)
Dept: FAMILY MEDICINE CLINIC | Age: 76
End: 2021-08-05

## 2021-08-05 NOTE — TELEPHONE ENCOUNTER
----- Message from Armaan Ivy sent at 8/5/2021  2:23 PM EDT -----  Regarding: Dr.Demorizi Ortega/Telephone  General Message/Vendor Calls    Caller's first and last name:Self      Reason for call:Pt advised has questions in regards to information on her mychart and wanting to get scheduled for lab work as well.        Callback required yes/no and why:yes, to clarify       Best contact number(s):[phone]      Details to clarify the request:n/a      Armaan Ivy

## 2021-08-09 ENCOUNTER — HOSPITAL ENCOUNTER (OUTPATIENT)
Dept: PHYSICAL THERAPY | Age: 76
Discharge: HOME | End: 2021-08-09

## 2021-08-10 ENCOUNTER — TELEPHONE (OUTPATIENT)
Dept: FAMILY MEDICINE CLINIC | Age: 76
End: 2021-08-10

## 2021-08-10 NOTE — TELEPHONE ENCOUNTER
----- Message from Rupinder Agrawal sent at 8/9/2021  5:23 PM EDT -----  Regarding: Dr. Galeano Gin: [phone]  PT requesting callback to schedule labs 8.12 1:15pm per discussion with provider.

## 2021-08-11 ENCOUNTER — APPOINTMENT (OUTPATIENT)
Dept: PHYSICAL THERAPY | Age: 76
End: 2021-08-11

## 2021-08-12 ENCOUNTER — OFFICE VISIT (OUTPATIENT)
Dept: FAMILY MEDICINE CLINIC | Age: 76
End: 2021-08-12
Payer: MEDICARE

## 2021-08-12 VITALS
TEMPERATURE: 97.9 F | DIASTOLIC BLOOD PRESSURE: 72 MMHG | BODY MASS INDEX: 28.5 KG/M2 | SYSTOLIC BLOOD PRESSURE: 121 MMHG | HEART RATE: 74 BPM | WEIGHT: 159.6 LBS | OXYGEN SATURATION: 95 %

## 2021-08-12 DIAGNOSIS — F51.04 CHRONIC INSOMNIA: ICD-10-CM

## 2021-08-12 DIAGNOSIS — Z79.899 CONTROLLED SUBSTANCE AGREEMENT SIGNED: Primary | ICD-10-CM

## 2021-08-12 DIAGNOSIS — R73.03 PREDIABETES: ICD-10-CM

## 2021-08-12 LAB
AMPHET UR QL SCN: NEGATIVE
BARBITURATES UR QL SCN: NEGATIVE
BENZODIAZ UR QL: POSITIVE
CANNABINOIDS UR QL SCN: NEGATIVE
COCAINE UR QL SCN: NEGATIVE
DRUG SCRN COMMENT,DRGCM: ABNORMAL
HBA1C MFR BLD HPLC: 5.4 %
METHADONE UR QL: NEGATIVE
OPIATES UR QL: NEGATIVE
PCP UR QL: NEGATIVE

## 2021-08-12 PROCEDURE — G8400 PT W/DXA NO RESULTS DOC: HCPCS | Performed by: STUDENT IN AN ORGANIZED HEALTH CARE EDUCATION/TRAINING PROGRAM

## 2021-08-12 PROCEDURE — G8419 CALC BMI OUT NRM PARAM NOF/U: HCPCS | Performed by: STUDENT IN AN ORGANIZED HEALTH CARE EDUCATION/TRAINING PROGRAM

## 2021-08-12 PROCEDURE — G8752 SYS BP LESS 140: HCPCS | Performed by: STUDENT IN AN ORGANIZED HEALTH CARE EDUCATION/TRAINING PROGRAM

## 2021-08-12 PROCEDURE — 83036 HEMOGLOBIN GLYCOSYLATED A1C: CPT | Performed by: STUDENT IN AN ORGANIZED HEALTH CARE EDUCATION/TRAINING PROGRAM

## 2021-08-12 PROCEDURE — 1101F PT FALLS ASSESS-DOCD LE1/YR: CPT | Performed by: STUDENT IN AN ORGANIZED HEALTH CARE EDUCATION/TRAINING PROGRAM

## 2021-08-12 PROCEDURE — 99214 OFFICE O/P EST MOD 30 MIN: CPT | Performed by: STUDENT IN AN ORGANIZED HEALTH CARE EDUCATION/TRAINING PROGRAM

## 2021-08-12 PROCEDURE — G8536 NO DOC ELDER MAL SCRN: HCPCS | Performed by: STUDENT IN AN ORGANIZED HEALTH CARE EDUCATION/TRAINING PROGRAM

## 2021-08-12 PROCEDURE — G8427 DOCREV CUR MEDS BY ELIG CLIN: HCPCS | Performed by: STUDENT IN AN ORGANIZED HEALTH CARE EDUCATION/TRAINING PROGRAM

## 2021-08-12 PROCEDURE — 1090F PRES/ABSN URINE INCON ASSESS: CPT | Performed by: STUDENT IN AN ORGANIZED HEALTH CARE EDUCATION/TRAINING PROGRAM

## 2021-08-12 PROCEDURE — G8754 DIAS BP LESS 90: HCPCS | Performed by: STUDENT IN AN ORGANIZED HEALTH CARE EDUCATION/TRAINING PROGRAM

## 2021-08-12 PROCEDURE — G8432 DEP SCR NOT DOC, RNG: HCPCS | Performed by: STUDENT IN AN ORGANIZED HEALTH CARE EDUCATION/TRAINING PROGRAM

## 2021-08-12 RX ORDER — TRIAZOLAM 0.25 MG/1
0.25 TABLET ORAL
Qty: 30 TABLET | Refills: 0 | Status: SHIPPED | OUTPATIENT
Start: 2021-08-12 | End: 2021-09-11

## 2021-08-12 NOTE — PROGRESS NOTES
Chief Complaint   Patient presents with    Follow-up     on panic attacks. Visit Vitals  /72 (BP 1 Location: Right arm, BP Patient Position: Sitting, BP Cuff Size: Adult)   Pulse 74   Temp 97.9 °F (36.6 °C) (Oral)   Wt 159 lb 9.6 oz (72.4 kg)   SpO2 95%   BMI 28.50 kg/m²     1. Have you been to the ER, urgent care clinic since your last visit? Hospitalized since your last visit? No    2. Have you seen or consulted any other health care providers outside of the [address] [place] [address] since your last visit? Include any pap smears or colon screening.  No

## 2021-08-12 NOTE — PROGRESS NOTES
[address] [facility] [address]   Office [phone], Fax [phone]    Subjective:     History provided by patient     HPI:  Crista Turner is a 66 y.o. WHITE/NON- female   Patient comes come for refill of Triazolam.  Patient has been on this medication for more than 10 years after experiencing chronic insomnia that has persisted since her breast cancer diagnosis in 2000. Patient is on a stable dose of Triazolam 0.25 mg. She denies any side effects.  Aberrant behaviors: None.  Urine Drug Screen: due - order placed.  Controlled substance agreement on file: YES.   reviewed:yes   Pill count is consistent with her prescription: yes     Reviewed side effects with patient including respiratory depression, dizziness, lightheadedness. Therapies tried to date:  Patient has tried sleep hygiene and ambien without any improvement. Patients past medical, surgical and family histories were reviewed. Allergies and Medications reviewed and updated. Objective     Vitals:    08/12/21 1338   BP: 121/72   Pulse: 74   Temp: 97.9 °F (36.6 °C)   TempSrc: Oral   SpO2: 95%   Weight: 159 lb 9.6 oz (72.4 kg)     Body mass index is 28.5 kg/m². Objective  General: Patient alert and oriented and in NAD  Heart: Regular rate and rhythm, No murmurs, rubs or gallops.   2+ peripheral pulses  Lungs: Clear to auscultation bilaterally, no wheezing, rales or rhonchi  Ext: No edema  Skin: No rashes or lesions noted on exposed skin,  Psych: Appropriate mood and affect    Recent Results (from the past 24 hour(s))   DRUG SCREEN, URINE    Collection Time: 08/12/21  2:30 PM   Result Value Ref Range    AMPHETAMINES Negative Negative      BARBITURATES Negative Negative      BENZODIAZEPINES Positive (A) Negative      COCAINE Negative Negative      METHADONE Negative Negative      OPIATES Negative Negative      PCP(PHENCYCLIDINE) Negative Negative      THC (TH-CANNABINOL) Negative Negative Drug screen comment (NOTE)    AMB POC HEMOGLOBIN A1C    Collection Time: 08/12/21  3:02 PM   Result Value Ref Range    Hemoglobin A1c (POC) 5.4 %       Assessment and Plan     Problem List Items Addressed This Visit        Other    Chronic insomnia    Relevant Medications    triazolam (HALCION) 0.25 mg tablet    Prediabetes    Relevant Orders    AMB POC HEMOGLOBIN A1C (Completed)    COLLECTION CAPILLARY BLOOD SPECIMEN    Controlled substance agreement signed - Primary    Relevant Orders    DRUG SCREEN, URINE (Completed)        Will provide 3 month refill with patient. Repeat UDS in 6 months (02/2022)  Patient states understanding and agreement with plan    I have discussed the diagnosis with the patient and the intended plan as seen in the above orders. she has expressed understanding. The patient has received an after-visit summary and questions were answered concerning future plans. I have discussed medication side effects and warnings with the patient as well. Patient discussed with Dr. Toni Reed.      Electronically Signed: Krystin Ashby MD

## 2021-08-13 PROBLEM — R73.03 PREDIABETES: Status: ACTIVE | Noted: 2021-08-13

## 2021-08-13 PROBLEM — Z79.899 CONTROLLED SUBSTANCE AGREEMENT SIGNED: Status: ACTIVE | Noted: 2021-08-13

## 2021-08-16 ENCOUNTER — APPOINTMENT (OUTPATIENT)
Dept: PHYSICAL THERAPY | Age: 76
End: 2021-08-16

## 2021-08-18 ENCOUNTER — APPOINTMENT (OUTPATIENT)
Dept: PHYSICAL THERAPY | Age: 76
End: 2021-08-18

## 2021-08-19 DIAGNOSIS — Z85.3 HISTORY OF RIGHT BREAST CANCER: Primary | ICD-10-CM

## 2021-08-24 ENCOUNTER — APPOINTMENT (OUTPATIENT)
Dept: PHYSICAL THERAPY | Age: 76
End: 2021-08-24

## 2021-08-26 ENCOUNTER — APPOINTMENT (OUTPATIENT)
Dept: PHYSICAL THERAPY | Age: 76
End: 2021-08-26

## 2021-08-31 ENCOUNTER — APPOINTMENT (OUTPATIENT)
Dept: PHYSICAL THERAPY | Age: 76
End: 2021-08-31

## 2021-09-14 NOTE — ANCILLARY DISCHARGE INSTRUCTIONS
Physical Therapy at [facility],   a part of  [facility]  [address]  Phone: [phone]  Fax: [phone]    Discharge Summary  2-15    Patient name: Filippo Murcia  : 1945  Provider#: [id number]  Referral source: Misbah Flores MD      Medical/Treatment Diagnosis: Acute pain of right knee [M25.561]     Prior Hospitalization: see medical history     Comorbidities: See Plan of Care  Prior Level of Function:See Plan of Care  Medications: Verified on Patient Summary List    Start of Care: 6/15/21      Onset Date:Several Years Ago   Visits from Start of Care: 4     Missed Visits: 2  Reporting Period : 6/15/21 to 21      ASSESSMENT/SUMMARY OF CARE:   Patient was last seen on 21. She completed 4 visits and at the time was making great progress towards goals.          RECOMMENDATIONS:  [x]Discontinue therapy: []Patient has reached or is progressing toward set goals      [x]Patient is non-compliant or has abdicated      []Due to lack of appreciable progress towards set goals      [x]Other [address], PT 2021

## 2021-09-15 DIAGNOSIS — G47.00 INSOMNIA, UNSPECIFIED TYPE: ICD-10-CM

## 2021-09-15 NOTE — TELEPHONE ENCOUNTER
----- Message from Bernard Harris sent at 9/15/2021  2:41 PM EDT -----  Regarding: Dr.Demorizi Ortega/telephone  Medication Refill    Caller (if not patient): N/a      Relationship of caller (if not patient): N/a      Best contact number(s): [phone]      Name of medication and dosage if known: Triazolam .25mg      Is patient out of this medication (yes/no): yes      Pharmacy name: [facility] [address] listed in chart? (yes/no): yes  Pharmacy phone number: [phone]      Details to clarify the request: Pt stated that she is currently not out of medication she will be and needs prescription sent over to pharmacy by Friday so that she can received medication by Sunday.        Bernard Harris

## 2021-09-16 RX ORDER — TRIAZOLAM 0.25 MG/1
TABLET ORAL
Qty: 30 TABLET | Refills: 2 | Status: SHIPPED | OUTPATIENT
Start: 2021-09-16 | End: 2021-12-11

## 2021-09-16 NOTE — TELEPHONE ENCOUNTER
Spoke with patient. /telephone  Received: Today  [place], [address] Message/Vendor Calls     Caller's first and last name: N/a     Reason for call: Update pharmacy information     Callback required yes/no and why: yes     Best contact number(s): [phone]     Details to clarify the request: Pt stated prescription should be sent to [facility] on [address] [place] [address] [phone] for her Triazolam .25mg.      Bernard Harris

## 2021-10-20 ENCOUNTER — HOSPITAL ENCOUNTER (OUTPATIENT)
Dept: MAMMOGRAPHY | Age: 76
Discharge: HOME | End: 2021-10-20
Attending: SURGERY
Payer: MEDICARE

## 2021-10-20 DIAGNOSIS — Z85.3 HISTORY OF RIGHT BREAST CANCER: ICD-10-CM

## 2021-10-20 PROCEDURE — 77061 BREAST TOMOSYNTHESIS UNI: CPT

## 2021-10-25 ENCOUNTER — TELEPHONE (OUTPATIENT)
Dept: ONCOLOGY | Age: 76
End: 2021-10-25

## 2021-11-03 ENCOUNTER — VIRTUAL VISIT (OUTPATIENT)
Dept: ONCOLOGY | Age: 76
End: 2021-11-03
Payer: MEDICARE

## 2021-11-03 DIAGNOSIS — J44.9 CHRONIC OBSTRUCTIVE PULMONARY DISEASE, UNSPECIFIED COPD TYPE (HCC): ICD-10-CM

## 2021-11-03 DIAGNOSIS — R51.9 FREQUENT HEADACHES: ICD-10-CM

## 2021-11-03 DIAGNOSIS — C50.911 INVASIVE DUCTAL CARCINOMA OF BREAST, RIGHT (HCC): Primary | ICD-10-CM

## 2021-11-03 DIAGNOSIS — Z85.3 HISTORY OF LEFT BREAST CANCER: ICD-10-CM

## 2021-11-03 DIAGNOSIS — Z98.890 HISTORY OF LUMPECTOMY OF RIGHT BREAST: ICD-10-CM

## 2021-11-03 DIAGNOSIS — Z90.12 H/O MASTECTOMY, LEFT: ICD-10-CM

## 2021-11-03 DIAGNOSIS — M19.90 ARTHRITIS: ICD-10-CM

## 2021-11-03 DIAGNOSIS — Z85.820 HISTORY OF MELANOMA: ICD-10-CM

## 2021-11-03 DIAGNOSIS — Z99.81 ON SUPPLEMENTAL OXYGEN THERAPY: ICD-10-CM

## 2021-11-03 PROCEDURE — G8400 PT W/DXA NO RESULTS DOC: HCPCS | Performed by: INTERNAL MEDICINE

## 2021-11-03 PROCEDURE — 1101F PT FALLS ASSESS-DOCD LE1/YR: CPT | Performed by: INTERNAL MEDICINE

## 2021-11-03 PROCEDURE — G8536 NO DOC ELDER MAL SCRN: HCPCS | Performed by: INTERNAL MEDICINE

## 2021-11-03 PROCEDURE — G8419 CALC BMI OUT NRM PARAM NOF/U: HCPCS | Performed by: INTERNAL MEDICINE

## 2021-11-03 PROCEDURE — 1090F PRES/ABSN URINE INCON ASSESS: CPT | Performed by: INTERNAL MEDICINE

## 2021-11-03 PROCEDURE — G8427 DOCREV CUR MEDS BY ELIG CLIN: HCPCS | Performed by: INTERNAL MEDICINE

## 2021-11-03 PROCEDURE — 99214 OFFICE O/P EST MOD 30 MIN: CPT | Performed by: INTERNAL MEDICINE

## 2021-11-03 PROCEDURE — G8756 NO BP MEASURE DOC: HCPCS | Performed by: INTERNAL MEDICINE

## 2021-11-03 PROCEDURE — G8432 DEP SCR NOT DOC, RNG: HCPCS | Performed by: INTERNAL MEDICINE

## 2021-11-03 NOTE — PROGRESS NOTES
[facility] [place] [facility]        Reason for Visit:   Leidy Cuevas is a 66 y.o. female who is seen by synchronous (real-time) audio-video technology for fu breast cancer. Treatment History:   RIGHT breast biopsy 10/20. Lumpectomy 4/15/21. STAGE:1 ER+ HER2 negative    History of Present Illness:   Leidy Cuevas is a pleasant 66 y.o. female seen today virtually per pt preference for 6 mo fu for RIGHT breast cancer ER + Her2 negative post lumpectomy 4/15/21. Not on any therapy from here. Doing ok on  and sees pulmonary and is limited by COPD and arthritis. No breast related issues. RIGHT mammo good 10/21. Labs with PCP. Concerned today about getting melanoma. Pt sees derm. No fevers/ chills/ chest pain/ SOB/ nausea/ vomiting/diarrhea/        Past Medical History:   Diagnosis Date    Arthritis     Breast cancer ([address] 10/2020    Breast cancer, left breast ([address] ,     lumpectomy/chemo/XRT, then mastectomy and anastrozole in     Cancer ([address]     SEVERAL SKIN - MELANOMA    COPD (chronic obstructive pulmonary disease) ([address]     Hypertension     Ill-defined condition     MACULAR DEGENERATION      Past Surgical History:   Procedure Laterality Date    HX APPENDECTOMY      HX BREAST LUMPECTOMY Left         HX BREAST LUMPECTOMY Right 4/15/2021    RIGHT BREAST LUMPECTOMY WITH ULTRASOUND performed [facility]rit[facility]tt [facility] OR    HX  SECTION      HX COLONOSCOPY      HX MASTECTOMY Left           Social History     Tobacco Use    Smoking status: Former Smoker     Packs/day: 1.00     Years: 20.00     Pack years: 20.00     Types: Cigarettes     Quit date: 2011     Years since quitting: 10.5    Smokeless tobacco: Never Used   Substance Use Topics    Alcohol use:  Yes     Alcohol/week: 2.0 standard drinks     Types: 2 Glasses of wine per week      Family History   Problem Relation Age of Onset    Thyroid Disease Mother     Heart Disease Father     Cancer Father         SKIN    Cancer Sister         MELANOMA, LUNG    Abdominal aortic aneurysm Brother     Anesth Problems Neg Hx      Current Outpatient Medications   Medication Sig    triazolam (HALCION) 0.25 mg tablet TAKE 1 TABLET BY MOUTH AT NIGHT AS NEEDED    atorvastatin (LIPITOR) 40 mg tablet Take 1 tablet by mouth once daily    citalopram (CELEXA) 20 mg tablet Take 1 Tablet by mouth daily.  lisinopriL (PRINIVIL, ZESTRIL) 5 mg tablet Take 1 Tab by mouth daily.  anastrozole (ARIMIDEX) 1 mg tablet Take 1 tablet by mouth once daily (Patient not taking: Reported on 6/25/2021)    vit A/vit C/vit E/zinc/copper (PRESERVISION AREDS PO) Take 1 Tablet by mouth two (2) times a day.  aspirin (ASPIRIN) 325 mg tablet Take 325 mg by mouth every six (6) hours as needed for Pain.  tiotropium bromide (SPIRIVA RESPIMAT) 2.5 mcg/actuation inhaler Spiriva Respimat 2.5 mcg/actuation solution for inhalation (Patient taking differently: Take 2 Puffs by inhalation daily. Spiriva Respimat 2.5 mcg/actuation solution for inhalation)     No current facility-administered medications for this visit. Allergies   Allergen Reactions    Betadine [Povidone-Iodine] Hives    Red Dye Hives    Tape [Adhesive] Rash        Review of Systems: A complete review of systems was obtained, negative except as described above. Physical Exam:   There were no vitals taken for this visit.     General: alert, cooperative, no distress   Mental  status: normal mood, behavior, speech, dress, motor activity, and thought processes, able to follow commands   HENT: NCAT   Neck: no visualized mass   Resp: no respiratory distress   Neuro: no gross deficits   Skin: no discoloration or lesions of concern on visible areas   Psychiatric: normal affect, consistent with stated mood, no evidence of hallucinations       Due to this being a TeleHealth evaluation (During RTOJF-11 public health emergency), many elements of the physical examination are unable to be assessed. Evaluation of the following organ systems was limited: Vitals/Constitutional/EENT/Resp/CV/GI//MS/Neuro/Skin/Heme-Lymph-Imm. Results:     Lab Results   Component Value Date/Time    WBC 6.4 04/09/2021 02:34 PM    HGB 13.2 04/09/2021 02:34 PM    HCT 40.6 04/09/2021 02:34 PM    PLATELET 490 50/83/2490 02:34 PM    .2 (H) 04/09/2021 02:34 PM    ABS. NEUTROPHILS 4.5 04/09/2021 02:34 PM     Lab Results   Component Value Date/Time    Sodium 138 05/11/2021 01:10 PM    Potassium 4.5 05/11/2021 01:10 PM    Chloride 105 05/11/2021 01:10 PM    CO2 25 05/11/2021 01:10 PM    Glucose 93 05/11/2021 01:10 PM    BUN 10 05/11/2021 01:10 PM    Creatinine 0.69 05/11/2021 01:10 PM    GFR est AA >60 05/11/2021 01:10 PM    GFR est non-AA >60 05/11/2021 01:10 PM    Calcium 9.0 05/11/2021 01:10 PM     Lab Results   Component Value Date/Time    Bilirubin, total 0.4 05/11/2021 01:10 PM    ALT (SGPT) 28 05/11/2021 01:10 PM    Alk. phosphatase 89 05/11/2021 01:10 PM    Protein, total 7.4 05/11/2021 01:10 PM    Albumin 4.0 05/11/2021 01:10 PM    Globulin 3.4 05/11/2021 01:10 PM         Records reviewed and summarized above. Pathology report(s) reviewed above. Radiology report(s) reviewed above. Assessment/PLAN:     1)  stage 1 RIGHT breast cancer ER+ HER2 negative post lumpectomy 4/21  Pt has hx of LEFT breast cancer in past with mastectomy 2012 and hormonal therapy use. Pt presently has had biopsy only. Has several medical problems including COPD on 02. Seen today virtually due to this and COVID. Wants to delay breast surgery due to COVID risk. Does not want any chemo. Seen today virtually per pt preference. Took neoadjuvant anastrozole and then stopped this prior to surgery. Had lumpectomy 4/21 and this showed multifocal disease with negative margins. Pt has multiple medical problems COPD/ arthritis with limited activity.    Pt and I decided no hormonal therapy today due to medical problems. Pt wants to continue course of surveillance.   good mammo 10/21  Labs with PCP. Clinically pt is stable today. 2) COPD on home /  Arthritis that limits walking/ uses a cane / HTN. Per PCP. Low dose CT last done . Will do again next year. 3) breast cancer hx:   : LEFT breast cancer. Lumpectomy, chemo, and XRT. No tamoxifen/ AI. ER negative. : LEFT breast cancer. Mastectomy, followed by 3 1/2 years of Anastrozole. 4) hx of skin melanomas. Sees derm. Sister  of melanoma. Pt concerned about her family and personal hx of melanoma. Has HAs and wants to do brain MRI. Ordered. 5) psychosocial.  Mood good. Lives with daughter/son. Has good support. On home . Fu with us in 6 months  Call if questions    The patient was evaluated through a synchronous (real-time) audio-video encounter. The patient (or guardian if applicable) is aware that this is a billable service. Verbal consent to proceed has been obtained within the past 12 months. The visit was conducted pursuant to the emergency declaration under the [facility], [address] [place] [address] authority and the [employer] and [employer] Act. Patient identification was verified, and a caregiver was present when appropriate. The patient was located in a state where the provider was credentialed to provide care. I appreciate the opportunity to participate in Ms. Love Penn's care.     Signed By: Noy Maurice DO

## 2021-12-09 ENCOUNTER — TELEPHONE (OUTPATIENT)
Dept: FAMILY MEDICINE CLINIC | Age: 76
End: 2021-12-09

## 2021-12-09 NOTE — TELEPHONE ENCOUNTER
----- Message from Alia Tariq sent at 12/7/2021 12:25 PM EST -----  Subject: Refill Request    QUESTIONS  Name of Medication? triazolam (HALCION) 0.25 mg tablet  Patient-reported dosage and instructions? 0.25mg 1 daily #30 2 refills  How many days do you have left? 3  Preferred Pharmacy? [id number]  Pharmacy phone number (if available)? [phone]  ---------------------------------------------------------------------------  --------------  CALL BACK INFO  What is the best way for the office to contact you? OK to leave message on   voicemail  Preferred Call Back Phone Number?  [phone]

## 2021-12-13 ENCOUNTER — TELEPHONE (OUTPATIENT)
Dept: FAMILY MEDICINE CLINIC | Age: 76
End: 2021-12-13

## 2021-12-13 NOTE — TELEPHONE ENCOUNTER
Patient called about her medication for insomnia. I told her that it look like it was sent over to her pharmacy and to give them a called to see if it was filled.

## 2022-01-26 ENCOUNTER — TELEPHONE (OUTPATIENT)
Dept: FAMILY MEDICINE CLINIC | Age: 77
End: 2022-01-26

## 2022-01-26 NOTE — TELEPHONE ENCOUNTER
Left a VM to call us back     ----- Message from Nikki Mcadams sent at 1/25/2022  3:50 PM EST -----  Subject: Appointment Request    Reason for Call: Routine Existing Condition Follow Up    QUESTIONS  Type of Appointment? Established Patient  Reason for appointment request? Available appointments did not meet   patient need  Additional Information for Provider? pt was wanting to schedule her 3   month med follow up with pcp but was wanting it to be a VV. pt is not sure   if she has to be seen in person or not but i was only showing in person   visits. please reach out to pt to schedule and advise.  ---------------------------------------------------------------------------  --------------  CALL BACK INFO  What is the best way for the office to contact you? OK to leave message on   voicemail  Preferred Call Back Phone Number? [phone]  ---------------------------------------------------------------------------  --------------  SCRIPT ANSWERS  Relationship to Patient? Self  Is this follow up request related to routine Diabetes Management? No  Have you been diagnosed with, awaiting test results for, or told that you   are suspected of having COVID-19 (Coronavirus)? (If patient has tested   negative or was tested as a requirement for work, school, or travel and   not based on symptoms, answer no)? No  Within the past two weeks have you developed any of the following symptoms   (answer no if symptoms have been present longer than 2 weeks or began   more than 2 weeks ago)? Fever or Chills, Cough, Shortness of breath or   difficulty breathing, Loss of taste or smell, Sore throat, Nasal   congestion, Sneezing or runny nose, Fatigue or generalized body aches   (answer no if pain is specific to a body part e.g. back pain), Diarrhea,   Headache? No  Have you had close contact with someone with COVID-19 in the last 14 days?    Yes

## 2022-02-03 ENCOUNTER — OFFICE VISIT (OUTPATIENT)
Dept: FAMILY MEDICINE CLINIC | Age: 77
End: 2022-02-03
Payer: MEDICARE

## 2022-02-03 DIAGNOSIS — G89.29 CHRONIC PAIN OF RIGHT KNEE: Primary | ICD-10-CM

## 2022-02-03 DIAGNOSIS — M25.561 CHRONIC PAIN OF RIGHT KNEE: Primary | ICD-10-CM

## 2022-02-03 DIAGNOSIS — G47.00 INSOMNIA, UNSPECIFIED TYPE: ICD-10-CM

## 2022-02-03 DIAGNOSIS — C50.911 INVASIVE DUCTAL CARCINOMA OF BREAST, RIGHT (HCC): ICD-10-CM

## 2022-02-03 PROCEDURE — 1101F PT FALLS ASSESS-DOCD LE1/YR: CPT | Performed by: STUDENT IN AN ORGANIZED HEALTH CARE EDUCATION/TRAINING PROGRAM

## 2022-02-03 PROCEDURE — G8432 DEP SCR NOT DOC, RNG: HCPCS | Performed by: STUDENT IN AN ORGANIZED HEALTH CARE EDUCATION/TRAINING PROGRAM

## 2022-02-03 PROCEDURE — 1090F PRES/ABSN URINE INCON ASSESS: CPT | Performed by: STUDENT IN AN ORGANIZED HEALTH CARE EDUCATION/TRAINING PROGRAM

## 2022-02-03 PROCEDURE — G8536 NO DOC ELDER MAL SCRN: HCPCS | Performed by: STUDENT IN AN ORGANIZED HEALTH CARE EDUCATION/TRAINING PROGRAM

## 2022-02-03 PROCEDURE — 99213 OFFICE O/P EST LOW 20 MIN: CPT | Performed by: STUDENT IN AN ORGANIZED HEALTH CARE EDUCATION/TRAINING PROGRAM

## 2022-02-03 PROCEDURE — G8400 PT W/DXA NO RESULTS DOC: HCPCS | Performed by: STUDENT IN AN ORGANIZED HEALTH CARE EDUCATION/TRAINING PROGRAM

## 2022-02-03 PROCEDURE — G8427 DOCREV CUR MEDS BY ELIG CLIN: HCPCS | Performed by: STUDENT IN AN ORGANIZED HEALTH CARE EDUCATION/TRAINING PROGRAM

## 2022-02-03 PROCEDURE — G8419 CALC BMI OUT NRM PARAM NOF/U: HCPCS | Performed by: STUDENT IN AN ORGANIZED HEALTH CARE EDUCATION/TRAINING PROGRAM

## 2022-02-03 RX ORDER — MELOXICAM 7.5 MG/1
7.5 TABLET ORAL DAILY
Qty: 30 TABLET | Refills: 1 | Status: SHIPPED | OUTPATIENT
Start: 2022-02-03 | End: 2022-04-09

## 2022-02-03 RX ORDER — TRIAZOLAM 0.25 MG/1
0.25 TABLET ORAL
Qty: 30 TABLET | Refills: 1 | Status: SHIPPED | OUTPATIENT
Start: 2022-02-03 | End: 2022-04-09

## 2022-02-03 RX ORDER — CAPSAICIN 0.1 %
CREAM (GRAM) TOPICAL 3 TIMES DAILY
Qty: 42.5 G | Refills: 0 | Status: SHIPPED | OUTPATIENT
Start: 2022-02-03 | End: 2022-10-21

## 2022-02-03 RX ORDER — CITALOPRAM 40 MG/1
40 TABLET, FILM COATED ORAL DAILY
COMMUNITY
Start: 2022-01-09 | End: 2022-04-07

## 2022-02-03 NOTE — PROGRESS NOTES
Subjective  Leisa Weaver is an 64 y.o. female who presents for refill of her Triazolam. She has been using this medications for more than 20 years. Patient reports her anxiety and her symptoms are stable while on this medication. She is unable to sleep without this medication. Panic attacks are well controlled. Past Medical History - reviewed:  Past Medical History:   Diagnosis Date    Arthritis     [facility] 10/2020    Breast cancer, left breast ([facility] 2003, 2012    lumpectomy/chemo/XRT, then mastectomy and anastrozole in 2012    Cancer ([facility]     SEVERAL SKIN - MELANOMA    [facility]     Hypertension     Ill-defined condition     MACULAR DEGENERATION       Medications - reviewed:   Current Outpatient Medications   Medication Sig    citalopram (CELEXA) 40 mg tablet Take 40 mg by mouth daily.  triazolam (HALCION) 0.25 mg tablet Take 1 Tablet by mouth nightly as needed for PRN Reason (Other) (Insomnia) for up to 30 days. Max Daily Amount: 0.25 mg.    capsaicin (CAPZASIN-HP) 0.1 % topical cream Apply  to affected area three (3) times daily.  meloxicam (MOBIC) 7.5 mg tablet Take 1 Tablet by mouth daily for 30 days.  atorvastatin (LIPITOR) 40 mg tablet TAKE 1 TABLET BY MOUTH DAILY    lisinopriL (PRINIVIL, ZESTRIL) 5 mg tablet Take 1 Tab by mouth daily.  vit A/vit C/vit E/zinc/copper (PRESERVISION AREDS PO) Take 1 Tablet by mouth two (2) times a day.  aspirin (ASPIRIN) 325 mg tablet Take 325 mg by mouth every six (6) hours as needed for Pain.  tiotropium bromide (SPIRIVA RESPIMAT) 2.5 mcg/actuation inhaler Spiriva Respimat 2.5 mcg/actuation solution for inhalation (Patient taking differently: Take 2 Puffs by inhalation daily. Spiriva Respimat 2.5 mcg/actuation solution for inhalation)    citalopram (CELEXA) 20 mg tablet Take 1 Tablet by mouth daily.  (Patient not taking: Reported on 2/3/2022)    anastrozole (ARIMIDEX) 1 mg tablet Take 1 tablet by mouth once daily (Patient not taking: Reported on 2/3/2022)     No current facility-administered medications for this visit. Past Surgical History - reviewed:   Past Surgical History:   Procedure Laterality Date    HX APPENDECTOMY      HX BREAST LUMPECTOMY Left         HX BREAST LUMPECTOMY Right 4/15/2021    RIGHT BREAST LUMPECTOMY WITH ULTRASOUND performed by [facility]ili[facility]per [facility] at [facility] AM[facility]ULATORY OR    HX  SECTION      HX COLONOSCOPY      HX MASTECTOMY Left              Social History - reviewed:  Social History     Socioeconomic History    Marital status: SINGLE     Spouse name: Not on file    Number of children: Not on file    Years of education: Not on file    Highest education level: Not on file   Occupational History    Not on file   Tobacco Use    Smoking status: Former Smoker     Packs/day: 1.00     Years: 20.00     Pack years: 20.00     Types: Cigarettes     Quit date: 2011     Years since quitting: 10.8    Smokeless tobacco: Never Used   Vaping Use    Vaping Use: Every day    Substances: Nicotine    Devices: Disposable   Substance and Sexual Activity    Alcohol use: Yes     Alcohol/week: 2.0 standard drinks     Types: 2 Glasses of wine per week    Drug use: Never    Sexual activity: Not on file   Other Topics Concern    Not on file   Social History Narrative    Not on file     Social Determinants of Health     Financial Resource Strain:     Difficulty of Paying Living Expenses: Not on file   Food Insecurity: No Food Insecurity    Worried About Running Out of Food in the Last Year: Never true    Essence of Food in the Last Year: Never true   Transportation Needs:     Lack of Transportation (Medical): Not on file    Lack of Transportation (Non-Medical):  Not on file   Physical Activity:     Days of Exercise per Week: Not on file    Minutes of Exercise per Session: Not on file   Stress:     Feeling of Stress : Not on file   Social Connections:     Frequency of Communication with Friends and Family: Not on file    Frequency of Social Gatherings with Friends and Family: Not on file    Attends Jainism Services: Not on file    Active Member of Clubs or Organizations: Not on file    Attends Club or Organization Meetings: Not on file    Marital Status: Not on file   Intimate Partner Violence:     Fear of Current or Ex-Partner: Not on file    Emotionally Abused: Not on file    Physically Abused: Not on file    Sexually Abused: Not on file   Housing Stability:     Unable to Pay for Housing in the Last Year: Not on file    Number of [place] in the Last Year: Not on file    Unstable Housing in the Last Year: Not on file         Family History - reviewed:  Family History   Problem Relation Age of Onset    Thyroid Disease Mother     Heart Disease Father     Cancer Father         SKIN    Cancer Sister         MELANOMA, LUNG    Abdominal aortic aneurysm Brother     Anesth Problems Neg Hx        Allergies - reviewed: Allergies   Allergen Reactions    Betadine [Povidone-Iodine] Hives    Red Dye Hives    Tape [Adhesive] Rash       Immunizations - reviewed:   Immunization History   Administered Date(s) Administered    COVID-19, Pfizer Purple top, DILUTE for use, 12+ yrs, 30mcg/0.3mL dose 03/02/2021, 03/23/2021         Review of Systems   Constitutional: Negative for chills and fever. Respiratory: Negative for cough. Cardiovascular: Negative for chest pain and palpitations. Musculoskeletal: Positive for joint pain. Negative for back pain, falls and neck pain. Neurological: Negative for headaches. Psychiatric/Behavioral: The patient has insomnia. Physical Exam  Visit Vitals  BP (P) 130/89   Pulse (P) 82   Temp (P) 97.1 °F (36.2 °C)   SpO2 (P) 96%       Physical Exam  Constitutional:       Appearance: She is normal weight. Cardiovascular:      Rate and Rhythm: Normal rate and regular rhythm.       Heart sounds: Normal heart sounds. Pulmonary:      Breath sounds: Normal breath sounds. Abdominal:      General: Bowel sounds are normal.   Musculoskeletal:         General: Deformity present. No swelling, tenderness or signs of injury. Right knee: Normal.      Instability Tests: Anterior drawer test negative. Anterior Lachman test negative. Medial Kathryn test negative. Left knee: Normal.      Instability Tests: Anterior drawer test negative. Anterior Lachman test negative. Medial Kathryn test negative. Right lower leg: No edema. Left lower leg: No edema. Neurological:      Mental Status: She is alert. Personally reviewed     Exam: AP, lateral, tunnel and sunrise views of the right knee.     FINDINGS: There is no acute fracture-dislocation. There is moderate to severe  narrowing of the medial tibiofemoral joint space. There are tricompartmental  osteophytes. There is an osteophyte extending posteriorly from the posterior  medial margin of the patella. Osseous structures are diffusely demineralized     IMPRESSION  IMPRESSION: No acute fracture or dislocation. Assessment/Plan    ICD-10-CM ICD-9-CM    1. Chronic pain of right knee  M25.561 719.46 capsaicin (CAPZASIN-HP) 0.1 % topical cream    G89.29 338.29 REFERRAL TO ORTHOPEDICS      XR KNEE RT MIN 4 V      meloxicam (MOBIC) 7.5 mg tablet   2. Insomnia, unspecified type  G47.00 780.52 triazolam (HALCION) 0.25 mg tablet   3. Invasive ductal carcinoma of breast, right (HCC)  C50.911 174.9        Right knee pain: chronic in nature. Pain with weight bearing. She was previously told she required knee replacement therapy. Saw Pulmonology who said that lung capacity had improved and she could probably tolerate the surgery.   - Capsaicin gel  - Referral to Ortho for evaluation.  - Meloxicam daily- watch for GI bleeding symptoms.  - Declines Sports Medicine and PT referral.    Insomnia:   - Refill Triazolam  - PDMP appropriate       Follow-up and Dispositions    · Return in about 2 weeks (around 2/17/2022). I have discussed the diagnosis with the patient and the intended plan as seen in the above orders. Patient verbalized understanding of the plan and agrees with the plan. The patient has received an after-visit summary and questions were answered concerning future plans. I have discussed medication side effects and warnings with the patient as well. Informed patient to return to the office if new symptoms arise.     Patient discussed with Dr. Paz Brooks MD  Family Medicine Resident

## 2022-02-28 ENCOUNTER — HOSPITAL ENCOUNTER (OUTPATIENT)
Dept: MRI IMAGING | Age: 77
Discharge: HOME | End: 2022-02-28
Attending: INTERNAL MEDICINE
Payer: MEDICARE

## 2022-02-28 DIAGNOSIS — Z85.820 HISTORY OF MELANOMA: ICD-10-CM

## 2022-02-28 DIAGNOSIS — M19.90 ARTHRITIS: ICD-10-CM

## 2022-02-28 DIAGNOSIS — R51.9 FREQUENT HEADACHES: ICD-10-CM

## 2022-02-28 DIAGNOSIS — C50.911 INVASIVE DUCTAL CARCINOMA OF BREAST, RIGHT (HCC): ICD-10-CM

## 2022-02-28 DIAGNOSIS — Z99.81 ON SUPPLEMENTAL OXYGEN THERAPY: ICD-10-CM

## 2022-02-28 DIAGNOSIS — Z85.3 HISTORY OF LEFT BREAST CANCER: ICD-10-CM

## 2022-02-28 DIAGNOSIS — Z98.890 HISTORY OF LUMPECTOMY OF RIGHT BREAST: ICD-10-CM

## 2022-02-28 DIAGNOSIS — Z90.12 H/O MASTECTOMY, LEFT: ICD-10-CM

## 2022-02-28 DIAGNOSIS — J44.9 CHRONIC OBSTRUCTIVE PULMONARY DISEASE, UNSPECIFIED COPD TYPE (HCC): ICD-10-CM

## 2022-02-28 PROCEDURE — A9575 INJ GADOTERATE MEGLUMI 0.1ML: HCPCS | Performed by: INTERNAL MEDICINE

## 2022-02-28 PROCEDURE — 74011250636 HC RX REV CODE- 250/636: Performed by: INTERNAL MEDICINE

## 2022-02-28 PROCEDURE — 70553 MRI BRAIN STEM W/O & W/DYE: CPT

## 2022-02-28 RX ORDER — GADOTERATE MEGLUMINE 376.9 MG/ML
14 INJECTION INTRAVENOUS
Status: COMPLETED | OUTPATIENT
Start: 2022-02-28 | End: 2022-02-28

## 2022-02-28 RX ADMIN — GADOTERATE MEGLUMINE 14 ML: 376.9 INJECTION INTRAVENOUS at 13:50

## 2022-03-01 NOTE — PROGRESS NOTES
HIPPA Verified. Called pt on mobile phone number listed. Patient was made aware of most recent Brain MRI being good/no cancer per Provider. Patient verbalized understanding and expressed no question!   Antonio Alvarez

## 2022-03-18 PROBLEM — C50.911 INVASIVE DUCTAL CARCINOMA OF BREAST, RIGHT (HCC): Status: ACTIVE | Noted: 2020-10-29

## 2022-03-18 PROBLEM — F51.04 CHRONIC INSOMNIA: Status: ACTIVE | Noted: 2021-07-31

## 2022-03-18 PROBLEM — Z85.3 HISTORY OF LEFT BREAST CANCER: Status: ACTIVE | Noted: 2020-11-04

## 2022-03-19 PROBLEM — Z79.899 CONTROLLED SUBSTANCE AGREEMENT SIGNED: Status: ACTIVE | Noted: 2021-08-13

## 2022-03-19 PROBLEM — Z86.59 HISTORY OF PANIC ATTACKS: Status: ACTIVE | Noted: 2021-07-31

## 2022-03-19 PROBLEM — R73.03 PREDIABETES: Status: ACTIVE | Noted: 2021-08-13

## 2022-04-06 DIAGNOSIS — I10 ESSENTIAL HYPERTENSION: ICD-10-CM

## 2022-04-06 NOTE — TELEPHONE ENCOUNTER
Hello pt request a refill for triaszolam .25 mg. I didn't see this medicine in the pt chart.       Please and thank you

## 2022-04-07 RX ORDER — CITALOPRAM 40 MG/1
TABLET, FILM COATED ORAL
Qty: 90 TABLET | Refills: 3 | Status: SHIPPED | OUTPATIENT
Start: 2022-04-07

## 2022-04-07 RX ORDER — LISINOPRIL 5 MG/1
TABLET ORAL
Qty: 90 TABLET | Refills: 3 | Status: SHIPPED | OUTPATIENT
Start: 2022-04-07

## 2022-04-27 ENCOUNTER — OFFICE VISIT (OUTPATIENT)
Dept: FAMILY MEDICINE CLINIC | Age: 77
End: 2022-04-27
Payer: MEDICARE

## 2022-04-27 VITALS
OXYGEN SATURATION: 95 % | WEIGHT: 152 LBS | HEIGHT: 63 IN | TEMPERATURE: 97.1 F | RESPIRATION RATE: 17 BRPM | BODY MASS INDEX: 26.93 KG/M2 | SYSTOLIC BLOOD PRESSURE: 123 MMHG | DIASTOLIC BLOOD PRESSURE: 77 MMHG | HEART RATE: 76 BPM

## 2022-04-27 DIAGNOSIS — M25.561 CHRONIC PAIN OF RIGHT KNEE: Primary | ICD-10-CM

## 2022-04-27 DIAGNOSIS — G89.29 CHRONIC PAIN OF RIGHT KNEE: Primary | ICD-10-CM

## 2022-04-27 PROCEDURE — 1101F PT FALLS ASSESS-DOCD LE1/YR: CPT | Performed by: STUDENT IN AN ORGANIZED HEALTH CARE EDUCATION/TRAINING PROGRAM

## 2022-04-27 PROCEDURE — G8754 DIAS BP LESS 90: HCPCS | Performed by: STUDENT IN AN ORGANIZED HEALTH CARE EDUCATION/TRAINING PROGRAM

## 2022-04-27 PROCEDURE — G8400 PT W/DXA NO RESULTS DOC: HCPCS | Performed by: STUDENT IN AN ORGANIZED HEALTH CARE EDUCATION/TRAINING PROGRAM

## 2022-04-27 PROCEDURE — G8432 DEP SCR NOT DOC, RNG: HCPCS | Performed by: STUDENT IN AN ORGANIZED HEALTH CARE EDUCATION/TRAINING PROGRAM

## 2022-04-27 PROCEDURE — G8752 SYS BP LESS 140: HCPCS | Performed by: STUDENT IN AN ORGANIZED HEALTH CARE EDUCATION/TRAINING PROGRAM

## 2022-04-27 PROCEDURE — G8427 DOCREV CUR MEDS BY ELIG CLIN: HCPCS | Performed by: STUDENT IN AN ORGANIZED HEALTH CARE EDUCATION/TRAINING PROGRAM

## 2022-04-27 PROCEDURE — 1090F PRES/ABSN URINE INCON ASSESS: CPT | Performed by: STUDENT IN AN ORGANIZED HEALTH CARE EDUCATION/TRAINING PROGRAM

## 2022-04-27 PROCEDURE — G8419 CALC BMI OUT NRM PARAM NOF/U: HCPCS | Performed by: STUDENT IN AN ORGANIZED HEALTH CARE EDUCATION/TRAINING PROGRAM

## 2022-04-27 PROCEDURE — 99213 OFFICE O/P EST LOW 20 MIN: CPT | Performed by: STUDENT IN AN ORGANIZED HEALTH CARE EDUCATION/TRAINING PROGRAM

## 2022-04-27 PROCEDURE — G8536 NO DOC ELDER MAL SCRN: HCPCS | Performed by: STUDENT IN AN ORGANIZED HEALTH CARE EDUCATION/TRAINING PROGRAM

## 2022-04-27 RX ORDER — DIPHENHYDRAMINE HCL 25 MG
25 TABLET ORAL
COMMUNITY
End: 2022-10-21

## 2022-04-27 NOTE — PROGRESS NOTES
Chief Complaint   Patient presents with    Knee Pain     right     1. Have you been to the ER, urgent care clinic since your last visit? Hospitalized since your last visit? No    2. Have you seen or consulted any other health care providers outside of the [address] [place] [address] since your last visit? Include any pap smears or colon screening.  No

## 2022-04-27 NOTE — PROGRESS NOTES
HPI:  Teo Mensah is a 64 y.o. female who presents with right  knee pain. No improvement of her Right knee pain with Mobic,  Capsain gel TID, Tylenol or Motrin. Improves with heat and  ASA. Pain is only present with walking. 10/10 pain with walking. Localized to Rt. Medial knee. No pain with bending or rest. She denies any falls or  trauma. Pain has been going on for 2 years. Patient doesn't want referral to PT, she feels like she has done her PT exercises TID. Past Medical History:   Diagnosis Date    Arthritis     Breast cancer ([facility] [address] 10/2020    Breast cancer, left breast ([facility] [address] 2003, 2012    lumpectomy/chemo/XRT, then mastectomy and anastrozole in 2012    Cancer [facility])     SEVERAL SKIN - MELANOMA    COPD (chronic obstructive pulmonary disease) ([facility] [address]     Hypertension     Ill-defined condition     MACULAR DEGENERATION       Current Outpatient Medications:     diphenhydrAMINE (Benadryl Allergy) 25 mg tablet, Take 25 mg by mouth every six (6) hours as needed. , Disp: , Rfl:     Oxygen, , Disp: , Rfl:     triazolam (HALCION) 0.25 mg tablet, TAKE 1 TABLET BY MOUTH NIGHTLY AS NEEDED FOR INSOMNIA FOR UP TO 30 DAYS., Disp: 30 Tablet, Rfl: 2    lisinopriL (PRINIVIL, ZESTRIL) 5 mg tablet, TAKE 1 TABLET BY MOUTH DAILY, Disp: 90 Tablet, Rfl: 3    citalopram (CELEXA) 40 mg tablet, TAKE 1 TABLET BY MOUTH EVERY DAY, Disp: 90 Tablet, Rfl: 3    atorvastatin (LIPITOR) 40 mg tablet, TAKE 1 TABLET BY MOUTH DAILY, Disp: 90 Tablet, Rfl: 5    vit A/vit C/vit E/zinc/copper (PRESERVISION AREDS PO), Take 1 Tablet by mouth two (2) times a day., Disp: , Rfl:     aspirin (ASPIRIN) 325 mg tablet, Take 325 mg by mouth every six (6) hours as needed for Pain., Disp: , Rfl:     meloxicam (MOBIC) 7.5 mg tablet, TAKE 1 TABLET BY MOUTH DAILY (Patient not taking: Reported on 4/27/2022), Disp: 30 Tablet, Rfl: 0    capsaicin (CAPZASIN-HP) 0.1 % topical cream, Apply  to affected area three (3) times daily.  (Patient not taking: Reported on 4/27/2022), Disp: 42.5 g, Rfl: 0  Allergies   Allergen Reactions    Betadine [Povidone-Iodine] Hives    Red Dye Hives    Tape [Adhesive] Rash     Past Medical History:   Diagnosis Date    Arthritis     Breast cancer ([facility] [address] 10/2020    Breast cancer, left breast ([facility] [address] 2003, 2012    lumpectomy/chemo/XRT, then mastectomy and anastrozole in 2012    Cancer ([facility] [address]     SEVERAL SKIN - MELANOMA    COPD (chronic obstructive pulmonary disease) ([facility] [address]     Hypertension     Ill-defined condition     MACULAR DEGENERATION     Family History   Problem Relation Age of Onset    Thyroid Disease Mother     Heart Disease Father     Cancer Father         SKIN    Cancer Sister         MELANOMA, LUNG    Abdominal aortic aneurysm Brother     Anesth Problems Neg Hx        ROS: As per HPI otherwise negative. Objective:   Visit Vitals  /77   Pulse 76   Temp 97.1 °F (36.2 °C) (Temporal)   Resp 17   Ht 5' 2.75\" (1.594 m)   Wt 152 lb (68.9 kg)   SpO2 95%   BMI 27.14 kg/m²     Gen: Well appearing. No apparent distress. Alert and oriented. Responds to all questions appropriately. Lungs: No labored respirations. Talking in complete sentences without difficulty.   Musculoskeletal:  Knee: right  Knee Effusion: medial-trace  Quadriceps atrophy: None   Popliteal (Bakers) Cyst: Negative   Patellofemoral crepitus: Negative      ROM:  Flexion: 130  Extension: 0   Hip IR/ER: FROM without pain    Dynamic Test:  Gait: Normal   Assistive devices: rolling walker    Palpation:   Patella tenderness: None  Patellar tendon tenderness: None  Quad tendon tenderness: None  Medial joint line tenderness: Yes   Lateral joint line tenderness: None  MCL tenderness: None  LCL tenderness: None  Medial facet tenderness: None  Lateral facet tenderness: None  Condyle tenderness: None  Tibia tubercle tenderness: None  Proximal fibula tenderness: None  Plica tenderness: None  Prepatellar bursa tenderness: None  Pes bursa tenderness: None  ITB tenderness: None    Ligament/Meniscal Exam:  Patellar Grind: Positive   Patellar apprehension (medial/lateral): Negative   Lochman: Negative, with good endpoint   Anterior Drawer: Negative   Posterior Drawer: Negative   Valgus stress: Negative with good endpoint   Varus stress: Negative with good endpoint   Dial test: Negative   Kathryn: Negative   Ellie sign: Negative. Strength (0-5/5):   Flexion: Left: 5/5    Right: 5/5    Extension: Left: 5/5    Right: 5/5    Hip abduction: 5/5    Hip adduction: 5/5      Neuro/Vascular : Pulses intact, no edema, and neurologically intact . Skin: No obvious rash or skin breakdown. Xray was from 07/2020 show narrowing of the medial tibiofemoral joint space and osteophytes suggestive for OA. +Osteophyte extending posteriorly from the posterior  medial margin of the patella. ASSESSMENT:  Likely 2/2 to OA and degenerative changes. Pain with weight bearing and walking. Desires surgical evaluation. Has been struggling with knee pain for the past 2 years. Was previously unable to be evaluated d/t poor lung capacity 2/2 to breast CA treatment. Cleared by Pulmonologist.       ICD-10-CM ICD-9-CM    1. Chronic pain of right knee  M25.561 719.46 XR KNEE RT MIN 4 V    G89.29 338.29 REFERRAL TO ORTHOPEDICS      REFERRAL TO ORTHOPEDICS      CANCELED: REFERRAL TO ORTHOPEDICS       PLAN:    1. Referral to Ortho for evaluation. 2. Continue with heat. Continue with PT exercises. 3. Ice 15 minutes, three times a day PRN and after exercise. Can alternate with heat for 15 minutes. Medications: Recommended Tylenol. Patient reports pain improvement only with . Discussed risk of GI bleeding and to watch out for symptoms melena, coffee ground emesis or hematochezia. Declines Sports medicine and PT referrals.       RTC: prn

## 2022-04-27 NOTE — PATIENT INSTRUCTIONS
Joint Pain: Care Instructions  Your Care Instructions     Many people have small aches and pains from overuse or injury to muscles and joints. Joint injuries often happen during sports or recreation, work tasks, or projects around the home. An overuse injury can happen when you put too much stress on a joint or when you do an activity that stresses the joint over and over, such as using the computer or rowing a boat. You can take action at home to help your muscles and joints get better. You should feel better in 1 to 2 weeks, but it can take 3 months or more to heal completely. Follow-up care is a key part of your treatment and safety. Be sure to make and go to all appointments, and call your doctor if you are having problems. It's also a good idea to know your test results and keep a list of the medicines you take. How can you care for yourself at home? · Do not put weight on the injured joint for at least a day or two. · For the first day or two after an injury, do not take hot showers or baths, and do not use hot packs. The heat could make swelling worse. · Put ice or a cold pack on the sore joint for 10 to 20 minutes at a time. Try to do this every 1 to 2 hours for the next 3 days (when you are awake) or until the swelling goes down. Put a thin cloth between the ice and your skin. · Wrap the injury in an elastic bandage. Do not wrap it too tightly because this can cause more swelling. · Prop up the sore joint on a pillow when you ice it or anytime you sit or lie down during the next 3 days. Try to keep it above the level of your heart. This will help reduce swelling. · Take an over-the-counter pain medicine, such as acetaminophen (Tylenol), ibuprofen (Advil, Motrin), or naproxen (Aleve). Read and follow all instructions on the label. · After 1 or 2 days of rest, begin moving the joint gently.  While the joint is still healing, you can begin to exercise using activities that do not strain or hurt the painful joint. When should you call for help? Call your doctor now or seek immediate medical care if:    · You have signs of infection, such as:  ? Increased pain, swelling, warmth, and redness. ? Red streaks leading from the joint. ? A fever. Watch closely for changes in your health, and be sure to contact your doctor if:    · Your movement or symptoms are not getting better after 1 to 2 weeks of home treatment. Where can you learn more? Go to http://www.gray.com/  Enter P205 in the search box to learn more about \"Joint Pain: Care Instructions. \"  Current as of: July 1, 2021               Content Version: 13.2  © 8742-3743 [employer]. Care instructions adapted under license by [employer] (which disclaims liability or warranty for this information). If you have questions about a medical condition or this instruction, always ask your healthcare professional. [address] any warranty or liability for your use of this information.

## 2022-05-03 ENCOUNTER — TELEPHONE (OUTPATIENT)
Dept: FAMILY MEDICINE CLINIC | Age: 77
End: 2022-05-03

## 2022-05-03 NOTE — TELEPHONE ENCOUNTER
Called patient to get her scheduled for an appointment for a physical with Dr. Danae Cuevas in June. Patient wanted early morning appointment. Lvm for patient to give her a call back.

## 2022-05-04 ENCOUNTER — TELEPHONE (OUTPATIENT)
Dept: FAMILY MEDICINE CLINIC | Age: 77
End: 2022-05-04

## 2022-05-04 NOTE — TELEPHONE ENCOUNTER
----- Message from Alexis Camacho sent at 5/3/2022  3:54 PM EDT -----  Subject: Message to Provider    QUESTIONS  Information for Provider? Pt has an appt with Dr. Bin Desai on Friday, 5/6   and he is requesting copies of the x-rays that were taken last week, 4/27. Message to pt stated to bring copies with her. Please forward to his   office or provide pt with copies that she would be able to bring. Please   advise pt.  ---------------------------------------------------------------------------  --------------  CALL BACK INFO  What is the best way for the office to contact you? OK to leave message on   voicemail  Preferred Call Back Phone Number? [phone]  ---------------------------------------------------------------------------  --------------  SCRIPT ANSWERS  Relationship to Patient?  Self

## 2022-05-13 ENCOUNTER — VIRTUAL VISIT (OUTPATIENT)
Dept: ONCOLOGY | Age: 77
End: 2022-05-13
Payer: MEDICARE

## 2022-05-13 DIAGNOSIS — Z85.820 HISTORY OF MELANOMA: ICD-10-CM

## 2022-05-13 DIAGNOSIS — M19.90 ARTHRITIS: ICD-10-CM

## 2022-05-13 DIAGNOSIS — Z98.890 HISTORY OF LUMPECTOMY OF RIGHT BREAST: ICD-10-CM

## 2022-05-13 DIAGNOSIS — Z90.12 H/O MASTECTOMY, LEFT: ICD-10-CM

## 2022-05-13 DIAGNOSIS — J44.9 CHRONIC OBSTRUCTIVE PULMONARY DISEASE, UNSPECIFIED COPD TYPE (HCC): ICD-10-CM

## 2022-05-13 DIAGNOSIS — Z99.81 ON SUPPLEMENTAL OXYGEN THERAPY: ICD-10-CM

## 2022-05-13 DIAGNOSIS — C50.911 INVASIVE DUCTAL CARCINOMA OF BREAST, RIGHT (HCC): Primary | ICD-10-CM

## 2022-05-13 PROCEDURE — G8427 DOCREV CUR MEDS BY ELIG CLIN: HCPCS | Performed by: INTERNAL MEDICINE

## 2022-05-13 PROCEDURE — G8536 NO DOC ELDER MAL SCRN: HCPCS | Performed by: INTERNAL MEDICINE

## 2022-05-13 PROCEDURE — G8400 PT W/DXA NO RESULTS DOC: HCPCS | Performed by: INTERNAL MEDICINE

## 2022-05-13 PROCEDURE — 99213 OFFICE O/P EST LOW 20 MIN: CPT | Performed by: INTERNAL MEDICINE

## 2022-05-13 PROCEDURE — G8432 DEP SCR NOT DOC, RNG: HCPCS | Performed by: INTERNAL MEDICINE

## 2022-05-13 PROCEDURE — G8419 CALC BMI OUT NRM PARAM NOF/U: HCPCS | Performed by: INTERNAL MEDICINE

## 2022-05-13 PROCEDURE — 1101F PT FALLS ASSESS-DOCD LE1/YR: CPT | Performed by: INTERNAL MEDICINE

## 2022-05-13 PROCEDURE — G8756 NO BP MEASURE DOC: HCPCS | Performed by: INTERNAL MEDICINE

## 2022-05-13 PROCEDURE — 1090F PRES/ABSN URINE INCON ASSESS: CPT | Performed by: INTERNAL MEDICINE

## 2022-05-13 NOTE — PROGRESS NOTES
[facility] [place] at [facility]        Reason for Visit:   Teresa Jasso is a 64 y.o. female who is seen by synchronous (real-time) audio-video technology for fu breast cancer. Treatment History:   RIGHT breast biopsy 10/20. Lumpectomy 4/15/21. STAGE:1 ER+ HER2 negative    History of Present Illness:   Teresa Jasso is a pleasant 64 y.o. female seen today virtually per pt preference for 6 mo fu for RIGHT breast cancer ER + Her2 negative post lumpectomy 4/15/21. Not on any therapy from here by choice. Doing ok wears 02 and sees pulmonary and is limited by COPD and arthritis. No breast related issues. Had brain MRI negative. Seeing ortho for knee. Had shot in eye today so vision not good. RIGHT mammo good 10/21. Labs with PCP. No fevers/ chills/ chest pain/ SOB/ nausea/ vomiting/diarrhea/        Past Medical History:   Diagnosis Date    Arthritis     Breast cancer ([address] 10/2020    Breast cancer, left breast ([address] ,     lumpectomy/chemo/XRT, then mastectomy and anastrozole in     Cancer ([address]     SEVERAL SKIN - MELANOMA    COPD (chronic obstructive pulmonary disease) ([address]     Hypertension     Ill-defined condition     MACULAR DEGENERATION      Past Surgical History:   Procedure Laterality Date    HX APPENDECTOMY      HX BREAST LUMPECTOMY Left         HX BREAST LUMPECTOMY Right 4/15/2021    RIGHT BREAST LUMPECTOMY WITH ULTRASOUND [facility]    HX  SECTION      HX COLONOSCOPY      HX MASTECTOMY Left           Social History     Tobacco Use    Smoking status: Former Smoker     Packs/day: 1.00     Years: 20.00     Pack years: 20.00     Types: Cigarettes     Quit date: 2011     Years since quittin.1    Smokeless tobacco: Never Used   Substance Use Topics    Alcohol use:  Yes     Alcohol/week: 2.0 standard drinks     Types: 2 Glasses of wine per week      Family History   Problem Relation Age of Onset    Thyroid Disease Mother     Heart Disease Father     Cancer Father         SKIN    Cancer Sister         MELANOMA, LUNG    Abdominal aortic aneurysm Brother     Anesth Problems Neg Hx      Current Outpatient Medications   Medication Sig    diphenhydrAMINE (Benadryl Allergy) 25 mg tablet Take 25 mg by mouth every six (6) hours as needed.  Oxygen     triazolam (HALCION) 0.25 mg tablet TAKE 1 TABLET BY MOUTH NIGHTLY AS NEEDED FOR INSOMNIA FOR UP TO 30 DAYS.  meloxicam (MOBIC) 7.5 mg tablet TAKE 1 TABLET BY MOUTH DAILY (Patient not taking: Reported on 4/27/2022)    lisinopriL (PRINIVIL, ZESTRIL) 5 mg tablet TAKE 1 TABLET BY MOUTH DAILY    citalopram (CELEXA) 40 mg tablet TAKE 1 TABLET BY MOUTH EVERY DAY    capsaicin (CAPZASIN-HP) 0.1 % topical cream Apply  to affected area three (3) times daily. (Patient not taking: Reported on 4/27/2022)    atorvastatin (LIPITOR) 40 mg tablet TAKE 1 TABLET BY MOUTH DAILY    vit A/vit C/vit E/zinc/copper (PRESERVISION AREDS PO) Take 1 Tablet by mouth two (2) times a day.  aspirin (ASPIRIN) 325 mg tablet Take 325 mg by mouth every six (6) hours as needed for Pain. No current facility-administered medications for this visit. Allergies   Allergen Reactions    Betadine [Povidone-Iodine] Hives    Red Dye Hives    Tape [Adhesive] Rash        Review of Systems: A complete review of systems was obtained, negative except as described above. Physical Exam:   There were no vitals taken for this visit.     General: alert, cooperative, no distress   Mental  status: normal mood, behavior, speech, dress, motor activity, and thought processes, able to follow commands   HENT: NCAT   Neck: no visualized mass   Resp: no respiratory distress   Neuro: no gross deficits   Skin: no discoloration or lesions of concern on visible areas   Psychiatric: normal affect, consistent with stated mood, no evidence of hallucinations       Due to this being a TeleHealth evaluation (During SBOIS-28 public health emergency), many elements of the physical examination are unable to be assessed. Evaluation of the following organ systems was limited: Vitals/Constitutional/EENT/Resp/CV/GI//MS/Neuro/Skin/Heme-Lymph-Imm. Results:     Lab Results   Component Value Date/Time    WBC 6.4 04/09/2021 02:34 PM    HGB 13.2 04/09/2021 02:34 PM    HCT 40.6 04/09/2021 02:34 PM    PLATELET 393 10/29/5851 02:34 PM    .2 (H) 04/09/2021 02:34 PM    ABS. NEUTROPHILS 4.5 04/09/2021 02:34 PM     Lab Results   Component Value Date/Time    Sodium 138 05/11/2021 01:10 PM    Potassium 4.5 05/11/2021 01:10 PM    Chloride 105 05/11/2021 01:10 PM    CO2 25 05/11/2021 01:10 PM    Glucose 93 05/11/2021 01:10 PM    BUN 10 05/11/2021 01:10 PM    Creatinine 0.69 05/11/2021 01:10 PM    GFR est AA >60 05/11/2021 01:10 PM    GFR est non-AA >60 05/11/2021 01:10 PM    Calcium 9.0 05/11/2021 01:10 PM     Lab Results   Component Value Date/Time    Bilirubin, total 0.4 05/11/2021 01:10 PM    ALT (SGPT) 28 05/11/2021 01:10 PM    Alk. phosphatase 89 05/11/2021 01:10 PM    Protein, total 7.4 05/11/2021 01:10 PM    Albumin 4.0 05/11/2021 01:10 PM    Globulin 3.4 05/11/2021 01:10 PM         Records reviewed and summarized above. Pathology report(s) reviewed above. Radiology report(s) reviewed above. Assessment/PLAN:     1)  stage 1 RIGHT breast cancer ER+ HER2 negative post lumpectomy 4/21  Pt has hx of LEFT breast cancer in past with mastectomy 2012 and hormonal therapy use. Pt presently has had biopsy only. Has several medical problems including COPD on 02. Seen today virtually due to this and COVID. Wants to delay breast surgery due to COVID risk. Does not want any chemo. Seen today virtually per pt preference. Took neoadjuvant anastrozole and then stopped this prior to surgery. Had lumpectomy 4/21 and this showed multifocal disease with negative margins.     Pt has multiple medical problems COPD/ arthritis with limited activity. Pt and I decided no hormonal therapy today due to medical problems. Pt wants to continue course of surveillance. Doing well overall.   good mammo 10/21 and yearly  Labs with PCP. Clinically pt is stable today. 2) COPD on home /  Arthritis that limits walking/ uses a cane / HTN. Per PCP. Low dose CT last done . PCP to do this. 3) breast cancer hx:   : LEFT breast cancer. Lumpectomy, chemo, and XRT. No tamoxifen/ AI. ER negative. : LEFT breast cancer. Mastectomy, followed by 3 1/2 years of Anastrozole. 4) hx of skin melanomas. Sees derm. Sister  of melanoma. Pt concerned about her family and personal hx of melanoma. Has [facility] brain MRI good. 5) psychosocial.  Mood good. Lives with daughter/son. Has good support. On home . Fu with us in 6 months  Call if questions    The patient was evaluated through a synchronous (real-time) audio-video encounter. The patient (or guardian if applicable) is aware that this is a billable service. Verbal consent to proceed has been obtained within the past 12 months. The visit was conducted pursuant to the emergency declaration under the [facility] [address] authority and the [employer] and [employer] General Act. Patient identification was verified, and a caregiver was present when appropriate. The patient was located in a state where the provider was credentialed to provide care. I appreciate the opportunity to participate in Ms. Ishmael Penn's care.     Signed By: Ashwini Bruner DO

## 2022-06-23 ENCOUNTER — OFFICE VISIT (OUTPATIENT)
Dept: FAMILY MEDICINE CLINIC | Age: 77
End: 2022-06-23
Payer: MEDICARE

## 2022-06-23 VITALS
OXYGEN SATURATION: 92 % | HEIGHT: 63 IN | HEART RATE: 82 BPM | WEIGHT: 158 LBS | BODY MASS INDEX: 28 KG/M2 | TEMPERATURE: 98 F | RESPIRATION RATE: 16 BRPM | SYSTOLIC BLOOD PRESSURE: 126 MMHG | DIASTOLIC BLOOD PRESSURE: 76 MMHG

## 2022-06-23 DIAGNOSIS — C50.912 MALIGNANT NEOPLASM OF LEFT FEMALE BREAST, UNSPECIFIED ESTROGEN RECEPTOR STATUS, UNSPECIFIED SITE OF BREAST (HCC): ICD-10-CM

## 2022-06-23 DIAGNOSIS — Z13.6 SCREENING FOR ISCHEMIC HEART DISEASE: ICD-10-CM

## 2022-06-23 DIAGNOSIS — E78.5 DYSLIPIDEMIA: ICD-10-CM

## 2022-06-23 DIAGNOSIS — Z00.00 MEDICARE ANNUAL WELLNESS VISIT, SUBSEQUENT: Primary | ICD-10-CM

## 2022-06-23 DIAGNOSIS — Z71.89 ADVANCED DIRECTIVES, COUNSELING/DISCUSSION: ICD-10-CM

## 2022-06-23 DIAGNOSIS — Z13.31 SCREENING FOR DEPRESSION: ICD-10-CM

## 2022-06-23 DIAGNOSIS — Z87.891 PERSONAL HISTORY OF TOBACCO USE, PRESENTING HAZARDS TO HEALTH: ICD-10-CM

## 2022-06-23 DIAGNOSIS — Z13.1 SCREENING FOR DIABETES MELLITUS: ICD-10-CM

## 2022-06-23 DIAGNOSIS — R73.03 PREDIABETES: ICD-10-CM

## 2022-06-23 DIAGNOSIS — Z23 ENCOUNTER FOR IMMUNIZATION: ICD-10-CM

## 2022-06-23 DIAGNOSIS — Z66 DNR (DO NOT RESUSCITATE): ICD-10-CM

## 2022-06-23 DIAGNOSIS — Z13.39 SCREENING FOR ALCOHOLISM: ICD-10-CM

## 2022-06-23 DIAGNOSIS — Z12.31 ENCOUNTER FOR SCREENING MAMMOGRAM FOR MALIGNANT NEOPLASM OF BREAST: ICD-10-CM

## 2022-06-23 PROCEDURE — 90750 HZV VACC RECOMBINANT IM: CPT | Performed by: STUDENT IN AN ORGANIZED HEALTH CARE EDUCATION/TRAINING PROGRAM

## 2022-06-23 PROCEDURE — G8432 DEP SCR NOT DOC, RNG: HCPCS | Performed by: STUDENT IN AN ORGANIZED HEALTH CARE EDUCATION/TRAINING PROGRAM

## 2022-06-23 PROCEDURE — G8754 DIAS BP LESS 90: HCPCS | Performed by: STUDENT IN AN ORGANIZED HEALTH CARE EDUCATION/TRAINING PROGRAM

## 2022-06-23 PROCEDURE — G8400 PT W/DXA NO RESULTS DOC: HCPCS | Performed by: STUDENT IN AN ORGANIZED HEALTH CARE EDUCATION/TRAINING PROGRAM

## 2022-06-23 PROCEDURE — 1123F ACP DISCUSS/DSCN MKR DOCD: CPT | Performed by: STUDENT IN AN ORGANIZED HEALTH CARE EDUCATION/TRAINING PROGRAM

## 2022-06-23 PROCEDURE — G8536 NO DOC ELDER MAL SCRN: HCPCS | Performed by: STUDENT IN AN ORGANIZED HEALTH CARE EDUCATION/TRAINING PROGRAM

## 2022-06-23 PROCEDURE — G8427 DOCREV CUR MEDS BY ELIG CLIN: HCPCS | Performed by: STUDENT IN AN ORGANIZED HEALTH CARE EDUCATION/TRAINING PROGRAM

## 2022-06-23 PROCEDURE — 1101F PT FALLS ASSESS-DOCD LE1/YR: CPT | Performed by: STUDENT IN AN ORGANIZED HEALTH CARE EDUCATION/TRAINING PROGRAM

## 2022-06-23 PROCEDURE — G8417 CALC BMI ABV UP PARAM F/U: HCPCS | Performed by: STUDENT IN AN ORGANIZED HEALTH CARE EDUCATION/TRAINING PROGRAM

## 2022-06-23 PROCEDURE — G0439 PPPS, SUBSEQ VISIT: HCPCS | Performed by: STUDENT IN AN ORGANIZED HEALTH CARE EDUCATION/TRAINING PROGRAM

## 2022-06-23 PROCEDURE — G8752 SYS BP LESS 140: HCPCS | Performed by: STUDENT IN AN ORGANIZED HEALTH CARE EDUCATION/TRAINING PROGRAM

## 2022-06-23 RX ORDER — ZOSTER VACCINE RECOMBINANT, ADJUVANTED 50 MCG/0.5
KIT INTRAMUSCULAR
Qty: 0.5 ML | Refills: 1 | Status: SHIPPED | OUTPATIENT
Start: 2022-06-23 | End: 2022-06-23

## 2022-06-23 NOTE — PATIENT INSTRUCTIONS
Advance Directives: Care Instructions  Overview  An advance directive is a legal way to state your wishes at the end of your life. It tells your family and your doctor what to do if you can't say what you want. There are two main types of advance directives. You can change them any time your wishes change. Living will. This form tells your family and your doctor your wishes about life support and other treatment. The form is also called a declaration. Medical power of . This form lets you name a person to make treatment decisions for you when you can't speak for yourself. This person is called a health care agent (health care proxy, health care surrogate). The form is also called a durable power of  for health care. If you do not have an advance directive, decisions about your medical care may be made by a family member, or by a doctor or a  who doesn't know you. It may help to think of an advance directive as a gift to the people who care for you. If you have one, they won't have to make tough decisions by themselves. Follow-up care is a key part of your treatment and safety. Be sure to make and go to all appointments, and call your doctor if you are having problems. It's also a good idea to know your test results and keep a list of the medicines you take. What should you include in an advance directive? Many states have a unique advance directive form. (It may ask you to address specific issues.) Or you might use a universal form that's approved by many states. If your form doesn't tell you what to address, it may be hard to know what to include in your advance directive. Use the questions below to help you get started. · Who do you want to make decisions about your medical care if you are not able to? · What life-support measures do you want if you have a serious illness that gets worse over time or can't be cured? · What are you most afraid of that might happen?  (Maybe you're afraid of having pain, losing your independence, or being kept alive by machines.)  · Where would you prefer to die? (Your home? A hospital? A nursing home?)  · Do you want to donate your organs when you die? · Do you want certain Judaism practices performed before you die? When should you call for help? Be sure to contact your doctor if you have any questions. Where can you learn more? Go to http://www.gray.com/  Enter R264 in the search box to learn more about \"Advance Directives: Care Instructions. \"  Current as of: October 18, 2021               Content Version: 13.2  © 9646-1125 [employer]. Care instructions adapted under license by [employer] (which disclaims liability or warranty for this information). If you have questions about a medical condition or this instruction, always ask your healthcare professional. [place] [address] any warranty or liability for your use of this information. Medicare Wellness Visit, Female     The best way to live healthy is to have a lifestyle where you eat a well-balanced diet, exercise regularly, limit alcohol use, and quit all forms of tobacco/nicotine, if applicable. Regular preventive services are another way to keep healthy. Preventive services (vaccines, screening tests, monitoring & exams) can help personalize your care plan, which helps you manage your own care. Screening tests can find health problems at the earliest stages, when they are easiest to treat. [facility] follows the current, evidence-based guidelines published by the [place] [employer] (USPSTF) when recommending preventive services for our patients. Because we follow these guidelines, sometimes recommendations change over time as research supports it. (For example, mammograms used to be recommended annually.  Even though Medicare will still pay for an annual mammogram, the newer guidelines recommend a mammogram every two years for women of average risk). Of course, you and your doctor may decide to screen more often for some diseases, based on your risk and your co-morbidities (chronic disease you are already diagnosed with). Preventive services for you include:  - Medicare offers their members a free annual wellness visit, which is time for you and your primary care provider to discuss and plan for your preventive service needs. Take advantage of this benefit every year!  -All adults over the age of 72 should receive the recommended pneumonia vaccines. Current USPSTF guidelines recommend a series of two vaccines for the best pneumonia protection.   -All adults should have a flu vaccine yearly and a tetanus vaccine every 10 years.   -All adults age 48 and older should receive the shingles vaccines (series of two vaccines). -All adults age 38-68 who are overweight should have a diabetes screening test once every three years.   -All adults born between 80 and 1965 should be screened once for Hepatitis C.  -Other screening tests and preventive services for persons with diabetes include: an eye exam to screen for diabetic retinopathy, a kidney function test, a foot exam, and stricter control over your cholesterol.   -Cardiovascular screening for adults with routine risk involves an electrocardiogram (ECG) at intervals determined by your doctor.   -Colorectal cancer screenings should be done for adults age 54-65 with no increased risk factors for colorectal cancer. There are a number of acceptable methods of screening for this type of cancer. Each test has its own benefits and drawbacks. Discuss with your doctor what is most appropriate for you during your annual wellness visit.  The different tests include: colonoscopy (considered the best screening method), a fecal occult blood test, a fecal DNA test, and sigmoidoscopy.    -A bone mass density test is recommended when a woman turns 65 to screen for osteoporosis. This test is only recommended one time, as a screening. Some providers will use this same test as a disease monitoring tool if you already have osteoporosis. -Breast cancer screenings are recommended every other year for women of normal risk, age 54-69.  -Cervical cancer screenings for women over age 72 are only recommended with certain risk factors. Here is a list of your current Health Maintenance items (your personalized list of preventive services) with a due date:  Health Maintenance Due   Topic Date Due    Shingles Vaccine (1 of 2) Never done    Bone Mineral Density   Never done    Annual Well Visit  Never done    DTaP/Tdap/Td  (2 - Td or Tdap) 08/19/2021    COVID-19 Vaccine (3 - Booster for [employer] series) 08/23/2021    Smoker or Former Smoker - [address]  04/07/2022    Cholesterol Test   05/11/2022         Medicare Wellness Visit, Female     The best way to live healthy is to have a lifestyle where you eat a well-balanced diet, exercise regularly, limit alcohol use, and quit all forms of tobacco/nicotine, if applicable. Regular preventive services are another way to keep healthy. Preventive services (vaccines, screening tests, monitoring & exams) can help personalize your care plan, which helps you manage your own care. Screening tests can find health problems at the earliest stages, when they are easiest to treat. [facility] follows the current, evidence-based guidelines published by the [facility] States Cordell Ramirez (USPSTF) when recommending preventive services for our patients. Because we follow these guidelines, sometimes recommendations change over time as research supports it. (For example, mammograms used to be recommended annually. Even though Medicare will still pay for an annual mammogram, the newer guidelines recommend a mammogram every two years for women of average risk).   Of course, you and your doctor may decide to screen more often for some diseases, based on your risk and your co-morbidities (chronic disease you are already diagnosed with). Preventive services for you include:  - Medicare offers their members a free annual wellness visit, which is time for you and your primary care provider to discuss and plan for your preventive service needs. Take advantage of this benefit every year!  -All adults over the age of 72 should receive the recommended pneumonia vaccines. Current USPSTF guidelines recommend a series of two vaccines for the best pneumonia protection.   -All adults should have a flu vaccine yearly and a tetanus vaccine every 10 years.   -All adults age 48 and older should receive the shingles vaccines (series of two vaccines). -All adults age 38-68 who are overweight should have a diabetes screening test once every three years.   -All adults born between 80 and 1965 should be screened once for Hepatitis C.  -Other screening tests and preventive services for persons with diabetes include: an eye exam to screen for diabetic retinopathy, a kidney function test, a foot exam, and stricter control over your cholesterol.   -Cardiovascular screening for adults with routine risk involves an electrocardiogram (ECG) at intervals determined by your doctor.   -Colorectal cancer screenings should be done for adults age 54-65 with no increased risk factors for colorectal cancer. There are a number of acceptable methods of screening for this type of cancer. Each test has its own benefits and drawbacks. Discuss with your doctor what is most appropriate for you during your annual wellness visit. The different tests include: colonoscopy (considered the best screening method), a fecal occult blood test, a fecal DNA test, and sigmoidoscopy.    -A bone mass density test is recommended when a woman turns 65 to screen for osteoporosis. This test is only recommended one time, as a screening. Some providers will use this same test as a disease monitoring tool if you already have osteoporosis. -Breast cancer screenings are recommended every other year for women of normal risk, age 54-69.  -Cervical cancer screenings for women over age 72 are only recommended with certain risk factors. Here is a list of your current Health Maintenance items (your personalized list of preventive services) with a due date:  Health Maintenance Due   Topic Date Due    Shingles Vaccine (1 of 2) Never done    Bone Mineral Density   Never done    Annual Well Visit  Never done    DTaP/Tdap/Td  (2 - Td or Tdap) 08/19/2021    COVID-19 Vaccine (3 - Booster for [place] series) 08/23/2021    Smoker or Former Smoker - [address]  04/07/2022    Cholesterol Test   05/11/2022         Medicare Wellness Visit, Female     The best way to live healthy is to have a lifestyle where you eat a well-balanced diet, exercise regularly, limit alcohol use, and quit all forms of tobacco/nicotine, if applicable. Regular preventive services are another way to keep healthy. Preventive services (vaccines, screening tests, monitoring & exams) can help personalize your care plan, which helps you manage your own care. Screening tests can find health problems at the earliest stages, when they are easiest to treat. [facility] follows the current, evidence-based guidelines published by the [place] Cordell Ramirez (USPSTF) when recommending preventive services for our patients. Because we follow these guidelines, sometimes recommendations change over time as research supports it. (For example, mammograms used to be recommended annually. Even though Medicare will still pay for an annual mammogram, the newer guidelines recommend a mammogram every two years for women of average risk).   Of course, you and your doctor may decide to screen more often for some diseases, based on your risk and your co-morbidities (chronic disease you are already diagnosed with). Preventive services for you include:  - Medicare offers their members a free annual wellness visit, which is time for you and your primary care provider to discuss and plan for your preventive service needs. Take advantage of this benefit every year!  -All adults over the age of 72 should receive the recommended pneumonia vaccines. Current USPSTF guidelines recommend a series of two vaccines for the best pneumonia protection.   -All adults should have a flu vaccine yearly and a tetanus vaccine every 10 years.   -All adults age 48 and older should receive the shingles vaccines (series of two vaccines). -All adults age 38-68 who are overweight should have a diabetes screening test once every three years.   -All adults born between 80 and 1965 should be screened once for Hepatitis C.  -Other screening tests and preventive services for persons with diabetes include: an eye exam to screen for diabetic retinopathy, a kidney function test, a foot exam, and stricter control over your cholesterol.   -Cardiovascular screening for adults with routine risk involves an electrocardiogram (ECG) at intervals determined by your doctor.   -Colorectal cancer screenings should be done for adults age 54-65 with no increased risk factors for colorectal cancer. There are a number of acceptable methods of screening for this type of cancer. Each test has its own benefits and drawbacks. Discuss with your doctor what is most appropriate for you during your annual wellness visit. The different tests include: colonoscopy (considered the best screening method), a fecal occult blood test, a fecal DNA test, and sigmoidoscopy.    -A bone mass density test is recommended when a woman turns 65 to screen for osteoporosis. This test is only recommended one time, as a screening.  Some providers will use this same test as a disease monitoring tool if you already have osteoporosis. -Breast cancer screenings are recommended every other year for women of normal risk, age 54-69.  -Cervical cancer screenings for women over age 72 are only recommended with certain risk factors.      Here is a list of your current Health Maintenance items (your personalized list of preventive services) with a due date:  Health Maintenance Due   Topic Date Due    Bone Mineral Density   Never done    Annual Well Visit  Never done    DTaP/Tdap/Td  (2 - Td or Tdap) 08/19/2021    COVID-19 Vaccine (3 - Booster for [employer] series) 08/23/2021    Smoker or Former Smoker - [address]  04/07/2022    Cholesterol Test   05/11/2022

## 2022-06-23 NOTE — PROGRESS NOTES
This is the Subsequent Medicare Annual Wellness Exam, performed 12 months or more after the Initial AWV or the last Subsequent AWV. She had her initial medicare wellness with her previous PCP Dr. Tierra Butterfield who retired. I have reviewed the patient's medical history in detail and updated the computerized patient record. Assessment/Plan   Education and counseling provided:  Are appropriate based on today's review and evaluation  End-of-Life planning (with patient's consent)  Screening Mammography  Cardiovascular screening blood test  Diabetes screening test    1. Medicare annual wellness visit, subsequent  -     CT LOW DOSE LUNG CANCER SCREENING; Future  -     HI ANNUAL ALCOHOL SCREEN 15 MIN  -     DEPRESSION SCREEN ANNUAL  -     STEFF MAMMO BI SCREENING INCL CAD; Future  -     HI ANNUAL ALCOHOL SCREEN 15 MIN  2. Encounter for immunization  -     varicella-zoster recombinant, PF, (Shingrix, PF,) 50 mcg/0.5 mL susr injection; 0.5mL by IntraMUSCular route once now and then repeat in 2-6 months, Print, Disp-0.5 mL, R-1  -     ZOSTER, SHINGRIX, (18 YRS +), IM  -     HI IMMUNIZ ADMIN,1 SINGLE/COMB VAC/TOXOID  -     HI IMMUNIZ ADMIN,1 SINGLE/COMB VAC/TOXOID  3. Prediabetes  -     HEMOGLOBIN A1C WITH EAG; Future  4. Dyslipidemia  -     LIPID PANEL; Future  -     LIPID PANEL; Future  5. DNR (do not resuscitate)  6. Advanced directives, counseling/discussion  -     DO NOT RESUSCITATE  7. Personal history of tobacco use, presenting hazards to health  -     CT LOW DOSE LUNG CANCER SCREENING; Future  8. Screening for diabetes mellitus  9. Screening for ischemic heart disease  -     HI ANNUAL ALCOHOL SCREEN 15 MIN  -     HI ANNUAL ALCOHOL SCREEN 15 MIN  10. Screening for alcoholism  11. Screening for depression  -     DEPRESSION SCREEN ANNUAL  12. Encounter for screening mammogram for malignant neoplasm of breast  -     STEFF MAMMO BI SCREENING INCL CAD;  Future       Depression Risk Factor Screening     3 most recent PHQ Screens 4/27/2022   Little interest or pleasure in doing things Not at all   Feeling down, depressed, irritable, or hopeless Not at all   Total Score PHQ 2 0   Trouble falling or staying asleep, or sleeping too much -   Feeling tired or having little energy -   Poor appetite, weight loss, or overeating -   Feeling bad about yourself - or that you are a failure or have let yourself or your family down -   Trouble concentrating on things such as school, work, reading, or watching TV -   Moving or speaking so slowly that other people could have noticed; or the opposite being so fidgety that others notice -   Thoughts of being better off dead, or hurting yourself in some way -   PHQ 9 Score -   How difficult have these problems made it for you to do your work, take care of your home and get along with others -     [address] Questions   -During the past 4 weeks:              -how would you rate your health in general? Fair              -how often have you been bothered by feeling dizzy when standing up? Patient did not answer.               -how much have you been bothered by bodily pain? severely              -Have you noticed any hearing difficulties? Patient did not answer.              -has your physical and emotional health limited your social activities with family or friends? yes     Emotional Health Questions   -Do you have a history of depression, anxiety, or emotional problems? no  -Over the past 2 weeks, have you felt down, depressed or hopeless? no  -Over the past 2 weeks, have you felt little interest or pleasure in doing things? Yes     Health Habits   Please describe your diet habits: Please see form scanned  Do you get 5 servings of fruits or vegetables daily? yes  Do you exercise regularly?  no     Activities of Daily Living and Functional Status   -Do you need help with eating, walking, dressing, bathing, toileting, the phone, transportation, shopping, preparing meals, housework, laundry, medications or managing money? Patient did not answer  -In the past four weeks, was someone available to help you if you needed and wanted help with anything? yes  -Are you confident are you that you can control and manage most of your health problems? yes  -Have you been given information to help you keep track of your medications? no  -How often do you have trouble taking your medications as prescribed? never     Fall Risk and Home Safety   Have you fallen 2 or more times in the past year? yes  Does your home have rugs in the hallways? yes, Do you have grab bars in the bathrooms?  no, Does your home have handrails on the stairs? yes, Do you have adequate lighting in your home? Patient did not answer. Do you have smoke detectors and check them regularly? Patient did not answer regularly.    Do you have difficulties driving a car/vehicle? no  Do you always fasten your seat belt when you are in a car? yes           Health Maintenance Due     Health Maintenance Due   Topic Date Due    Bone Densitometry (Dexa) Screening  Never done    Medicare Yearly Exam  Never done    DTaP/Tdap/Td series (2 - Td or Tdap) 08/19/2021    COVID-19 Vaccine (3 - Booster for [employer] series) 08/23/2021    Low dose CT lung screening  04/07/2022    Lipid Screen  05/11/2022       Patient Care Team   Patient Care Team:  Bobby Brown MD as PCP - General (Family Medicine)  Nasim Vee MD as PCP - [facility] Empaneled Provider    History     Patient Active Problem List   Diagnosis Code    [facility] C50.911    History of left breast cancer Z85.3    History of panic attacks Z86.59    Chronic insomnia F51.04    Prediabetes R73.03    Controlled substance agreement signed Z79.899     Past Medical History:   Diagnosis Date    Arthritis     [facility] 10/2020    Breast cancer, left breast ([facility] 2003, 2012    lumpectomy/chemo/XRT, then mastectomy and anastrozole in 2012    Cancer ([facility]     SEVERAL SKIN - MELANOMA    COPD (chronic obstructive pulmonary disease) ([facility]     Hypertension     Ill-defined condition     MACULAR DEGENERATION      Past Surgical History:   Procedure Laterality Date    HX APPENDECTOMY      HX BREAST LUMPECTOMY Left         HX BREAST LUMPECTOMY Right 4/15/2021    RIGHT BREAST LUMPECTOMY WITH ULTRASOUND performed by R[facility]do Cage [facility] at [facility] AM[facility]UL[facility]    HX  SECTION      HX COLONOSCOPY      HX MASTECTOMY Left          Current Outpatient Medications   Medication Sig Dispense Refill    varicella-zoster recombinant, PF, (Shingrix, PF,) 50 mcg/0.5 mL susr injection 0.5mL by IntraMUSCular route once now and then repeat in 2-6 months 0.5 mL 1    diphenhydrAMINE (Benadryl Allergy) 25 mg tablet Take 25 mg by mouth every six (6) hours as needed.  Oxygen       triazolam (HALCION) 0.25 mg tablet TAKE 1 TABLET BY MOUTH NIGHTLY AS NEEDED FOR INSOMNIA FOR UP TO 30 DAYS. 30 Tablet 2    lisinopriL (PRINIVIL, ZESTRIL) 5 mg tablet TAKE 1 TABLET BY MOUTH DAILY 90 Tablet 3    citalopram (CELEXA) 40 mg tablet TAKE 1 TABLET BY MOUTH EVERY DAY 90 Tablet 3    atorvastatin (LIPITOR) 40 mg tablet TAKE 1 TABLET BY MOUTH DAILY 90 Tablet 5    vit A/vit C/vit E/zinc/copper (PRESERVISION AREDS PO) Take 1 Tablet by mouth two (2) times a day.  aspirin (ASPIRIN) 325 mg tablet Take 325 mg by mouth every six (6) hours as needed for Pain.  meloxicam (MOBIC) 7.5 mg tablet TAKE 1 TABLET BY MOUTH DAILY (Patient not taking: Reported on 2022) 30 Tablet 0    capsaicin (CAPZASIN-HP) 0.1 % topical cream Apply  to affected area three (3) times daily.  (Patient not taking: Reported on 2022) 42.5 g 0     Allergies   Allergen Reactions    Betadine [Povidone-Iodine] Hives    Red Dye Hives    Tape [Adhesive] Rash       Family History   Problem Relation Age of Onset    Thyroid Disease Mother     Heart Disease Father     Cancer Father SKIN    Cancer Sister         MELANOMA, LUNG    Abdominal aortic aneurysm Brother     Anesth Problems Neg Hx      Social History     Tobacco Use    Smoking status: Former Smoker     Packs/day: 1.00     Years: 20.00     Pack years: 20.00     Types: Cigarettes     Quit date: 2011     Years since quittin.2    Smokeless tobacco: Never Used   Substance Use Topics    Alcohol use:  Yes     Alcohol/week: 2.0 standard drinks     Types: 2 Glasses of wine per week       Patient discussed with Dr. Jd Cintron (Attending Physician)  Esteban Dow MD

## 2022-06-23 NOTE — PROGRESS NOTES
Chava Bermudez is a 64 y.o. female    Chief Complaint   Patient presents with    Complete Physical     Patient is coming in for a complete physical. No other concerns. 1. Have you been to the ER, urgent care clinic since your last visit? Hospitalized since your last visit? No  2. Have you seen or consulted any other health care providers outside of the [address] [place] [address] since your last visit? Include any pap smears or colon screening. No    Visit Vitals  /76 (BP 1 Location: Right upper arm, BP Patient Position: Sitting)   Pulse 82   Temp 98 °F (36.7 °C) (Oral)   Resp 16   Ht 5' 2.75\" (1.594 m)   Wt 158 lb (71.7 kg)   SpO2 92%   BMI 28.21 kg/m²           Health Maintenance Due   Topic Date Due    Shingrix Vaccine Age 49> (1 of 2) Never done    Bone Densitometry (Dexa) Screening  Never done    Medicare Yearly Exam  Never done    DTaP/Tdap/Td series (2 - Td or Tdap) 08/19/2021    COVID-19 Vaccine (3 - Booster for Pfizer series) 08/23/2021    Low dose CT lung screening  04/07/2022    Lipid Screen  05/11/2022     Medication Reconciliation completed, changes noted. Please  Update medication list.      General Health Questions   -During the past 4 weeks:   -how would you rate your health in general? Fair   -how often have you been bothered by feeling dizzy when standing up? Patient did not answer.    -how much have you been bothered by bodily pain? severely   -Have you noticed any hearing difficulties? Patient did not answer.   -has your physical and emotional health limited your social activities with family or friends? yes    Emotional Health Questions   -Do you have a history of depression, anxiety, or emotional problems? no  -Over the past 2 weeks, have you felt down, depressed or hopeless? no  -Over the past 2 weeks, have you felt little interest or pleasure in doing things?  Yes    Health Habits   Please describe your diet habits: Please see form scanned  Do you get 5 servings of fruits or vegetables daily? yes  Do you exercise regularly? no    Activities of Daily Living and Functional Status   -Do you need help with eating, walking, dressing, bathing, toileting, the phone, transportation, shopping, preparing meals, housework, laundry, medications or managing money? Patient did not answer  -In the past four weeks, was someone available to help you if you needed and wanted help with anything? yes  -Are you confident are you that you can control and manage most of your health problems? yes  -Have you been given information to help you keep track of your medications? no  -How often do you have trouble taking your medications as prescribed? never    Fall Risk and Home Safety   Have you fallen 2 or more times in the past year? yes  Does your home have rugs in the hallways? yes, Do you have grab bars in the bathrooms?  no, Does your home have handrails on the stairs? yes, Do you have adequate lighting in your home? Patient did not answer. Do you have smoke detectors and check them regularly? Patient did not answer regularly.    Do you have difficulties driving a car/vehicle? no  Do you always fasten your seat belt when you are in a car? yes

## 2022-06-23 NOTE — PROGRESS NOTES
General Advance Care Planning (ACP) Conversation      Date of Conversation: 6/23/2022  Conducted with: Patient with Decision Making Capacity    Healthcare Decision Maker:   Advance Care Planning   Healthcare Decision Maker:       Primary Decision Maker (Active): Felicia Randy - Daughter - [phone]    Today we documented Decision Maker(s) consistent with ACP documents on file. Content/Action Overview:    Has ACP document(s) on file - reflects the patient's care preferences  Reviewed DNR/DNI and patient elects DNR order - referred to [address] [place] [address] Specialist & placed order      Jef Lion MD     Advance Care Planning

## 2022-06-23 NOTE — PROGRESS NOTES
This is the Subsequent Medicare Annual Wellness Exam, performed 12 months or more after the Initial AWV or the last Subsequent AWV. She had her initial medicare wellness with her previous PCP Dr. Cora Russo who retired. I have reviewed the patient's medical history in detail and updated the computerized patient record. Assessment/Plan   Education and counseling provided:  Are appropriate based on today's review and evaluation  End-of-Life planning (with patient's consent)  Screening Mammography  Cardiovascular screening blood test  Diabetes screening test    1. Medicare annual wellness visit, subsequent  -     CT LOW DOSE LUNG CANCER SCREENING; Future  -     PA ANNUAL ALCOHOL SCREEN 15 MIN  -     DEPRESSION SCREEN ANNUAL  -     STEFF MAMMO BI SCREENING INCL CAD; Future  -     PA ANNUAL ALCOHOL SCREEN 15 MIN  2. Encounter for immunization  -     varicella-zoster recombinant, PF, (Shingrix, PF,) 50 mcg/0.5 mL susr injection; 0.5mL by IntraMUSCular route once now and then repeat in 2-6 months, Print, Disp-0.5 mL, R-1  -     ZOSTER, SHINGRIX, (18 YRS +), IM  -     PA IMMUNIZ ADMIN,1 SINGLE/COMB VAC/TOXOID  -     PA IMMUNIZ ADMIN,1 SINGLE/COMB VAC/TOXOID  3. Prediabetes  -     HEMOGLOBIN A1C WITH EAG; Future  4. Dyslipidemia  -     LIPID PANEL; Future  -     LIPID PANEL; Future  5. DNR (do not resuscitate)  6. Advanced directives, counseling/discussion  -     DO NOT RESUSCITATE  7. Personal history of tobacco use, presenting hazards to health  -     CT LOW DOSE LUNG CANCER SCREENING; Future  8. Screening for diabetes mellitus  9. Screening for ischemic heart disease  -     PA ANNUAL ALCOHOL SCREEN 15 MIN  -     PA ANNUAL ALCOHOL SCREEN 15 MIN  10. Screening for alcoholism  11. Screening for depression  -     DEPRESSION SCREEN ANNUAL  12.  Encounter for screening mammogram for malignant neoplasm of breast  -     STEFF MAMMO BI DX INCL CAD      Depression Risk Factor Screening     3 most recent PHQ Screens 4/27/2022   Little interest or pleasure in doing things Not at all   Feeling down, depressed, irritable, or hopeless Not at all   Total Score PHQ 2 0   Trouble falling or staying asleep, or sleeping too much -   Feeling tired or having little energy -   Poor appetite, weight loss, or overeating -   Feeling bad about yourself - or that you are a failure or have let yourself or your family down -   Trouble concentrating on things such as school, work, reading, or watching TV -   Moving or speaking so slowly that other people could have noticed; or the opposite being so fidgety that others notice -   Thoughts of being better off dead, or hurting yourself in some way -   PHQ 9 Score -   How difficult have these problems made it for you to do your work, take care of your home and get along with others -     [address] Questions   -During the past 4 weeks:              -how would you rate your health in general? Fair              -how often have you been bothered by feeling dizzy when standing up? Patient did not answer.               -how much have you been bothered by bodily pain? severely              -Have you noticed any hearing difficulties? Patient did not answer.              -has your physical and emotional health limited your social activities with family or friends? yes     Emotional Health Questions   -Do you have a history of depression, anxiety, or emotional problems? no  -Over the past 2 weeks, have you felt down, depressed or hopeless? no  -Over the past 2 weeks, have you felt little interest or pleasure in doing things? Yes     Health Habits   Please describe your diet habits: Please see form scanned  Do you get 5 servings of fruits or vegetables daily? yes  Do you exercise regularly?  no     Activities of Daily Living and Functional Status   -Do you need help with eating, walking, dressing, bathing, toileting, the phone, transportation, shopping, preparing meals, housework, laundry, medications or managing money? Patient did not answer  -In the past four weeks, was someone available to help you if you needed and wanted help with anything? yes  -Are you confident are you that you can control and manage most of your health problems? yes  -Have you been given information to help you keep track of your medications? no  -How often do you have trouble taking your medications as prescribed? never     Fall Risk and Home Safety   Have you fallen 2 or more times in the past year? yes  Does your home have rugs in the hallways? yes, Do you have grab bars in the bathrooms?  no, Does your home have handrails on the stairs? yes, Do you have adequate lighting in your home? Patient did not answer. Do you have smoke detectors and check them regularly? Patient did not answer regularly.    Do you have difficulties driving a car/vehicle? no  Do you always fasten your seat belt when you are in a car? yes           Health Maintenance Due     Health Maintenance Due   Topic Date Due    Bone Densitometry (Dexa) Screening  Never done    Medicare Yearly Exam  Never done    DTaP/Tdap/Td series (2 - Td or Tdap) 08/19/2021    COVID-19 Vaccine (3 - Booster for Pfizer series) 08/23/2021    Low dose CT lung screening  04/07/2022    Lipid Screen  05/11/2022       Patient Care Team   Patient Care Team:  Karla Quarles MD as PCP - General (Family Medicine)  Patricia Benz MD as PCP - [facility] Empaneled Provider    History     Patient Active Problem List   Diagnosis Code    [facility] C50.911    History of left breast cancer Z85.3    History of panic attacks Z86.59    Chronic insomnia F51.04    Prediabetes R73.03    Controlled substance agreement signed Z79.899     Past Medical History:   Diagnosis Date    Arthritis     Breast [facility] ([facility] [address] [address] 10/2020    Breast cancer, left breast ([facility] [address] 2003, 2012    lumpectomy/chemo/XRT, then mastectomy and anastrozole in 2012    Cancer [facility]) SEVERAL SKIN - MELANOMA    COPD (chronic obstructive pulmonary disease) (HCC)     Hypertension     Ill-defined condition     MACULAR DEGENERATION      Past Surgical History:   Procedure Laterality Date    HX APPENDECTOMY      HX BREAST LUMPECTOMY Left         HX BREAST LUMPECTOMY Right 4/15/2021    RIGHT BREAST LUMPECTOMY WITH ULTRASOUND performed by Coleen Marques [facility] at [facility]    HX  SECTION      HX COLONOSCOPY      HX MASTECTOMY Left          Current Outpatient Medications   Medication Sig Dispense Refill    varicella-zoster recombinant, PF, (Shingrix, PF,) 50 mcg/0.5 mL susr injection 0.5mL by IntraMUSCular route once now and then repeat in 2-6 months 0.5 mL 1    diphenhydrAMINE (Benadryl Allergy) 25 mg tablet Take 25 mg by mouth every six (6) hours as needed.  Oxygen       triazolam (HALCION) 0.25 mg tablet TAKE 1 TABLET BY MOUTH NIGHTLY AS NEEDED FOR INSOMNIA FOR UP TO 30 DAYS. 30 Tablet 2    lisinopriL (PRINIVIL, ZESTRIL) 5 mg tablet TAKE 1 TABLET BY MOUTH DAILY 90 Tablet 3    citalopram (CELEXA) 40 mg tablet TAKE 1 TABLET BY MOUTH EVERY DAY 90 Tablet 3    atorvastatin (LIPITOR) 40 mg tablet TAKE 1 TABLET BY MOUTH DAILY 90 Tablet 5    vit A/vit C/vit E/zinc/copper (PRESERVISION AREDS PO) Take 1 Tablet by mouth two (2) times a day.  aspirin (ASPIRIN) 325 mg tablet Take 325 mg by mouth every six (6) hours as needed for Pain.  meloxicam (MOBIC) 7.5 mg tablet TAKE 1 TABLET BY MOUTH DAILY (Patient not taking: Reported on 2022) 30 Tablet 0    capsaicin (CAPZASIN-HP) 0.1 % topical cream Apply  to affected area three (3) times daily.  (Patient not taking: Reported on 2022) 42.5 g 0     Allergies   Allergen Reactions    Betadine [Povidone-Iodine] Hives    Red Dye Hives    Tape [Adhesive] Rash       Family History   Problem Relation Age of Onset    Thyroid Disease Mother     Heart Disease Father     Cancer Father         SKIN    Cancer Sister         MELANOMA, LUNG    Abdominal aortic aneurysm Brother     Anesth Problems Neg Hx      Social History     Tobacco Use    Smoking status: Former Smoker     Packs/day: 1.00     Years: 20.00     Pack years: 20.00     Types: Cigarettes     Quit date: 2011     Years since quittin.2    Smokeless tobacco: Never Used   Substance Use Topics    Alcohol use:  Yes     Alcohol/week: 2.0 standard drinks     Types: 2 Glasses of wine per week       Patient discussed with Dr. Tera Goodman (Attending Physician)  Marcos Vidales MD

## 2022-06-24 ENCOUNTER — TELEPHONE (OUTPATIENT)
Dept: FAMILY MEDICINE CLINIC | Age: 77
End: 2022-06-24

## 2022-06-24 LAB
CHOLEST SERPL-MCNC: 175 MG/DL
EST. AVERAGE GLUCOSE BLD GHB EST-MCNC: 114 MG/DL
HBA1C MFR BLD: 5.6 % (ref 4–5.6)
HDLC SERPL-MCNC: 65 MG/DL
HDLC SERPL: 2.7 {RATIO} (ref 0–5)
LDLC SERPL CALC-MCNC: 86.4 MG/DL (ref 0–100)
TRIGL SERPL-MCNC: 118 MG/DL (ref ?–150)
VLDLC SERPL CALC-MCNC: 23.6 MG/DL

## 2022-06-24 NOTE — TELEPHONE ENCOUNTER
Hi Dr Ibarra Samples from central scheduling called wanted to update about pt recent mamm order cannot be covered by her insurance. She is requesting new order as Diagnostic Mammogram and new diagnosis for CT as Pt had breast cancer before.   Please advice, thank you      -Renae Rg

## 2022-06-28 ENCOUNTER — TELEPHONE (OUTPATIENT)
Dept: FAMILY MEDICINE CLINIC | Age: 77
End: 2022-06-28

## 2022-06-28 NOTE — TELEPHONE ENCOUNTER
Patient called and said that she has copd and she check her heart rate and oxygen often. She says her oxygen been at 96/97 which is unusual for her since its normally 80. She says her heart rate is concerning because its been at 30. She says when she sits down it drops to 30 but when she moves around it goes back up. I spoke with a nurse who recommend she go to the ER patient said okay then hung up on me.

## 2022-09-25 NOTE — PROGRESS NOTES
Subjective   CC:  Leidy Cuevas is a 64 y.o. female with hx of breast cancer, COPD, HTN, HLD, prediabetes, osteoporosis, arthritis, anxiety/panic attacks who presents for follow up of bradycardia/palpitations. Bradycardia / palpitations:   - Patient wears NC O2 at night for COPD  - She checked her pulse ox in June and noted that her heart rate was in the 40s. This has happened periodically since then. - She reports occasional palpitations and states that she could sometimes feel her pulse being irregular but states that she has otherwise been feeling fine   - She denies any SOB worse than usual (has ENRIQUEZ at baseline), CP, dizziness, lightheadedness, fatigue   - She was seen by her cardiologist, Dr. Brittany Calix who did a complete workup including EKG and holter monitor. She states he told her she was having \"ghost beats\" (?PVCs) which caused her pulse ox to be reading incorrectly   - She denies any diagnosis of A-fib or heart block   - Her next appointment with Dr. Brittany Calix is in late December or early January   - Of note, patient is also followed by Dr. Sukhi Shipley (pulmonology)     Review of Systems   Constitutional:  Negative for activity change, appetite change, fatigue and fever. HENT:  Negative for rhinorrhea. Eyes:  Negative for visual disturbance. Respiratory:  Positive for shortness of breath (ENRIQUEZ at baseline secondary to COPD). Negative for cough, chest tightness and wheezing. Cardiovascular:  Positive for palpitations. Negative for chest pain and leg swelling. Gastrointestinal:  Negative for nausea and vomiting. Skin:  Negative for rash. Neurological:  Negative for dizziness, syncope and light-headedness.      Past Medical History  Past Medical History:   Diagnosis Date    Arthritis     Breast cancer ([facility] [address] 10/2020    Breast cancer, left breast ([facility] [address] 2003, 2012    lumpectomy/chemo/XRT, then mastectomy and anastrozole in 2012    Cancer [facility])     SEVERAL SKIN - MELANOMA    COPD (chronic obstructive pulmonary disease) ([facility] [address]     Hypertension     Ill-defined condition     MACULAR DEGENERATION       Current Medications  Current Outpatient Medications   Medication Sig Dispense Refill    triazolam (HALCION) 0.25 mg tablet TAKE 1 TABLET BY MOUTH EVERY NIGHT AS NEEDED FOR INSOMNIA 30 Tablet 2    Oxygen       lisinopriL (PRINIVIL, ZESTRIL) 5 mg tablet TAKE 1 TABLET BY MOUTH DAILY 90 Tablet 3    citalopram (CELEXA) 40 mg tablet TAKE 1 TABLET BY MOUTH EVERY DAY 90 Tablet 3    atorvastatin (LIPITOR) 40 mg tablet TAKE 1 TABLET BY MOUTH DAILY 90 Tablet 5    vit A/vit C/vit E/zinc/copper (PRESERVISION AREDS PO) Take 1 Tablet by mouth two (2) times a day. aspirin (ASPIRIN) 325 mg tablet Take 325 mg by mouth every six (6) hours as needed for Pain. Spiriva Respimat 2.5 mcg/actuation inhaler       diphenhydrAMINE (BENADRYL) 25 mg tablet Take 25 mg by mouth every six (6) hours as needed. (Patient not taking: Reported on 2022)      meloxicam (MOBIC) 7.5 mg tablet TAKE 1 TABLET BY MOUTH DAILY (Patient not taking: No sig reported) 30 Tablet 0    capsaicin (CAPZASIN-HP) 0.1 % topical cream Apply  to affected area three (3) times daily. (Patient not taking: No sig reported) 42.5 g 0       Allergies  Allergies   Allergen Reactions    Betadine [Povidone-Iodine] Hives    Red Dye Hives    Tape [Adhesive] Rash       Social History   Social History     Socioeconomic History    Marital status: SINGLE     Spouse name: Not on file    Number of children: Not on file    Years of education: Not on file    Highest education level: Not on file   Occupational History    Not on file   Tobacco Use    Smoking status: Former     Packs/day: 1.00     Years: 20.00     Pack years: 20.00     Types: Cigarettes     Quit date: 2011     Years since quittin.4    Smokeless tobacco: Never   Vaping Use    Vaping Use: Every day    Substances: Nicotine    Devices: Disposable   Substance and Sexual Activity    Alcohol use:  Yes     Alcohol/week: 2.0 standard drinks     Types: 2 Glasses of wine per week    Drug use: Never    Sexual activity: Not on file   Other Topics Concern    Not on file   Social History Narrative    Not on file     Social Determinants of Health     Financial Resource Strain: Not on file   Food Insecurity: Not on file   Transportation Needs: Not on file   Physical Activity: Not on file   Stress: Not on file   Social Connections: Not on file   Intimate Partner Violence: Not on file   Housing Stability: Not on file       Family History  Family History   Problem Relation Age of Onset    Thyroid Disease Mother     Heart Disease Father     Cancer Father         SKIN    Cancer Sister         MELANOMA, LUNG    Abdominal aortic aneurysm Brother     Anesth Problems Neg Hx        Objective   Vital Signs  Visit Vitals  /87   Pulse 77   Temp 97.8 °F (36.6 °C) (Temporal)   Wt 156 lb (70.8 kg)   SpO2 95%   BMI 27.85 kg/m²       Physical Exam  Vitals reviewed. Constitutional:       General: She is not in acute distress. Appearance: She is normal weight. HENT:      Head: Normocephalic and atraumatic. Cardiovascular:      Rate and Rhythm: Normal rate and regular rhythm. Heart sounds: Normal heart sounds. No murmur heard. Pulmonary:      Effort: Pulmonary effort is normal.      Breath sounds: No wheezing, rhonchi or rales. Comments: Prolonged expiratory phase. Musculoskeletal:      Right lower leg: No edema. Left lower leg: No edema. Neurological:      Mental Status: She is alert and oriented to person, place, and time. Assessment and Plan   Dora Rosa is a 64 y.o. who is here for follow up of bradycardia / palpitations. 1. Bradycardia - Followed by cardiology (Dr. Nancy Shields) and has had a complete work up including EKG and holter monitor study for this. Per patient, she was told that she is having \"ghost beats\" (?PVCs) but does not have heart block or A-fib.  HR is normal here and she has regular rate and rhythm on exam. Records release form signed today to get these records sent to our office. Consider checking TSH if this has not been done yet. Advised that she follow up with Dr. Olivia Rojas in 3 months for chronic conditions/medication refill or sooner if needed. 2. Palpitations - See above. Patient is counseled to return to the office if symptoms do not improve as expected. Urgent consultation with the nearest Emergency Department is strongly recommended if condition worsens. Patient is counseled to follow up as recommended and to inform the office if any changes in treatment are recommended.       I discussed this patient with Dr. Yoahna Leigh (Attending Physician)       Signed By:  Jossue Villasenor DO  Family Medicine Resident

## 2022-09-26 ENCOUNTER — OFFICE VISIT (OUTPATIENT)
Dept: FAMILY MEDICINE CLINIC | Age: 77
End: 2022-09-26
Payer: MEDICARE

## 2022-09-26 VITALS
OXYGEN SATURATION: 95 % | SYSTOLIC BLOOD PRESSURE: 133 MMHG | TEMPERATURE: 97.8 F | WEIGHT: 156 LBS | BODY MASS INDEX: 27.85 KG/M2 | DIASTOLIC BLOOD PRESSURE: 87 MMHG | HEART RATE: 77 BPM

## 2022-09-26 DIAGNOSIS — G47.00 INSOMNIA, UNSPECIFIED TYPE: ICD-10-CM

## 2022-09-26 DIAGNOSIS — R00.2 PALPITATIONS: ICD-10-CM

## 2022-09-26 DIAGNOSIS — R00.1 BRADYCARDIA: Primary | ICD-10-CM

## 2022-09-26 PROCEDURE — G8400 PT W/DXA NO RESULTS DOC: HCPCS | Performed by: STUDENT IN AN ORGANIZED HEALTH CARE EDUCATION/TRAINING PROGRAM

## 2022-09-26 PROCEDURE — G8536 NO DOC ELDER MAL SCRN: HCPCS | Performed by: STUDENT IN AN ORGANIZED HEALTH CARE EDUCATION/TRAINING PROGRAM

## 2022-09-26 PROCEDURE — 1123F ACP DISCUSS/DSCN MKR DOCD: CPT | Performed by: STUDENT IN AN ORGANIZED HEALTH CARE EDUCATION/TRAINING PROGRAM

## 2022-09-26 PROCEDURE — G8417 CALC BMI ABV UP PARAM F/U: HCPCS | Performed by: STUDENT IN AN ORGANIZED HEALTH CARE EDUCATION/TRAINING PROGRAM

## 2022-09-26 PROCEDURE — G8752 SYS BP LESS 140: HCPCS | Performed by: STUDENT IN AN ORGANIZED HEALTH CARE EDUCATION/TRAINING PROGRAM

## 2022-09-26 PROCEDURE — 99213 OFFICE O/P EST LOW 20 MIN: CPT | Performed by: STUDENT IN AN ORGANIZED HEALTH CARE EDUCATION/TRAINING PROGRAM

## 2022-09-26 PROCEDURE — G8427 DOCREV CUR MEDS BY ELIG CLIN: HCPCS | Performed by: STUDENT IN AN ORGANIZED HEALTH CARE EDUCATION/TRAINING PROGRAM

## 2022-09-26 PROCEDURE — 1090F PRES/ABSN URINE INCON ASSESS: CPT | Performed by: STUDENT IN AN ORGANIZED HEALTH CARE EDUCATION/TRAINING PROGRAM

## 2022-09-26 PROCEDURE — 1101F PT FALLS ASSESS-DOCD LE1/YR: CPT | Performed by: STUDENT IN AN ORGANIZED HEALTH CARE EDUCATION/TRAINING PROGRAM

## 2022-09-26 PROCEDURE — G8432 DEP SCR NOT DOC, RNG: HCPCS | Performed by: STUDENT IN AN ORGANIZED HEALTH CARE EDUCATION/TRAINING PROGRAM

## 2022-09-26 PROCEDURE — G8754 DIAS BP LESS 90: HCPCS | Performed by: STUDENT IN AN ORGANIZED HEALTH CARE EDUCATION/TRAINING PROGRAM

## 2022-09-26 RX ORDER — TIOTROPIUM BROMIDE INHALATION SPRAY 3.12 UG/1
SPRAY, METERED RESPIRATORY (INHALATION)
COMMUNITY
Start: 2022-09-25

## 2022-09-26 NOTE — PROGRESS NOTES
Chief Complaint   Patient presents with    Follow-up     Check blood sugar   Visit Vitals  BP (!) 131/91 (BP 1 Location: Left upper arm, BP Patient Position: Sitting, BP Cuff Size: Adult)   Pulse 77   Temp 97.8 °F (36.6 °C) (Temporal)   Wt 156 lb (70.8 kg)   SpO2 95%   BMI 27.85 kg/m²

## 2022-09-27 ENCOUNTER — TELEPHONE (OUTPATIENT)
Dept: FAMILY MEDICINE CLINIC | Age: 77
End: 2022-09-27

## 2022-09-27 RX ORDER — TRIAZOLAM 0.25 MG/1
0.25 TABLET ORAL
Qty: 30 TABLET | Refills: 0 | Status: SHIPPED | OUTPATIENT
Start: 2022-09-27 | End: 2022-10-27

## 2022-09-27 NOTE — TELEPHONE ENCOUNTER
Patient called in upset because there are no refills on her prescriptions. I informed her that she needs UDS for further refills. Patient does not understand why this information was not given to Dr. Brenna Mahajan yesterday when she came in for her appointment so she could of left a urine sample then. Patient stated this is an inconvenience for her having to come back and forth because she has a lot of medical problems. She would like a call from Dr. Jonna Kawasaki to further discuss this issue.

## 2022-10-14 ENCOUNTER — TELEPHONE (OUTPATIENT)
Dept: FAMILY MEDICINE CLINIC | Age: 77
End: 2022-10-14

## 2022-10-14 DIAGNOSIS — C50.911 INVASIVE DUCTAL CARCINOMA OF BREAST, RIGHT (HCC): Primary | ICD-10-CM

## 2022-10-14 DIAGNOSIS — R92.8 ABNORMAL MAMMOGRAM: ICD-10-CM

## 2022-10-17 NOTE — TELEPHONE ENCOUNTER
----- Message from [facility] sent at 10/14/2022  2:54 PM EDT -----  Subject: Message to Provider    QUESTIONS  Information for Provider? Patient called in to let her provider know that   she was unable to schedule her mammogram and CT scan. She was told that   she would need a diagnostic mammogram not the mammogram that the provider   initially gave her. Please contact patient to further assist.   ---------------------------------------------------------------------------  --------------  Ky Fraction INFO  [phone]; OK to leave message on voicemail  ---------------------------------------------------------------------------  --------------  SCRIPT ANSWERS  Relationship to Patient?  Self

## 2022-10-19 ENCOUNTER — TELEPHONE (OUTPATIENT)
Dept: FAMILY MEDICINE CLINIC | Age: 77
End: 2022-10-19

## 2022-10-19 DIAGNOSIS — Z12.2 SCREENING FOR LUNG CANCER: Primary | ICD-10-CM

## 2022-10-19 DIAGNOSIS — Z00.00 MEDICARE ANNUAL WELLNESS VISIT, SUBSEQUENT: ICD-10-CM

## 2022-10-19 NOTE — TELEPHONE ENCOUNTER
----- Message from Alex sent at 10/18/2022  9:58 AM EDT -----  Subject: Message to Provider    QUESTIONS  Information for Provider? received orders for CT scan but the diagnostic   code used is not covered, asking to have order resubmitted w updated code. [phone] is their fax number. ---------------------------------------------------------------------------  --------------  Maddison PATEL  [phone]; Do not leave any message, patient will call back for answer  ---------------------------------------------------------------------------  --------------  SCRIPT ANSWERS  Relationship to Patient? Third Party  Third Party Type?  Other  Other Third Party Type? central scheduling   Representative Name? Kevin Astudillo

## 2022-10-21 ENCOUNTER — OFFICE VISIT (OUTPATIENT)
Dept: FAMILY MEDICINE CLINIC | Age: 77
End: 2022-10-21
Payer: MEDICARE

## 2022-10-21 VITALS
HEIGHT: 63 IN | HEART RATE: 82 BPM | TEMPERATURE: 98.3 F | DIASTOLIC BLOOD PRESSURE: 77 MMHG | OXYGEN SATURATION: 93 % | BODY MASS INDEX: 26.54 KG/M2 | RESPIRATION RATE: 20 BRPM | SYSTOLIC BLOOD PRESSURE: 130 MMHG | WEIGHT: 149.8 LBS

## 2022-10-21 DIAGNOSIS — G47.00 INSOMNIA, UNSPECIFIED TYPE: Primary | ICD-10-CM

## 2022-10-21 DIAGNOSIS — Z23 ENCOUNTER FOR IMMUNIZATION: ICD-10-CM

## 2022-10-21 PROCEDURE — 90694 VACC AIIV4 NO PRSRV 0.5ML IM: CPT | Performed by: STUDENT IN AN ORGANIZED HEALTH CARE EDUCATION/TRAINING PROGRAM

## 2022-10-21 PROCEDURE — G0008 ADMIN INFLUENZA VIRUS VAC: HCPCS | Performed by: STUDENT IN AN ORGANIZED HEALTH CARE EDUCATION/TRAINING PROGRAM

## 2022-10-21 RX ORDER — TRIAZOLAM 0.25 MG/1
0.25 TABLET ORAL
Qty: 30 TABLET | Refills: 0 | Status: SHIPPED | OUTPATIENT
Start: 2022-10-21 | End: 2022-11-02 | Stop reason: SDUPTHER

## 2022-10-21 NOTE — PROGRESS NOTES
[facility] [address] [place] [address]   Office [phone], Fax [phone]      Chief Complaint:     Lona Golden is a 64 y.o. female that presents for:    Chief Complaint   Patient presents with    Follow Up Chronic Condition       Assessment/Plan:     1. Insomnia, unspecified type  -     COMPLIANCE DRUG SCREEN/PRESCRIPTION MONITORING; Future  -     triazolam (HALCION) 0.25 mg tablet; Take 1 Tablet by mouth nightly as needed for PRN Reason (Other) (insomnia) for up to 30 days. Max Daily Amount: 0.25 mg., Normal, Disp-30 Tablet, R-0  2. Encounter for immunization  -     INFLUENZA, FLUAD, (AGE 72 Y+), IM, PF, 0.5 ML  -     MI IMMUNIZ ADMIN,1 SINGLE/COMB VAC/TOXOID         PDMP reviewed and appropriate  Controlled substance agreement signed  Discussed alternative treatment option and side effects of medication, including worsening respiratory function, increse risk of falls and confusion. Patient would like to continue tx despite the afore mentioned. Follow up: Follow-up and Dispositions    Return in about 36 weeks (around 6/30/2023) for medicare annual wellness . Subjective:   HPI:  Lona Golden is a 64 y.o. female that presents for: medication refill. Patient takes Triazolam 0.25 mg HS prn for insomnia for more than 10 years. Patient states that no other medication has helped in the past. She will be up all night without it. She uses 2L NC prn chronically and follows with Pulmonary outpatient. Patient overall feels well and has no complains today. She is independent in her ADL's, drives and lives with her daughter and stepson. She has no chest pain, shortness of breath, dyspnea or palpitations.        Health Maintenance:  Health Maintenance Due   Topic Date Due    Bone Densitometry (Dexa) Screening  Never done    DTaP/Tdap/Td series (2 - Td or Tdap) 08/19/2021    COVID-19 Vaccine (3 - Booster for Pfizer series) 08/23/2021    Low dose CT lung screening  04/07/2022 Shingrix Vaccine Age 50> (2 of 2) 08/18/2022        ROS:   Review of Systems   All other systems reviewed and are negative. Past medical history, social history, and medications personally reviewed. Past Medical History:   Diagnosis Date    Arthritis     Breast cancer ([facility] 10/2020    Breast cancer, left breast ([facility] 2003, 2012    lumpectomy/chemo/XRT, then mastectomy and anastrozole in 2012    Cancer [facility])     SEVERAL SKIN - MELANOMA    COPD (chronic obstructive pulmonary disease) ([facility] [facility]     Hypertension     Ill-defined condition     MACULAR DEGENERATION        Allergies personally reviewed. Allergies   Allergen Reactions    Betadine [Povidone-Iodine] Hives    Red Dye Hives    Tape [Adhesive] Rash        Objective:   Vitals reviewed. Visit Vitals  /77 (BP 1 Location: Right upper arm, BP Patient Position: Sitting, BP Cuff Size: Adult)   Pulse 82   Temp 98.3 °F (36.8 °C) (Oral)   Resp 20   Ht 5' 2.75\" (1.594 m)   Wt 149 lb 12.8 oz (67.9 kg)   SpO2 93%   BMI 26.75 kg/m²        Physical Exam    General AO x 3. No distress. Not diaphoretic. No jaundice. No cyanosis. No pallor. HEENT Normocephalic, atraumatic. Neck supple, no cervical adenopathy. EOMI. Cardio  Normal rate, regular rhythm. No murmur, rubs, or gallop. Pulmonary Effort normal. No accessory muscle use. No wheezes, rales, or rhonchi. Pt was discussed with Dr. Lala Lewis  (attending physician). I have reviewed pertinent labs results and other data. I have discussed the diagnosis with the patient and the intended plan as seen in the above orders. The patient has received an after-visit summary and questions were answered concerning future plans. I have discussed medication side effects and warnings with the patient as well.     Kenn Camarena MD  Resident [facility]  10/21/22

## 2022-10-21 NOTE — PROGRESS NOTES
Identified pt with two pt identifiers(name and ). Reviewed record in preparation for visit and have obtained necessary documentation. Chief Complaint   Patient presents with    Follow Up Chronic Condition        Health Maintenance Due   Topic    Bone Densitometry (Dexa) Screening     DTaP/Tdap/Td series (2 - Td or Tdap)    COVID-19 Vaccine (3 - Booster for Kiko Laws series)    Low dose CT lung screening     Flu Vaccine (1)    Shingrix Vaccine Age 50> (2 of 2)       Visit Vitals  /77 (BP 1 Location: Right upper arm, BP Patient Position: Sitting, BP Cuff Size: Adult)   Pulse 82   Temp 98.3 °F (36.8 °C) (Oral)   Resp 20   Ht 5' 2.75\" (1.594 m)   Wt 149 lb 12.8 oz (67.9 kg)   SpO2 93%   BMI 26.75 kg/m²         Coordination of Care Questionnaire:  :   1) Have you been to an emergency room, urgent care, or hospitalized since your last visit? If yes, where when, and reason for visit? no       2. Have seen or consulted any other health care provider since your last visit? If yes, where when, and reason for visit? YES, Dr Dalia Zamorano, knee injection        Patient is accompanied by self I have received verbal consent from Thuan Wild to discuss any/all medical information while they are present in the room.

## 2022-10-25 NOTE — TELEPHONE ENCOUNTER
Elly Osgood, MD  You 6 days ago     Reginaldo Slaughter  Not sure what diagnosis I can use to justify other than what it's used for which is lung cancer screening. I tried Medicare wellness as well and it popped the same waiver about associated diagnoses. I don't know what to do? Elly Osgood, MD  You 6 days ago     Juan Eliu you know which code is covered? Can it fall under her medicare wellness? Hi Dr. Mariposa Hamilton,  I was hoping this is something you could help Dr. Sharma Brochure with.     April

## 2022-10-28 ENCOUNTER — TELEPHONE (OUTPATIENT)
Dept: FAMILY MEDICINE CLINIC | Age: 77
End: 2022-10-28

## 2022-10-28 LAB — DRUGS UR: NORMAL

## 2022-10-28 NOTE — TELEPHONE ENCOUNTER
----- Message from Danyelle Diallo sent at 10/26/2022  1:11 PM EDT -----  Subject: Medication Problem    Medication: triazolam (HALCION) 0.25 mg tablet  Dosage:  Take 1 Tablet by mouth nightly as needed  Ordering Provider: Manuel Dawson    Question/Problem: pt. Johnnie Orellana indicates the prescription was   supposed to be a 30 day supply + 2 refills but the pharmacy is only   providing a 30 day supply and no refills, pt. would like a callback to   discuss  Additional Information for Provider:     Pharmacy: [facility], [address] ([facility]    ---------------------------------------------------------------------------  --------------  Guerrero Oconnell [facility]  [phone]; OK to leave message on voicemail, OK to respond with   electronic message via eCareDiary portal (only for patients who have   registered eCareDiary account)  ---------------------------------------------------------------------------  --------------    SCRIPT ANSWERS  Relationship to Patient: Self

## 2022-10-31 ENCOUNTER — TRANSCRIBE ORDER (OUTPATIENT)
Dept: SCHEDULING | Age: 77
End: 2022-10-31

## 2022-10-31 DIAGNOSIS — J43.9 EMPHYSEMA LUNG (HCC): Primary | ICD-10-CM

## 2022-10-31 DIAGNOSIS — Z87.891 FORMER SMOKER: ICD-10-CM

## 2022-11-02 DIAGNOSIS — G47.00 INSOMNIA, UNSPECIFIED TYPE: ICD-10-CM

## 2022-11-02 RX ORDER — TRIAZOLAM 0.25 MG/1
0.25 TABLET ORAL
Qty: 30 TABLET | Refills: 0 | Status: SHIPPED | OUTPATIENT
Start: 2022-11-21 | End: 2022-12-21

## 2022-11-02 RX ORDER — TRIAZOLAM 0.25 MG/1
0.25 TABLET ORAL
Qty: 30 TABLET | Refills: 0 | Status: SHIPPED | OUTPATIENT
Start: 2022-12-21 | End: 2023-01-20

## 2022-11-16 ENCOUNTER — VIRTUAL VISIT (OUTPATIENT)
Dept: FAMILY MEDICINE CLINIC | Age: 77
End: 2022-11-16

## 2023-01-13 ENCOUNTER — VIRTUAL VISIT (OUTPATIENT)
Dept: FAMILY MEDICINE CLINIC | Age: 78
End: 2023-01-13
Payer: MEDICARE

## 2023-01-13 DIAGNOSIS — G47.00 INSOMNIA, UNSPECIFIED TYPE: Primary | ICD-10-CM

## 2023-01-13 RX ORDER — TRIAZOLAM 0.25 MG/1
0.25 TABLET ORAL
Qty: 30 TABLET | Refills: 2 | Status: SHIPPED | OUTPATIENT
Start: 2023-01-13

## 2023-01-13 NOTE — PROGRESS NOTES
[address] Medicine Residency Attending Addendum:  Dr. Rodolfo Dominique MD,  the patient and I were not physically present during this encounter. The resident and I are concurrently monitoring the patient care through appropriate telecommunication technology. I discussed the findings, assessment and plan with the resident and agree with the resident's findings and plan as documented in the resident's note.       Jose Antonio Aguila MD

## 2023-01-13 NOTE — PROGRESS NOTES
Lona Golden  76 y.o. female  1945  [address] [place] [address]  [address]:    Telemedicine Progress Note  Julius Moscoso MD       Encounter Date and Time: January 13, 2023 at 12:53 PM    Consent: Lona Golden, who was seen by synchronous (real-time) audio-video technology, and/or her healthcare decision maker, is aware that this patient-initiated, Telehealth encounter on 1/13/2023 is a billable service, with coverage as determined by her insurance carrier. She is aware that she may receive a bill and has provided verbal consent to proceed: Yes. Chief Complaint   Patient presents with    Medication Refill       History of Present Illness   Lona Golden is a 76 y.o. female was evaluated by synchronous (real-time) audio-video technology from home, through a secure patient portal.    Patient feels well and denies any problem. Desires prescription for Triazolam patient has been on medication for over 10 years. Patient takes Triazolam 0.25 mg HS prn for insomnia for Patient states that no other medication has helped in the past. She will be up all night without it. She uses 2L NC prn chronically and follows with Pulmonary outpatient. She is independent in her ADL's and lives with family. Saw Cardiologist yesterday, had normal EKG. Review of Systems   Denies chest pain, shortness of breath, cough or palpitation. Vitals/Objective:     General: alert, cooperative, no distress   Mental  status: mental status: alert, oriented to person, place, and time, normal mood, behavior, speech, dress, motor activity, and thought processes   Resp: resp: normal effort and no respiratory distress   Neuro: neuro: no gross deficits   Skin: skin: no discoloration or lesions of concern on visible areas   Due to this being a TeleHealth evaluation, many elements of the physical examination are unable to be assessed.       Assessment and Plan:   Time-based coding, delete if not needed: I spent at least 15 minutes with this established patient, and >50% of the time was spent counseling and/or coordinating care regarding      1. Insomnia, unspecified type  - triazolam (HALCION) 0.25 mg tablet; Take 1 Tablet by mouth nightly as needed for PRN Reason (Other) (insomnia) for up to 30 doses. Max Daily Amount: 0.25 mg. Dispense: 30 Tablet; Refill: 2  - We have discussed in numerous visits alternative treatment options, side effects of medication, including worsening respiratory function, increse risk of falls and confusion. Patient would like to continue tx despite the afore mentioned. - PDMP review and appropriate, controlled substance agreement signed on 10/22      Time spent in direct conversation with the patient to include medical condition(s) discussed, assessment and treatment plan:       We discussed the expected course, resolution and complications of the diagnosis(es) in detail. Medication risks, benefits, costs, interactions, and alternatives were discussed as indicated. I advised her to contact the office if her condition worsens, changes or fails to improve as anticipated. She expressed understanding with the diagnosis(es) and plan. Patient understands that this encounter was a temporary measure, and the importance of further follow up and examination was emphasized. Patient verbalized understanding. Patient informed to follow up: Alejo Ivan Follow-up and Dispositions    Return in about 5 months (around 6/20/2023) for annual check up or sooner if there are any new symptoms . Electronically Signed: Dejah Chanel MD    CPT Codes 49283-70504 for Established Patients may apply to this Telehealth Visit. POS code: 18. Modifier GT    Elie Borrero is a 76 y.o. female who was evaluated by an audio-video encounter for concerns as above. Patient identification was verified prior to start of the visit.  A caregiver was present when appropriate. Due to this being a TeleHealth encounter (During ZQJPJ-23 public health emergency), evaluation of the following organ systems was limited: Vitals/Constitutional/EENT/Resp/CV/GI//MS/Neuro/Skin/Heme-Lymph-Imm. Pursuant to the emergency declaration under the [address] [place] [address] waiver authority and the [employer] Act, this Virtual Visit was conducted, with patient's (and/or legal guardian's) consent, to reduce the patient's risk of exposure to COVID-19 and provide necessary medical care. Services were provided through a synchronous discussion virtually to substitute for in-person clinic visit. I was  at the hospital . The patient was at home. History   Patients past medical, surgical and family histories were reviewed and updated.       Past Medical History:   Diagnosis Date    Arthritis     Breast cancer ([address] 10/2020    Breast cancer, left breast ([address] ,     lumpectomy/chemo/XRT, then mastectomy and anastrozole in     Cancer ([address]     SEVERAL SKIN - MELANOMA    COPD (chronic obstructive pulmonary disease) ([address]     Hypertension     Ill-defined condition     MACULAR DEGENERATION     Past Surgical History:   Procedure Laterality Date    HX APPENDECTOMY      HX BREAST LUMPECTOMY Left         HX BREAST LUMPECTOMY Right 4/15/2021    RIGHT BREAST LUMPECTOMY WITH ULTRASOUND performed by Kristan Oneill MD at [facility] AMBULATORY OR    HX  SECTION      HX COLONOSCOPY      HX MASTECTOMY Left          Family History   Problem Relation Age of Onset    Thyroid Disease Mother     Heart Disease Father     Cancer Father         SKIN    Cancer Sister         MELANOMA, LUNG    Abdominal aortic aneurysm Brother     Anesth Problems Neg Hx      Social History     Tobacco Use    Smoking status: Former     Packs/day: 1.00     Years: 20.00     Pack years: 20.00     Types: Cigarettes Quit date: 2011     Years since quittin.7    Smokeless tobacco: Never   Vaping Use    Vaping Use: Every day    Substances: Nicotine    Devices: Disposable   Substance Use Topics    Alcohol use: Yes     Alcohol/week: 2.0 standard drinks     Types: 2 Glasses of wine per week    Drug use: Never     Patient Active Problem List   Diagnosis Code    Invasive ductal carcinoma of breast, right ([facility] [address] C50.911    History of left breast cancer Z85.3    History of panic attacks Z86.59    Chronic insomnia F51.04    Prediabetes R73.03    Controlled substance agreement signed Z79.899          Current Medications/Allergies   Medications and Allergies reviewed:    Current Outpatient Medications   Medication Sig Dispense Refill    triazolam (HALCION) 0.25 mg tablet Take 1 Tablet by mouth nightly as needed for PRN Reason (Other) (insomnia) for up to 30 doses. Max Daily Amount: 0.25 mg. 30 Tablet 2    atorvastatin (LIPITOR) 40 mg tablet TAKE 1 TABLET BY MOUTH DAILY 90 Tablet 5    citalopram (CELEXA) 40 mg tablet TAKE 1 TABLET BY MOUTH EVERY DAY 90 Tablet 3    triazolam (HALCION) 0.25 mg tablet Take 1 Tablet by mouth nightly as needed for PRN Reason (Other) (insomnia) for up to 30 days. Max Daily Amount: 0.25 mg. 30 Tablet 0    Spiriva Respimat 2.5 mcg/actuation inhaler       Oxygen       lisinopriL (PRINIVIL, ZESTRIL) 5 mg tablet TAKE 1 TABLET BY MOUTH DAILY 90 Tablet 3    vit A/vit C/vit E/zinc/copper (PRESERVISION AREDS PO) Take 1 Tablet by mouth two (2) times a day. aspirin (ASPIRIN) 325 mg tablet Take 325 mg by mouth every six (6) hours as needed for Pain.        Allergies   Allergen Reactions    Betadine [Povidone-Iodine] Hives    Red Dye Hives    Tape [Adhesive] Rash

## 2023-01-18 ENCOUNTER — HOSPITAL ENCOUNTER (OUTPATIENT)
Dept: MAMMOGRAPHY | Age: 78
Discharge: HOME | End: 2023-01-18
Attending: STUDENT IN AN ORGANIZED HEALTH CARE EDUCATION/TRAINING PROGRAM
Payer: MEDICARE

## 2023-01-18 ENCOUNTER — HOSPITAL ENCOUNTER (OUTPATIENT)
Dept: CT IMAGING | Age: 78
Discharge: HOME | End: 2023-01-18
Attending: STUDENT IN AN ORGANIZED HEALTH CARE EDUCATION/TRAINING PROGRAM
Payer: MEDICARE

## 2023-01-18 DIAGNOSIS — R92.8 ABNORMAL MAMMOGRAM: ICD-10-CM

## 2023-01-18 DIAGNOSIS — C50.911 INVASIVE DUCTAL CARCINOMA OF BREAST, RIGHT (HCC): ICD-10-CM

## 2023-01-18 DIAGNOSIS — Z12.2 SCREENING FOR LUNG CANCER: ICD-10-CM

## 2023-01-18 PROCEDURE — 77061 BREAST TOMOSYNTHESIS UNI: CPT

## 2023-01-18 PROCEDURE — 71271 CT THORAX LUNG CANCER SCR C-: CPT

## 2023-05-04 DIAGNOSIS — G47.00 INSOMNIA, UNSPECIFIED TYPE: ICD-10-CM

## 2023-05-04 RX ORDER — TRIAZOLAM 0.25 MG/1
TABLET ORAL
Qty: 30 TABLET | OUTPATIENT
Start: 2023-05-04

## 2023-05-08 ENCOUNTER — TELEPHONE (OUTPATIENT)
Age: 78
End: 2023-05-08

## 2023-05-08 DIAGNOSIS — G47.00 INSOMNIA, UNSPECIFIED TYPE: ICD-10-CM

## 2023-05-08 RX ORDER — TRIAZOLAM 0.25 MG/1
0.25 TABLET ORAL
Qty: 30 TABLET | Refills: 0 | Status: SHIPPED | OUTPATIENT
Start: 2023-05-08 | End: 2023-05-08

## 2023-05-08 RX ORDER — TRIAZOLAM 0.25 MG/1
0.25 TABLET ORAL
Qty: 30 TABLET | Refills: 0
Start: 2023-05-08

## 2023-05-08 NOTE — TELEPHONE ENCOUNTER
Patient called requesting for her refill on Triazolam 0.25mg tablet that's taken once nightly to be updated. She stated that she is suppose to get the 30 day supply with 2 refills on it. The prescription was sent to the pharmacy, but it didn't have the two refills on it. Needs to be sent to [facility]. Thanks!

## 2023-05-09 DIAGNOSIS — G47.00 INSOMNIA, UNSPECIFIED TYPE: Primary | ICD-10-CM

## 2023-05-09 NOTE — PROGRESS NOTES
[facility]ills sent to [facility] [address]. Patient will need an appointment after the last refill on 08/09/2023.

## 2023-05-10 ENCOUNTER — TELEPHONE (OUTPATIENT)
Age: 78
End: 2023-05-10

## 2023-05-10 NOTE — TELEPHONE ENCOUNTER
Called patient on 2 occasions, no answer. Left voicemail message for patient to mobile phone ([phone]). Called daughter no reply. Explained to patient on voice mail message that I'm unable to provide refills for Triazolam given it's a controlled substances. I was able to provided refills ahead until August, when patient will need a follow up visit.

## 2023-05-10 NOTE — TELEPHONE ENCOUNTER
----- Message from Martin Penaloza sent at 5/8/2023  4:24 PM EDT -----  Subject: Message to Provider    QUESTIONS  Information for Provider? Patient is wanting a call back regarding a   medication, triazolam, refill request she sent over and has not received a   response on. Please call patient back to discuss. ---------------------------------------------------------------------------  --------------  Bennie Garcia [facility]  [phone]; OK to leave message on voicemail  ---------------------------------------------------------------------------  --------------  SCRIPT ANSWERS  Relationship to Patient?  Self

## 2023-05-10 NOTE — TELEPHONE ENCOUNTER
Patient needs to come in for an office visit per Dr Roxi Heard to get refills. Sent a separate message to PSR's to help assist patient in scheduling appt.

## 2023-05-10 NOTE — TELEPHONE ENCOUNTER
Patient called back in regards to refills on her Triazolam 0.25 mg medication. She stated that on her bottles it states that she has no refills. I tried to explain to her that there were prescriptions in separately for each month up until August, but she stated that she didn't want it like that. She would like for the prescription to be sent in for a 30 day supply with 2 refills, so she doesn't have to keep calling in trying to get refills. Would like to be contacted once this is corrected. Thanks!

## 2023-05-25 RX ORDER — LISINOPRIL 5 MG/1
1 TABLET ORAL DAILY
COMMUNITY
Start: 2023-04-15

## 2023-05-25 RX ORDER — ASPIRIN 325 MG
325 TABLET ORAL EVERY 6 HOURS PRN
COMMUNITY

## 2023-05-25 RX ORDER — ATORVASTATIN CALCIUM 40 MG/1
1 TABLET, FILM COATED ORAL DAILY
COMMUNITY
Start: 2023-01-13

## 2023-05-25 RX ORDER — CITALOPRAM 40 MG/1
1 TABLET ORAL DAILY
COMMUNITY
Start: 2023-01-13

## 2023-05-31 DIAGNOSIS — G47.00 INSOMNIA, UNSPECIFIED TYPE: ICD-10-CM

## 2023-06-05 DIAGNOSIS — G47.00 INSOMNIA, UNSPECIFIED TYPE: ICD-10-CM

## 2023-06-05 NOTE — TELEPHONE ENCOUNTER
Pt called to follow up on requested medication refill. She expressed frustration and disappointment that she continues to experience refills not being sent within 72 hours. She is requesting that the refill is sent to pharmacy today and that a Manager contacts her via telephone today. Thank you.

## 2023-07-23 NOTE — TELEPHONE ENCOUNTER
----- Message from Stacia Boss sent at 12/7/2020  1:48 PM EST -----  Regarding: /Telephone    Appointment not available    Caller's first and last name and relationship to patient (if not the patient): self      Best contact number:[phone]      Preferred date and time:Any       Scheduled appointment date and time:n/a      Reason for appointment: f/up       Details to clarify the request: Pt would like to see PCP by the 12/18. Pt has been diagnosis with breast cancer for the 3rd time and would like to speak with PCP to discuss the medications and new diagnosis virtually. Dr. Ruth Deras next appt is 12/21 and pt would like to see if she can be squeezed in somewhere.        Stacia Boss Alert-The patient is alert, awake and responds to voice. The patient is oriented to time, place, and person. The triage nurse is able to obtain subjective information.

## 2024-01-17 ENCOUNTER — TELEPHONE (OUTPATIENT)
Age: 79
End: 2024-01-17

## 2024-04-25 RX ORDER — LISINOPRIL 5 MG/1
TABLET ORAL DAILY
Qty: 90 TABLET | OUTPATIENT
Start: 2024-04-25

## 2024-07-23 RX ORDER — LISINOPRIL 5 MG/1
TABLET ORAL DAILY
Qty: 90 TABLET | OUTPATIENT
Start: 2024-07-23

## 2024-07-28 ENCOUNTER — HOSPITAL ENCOUNTER (EMERGENCY)
Facility: HOSPITAL | Age: 79
Discharge: HOME | End: 2024-07-28
Attending: EMERGENCY MEDICINE
Payer: MEDICARE

## 2024-07-28 ENCOUNTER — APPOINTMENT (OUTPATIENT)
Facility: HOSPITAL | Age: 79
End: 2024-07-28
Payer: MEDICARE

## 2024-07-28 VITALS
WEIGHT: 155 LBS | TEMPERATURE: 98.2 F | OXYGEN SATURATION: 94 % | BODY MASS INDEX: 27.46 KG/M2 | HEART RATE: 82 BPM | SYSTOLIC BLOOD PRESSURE: 127 MMHG | DIASTOLIC BLOOD PRESSURE: 72 MMHG | RESPIRATION RATE: 18 BRPM | HEIGHT: 63 IN

## 2024-07-28 DIAGNOSIS — M25.561 ACUTE PAIN OF RIGHT KNEE: Primary | ICD-10-CM

## 2024-07-28 DIAGNOSIS — M19.90 ACUTE ARTHRITIS: ICD-10-CM

## 2024-07-28 PROCEDURE — 73562 X-RAY EXAM OF KNEE 3: CPT

## 2024-07-28 PROCEDURE — 6370000000 HC RX 637 (ALT 250 FOR IP): Performed by: NURSE PRACTITIONER

## 2024-07-28 PROCEDURE — 99283 EMERGENCY DEPT VISIT LOW MDM: CPT

## 2024-07-28 RX ORDER — IBUPROFEN 600 MG/1
600 TABLET ORAL
Status: COMPLETED | OUTPATIENT
Start: 2024-07-28 | End: 2024-07-28

## 2024-07-28 RX ORDER — IBUPROFEN 600 MG/1
600 TABLET ORAL 4 TIMES DAILY PRN
Qty: 10 TABLET | Refills: 0 | Status: SHIPPED | OUTPATIENT
Start: 2024-07-28

## 2024-07-28 RX ADMIN — IBUPROFEN 600 MG: 600 TABLET, FILM COATED ORAL at 11:26

## 2024-07-28 ASSESSMENT — PAIN DESCRIPTION - LOCATION
LOCATION: KNEE
LOCATION: KNEE

## 2024-07-28 ASSESSMENT — PAIN SCALES - GENERAL
PAINLEVEL_OUTOF10: 2
PAINLEVEL_OUTOF10: 10

## 2024-07-28 ASSESSMENT — PAIN DESCRIPTION - ORIENTATION
ORIENTATION: RIGHT
ORIENTATION: RIGHT

## 2024-07-28 ASSESSMENT — PAIN - FUNCTIONAL ASSESSMENT: PAIN_FUNCTIONAL_ASSESSMENT: 0-10

## 2024-07-28 NOTE — ED TRIAGE NOTES
Pt arrives to the ER for complaints of right leg pain that radiates from her hip and down into her lower leg. Pt states worsening pain with walking. Reports symptoms started about a week ago.

## 2024-07-28 NOTE — ED PROVIDER NOTES
[facility] EMERGENCY DEPT  EMERGENCY DEPARTMENT ENCOUNTER      Pt Name: Nicole Galeano  MRN: [id number]  Birthdate 1945  Date of evaluation: 2024  Provider: ISAIAH Heller NP      HISTORY OF PRESENT ILLNESS      This is a 78 year old female who presents to the emergency room with complaints of right knee pain. States pain is worse directly at the right knee.  Denies any injury.  States her symptoms started about a week ago and have just continued.  Patient states her pain worsens with weightbearing and walking.  States she has known diffuse osteoarthritis but has never had a flare. Denies any chest pain, shortness of breath, dizziness, nausea or vomiting, fever or chills. No calf pain or tenderness. No history of blood clots. Has not taken any medication prior to arrival for her symptoms. There are no further complaints at this time.    Past Medical History:  No date: Arthritis  10/2020: Breast cancer ([facility])  , : Breast cancer, left breast ([facility])      Comment:  lumpectomy/chemo/XRT, then mastectomy and anastrozole in                 No date: Cancer ([facility])      Comment:  SEVERAL SKIN - MELANOMA  No date: COPD (chronic obstructive pulmonary disease) ([facility])  No date: Hypertension  No date: Ill-defined condition      Comment:  MACULAR DEGENERATION  Past Surgical History:  No date: APPENDECTOMY  No date: BREAST LUMPECTOMY; Left      Comment:  2003  4/15/2021: BREAST LUMPECTOMY; Right      Comment:  RIGHT BREAST LUMPECTOMY WITH ULTRASOUND performed by                Andres Cortes Jr., MD at [facility] AMBULATORY OR  No date:  SECTION  No date: COLONOSCOPY  No date: MASTECTOMY; Left      Comment:  2013  10/29/2020: US BREAST BIOPSY W LOC DEVICE 1ST LESION RIGHT; Right      Comment:  US BREAST NEEDLE BIOPSY RIGHT 10/29/2020 [facility] RAD MAMMO      The history is provided by the patient.           Nursing Notes were reviewed.    REVIEW OF SYSTEMS         Review of Systems   Constitutional:

## 2024-08-30 ENCOUNTER — HOSPITAL ENCOUNTER (EMERGENCY)
Facility: HOSPITAL | Age: 79
Discharge: HOME | End: 2024-08-30
Attending: EMERGENCY MEDICINE
Payer: MEDICARE

## 2024-08-30 VITALS
BODY MASS INDEX: 27.46 KG/M2 | HEIGHT: 63 IN | RESPIRATION RATE: 20 BRPM | HEART RATE: 88 BPM | DIASTOLIC BLOOD PRESSURE: 74 MMHG | OXYGEN SATURATION: 95 % | WEIGHT: 155 LBS | SYSTOLIC BLOOD PRESSURE: 126 MMHG | TEMPERATURE: 98.3 F

## 2024-08-30 DIAGNOSIS — M06.9 RHEUMATOID ARTHRITIS, INVOLVING UNSPECIFIED SITE, UNSPECIFIED WHETHER RHEUMATOID FACTOR PRESENT (HCC): ICD-10-CM

## 2024-08-30 DIAGNOSIS — M25.50 ARTHRALGIA, UNSPECIFIED JOINT: Primary | ICD-10-CM

## 2024-08-30 LAB
ALBUMIN SERPL-MCNC: 2.3 G/DL (ref 3.5–5)
ALBUMIN/GLOB SERPL: 0.5 (ref 1.1–2.2)
ALP SERPL-CCNC: 95 U/L (ref 45–117)
ALT SERPL-CCNC: 20 U/L (ref 12–78)
ANION GAP SERPL CALC-SCNC: 5 MMOL/L (ref 5–15)
AST SERPL-CCNC: 20 U/L (ref 15–37)
BASOPHILS # BLD: 0 K/UL (ref 0–0.1)
BASOPHILS NFR BLD: 0 % (ref 0–1)
BILIRUB SERPL-MCNC: 0.3 MG/DL (ref 0.2–1)
BUN SERPL-MCNC: 17 MG/DL (ref 6–20)
BUN/CREAT SERPL: 33 (ref 12–20)
CALCIUM SERPL-MCNC: 9.3 MG/DL (ref 8.5–10.1)
CHLORIDE SERPL-SCNC: 106 MMOL/L (ref 97–108)
CO2 SERPL-SCNC: 24 MMOL/L (ref 21–32)
CREAT SERPL-MCNC: 0.52 MG/DL (ref 0.55–1.02)
DIFFERENTIAL METHOD BLD: ABNORMAL
EOSINOPHIL # BLD: 0.2 K/UL (ref 0–0.4)
EOSINOPHIL NFR BLD: 2 % (ref 0–7)
ERYTHROCYTE [DISTWIDTH] IN BLOOD BY AUTOMATED COUNT: 13.9 % (ref 11.5–14.5)
GLOBULIN SER CALC-MCNC: 4.9 G/DL (ref 2–4)
GLUCOSE SERPL-MCNC: 104 MG/DL (ref 65–100)
HCT VFR BLD AUTO: 36.8 % (ref 35–47)
HGB BLD-MCNC: 12 G/DL (ref 11.5–16)
IMM GRANULOCYTES # BLD AUTO: 0.1 K/UL (ref 0–0.04)
IMM GRANULOCYTES NFR BLD AUTO: 1 % (ref 0–0.5)
LYMPHOCYTES # BLD: 0.9 K/UL (ref 0.8–3.5)
LYMPHOCYTES NFR BLD: 9 % (ref 12–49)
MCH RBC QN AUTO: 32.8 PG (ref 26–34)
MCHC RBC AUTO-ENTMCNC: 32.6 G/DL (ref 30–36.5)
MCV RBC AUTO: 100.5 FL (ref 80–99)
MONOCYTES # BLD: 0.7 K/UL (ref 0–1)
MONOCYTES NFR BLD: 7 % (ref 5–13)
NEUTS SEG # BLD: 8.7 K/UL (ref 1.8–8)
NEUTS SEG NFR BLD: 81 % (ref 32–75)
NRBC # BLD: 0 K/UL (ref 0–0.01)
NRBC BLD-RTO: 0 PER 100 WBC
PLATELET # BLD AUTO: 407 K/UL (ref 150–400)
PMV BLD AUTO: 9.3 FL (ref 8.9–12.9)
POTASSIUM SERPL-SCNC: 4 MMOL/L (ref 3.5–5.1)
PROT SERPL-MCNC: 7.2 G/DL (ref 6.4–8.2)
RBC # BLD AUTO: 3.66 M/UL (ref 3.8–5.2)
SODIUM SERPL-SCNC: 135 MMOL/L (ref 136–145)
WBC # BLD AUTO: 10.6 K/UL (ref 3.6–11)

## 2024-08-30 PROCEDURE — 97165 OT EVAL LOW COMPLEX 30 MIN: CPT

## 2024-08-30 PROCEDURE — 97535 SELF CARE MNGMENT TRAINING: CPT

## 2024-08-30 PROCEDURE — 85025 COMPLETE CBC W/AUTO DIFF WBC: CPT

## 2024-08-30 PROCEDURE — 6370000000 HC RX 637 (ALT 250 FOR IP): Performed by: EMERGENCY MEDICINE

## 2024-08-30 PROCEDURE — 97162 PT EVAL MOD COMPLEX 30 MIN: CPT

## 2024-08-30 PROCEDURE — 36415 COLL VENOUS BLD VENIPUNCTURE: CPT

## 2024-08-30 PROCEDURE — 97116 GAIT TRAINING THERAPY: CPT

## 2024-08-30 PROCEDURE — 96374 THER/PROPH/DIAG INJ IV PUSH: CPT

## 2024-08-30 PROCEDURE — 80053 COMPREHEN METABOLIC PANEL: CPT

## 2024-08-30 PROCEDURE — 99284 EMERGENCY DEPT VISIT MOD MDM: CPT

## 2024-08-30 PROCEDURE — 6360000002 HC RX W HCPCS: Performed by: EMERGENCY MEDICINE

## 2024-08-30 RX ORDER — TRAMADOL HYDROCHLORIDE 50 MG/1
50 TABLET ORAL ONCE
Status: COMPLETED | OUTPATIENT
Start: 2024-08-30 | End: 2024-08-30

## 2024-08-30 RX ORDER — KETOROLAC TROMETHAMINE 15 MG/ML
15 INJECTION, SOLUTION INTRAMUSCULAR; INTRAVENOUS
Status: COMPLETED | OUTPATIENT
Start: 2024-08-30 | End: 2024-08-30

## 2024-08-30 RX ORDER — TRAMADOL HYDROCHLORIDE 50 MG/1
50 TABLET ORAL EVERY 6 HOURS PRN
Qty: 20 TABLET | Refills: 0 | Status: SHIPPED | OUTPATIENT
Start: 2024-08-30 | End: 2024-09-04

## 2024-08-30 RX ADMIN — TRAMADOL HYDROCHLORIDE 50 MG: 50 TABLET ORAL at 11:18

## 2024-08-30 RX ADMIN — KETOROLAC TROMETHAMINE 15 MG: 15 INJECTION, SOLUTION INTRAMUSCULAR; INTRAVENOUS at 11:18

## 2024-08-30 ASSESSMENT — PAIN SCALES - GENERAL: PAINLEVEL_OUTOF10: 8

## 2024-08-30 ASSESSMENT — LIFESTYLE VARIABLES
HOW OFTEN DO YOU HAVE A DRINK CONTAINING ALCOHOL: NEVER
HOW OFTEN DO YOU HAVE A DRINK CONTAINING ALCOHOL: NEVER
HOW MANY STANDARD DRINKS CONTAINING ALCOHOL DO YOU HAVE ON A TYPICAL DAY: PATIENT DOES NOT DRINK
HOW MANY STANDARD DRINKS CONTAINING ALCOHOL DO YOU HAVE ON A TYPICAL DAY: PATIENT DOES NOT DRINK

## 2024-08-30 ASSESSMENT — PAIN - FUNCTIONAL ASSESSMENT: PAIN_FUNCTIONAL_ASSESSMENT: 0-10

## 2024-08-30 NOTE — PLAN OF CARE
Problem: Physical Therapy - Adult  Goal: By Discharge: Performs mobility at highest level of function for planned discharge setting.  See evaluation for individualized goals.  Description: FUNCTIONAL STATUS PRIOR TO ADMISSION: Patient was modified independent using a rolling walker for functional mobility.  With increased weakness over the past few days     HOME SUPPORT PRIOR TO ADMISSION: The patient lived with family.    Physical Therapy Goals  Initiated 8/30/2024  1.  Patient will move from supine to sit and sit to supine in bed with supervision/set-up within 7 day(s).    2.  Patient will perform sit to stand with supervision/set-up within 7 day(s).  3.  Patient will transfer from bed to chair and chair to bed with supervision/set-up using the least restrictive device within 7 day(s).  4.  Patient will ambulate with supervision/set-up for 150 feet with the least restrictive device within 7 day(s).   5.  Patient will ascend/descend 4 stairs with 2 handrail(s) with contact guard assist within 7 day(s).   Outcome: Progressing   PHYSICAL THERAPY EVALUATION    Patient: Nicole Galeano (78 y.o. female)  Date: 8/30/2024  Primary Diagnosis: No admission diagnoses are documented for this encounter.       Precautions: Restrictions/Precautions: Fall Risk                      ASSESSMENT :   DEFICITS/IMPAIRMENTS:   The patient is limited by decreased functional mobility, strength following admission for decreased mobility and increased pain.  Patient with a new diagnosis of RA and in the past week she has experienced increased pain limiting her bed mobility and transfers at home.      Based on the impairments listed above patient was overall 1 person assist, she did require IJEOMA  for sit-stand but once in standing able to ambulate short distance of 50 feet with RW without assistance and no instance of loss of balance or instability.  Patient returned to sitting.  Discussed options of therapy, patient does not want

## 2024-08-30 NOTE — CARE COORDINATION
8/30/2024  4:45 PM  Family would like to DC w/ HH services FOC offered, prefers in network agency, referral sent to [facility], they have accepted, advised pt has discharged to home  Order completed  TATY Torres      3:05 PM     08/30/24 5913   Service Assessment   Patient Orientation Alert and Oriented   Cognition Alert   History Provided By Patient   Primary Caregiver Self   Support Systems Children;Family Members   Patient's Healthcare Decision Maker is: Legal Next of Kin  (daughter Nancy Frances (ENRRIQUE) [phone])   PCP Verified by CM Yes  (RAFY Saenz)   Last Visit to PCP Within last 3 months   Prior Functional Level Assistance with the following:;Mobility;Independent in ADLs/IADLs  (RW for mobility)   Current Functional Level Independent in ADLs/IADLs;Mobility;Other (see comment)  (RW for mobility, Assist x1 for mobility and transfers)   Can patient return to prior living arrangement Unknown at present   Ability to make needs known: Good   Family able to assist with home care needs: No  (Family unable to provide 24H care, requesting SNF)   Financial Resources Medicare;Medicaid  ([facility] Medicaid /Medicare)   Community Resources None   Social/Functional History   Lives With Daughter;Family   Type of Home House   Home Layout Two level   Home Access Stairs to enter with rails   Entrance Stairs - Number of Steps 4   Entrance Stairs - Rails Both   Bathroom Shower/Tub Tub/Shower unit;Walk-in shower   Bathroom Toilet Bedside commode   Bathroom Equipment Toilet raiser   Home Equipment Cane;Walker - Rolling   Receives Help From Family   Ambulation Assistance Needs assistance  (RW for mobility)   Transfer Assistance Needs assistance   Active  No   Patient's  Info Family   Mode of Transportation Car   Occupation Retired   Discharge Planning   Type of Residence Other (Comment)  (SNF vs Home w/ HH)   Living Arrangements Children;Family Members   Current Services Prior To Admission Oxygen

## 2024-08-30 NOTE — ED PROVIDER NOTES
[facility] EMERGENCY DEPT  EMERGENCY DEPARTMENT ENCOUNTER      Pt Name: Nicole Galeano  MRN: [id number]  Birthdate 1945  Date of evaluation: 2024  Provider: Maggie Patel MD    CHIEF COMPLAINT       Chief Complaint   Patient presents with    Joint Pain         HISTORY OF PRESENT ILLNESS    Nicole Galeano is an 77 yo F with COPD on home O2 who was recently diagnosed with RA.  She has seen orthoa nd has been referred to a rheumatologist but has not seen them yet.  She states that at first her symptoms were improved with a steroid dose pack but as soon as it was completed the pain returned.  Her orthopedist states it is too soon for another steroid pack.   Today her pain was so severe she could not stand so her daughter brought her to the ED.  She denies any recent trauma or falls.  She has tried taking diclofenac and meloxicam but none of them have helped.            Additional history from independent historians:     Review of External Medical Records:     Nursing Notes were reviewed.    REVIEW OF SYSTEMS       Review of Systems    Except as noted above the remainder of the review of systems was reviewed and negative.       PAST MEDICAL HISTORY     Past Medical History:   Diagnosis Date    Arthritis     Breast cancer (HCC) 10/2020    Breast cancer, left breast (HCC) ,     lumpectomy/chemo/XRT, then mastectomy and anastrozole in     Cancer (HCC)     SEVERAL SKIN - MELANOMA    COPD (chronic obstructive pulmonary disease) (HCC)     Hypertension     Ill-defined condition     MACULAR DEGENERATION         SURGICAL HISTORY       Past Surgical History:   Procedure Laterality Date    APPENDECTOMY      BREAST LUMPECTOMY Left         BREAST LUMPECTOMY Right 4/15/2021    RIGHT BREAST LUMPECTOMY WITH ULTRASOUND performed by Andres Cortes Jr., MD at [facility] AMBULATORY OR     SECTION      COLONOSCOPY      MASTECTOMY Left         US BREAST BIOPSY W LOC DEVICE 1ST LESION RIGHT  Right 10/29/2020    US BREAST NEEDLE BIOPSY RIGHT 10/29/2020 [facility] RAD MAMMO         CURRENT MEDICATIONS       Previous Medications    ASPIRIN 325 MG TABLET    Take 1 tablet by mouth every 6 hours as needed    ATORVASTATIN (LIPITOR) 40 MG TABLET    Take 1 tablet by mouth daily    CITALOPRAM (CELEXA) 40 MG TABLET    Take 1 tablet by mouth daily    IBUPROFEN (ADVIL;MOTRIN) 600 MG TABLET    Take 1 tablet by mouth 4 times daily as needed for Pain    LISINOPRIL (PRINIVIL;ZESTRIL) 5 MG TABLET    Take 1 tablet by mouth daily    OXYGEN    E clarify this medication. Outside name: Oxygen    TIOTROPIUM (SPIRIVA RESPIMAT) 2.5 MCG/ACT AERS INHALER    ceived the following from [facility]: Outside name: Spiriva Respimat 2.5 mcg/actuation inhaler       ALLERGIES     Povidone-iodine, Red dye #40 (allura red), and Adhesive tape    FAMILY HISTORY       Family History   Problem Relation Age of Onset    Anesth Problems Neg Hx     Abdominal aortic aneurysm Brother     Cancer Sister         MELANOMA, LUNG    Cancer Father         SKIN    Heart Disease Father     Thyroid Disease Mother           SOCIAL HISTORY       Social History     Socioeconomic History    Marital status: Single   Tobacco Use    Smoking status: Former     Current packs/day: 0.00     Types: Cigarettes     Quit date: 2011     Years since quittin.4    Smokeless tobacco: Never   Substance and Sexual Activity    Alcohol use: Yes     Alcohol/week: 2.0 standard drinks of alcohol    Drug use: Never     Social Determinants of Health     Financial Resource Strain: Low Risk  (10/21/2022)    Overall Financial Resource Strain (CARDIA)     Difficulty of Paying Living Expenses: Not hard at all   Food Insecurity: No Food Insecurity (10/21/2022)    Hunger Vital Sign     Worried About Running Out of Food in the Last Year: Never true     Ran Out of Food in the Last Year: Never true         PHYSICAL EXAM       ED Triage Vitals [24 1100]   BP Systolic BP

## 2024-08-30 NOTE — PLAN OF CARE
support      ADL Assessment:          Feeding: Setup  Feeding Skilled Clinical Factors: inferred    Grooming: Setup  Grooming Skilled Clinical Factors: inferred    UE Bathing: Setup  UE Bathing Skilled Clinical Factors: inferred         LE Bathing: Minimal assistance  LE Bathing Skilled Clinical Factors: inferred for standing/ balance/ mobility    UE Dressing: Setup       LE Dressing: Minimal assistance  LE Dressing Skilled Clinical Factors: inferred for standing/ balance/ mobility    Toileting: Minimal assistance  Toileting Skilled Clinical Factors: inferred for standing/ balance/ mobility          [facility] \"6 Clicks\"                                                       Daily Activity Inpatient Short Form  AM-PAC Daily Activity - Inpatient   How much help is needed for putting on and taking off regular lower body clothing?: A Little  How much help is needed for bathing (which includes washing, rinsing, drying)?: A Little  How much help is needed for toileting (which includes using toilet, bedpan, or urinal)?: A Little  How much help is needed for putting on and taking off regular upper body clothing?: None  How much help is needed for taking care of personal grooming?: None  How much help for eating meals?: None  [facility] Daily Activity Raw Score: 21  AM-PAC Inpatient ADL T-Scale Score : 44.27  ADL Inpatient CMS 0-100% Score: 32.79  ADL Inpatient CMS G-Code Modifier : CJ     Interpretation of Tool:  Represents clinically-significant functional categories (i.e. Activities of daily living).  Cutoff score 39.4 (19) correlates to a good likelihood of discharging home versus a facility  Monse Samuels, Kat Urias, Rudi Gary, Anita Garza, Evangelist Salomon, Jefferson Samuels, AM-PAC “6-Clicks” Functional Assessment Scores Predict Acute Care Hospital Discharge Destination, Physical Therapy, Volume 94, Issue 9, 1 September 2014, Pages 7626-3360,  [email or web address]://[email or web address]       Pain Rating:  Patient reported diffuse joint pain    After treatment:   Patient left in no apparent distress sitting up in chair, Call bell within reach, and Caregiver / family present    COMMUNICATION/EDUCATION:   The patient's plan of care was discussed with: physical therapist and registered nurse         Thank you for this referral.  Dillon Aviles OT  Minutes: 26    Occupational Therapy Evaluation Charge Determination   History Examination Decision-Making   LOW Complexity : Brief history review  MEDIUM Complexity: 3-5 Performance deficits relating to physical, cognitive, or psychosocial skills that result in activity limitations and/or participation restrictions MEDIUM Complexity: Patient may present with comorbidities that affect occupational performance. Minimal to moderate modifications of tasks or assist (eg. physical or verbal) with assist is necessary to enable pt to complete eval   Based on the above components, the patient evaluation is determined to be of the following complexity level: Low

## 2024-08-30 NOTE — ED TRIAGE NOTES
Pt arrives to the ED, pt reports she was recently diagnosed with rheumatoid arthritis and was given medications from her PCP, has an appt on 9/11/2024 with a rheumatologist but does not feel like she's able to go on without pain mediation. Pt reports PCP did not want to give her anything stronger right now and wanted her to wait until her appointment. Pt having increased mobility issues due to the pain. Pt denies any recent falls.

## 2025-03-04 ENCOUNTER — HOSPITAL ENCOUNTER (INPATIENT)
Facility: HOSPITAL | Age: 80
LOS: 10 days | Discharge: NURSING, REHAB OR LONG-TERM CARE | DRG: 492 | End: 2025-03-14
Attending: EMERGENCY MEDICINE | Admitting: FAMILY MEDICINE
Payer: MEDICARE

## 2025-03-04 ENCOUNTER — APPOINTMENT (OUTPATIENT)
Facility: HOSPITAL | Age: 80
DRG: 492 | End: 2025-03-04
Payer: MEDICARE

## 2025-03-04 DIAGNOSIS — M25.572 ACUTE LEFT ANKLE PAIN: ICD-10-CM

## 2025-03-04 DIAGNOSIS — S82.892A CLOSED FRACTURE OF LEFT ANKLE, INITIAL ENCOUNTER: Primary | ICD-10-CM

## 2025-03-04 DIAGNOSIS — J44.9 CHRONIC OBSTRUCTIVE PULMONARY DISEASE, UNSPECIFIED COPD TYPE (HCC): ICD-10-CM
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
## 2025-03-04 DIAGNOSIS — M06.9 RHEUMATOID ARTHRITIS, INVOLVING UNSPECIFIED SITE, UNSPECIFIED WHETHER RHEUMATOID FACTOR PRESENT (HCC): ICD-10-CM

## 2025-03-04 LAB
ALBUMIN SERPL-MCNC: 2.4 G/DL (ref 3.5–5)
ALBUMIN/GLOB SERPL: 0.5 (ref 1.1–2.2)
ALP SERPL-CCNC: 604 U/L (ref 45–117)
ALT SERPL-CCNC: 45 U/L (ref 12–78)
ANION GAP SERPL CALC-SCNC: 8 MMOL/L (ref 2–12)
AST SERPL-CCNC: 40 U/L (ref 15–37)
BASOPHILS # BLD: 0.03 K/UL (ref 0–0.1)
BASOPHILS NFR BLD: 0.2 % (ref 0–1)
BILIRUB SERPL-MCNC: 0.6 MG/DL (ref 0.2–1)
BUN SERPL-MCNC: 14 MG/DL (ref 6–20)
BUN/CREAT SERPL: 27 (ref 12–20)
CALCIUM SERPL-MCNC: 9.3 MG/DL (ref 8.5–10.1)
CHLORIDE SERPL-SCNC: 103 MMOL/L (ref 97–108)
CO2 SERPL-SCNC: 27 MMOL/L (ref 21–32)
CREAT SERPL-MCNC: 0.52 MG/DL (ref 0.55–1.02)
DIFFERENTIAL METHOD BLD: ABNORMAL
EOSINOPHIL # BLD: 0.1 K/UL (ref 0–0.4)
EOSINOPHIL NFR BLD: 0.7 % (ref 0–7)
ERYTHROCYTE [DISTWIDTH] IN BLOOD BY AUTOMATED COUNT: 13.5 % (ref 11.5–14.5)
GLOBULIN SER CALC-MCNC: 4.5 G/DL (ref 2–4)
GLUCOSE SERPL-MCNC: 113 MG/DL (ref 65–100)
HCT VFR BLD AUTO: 39.6 % (ref 35–47)
HGB BLD-MCNC: 12.9 G/DL (ref 11.5–16)
IMM GRANULOCYTES # BLD AUTO: 0.13 K/UL (ref 0–0.04)
IMM GRANULOCYTES NFR BLD AUTO: 0.9 % (ref 0–0.5)
LYMPHOCYTES # BLD: 0.48 K/UL (ref 0.8–3.5)
LYMPHOCYTES NFR BLD: 3.2 % (ref 12–49)
MCH RBC QN AUTO: 33.8 PG (ref 26–34)
MCHC RBC AUTO-ENTMCNC: 32.6 G/DL (ref 30–36.5)
MCV RBC AUTO: 103.7 FL (ref 80–99)
MONOCYTES # BLD: 0.66 K/UL (ref 0–1)
MONOCYTES NFR BLD: 4.4 % (ref 5–13)
NEUTS SEG # BLD: 13.5 K/UL (ref 1.8–8)
NEUTS SEG NFR BLD: 90.6 % (ref 32–75)
NRBC # BLD: 0 K/UL (ref 0–0.01)
NRBC BLD-RTO: 0 PER 100 WBC
PLATELET # BLD AUTO: 376 K/UL (ref 150–400)
PMV BLD AUTO: 9.4 FL (ref 8.9–12.9)
POTASSIUM SERPL-SCNC: 4 MMOL/L (ref 3.5–5.1)
PROT SERPL-MCNC: 6.9 G/DL (ref 6.4–8.2)
RBC # BLD AUTO: 3.82 M/UL (ref 3.8–5.2)
RBC MORPH BLD: ABNORMAL
SODIUM SERPL-SCNC: 138 MMOL/L (ref 136–145)
WBC # BLD AUTO: 14.9 K/UL (ref 3.6–11)

## 2025-03-04 PROCEDURE — 73610 X-RAY EXAM OF ANKLE: CPT

## 2025-03-04 PROCEDURE — 2500000003 HC RX 250 WO HCPCS: Performed by: EMERGENCY MEDICINE

## 2025-03-04 PROCEDURE — 85025 COMPLETE CBC W/AUTO DIFF WBC: CPT

## 2025-03-04 PROCEDURE — 96374 THER/PROPH/DIAG INJ IV PUSH: CPT

## 2025-03-04 PROCEDURE — 80053 COMPREHEN METABOLIC PANEL: CPT

## 2025-03-04 PROCEDURE — 93005 ELECTROCARDIOGRAM TRACING: CPT | Performed by: EMERGENCY MEDICINE

## 2025-03-04 PROCEDURE — 99285 EMERGENCY DEPT VISIT HI MDM: CPT

## 2025-03-04 PROCEDURE — 27788 TREATMENT OF ANKLE FRACTURE: CPT

## 2025-03-04 PROCEDURE — 6370000000 HC RX 637 (ALT 250 FOR IP): Performed by: EMERGENCY MEDICINE

## 2025-03-04 PROCEDURE — 2500000003 HC RX 250 WO HCPCS: Performed by: FAMILY MEDICINE

## 2025-03-04 PROCEDURE — 94761 N-INVAS EAR/PLS OXIMETRY MLT: CPT

## 2025-03-04 PROCEDURE — 2700000000 HC OXYGEN THERAPY PER DAY

## 2025-03-04 PROCEDURE — 1100000000 HC RM PRIVATE

## 2025-03-04 PROCEDURE — 36415 COLL VENOUS BLD VENIPUNCTURE: CPT

## 2025-03-04 PROCEDURE — 6370000000 HC RX 637 (ALT 250 FOR IP): Performed by: FAMILY MEDICINE

## 2025-03-04 RX ORDER — ACETAMINOPHEN 325 MG/1
650 TABLET ORAL EVERY 6 HOURS PRN
Status: DISCONTINUED | OUTPATIENT
Start: 2025-03-04 | End: 2025-03-14 | Stop reason: HOSPADM

## 2025-03-04 RX ORDER — ONDANSETRON 4 MG/1
4 TABLET, ORALLY DISINTEGRATING ORAL EVERY 8 HOURS PRN
Status: DISCONTINUED | OUTPATIENT
Start: 2025-03-04 | End: 2025-03-14 | Stop reason: HOSPADM

## 2025-03-04 RX ORDER — CITALOPRAM HYDROBROMIDE 20 MG/1
40 TABLET ORAL DAILY
Status: DISCONTINUED | OUTPATIENT
Start: 2025-03-04 | End: 2025-03-14 | Stop reason: HOSPADM

## 2025-03-04 RX ORDER — PREDNISONE 5 MG/1
10 TABLET ORAL DAILY
Status: DISCONTINUED | OUTPATIENT
Start: 2025-03-05 | End: 2025-03-14 | Stop reason: HOSPADM

## 2025-03-04 RX ORDER — ONDANSETRON 2 MG/ML
4 INJECTION INTRAMUSCULAR; INTRAVENOUS EVERY 6 HOURS PRN
Status: DISCONTINUED | OUTPATIENT
Start: 2025-03-04 | End: 2025-03-14 | Stop reason: HOSPADM

## 2025-03-04 RX ORDER — ATORVASTATIN CALCIUM 20 MG/1
40 TABLET, FILM COATED ORAL DAILY
Status: DISCONTINUED | OUTPATIENT
Start: 2025-03-05 | End: 2025-03-14 | Stop reason: HOSPADM

## 2025-03-04 RX ORDER — TRIAZOLAM 0.25 MG
0.25 TABLET ORAL NIGHTLY PRN
COMMUNITY

## 2025-03-04 RX ORDER — PREDNISONE 10 MG/1
10 TABLET ORAL DAILY
COMMUNITY

## 2025-03-04 RX ORDER — SODIUM CHLORIDE 0.9 % (FLUSH) 0.9 %
5-40 SYRINGE (ML) INJECTION PRN
Status: DISCONTINUED | OUTPATIENT
Start: 2025-03-04 | End: 2025-03-08

## 2025-03-04 RX ORDER — ACETAMINOPHEN 500 MG
1000 TABLET ORAL
Status: COMPLETED | OUTPATIENT
Start: 2025-03-04 | End: 2025-03-04

## 2025-03-04 RX ORDER — I-VITE, TAB 1000-60-2MG (60/BT) 300MCG-200
1 TAB ORAL DAILY
Status: DISCONTINUED | OUTPATIENT
Start: 2025-03-05 | End: 2025-03-04

## 2025-03-04 RX ORDER — LISINOPRIL 5 MG/1
5 TABLET ORAL DAILY
Status: DISCONTINUED | OUTPATIENT
Start: 2025-03-05 | End: 2025-03-14 | Stop reason: HOSPADM

## 2025-03-04 RX ORDER — MULTIVITAMIN WITH IRON
1 TABLET ORAL DAILY
Status: DISCONTINUED | OUTPATIENT
Start: 2025-03-04 | End: 2025-03-04

## 2025-03-04 RX ORDER — SODIUM CHLORIDE 0.9 % (FLUSH) 0.9 %
5-40 SYRINGE (ML) INJECTION EVERY 12 HOURS SCHEDULED
Status: DISCONTINUED | OUTPATIENT
Start: 2025-03-04 | End: 2025-03-13

## 2025-03-04 RX ORDER — TRIAZOLAM 0.25 MG
0.25 TABLET ORAL NIGHTLY PRN
Status: DISCONTINUED | OUTPATIENT
Start: 2025-03-04 | End: 2025-03-14 | Stop reason: HOSPADM

## 2025-03-04 RX ORDER — OXYCODONE HYDROCHLORIDE 5 MG/1
5 TABLET ORAL EVERY 4 HOURS PRN
Status: DISCONTINUED | OUTPATIENT
Start: 2025-03-04 | End: 2025-03-06

## 2025-03-04 RX ORDER — MAGNESIUM SULFATE IN WATER 40 MG/ML
2000 INJECTION, SOLUTION INTRAVENOUS PRN
Status: DISCONTINUED | OUTPATIENT
Start: 2025-03-04 | End: 2025-03-08

## 2025-03-04 RX ORDER — I-VITE, TAB 1000-60-2MG (60/BT) 300MCG-200
1 TAB ORAL DAILY
Status: DISCONTINUED | OUTPATIENT
Start: 2025-03-04 | End: 2025-03-14 | Stop reason: HOSPADM

## 2025-03-04 RX ORDER — MORPHINE SULFATE 2 MG/ML
2 INJECTION, SOLUTION INTRAMUSCULAR; INTRAVENOUS
Status: COMPLETED | OUTPATIENT
Start: 2025-03-04 | End: 2025-03-04

## 2025-03-04 RX ORDER — POLYETHYLENE GLYCOL 3350 17 G/17G
17 POWDER, FOR SOLUTION ORAL DAILY PRN
Status: DISCONTINUED | OUTPATIENT
Start: 2025-03-04 | End: 2025-03-14 | Stop reason: HOSPADM

## 2025-03-04 RX ORDER — SODIUM CHLORIDE 9 MG/ML
INJECTION, SOLUTION INTRAVENOUS PRN
Status: DISCONTINUED | OUTPATIENT
Start: 2025-03-04 | End: 2025-03-14 | Stop reason: HOSPADM

## 2025-03-04 RX ORDER — ACETAMINOPHEN 650 MG/1
650 SUPPOSITORY RECTAL EVERY 6 HOURS PRN
Status: DISCONTINUED | OUTPATIENT
Start: 2025-03-04 | End: 2025-03-14 | Stop reason: HOSPADM

## 2025-03-04 RX ADMIN — ACETAMINOPHEN 1000 MG: 500 TABLET ORAL at 07:28

## 2025-03-04 RX ADMIN — Medication 1 TABLET: at 16:12

## 2025-03-04 RX ADMIN — MORPHINE SULFATE 2 MG: 2 INJECTION, SOLUTION INTRAMUSCULAR; INTRAVENOUS at 08:43

## 2025-03-04 RX ADMIN — OXYCODONE 5 MG: 5 TABLET ORAL at 20:08

## 2025-03-04 RX ADMIN — ACETAMINOPHEN 650 MG: 325 TABLET ORAL at 22:35

## 2025-03-04 RX ADMIN — SODIUM CHLORIDE, PRESERVATIVE FREE 10 ML: 5 INJECTION INTRAVENOUS at 20:09

## 2025-03-04 RX ADMIN — ACETAMINOPHEN 650 MG: 325 TABLET ORAL at 16:11

## 2025-03-04 ASSESSMENT — PAIN SCALES - GENERAL
PAINLEVEL_OUTOF10: 5
PAINLEVEL_OUTOF10: 2
PAINLEVEL_OUTOF10: 2
PAINLEVEL_OUTOF10: 6

## 2025-03-04 ASSESSMENT — PAIN DESCRIPTION - DESCRIPTORS
DESCRIPTORS: ACHING
DESCRIPTORS: ACHING

## 2025-03-04 ASSESSMENT — PAIN DESCRIPTION - ORIENTATION
ORIENTATION: LEFT
ORIENTATION: LEFT

## 2025-03-04 ASSESSMENT — PAIN DESCRIPTION - LOCATION
LOCATION: ANKLE;FOOT
LOCATION: ANKLE;FOOT

## 2025-03-04 ASSESSMENT — PAIN - FUNCTIONAL ASSESSMENT: PAIN_FUNCTIONAL_ASSESSMENT: 0-10

## 2025-03-04 NOTE — ED PROVIDER NOTES
[facility] EMERGENCY DEPARTMENT  EMERGENCY DEPARTMENT ENCOUNTER      Pt Name: Nicole Galeano  MRN: [id number]  Birthdate 1945  Date of evaluation: 3/4/2025  Provider: Kevin Manzano MD    CHIEF COMPLAINT       Chief Complaint   Patient presents with    Ankle Pain         HISTORY OF PRESENT ILLNESS   (Location/Symptom, Timing/Onset, Context/Setting, Quality, Duration, Modifying Factors, Severity)  Note limiting factors.   79-year-old female presents from home via EMS with injury to her left ankle.  She was getting ready this morning when she tripped and fell twisting her left ankle.  EMS reported a deformity.  This was splinted.  Patient denies any previous injury or surgery to the ankle.  Review of EMR shows a history of breast cancer, arthritis, COPD, hypertension.    The history is provided by the patient, the EMS personnel and medical records.         Review of External Medical Records:     Nursing Notes were reviewed.    REVIEW OF SYSTEMS    (2-9 systems for level 4, 10 or more for level 5)     Review of Systems   Constitutional:  Negative for fatigue.   HENT:  Negative for sore throat.    Eyes:  Negative for visual disturbance.   Respiratory:  Negative for cough.    Cardiovascular:  Negative for palpitations.   Gastrointestinal:  Negative for vomiting.   Genitourinary:  Negative for difficulty urinating.   Musculoskeletal:  Negative for myalgias.   Skin:  Negative for rash.   Neurological:  Negative for weakness.       Except as noted above the remainder of the review of systems was reviewed and negative.       PAST MEDICAL HISTORY     Past Medical History:   Diagnosis Date    Arthritis     Breast cancer (HCC) 10/2020    Breast cancer, left breast (HCC) 2003, 2012    lumpectomy/chemo/XRT, then mastectomy and anastrozole in 2012    Cancer (HCC)     SEVERAL SKIN - MELANOMA    COPD (chronic obstructive pulmonary disease) (HCC)     Hypertension     Ill-defined condition     MACULAR  Strain: Low Risk  (10/21/2022)    Overall Financial Resource Strain (CARDIA)     Difficulty of Paying Living Expenses: Not hard at all   Food Insecurity: No Food Insecurity (10/21/2022)    Hunger Vital Sign     Worried About Running Out of Food in the Last Year: Never true     Ran Out of Food in the Last Year: Never true           PHYSICAL EXAM    (up to 7 for level 4, 8 or more for level 5)     ED Triage Vitals   BP Systolic BP Percentile Diastolic BP Percentile Temp Temp src Pulse Resp SpO2   -- -- -- -- -- -- -- --      Height Weight         -- --             Body mass index is 28.34 kg/m².    Physical Exam  HENT:      Head: Normocephalic.      Mouth/Throat:      Mouth: Mucous membranes are moist.   Eyes:      General: No scleral icterus.  Cardiovascular:      Rate and Rhythm: Normal rate.   Pulmonary:      Effort: Pulmonary effort is normal.   Abdominal:      General: There is no distension.   Musculoskeletal:         General: No deformity.      Cervical back: Neck supple.      Comments: Left ankle currently in a Alfredo splint.  Neurovascular intact.   Skin:     Findings: No rash.   Neurological:      General: No focal deficit present.      Mental Status: She is alert and oriented to person, place, and time.         DIAGNOSTIC RESULTS     EKG: All EKG's are interpreted by the Emergency Department Physician who either signs or Co-signs this chart in the absence of a cardiologist.        RADIOLOGY:   Non-plain film images such as CT, Ultrasound and MRI are read by the radiologist. Plain radiographic images are visualized and preliminarily interpreted by the emergency physician with the below findings:        Interpretation per the Radiologist below, if available at the time of this note:    XR ANKLE LEFT (MIN 3 VIEWS)   Final Result   No acute abnormality.      1. Acute fractures of the distal medial and lateral malleoli      Electronically signed by Milo Morillo      XR ANKLE LEFT (MIN 3 VIEWS)    (Results Pending)

## 2025-03-04 NOTE — CONSULTS
Atrial Rate 91 BPM    P-R Interval 168 ms    QRS Duration 76 ms    Q-T Interval 360 ms    QTc Calculation (Bazett) 442 ms    R Axis 208 degrees    T Axis 88 degrees    Diagnosis       Normal sinus rhythm  Right superior axis deviation  Nonspecific ST and T wave abnormality  Abnormal ECG  When compared with ECG of 09-APR-2021 14:04,  QRS axis shifted left  Nonspecific T wave abnormality has replaced inverted T waves in Anterior   leads     CBC with Auto Differential    Collection Time: 03/04/25  8:44 AM   Result Value Ref Range    WBC 14.9 (H) 3.6 - 11.0 K/uL    RBC 3.82 3.80 - 5.20 M/uL    Hemoglobin 12.9 11.5 - 16.0 g/dL    Hematocrit 39.6 35.0 - 47.0 %    .7 (H) 80.0 - 99.0 FL    MCH 33.8 26.0 - 34.0 PG    MCHC 32.6 30.0 - 36.5 g/dL    RDW 13.5 11.5 - 14.5 %    Platelets 376 150 - 400 K/uL    MPV 9.4 8.9 - 12.9 FL    Nucleated RBCs 0.0 0  WBC    nRBC 0.00 0.00 - 0.01 K/uL    Neutrophils % 90.6 (H) 32.0 - 75.0 %    Lymphocytes % 3.2 (L) 12.0 - 49.0 %    Monocytes % 4.4 (L) 5.0 - 13.0 %    Eosinophils % 0.7 0.0 - 7.0 %    Basophils % 0.2 0.0 - 1.0 %    Immature Granulocytes % 0.9 (H) 0.0 - 0.5 %    Neutrophils Absolute 13.50 (H) 1.80 - 8.00 K/UL    Lymphocytes Absolute 0.48 (L) 0.80 - 3.50 K/UL    Monocytes Absolute 0.66 0.00 - 1.00 K/UL    Eosinophils Absolute 0.10 0.00 - 0.40 K/UL    Basophils Absolute 0.03 0.00 - 0.10 K/UL    Immature Granulocytes Absolute 0.13 (H) 0.00 - 0.04 K/UL    Differential Type SMEAR SCANNED      RBC Comment NORMOCYTIC, NORMOCHROMIC           Impression:     Patient Active Problem List    Diagnosis Date Noted    Prediabetes 08/13/2021    Controlled substance agreement signed 08/13/2021    Chronic insomnia 07/31/2021    History of panic attacks 07/31/2021    History of left breast cancer 11/04/2020    Invasive ductal carcinoma of breast, right (HCC) 10/29/2020     Active Problems:    * No active hospital problems. *  Resolved Problems:    * No resolved hospital problems.  *      Plan:   -  L Elroy Fracture - post reduction/ splint placement XRs reviewed with good alignment, no plan for surgery today, I did discuss the need for surgery the pt and her family, the pt is unsure of surgery at this time, I will follow back up with her after she has been seen by the medical team. Pt may be up with PT/OT ELIZABETH WYATT.   - pt may eat today      Dr. Mcrae aware and agrees with plan as above.        ISAIAH Oneill - NP  Orthopedic Nurse Practitioner   [facility]

## 2025-03-04 NOTE — ED TRIAGE NOTES
BIBEMS with c/o injury to left ankle after mechanical fall this morning at 0430.  Denies previous injury to same ankle.  Malleable splint to left ankle with bruising noted.  PMS intact.  No pain meds PTA.

## 2025-03-04 NOTE — H&P
Hospitalist Admission Note    NAME:  Nicole Gaelano   :  1945   MRN:  [id number]     Date/Time:  3/4/2025 2:02 PM    Patient PCP: Tessie Martinez, MITA  ________________________________________________________________________    Given the patient's current clinical presentation, I have a high level of concern for decompensation if discharged from the emergency department.  Complex decision making was performed, which includes reviewing the patient's available past medical records, laboratory results, and x-ray films.       My assessment of this patient's clinical condition and my plan of care is as follows.    Assessment / Plan:    Nicole is a 79 y.o.  female with PMHx past medical history of COPD on 1 L NC at night, rheumatoid arthritis, macular degeneration, radiation induced left lung injury, breast cancer status postlumpectomy and mastectomy, and melanoma who presents after a fall found to have a left bimalleolar ankle fracture.    # Fall/bimalleolar ankle fracture: Acute, present admission.  Secondary to mechanical fall.  Leg is splinted.  Patient is likely increased risk of fractures due to chronic steroid use.  - Ortho consulted  - Pain control with Roxicodone and Tylenol.  - N.p.o. midnight  - PT/OT.  Nonweightbearing on left lower extremity.  -Discussed the risks and benefits of surgery given that patient is chronically on O2 just at night.  Gave my recommendation that the benefits outweigh the risks and patient said she will think about it.    Hypertension: Chronic.  Continue lisinopril    Depression/anxiety: Chronic.  Continue Celexa.    Hyperlipidemia: Chronic.  Continue Lipitor    Rheumatoid arthritis: Chronic.  Continue prednisone 10 mg p.o. once daily.    COPD: Chronic.  Continue Spiriva. continue nasal cannula 1 L at night.    AST elevated: mild.  Will repeat tomorrow. Can continue statin for now.     Leukocytosis: acute, present on admission. No source of infection.

## 2025-03-05 LAB
ALBUMIN SERPL-MCNC: 2.1 G/DL (ref 3.5–5)
ALBUMIN/GLOB SERPL: 0.5 (ref 1.1–2.2)
ALP SERPL-CCNC: 533 U/L (ref 45–117)
ALT SERPL-CCNC: 37 U/L (ref 12–78)
ANION GAP SERPL CALC-SCNC: 6 MMOL/L (ref 2–12)
AST SERPL-CCNC: 34 U/L (ref 15–37)
BASOPHILS # BLD: 0.05 K/UL (ref 0–0.1)
BASOPHILS NFR BLD: 0.6 % (ref 0–1)
BILIRUB SERPL-MCNC: 0.6 MG/DL (ref 0.2–1)
BUN SERPL-MCNC: 15 MG/DL (ref 6–20)
BUN/CREAT SERPL: 26 (ref 12–20)
CALCIUM SERPL-MCNC: 9.1 MG/DL (ref 8.5–10.1)
CHLORIDE SERPL-SCNC: 104 MMOL/L (ref 97–108)
CO2 SERPL-SCNC: 28 MMOL/L (ref 21–32)
CREAT SERPL-MCNC: 0.57 MG/DL (ref 0.55–1.02)
DIFFERENTIAL METHOD BLD: ABNORMAL
EKG ATRIAL RATE: 91 BPM
EKG DIAGNOSIS: NORMAL
EKG P-R INTERVAL: 168 MS
EKG Q-T INTERVAL: 360 MS
EKG QRS DURATION: 76 MS
EKG QTC CALCULATION (BAZETT): 442 MS
EKG R AXIS: 208 DEGREES
EKG T AXIS: 88 DEGREES
EKG VENTRICULAR RATE: 91 BPM
EOSINOPHIL # BLD: 0.31 K/UL (ref 0–0.4)
EOSINOPHIL NFR BLD: 3.7 % (ref 0–7)
ERYTHROCYTE [DISTWIDTH] IN BLOOD BY AUTOMATED COUNT: 13.7 % (ref 11.5–14.5)
GLOBULIN SER CALC-MCNC: 4.3 G/DL (ref 2–4)
GLUCOSE SERPL-MCNC: 103 MG/DL (ref 65–100)
HCT VFR BLD AUTO: 35.9 % (ref 35–47)
HGB BLD-MCNC: 11.3 G/DL (ref 11.5–16)
IMM GRANULOCYTES # BLD AUTO: 0.06 K/UL (ref 0–0.04)
IMM GRANULOCYTES NFR BLD AUTO: 0.7 % (ref 0–0.5)
LYMPHOCYTES # BLD: 0.96 K/UL (ref 0.8–3.5)
LYMPHOCYTES NFR BLD: 11.6 % (ref 12–49)
MCH RBC QN AUTO: 32.9 PG (ref 26–34)
MCHC RBC AUTO-ENTMCNC: 31.5 G/DL (ref 30–36.5)
MCV RBC AUTO: 104.7 FL (ref 80–99)
MONOCYTES # BLD: 0.6 K/UL (ref 0–1)
MONOCYTES NFR BLD: 7.2 % (ref 5–13)
NEUTS SEG # BLD: 6.31 K/UL (ref 1.8–8)
NEUTS SEG NFR BLD: 76.2 % (ref 32–75)
NRBC # BLD: 0 K/UL (ref 0–0.01)
NRBC BLD-RTO: 0 PER 100 WBC
PLATELET # BLD AUTO: 357 K/UL (ref 150–400)
PMV BLD AUTO: 9.2 FL (ref 8.9–12.9)
POTASSIUM SERPL-SCNC: 4.1 MMOL/L (ref 3.5–5.1)
PROT SERPL-MCNC: 6.4 G/DL (ref 6.4–8.2)
RBC # BLD AUTO: 3.43 M/UL (ref 3.8–5.2)
SODIUM SERPL-SCNC: 138 MMOL/L (ref 136–145)
WBC # BLD AUTO: 8.3 K/UL (ref 3.6–11)

## 2025-03-05 PROCEDURE — 93010 ELECTROCARDIOGRAM REPORT: CPT | Performed by: INTERNAL MEDICINE

## 2025-03-05 PROCEDURE — 80053 COMPREHEN METABOLIC PANEL: CPT

## 2025-03-05 PROCEDURE — 1100000000 HC RM PRIVATE

## 2025-03-05 PROCEDURE — 85025 COMPLETE CBC W/AUTO DIFF WBC: CPT

## 2025-03-05 PROCEDURE — 94761 N-INVAS EAR/PLS OXIMETRY MLT: CPT

## 2025-03-05 PROCEDURE — 2700000000 HC OXYGEN THERAPY PER DAY

## 2025-03-05 PROCEDURE — 2500000003 HC RX 250 WO HCPCS: Performed by: FAMILY MEDICINE

## 2025-03-05 PROCEDURE — 6370000000 HC RX 637 (ALT 250 FOR IP): Performed by: FAMILY MEDICINE

## 2025-03-05 PROCEDURE — 6360000002 HC RX W HCPCS: Performed by: FAMILY MEDICINE

## 2025-03-05 PROCEDURE — 36415 COLL VENOUS BLD VENIPUNCTURE: CPT

## 2025-03-05 RX ORDER — LORAZEPAM 0.5 MG/1
0.5 TABLET ORAL EVERY 4 HOURS PRN
Status: DISCONTINUED | OUTPATIENT
Start: 2025-03-05 | End: 2025-03-14 | Stop reason: HOSPADM

## 2025-03-05 RX ORDER — GAUZE BANDAGE 2" X 2"
100 BANDAGE TOPICAL DAILY
Status: DISCONTINUED | OUTPATIENT
Start: 2025-03-05 | End: 2025-03-14 | Stop reason: HOSPADM

## 2025-03-05 RX ORDER — CALCIUM CARBONATE 500 MG/1
TABLET, CHEWABLE ORAL 3 TIMES DAILY PRN
Status: DISCONTINUED | OUTPATIENT
Start: 2025-03-05 | End: 2025-03-14 | Stop reason: HOSPADM

## 2025-03-05 RX ORDER — SODIUM CHLORIDE 0.9 % (FLUSH) 0.9 %
5-40 SYRINGE (ML) INJECTION EVERY 12 HOURS SCHEDULED
Status: DISCONTINUED | OUTPATIENT
Start: 2025-03-05 | End: 2025-03-13

## 2025-03-05 RX ORDER — LORAZEPAM 1 MG/1
1 TABLET ORAL
Status: DISCONTINUED | OUTPATIENT
Start: 2025-03-05 | End: 2025-03-14 | Stop reason: HOSPADM

## 2025-03-05 RX ORDER — SODIUM CHLORIDE 9 MG/ML
INJECTION, SOLUTION INTRAVENOUS PRN
Status: DISCONTINUED | OUTPATIENT
Start: 2025-03-05 | End: 2025-03-14 | Stop reason: HOSPADM

## 2025-03-05 RX ORDER — ENOXAPARIN SODIUM 100 MG/ML
40 INJECTION SUBCUTANEOUS DAILY
Status: DISCONTINUED | OUTPATIENT
Start: 2025-03-05 | End: 2025-03-07

## 2025-03-05 RX ORDER — LORAZEPAM 1 MG/1
4 TABLET ORAL
Status: DISCONTINUED | OUTPATIENT
Start: 2025-03-05 | End: 2025-03-14 | Stop reason: HOSPADM

## 2025-03-05 RX ORDER — LORAZEPAM 1 MG/1
2 TABLET ORAL
Status: DISCONTINUED | OUTPATIENT
Start: 2025-03-05 | End: 2025-03-14 | Stop reason: HOSPADM

## 2025-03-05 RX ORDER — CALCIUM CARBONATE 500 MG/1
500 TABLET, CHEWABLE ORAL 3 TIMES DAILY PRN
Status: DISCONTINUED | OUTPATIENT
Start: 2025-03-05 | End: 2025-03-05

## 2025-03-05 RX ORDER — SODIUM CHLORIDE 0.9 % (FLUSH) 0.9 %
5-40 SYRINGE (ML) INJECTION PRN
Status: DISCONTINUED | OUTPATIENT
Start: 2025-03-05 | End: 2025-03-08

## 2025-03-05 RX ORDER — LORAZEPAM 1 MG/1
3 TABLET ORAL
Status: DISCONTINUED | OUTPATIENT
Start: 2025-03-05 | End: 2025-03-14 | Stop reason: HOSPADM

## 2025-03-05 RX ADMIN — LISINOPRIL 5 MG: 5 TABLET ORAL at 15:06

## 2025-03-05 RX ADMIN — CITALOPRAM HYDROBROMIDE 40 MG: 20 TABLET ORAL at 10:37

## 2025-03-05 RX ADMIN — SODIUM CHLORIDE, PRESERVATIVE FREE 10 ML: 5 INJECTION INTRAVENOUS at 20:16

## 2025-03-05 RX ADMIN — PREDNISONE 10 MG: 5 TABLET ORAL at 10:45

## 2025-03-05 RX ADMIN — OXYCODONE 5 MG: 5 TABLET ORAL at 20:22

## 2025-03-05 RX ADMIN — ENOXAPARIN SODIUM 40 MG: 100 INJECTION SUBCUTANEOUS at 15:06

## 2025-03-05 RX ADMIN — Medication 250 MCG: at 00:30

## 2025-03-05 RX ADMIN — Medication 100 MG: at 10:37

## 2025-03-05 RX ADMIN — Medication 250 MCG: at 23:05

## 2025-03-05 RX ADMIN — SODIUM CHLORIDE, PRESERVATIVE FREE 10 ML: 5 INJECTION INTRAVENOUS at 10:38

## 2025-03-05 RX ADMIN — SODIUM CHLORIDE, PRESERVATIVE FREE 10 ML: 5 INJECTION INTRAVENOUS at 10:40

## 2025-03-05 RX ADMIN — HYDROMORPHONE HYDROCHLORIDE 0.5 MG: 1 INJECTION, SOLUTION INTRAMUSCULAR; INTRAVENOUS; SUBCUTANEOUS at 10:38

## 2025-03-05 RX ADMIN — ACETAMINOPHEN 650 MG: 325 TABLET ORAL at 23:05

## 2025-03-05 RX ADMIN — ATORVASTATIN CALCIUM 40 MG: 20 TABLET, FILM COATED ORAL at 10:37

## 2025-03-05 RX ADMIN — Medication 1 TABLET: at 10:37

## 2025-03-05 RX ADMIN — HYDROMORPHONE HYDROCHLORIDE 0.5 MG: 1 INJECTION, SOLUTION INTRAMUSCULAR; INTRAVENOUS; SUBCUTANEOUS at 15:07

## 2025-03-05 ASSESSMENT — PAIN DESCRIPTION - LOCATION
LOCATION: ANKLE;FOOT
LOCATION: ANKLE;KNEE;LEG
LOCATION: KNEE;LEG;ANKLE
LOCATION: ANKLE;FOOT

## 2025-03-05 ASSESSMENT — PAIN SCALES - GENERAL
PAINLEVEL_OUTOF10: 10
PAINLEVEL_OUTOF10: 4
PAINLEVEL_OUTOF10: 6
PAINLEVEL_OUTOF10: 7
PAINLEVEL_OUTOF10: 10
PAINLEVEL_OUTOF10: 8

## 2025-03-05 ASSESSMENT — PAIN DESCRIPTION - DESCRIPTORS
DESCRIPTORS: ACHING
DESCRIPTORS: THROBBING
DESCRIPTORS: THROBBING
DESCRIPTORS: ACHING

## 2025-03-05 ASSESSMENT — PAIN DESCRIPTION - ORIENTATION
ORIENTATION: LEFT

## 2025-03-05 NOTE — PROGRESS NOTES
[facility]  [address] [place] [address] [phone]        Hospitalist Progress Note      NAME: Nicole Galeano   :  1945  MRM:  [id number]    Date/Time of service: 3/5/2025  1:02 PM       Subjective:   Patient was personally seen and examined by me during this time period.  Chart reviewed.     Pain is not well controlled per patient of left leg.  No other particular complaints.     ROS: per history above       Objective:       Vitals:       Last 24hrs VS reviewed since prior progress note. Most recent are:    Vitals:    25 1030   BP: 112/71   Pulse:    Resp:    Temp:    SpO2:      SpO2 Readings from Last 6 Encounters:   25 96%   24 95%   24 94%          Intake/Output Summary (Last 24 hours) at 3/5/2025 1302  Last data filed at 3/4/2025 2008  Gross per 24 hour   Intake 10 ml   Output 300 ml   Net -290 ml        Exam:     Physical Exam:    Gen:    Well-developed, well-nourished, in no acute distress  HEENT:  Pink conjunctivae, hearing intact to voice, moist mucous membranes  Neck:  Supple, without obvious masses  Resp:  No accessory muscle use, clear breath sounds without wheezes rales or rhonchi  Card:   No murmurs, normal S1, S2 without thrills, bruits or peripheral edema  Abd:  Soft, non-tender, non-distended, no palpable organomegaly   MSK: Left leg is splinted to just below the knee.  Patient is able to move toes without issue.  Skin:   No rashes or ulcers, skin turgor is good  Neuro:  Cranial nerves are grossly intact, no focal motor weakness, follows commands appropriately  Psych:  Good insight, oriented to person, place and time, alert      Medications Reviewed: (see below)    Lab Data Reviewed: (see below)    ______________________________________________________________________    Medications:     Current Facility-Administered Medications   Medication Dose Route Frequency    calcium carbonate (TUMS) chewable tablet 500 mg

## 2025-03-05 NOTE — PROGRESS NOTES
Orthopedic NP Progress Note      March 5, 2025 1:43 PM     Nicole Galeano    Attending Physician: Treatment Team:   Kartik Lobo MD Reynolds, MD Kendra Webb, ISAIAH Porter NP, Justine Sanders, Kimberly Garibay, FIDEL Eid, Maria Isabel RN     Vital Signs:    Patient Vitals for the past 8 hrs:   BP Temp Temp src Pulse Resp SpO2   03/05/25 1030 112/71 -- -- -- -- --   03/05/25 0742 (!) 133/90 99.5 °F (37.5 °C) Oral 85 18 96 %          Intake/Output:  No intake/output data recorded.  03/03 1901 - 03/05 0700  In: 10 [I.V.:10]  Out: 300 [Urine:300]    Pain Control:        LAB:    Recent Labs     03/05/25  0355   HCT 35.9   HGB 11.3*     Lab Results   Component Value Date/Time     03/05/2025 03:55 AM    K 4.1 03/05/2025 03:55 AM     03/05/2025 03:55 AM    CO2 28 03/05/2025 03:55 AM    BUN 15 03/05/2025 03:55 AM       Subjective:  Nicole Galeano is a 79 y.o. female with L ankle fracture, pain fairly well managed at this time, daughter and son in law at bedside      Objective: General: alert, cooperative, no distress.    Neuro/Vascular: CNS Intact.  Sensation stable. Brisk cap refill, 2+ pulses UE/LE  Musculoskeletal:    LLE - posterior short leg splint with stirr up splint in place, able to ROM toes, NVI   Skin: warm and dry                PT/OT:   Gait:                      Assessment:    s/p     Principal Problem:    Acute left ankle pain  Resolved Problems:    * No resolved hospital problems. *       Plan:   -   L Elroy Fracture -  I again today discussed the need for surgery the pt and her family, after a long discussion pt has agreed to have surgery with Dr. Kirby, we will plan for surgery later this week. Pt may be up with PT/OT [facility] LLE.   -  will let pt and family know once surgery date and time is arranged       Discharge Planning: SNF      Signed By: ISAIAH Oneill NP    Orthopedic Nurse Practitioner

## 2025-03-05 NOTE — PLAN OF CARE
Problem: Discharge Planning  Goal: Discharge to home or other facility with appropriate resources  3/5/2025 0148 by Juan Francisco Crenshaw LPN  Outcome: Progressing  3/4/2025 1750 by Teetee Omalley, RN  Outcome: Progressing     Problem: Skin/Tissue Integrity  Goal: Skin integrity remains intact  Description: 1.  Monitor for areas of redness and/or skin breakdown  2.  Assess vascular access sites hourly  3.  Every 4-6 hours minimum:  Change oxygen saturation probe site  4.  Every 4-6 hours:  If on nasal continuous positive airway pressure, respiratory therapy assess nares and determine need for appliance change or resting period  3/5/2025 0148 by Juan Francisco Crenshaw LPN  Outcome: Progressing  3/4/2025 1750 by Teetee Omalley, RN  Outcome: Progressing     Problem: Safety - Adult  Goal: Free from fall injury  3/5/2025 0148 by Juan Francisco Crenshaw LPN  Outcome: Progressing  3/4/2025 1750 by Teetee Omalley, RN  Outcome: Progressing

## 2025-03-05 NOTE — PLAN OF CARE
Problem: Discharge Planning  Goal: Discharge to home or other facility with appropriate resources  Outcome: Progressing     Problem: Skin/Tissue Integrity  Goal: Skin integrity remains intact  Description: 1.  Monitor for areas of redness and/or skin breakdown  2.  Assess vascular access sites hourly  3.  Every 4-6 hours minimum:  Change oxygen saturation probe site  4.  Every 4-6 hours:  If on nasal continuous positive airway pressure, respiratory therapy assess nares and determine need for appliance change or resting period  Outcome: Progressing     Problem: Safety - Adult  Goal: Free from fall injury  Outcome: Progressing     Problem: Pain  Goal: Verbalizes/displays adequate comfort level or baseline comfort level  Outcome: Progressing

## 2025-03-06 ENCOUNTER — APPOINTMENT (OUTPATIENT)
Facility: HOSPITAL | Age: 80
DRG: 492 | End: 2025-03-06
Payer: MEDICARE

## 2025-03-06 LAB
ALBUMIN SERPL-MCNC: 2 G/DL (ref 3.5–5)
ALBUMIN/GLOB SERPL: 0.6 (ref 1.1–2.2)
ALP SERPL-CCNC: 530 U/L (ref 45–117)
ALT SERPL-CCNC: 30 U/L (ref 12–78)
AMMONIA PLAS-SCNC: 22 UMOL/L
ANION GAP SERPL CALC-SCNC: 7 MMOL/L (ref 2–12)
APPEARANCE UR: CLEAR
ARTERIAL PATENCY WRIST A: POSITIVE
AST SERPL-CCNC: 28 U/L (ref 15–37)
BACTERIA URNS QL MICRO: ABNORMAL /HPF
BASE EXCESS BLD CALC-SCNC: 5.3 MMOL/L
BASOPHILS # BLD: 0.03 K/UL (ref 0–0.1)
BASOPHILS NFR BLD: 0.3 % (ref 0–1)
BDY SITE: ABNORMAL
BILIRUB SERPL-MCNC: 1 MG/DL (ref 0.2–1)
BILIRUB UR QL: NEGATIVE
BUN SERPL-MCNC: 18 MG/DL (ref 6–20)
BUN/CREAT SERPL: 40 (ref 12–20)
CALCIUM SERPL-MCNC: 8.2 MG/DL (ref 8.5–10.1)
CHLORIDE SERPL-SCNC: 101 MMOL/L (ref 97–108)
CO2 SERPL-SCNC: 30 MMOL/L (ref 21–32)
COLOR UR: ABNORMAL
COMMENT:: NORMAL
CREAT SERPL-MCNC: 0.45 MG/DL (ref 0.55–1.02)
DIFFERENTIAL METHOD BLD: ABNORMAL
EOSINOPHIL # BLD: 0.13 K/UL (ref 0–0.4)
EOSINOPHIL NFR BLD: 1.5 % (ref 0–7)
EPITH CASTS URNS QL MICRO: ABNORMAL /LPF
ERYTHROCYTE [DISTWIDTH] IN BLOOD BY AUTOMATED COUNT: 13.6 % (ref 11.5–14.5)
GAS FLOW.O2 O2 DELIVERY SYS: ABNORMAL
GLOBULIN SER CALC-MCNC: 3.6 G/DL (ref 2–4)
GLUCOSE BLD STRIP.AUTO-MCNC: 95 MG/DL (ref 65–117)
GLUCOSE SERPL-MCNC: 92 MG/DL (ref 65–100)
GLUCOSE UR STRIP.AUTO-MCNC: NEGATIVE MG/DL
HCO3 BLD-SCNC: 30.7 MMOL/L (ref 21–28)
HCT VFR BLD AUTO: 34.1 % (ref 35–47)
HGB BLD-MCNC: 11 G/DL (ref 11.5–16)
HGB UR QL STRIP: NEGATIVE
IMM GRANULOCYTES # BLD AUTO: 0.07 K/UL (ref 0–0.04)
IMM GRANULOCYTES NFR BLD AUTO: 0.8 % (ref 0–0.5)
KETONES UR QL STRIP.AUTO: NEGATIVE MG/DL
LEUKOCYTE ESTERASE UR QL STRIP.AUTO: ABNORMAL
LYMPHOCYTES # BLD: 1.24 K/UL (ref 0.8–3.5)
LYMPHOCYTES NFR BLD: 14.4 % (ref 12–49)
MCH RBC QN AUTO: 33.5 PG (ref 26–34)
MCHC RBC AUTO-ENTMCNC: 32.3 G/DL (ref 30–36.5)
MCV RBC AUTO: 104 FL (ref 80–99)
MONOCYTES # BLD: 0.74 K/UL (ref 0–1)
MONOCYTES NFR BLD: 8.6 % (ref 5–13)
NEUTS SEG # BLD: 6.39 K/UL (ref 1.8–8)
NEUTS SEG NFR BLD: 74.4 % (ref 32–75)
NITRITE UR QL STRIP.AUTO: NEGATIVE
NRBC # BLD: 0 K/UL (ref 0–0.01)
NRBC BLD-RTO: 0 PER 100 WBC
O2/TOTAL GAS SETTING VFR VENT: 2 %
PCO2 BLD: 47.7 MMHG (ref 35–48)
PH BLD: 7.42 (ref 7.35–7.45)
PH UR STRIP: 6 (ref 5–8)
PLATELET # BLD AUTO: 345 K/UL (ref 150–400)
PMV BLD AUTO: 9.7 FL (ref 8.9–12.9)
PO2 BLD: 65 MMHG (ref 83–108)
POTASSIUM SERPL-SCNC: 4.2 MMOL/L (ref 3.5–5.1)
PROT SERPL-MCNC: 5.6 G/DL (ref 6.4–8.2)
PROT UR STRIP-MCNC: NEGATIVE MG/DL
RBC # BLD AUTO: 3.28 M/UL (ref 3.8–5.2)
RBC #/AREA URNS HPF: ABNORMAL /HPF (ref 0–5)
SAO2 % BLD: 92.5 % (ref 92–97)
SERVICE CMNT-IMP: NORMAL
SODIUM SERPL-SCNC: 138 MMOL/L (ref 136–145)
SP GR UR REFRACTOMETRY: 1.01 (ref 1–1.03)
SPECIMEN HOLD: NORMAL
SPECIMEN TYPE: ABNORMAL
URINE CULTURE IF INDICATED: ABNORMAL
UROBILINOGEN UR QL STRIP.AUTO: 1 EU/DL (ref 0.2–1)
WBC # BLD AUTO: 8.6 K/UL (ref 3.6–11)
WBC URNS QL MICRO: ABNORMAL /HPF (ref 0–4)

## 2025-03-06 PROCEDURE — 80356 HEROIN METABOLITE: CPT

## 2025-03-06 PROCEDURE — 82962 GLUCOSE BLOOD TEST: CPT

## 2025-03-06 PROCEDURE — 36415 COLL VENOUS BLD VENIPUNCTURE: CPT

## 2025-03-06 PROCEDURE — 6360000002 HC RX W HCPCS: Performed by: FAMILY MEDICINE

## 2025-03-06 PROCEDURE — 6370000000 HC RX 637 (ALT 250 FOR IP): Performed by: FAMILY MEDICINE

## 2025-03-06 PROCEDURE — 2700000000 HC OXYGEN THERAPY PER DAY

## 2025-03-06 PROCEDURE — 80365 DRUG SCREENING OXYCODONE: CPT

## 2025-03-06 PROCEDURE — 94640 AIRWAY INHALATION TREATMENT: CPT

## 2025-03-06 PROCEDURE — 82803 BLOOD GASES ANY COMBINATION: CPT

## 2025-03-06 PROCEDURE — 99231 SBSQ HOSP IP/OBS SF/LOW 25: CPT | Performed by: NURSE PRACTITIONER

## 2025-03-06 PROCEDURE — 94761 N-INVAS EAR/PLS OXIMETRY MLT: CPT

## 2025-03-06 PROCEDURE — 81001 URINALYSIS AUTO W/SCOPE: CPT

## 2025-03-06 PROCEDURE — 80307 DRUG TEST PRSMV CHEM ANLYZR: CPT

## 2025-03-06 PROCEDURE — 80361 OPIATES 1 OR MORE: CPT

## 2025-03-06 PROCEDURE — 80053 COMPREHEN METABOLIC PANEL: CPT

## 2025-03-06 PROCEDURE — 82140 ASSAY OF AMMONIA: CPT

## 2025-03-06 PROCEDURE — 85025 COMPLETE CBC W/AUTO DIFF WBC: CPT

## 2025-03-06 PROCEDURE — 70450 CT HEAD/BRAIN W/O DYE: CPT

## 2025-03-06 PROCEDURE — 36600 WITHDRAWAL OF ARTERIAL BLOOD: CPT

## 2025-03-06 PROCEDURE — 1100000000 HC RM PRIVATE

## 2025-03-06 PROCEDURE — 2500000003 HC RX 250 WO HCPCS: Performed by: FAMILY MEDICINE

## 2025-03-06 RX ORDER — OXYCODONE HYDROCHLORIDE 5 MG/1
2.5 TABLET ORAL EVERY 4 HOURS PRN
Status: DISCONTINUED | OUTPATIENT
Start: 2025-03-06 | End: 2025-03-07

## 2025-03-06 RX ORDER — DIPHENHYDRAMINE HYDROCHLORIDE 50 MG/ML
50 INJECTION, SOLUTION INTRAMUSCULAR; INTRAVENOUS ONCE
Status: DISCONTINUED | OUTPATIENT
Start: 2025-03-07 | End: 2025-03-07

## 2025-03-06 RX ADMIN — PREDNISONE 10 MG: 5 TABLET ORAL at 06:18

## 2025-03-06 RX ADMIN — ATORVASTATIN CALCIUM 40 MG: 20 TABLET, FILM COATED ORAL at 08:24

## 2025-03-06 RX ADMIN — SODIUM CHLORIDE, PRESERVATIVE FREE 10 ML: 5 INJECTION INTRAVENOUS at 08:25

## 2025-03-06 RX ADMIN — ENOXAPARIN SODIUM 40 MG: 100 INJECTION SUBCUTANEOUS at 14:22

## 2025-03-06 RX ADMIN — CITALOPRAM HYDROBROMIDE 40 MG: 20 TABLET ORAL at 08:24

## 2025-03-06 RX ADMIN — ACETAMINOPHEN 650 MG: 325 TABLET ORAL at 06:18

## 2025-03-06 RX ADMIN — ACETAMINOPHEN 650 MG: 325 TABLET ORAL at 23:41

## 2025-03-06 ASSESSMENT — PAIN SCALES - GENERAL: PAINLEVEL_OUTOF10: 0

## 2025-03-06 NOTE — PROGRESS NOTES
RCP Responded to rapid response called at 02:35 for AMS   ABG ordered  Results on 2L/min NC:  pH: 7.416  CO2: 47.7mmHg  O2: 65.2mmHg  HCO3: 30.7  BE(b): 5.3     03/06/25 0329   Oxygen Therapy/Pulse Ox   Blood Gas  Performed? Yes   Ras's Test #1 Pos   Site #1 Right Radial   Site Prepped #1 Yes   Number of Attempts #1 1   Pressure Held #1 Yes   Complications #1 None   Post-procedure #1 Standard   Specimen Status #1 Point of care   How Tolerated? Tolerated well

## 2025-03-06 NOTE — PROGRESS NOTES
Orthopaedic Progress Note  Post Op day: * No surgery found *    2025 12:17 PM     Patient: Nicole Galeano MRN: [id number]  SSN: [id number]    YOB: 1945  Age: 79 y.o.  Sex: female      Admit date:  3/4/2025  Date of Surgery:  [unfilled]   Procedures:    Admitting Physician:  Kartik Lobo MD   Surgeon:  * Surgery not found *    Consulting Physician(s): Treatment Team:   Kartik Lobo, MD Mcrae, MD Kendra Webb, La MALDONADO, ISAIAH - Damaris Rosen, Maria Isabel, FIDEL  Robel, FIDEL Méndez Aleesha              SUBJECTIVE:     Nicole Galeano is a 79 y.o. female who is resting in bed. She is not conversational, intentionally not answering questions. Son is at bedside, and she is not conversing with him either.   OBJECTIVE:       Physical Exam:  General: Alert. Pt initially said \" I have nothing to say to you\" when I first entered the room. Now, patient is nonconversational and not responding to questions. She is alert, eyes open, will follow occasionally. Pt did follow commands with squeezing hands, lifting arms, etc.     Respiratory: Respirations unlabored  Neurological:  Neurovascular exam within normal limits.  Motor: + DF/PF.   Musculoskeletal: Left foot in posterior splint. Cap refill less than 3 seconds.  +PF. DP/ PT +2. SILT with BCR in toes  Dressing/Wound:  Clean, dry and intact. No significant erythema or swelling.      Vital Signs:      Patient Vitals for the past 8 hrs:   BP Temp Temp src Pulse Resp SpO2   25 1054 113/73 97.9 °F (36.6 °C) Oral 70 16 96 %   25 0819 113/69 97.7 °F (36.5 °C) Oral 61 16 95 %   25 0658 -- -- -- 60 -- --   25 0618 126/77 -- -- 60 16 96 %                                          Temp (24hrs), Av °F (36.7 °C), Min:97.7 °F (36.5 °C), Max:98.4 °F (36.9 °C)      Labs:        Recent Labs     25  0228   HCT 34.1*   HGB 11.0*     Lab Results   Component Value Date/Time      03/06/2025 02:28 AM    K 4.2 03/06/2025 02:28 AM     03/06/2025 02:28 AM    CO2 30 03/06/2025 02:28 AM    BUN 18 03/06/2025 02:28 AM           Plan:   -   L Elroy Fracture -  Patient was non conversational. Yesterday, my partner La Gardner NP discussed with family and they agreed to proceed with surgery later this week. . Pt may be up with PT/OT ELIZABETH TAYLORE.   - We will let pt and family know once surgery date and time is arranged                        Signed By:  ISAIAH Burrell - NP    Orthopedic Surgery   [facility]

## 2025-03-06 NOTE — PLAN OF CARE
Problem: Discharge Planning  Goal: Discharge to home or other facility with appropriate resources  3/6/2025 0604 by Juan Francisco Crenshaw LPN  Outcome: Progressing  3/5/2025 1730 by Kimberly Thornton RN  Outcome: Progressing  Flowsheets (Taken 3/5/2025 0800)  Discharge to home or other facility with appropriate resources: Identify barriers to discharge with patient and caregiver     Problem: Skin/Tissue Integrity  Goal: Skin integrity remains intact  Description: 1.  Monitor for areas of redness and/or skin breakdown  2.  Assess vascular access sites hourly  3.  Every 4-6 hours minimum:  Change oxygen saturation probe site  4.  Every 4-6 hours:  If on nasal continuous positive airway pressure, respiratory therapy assess nares and determine need for appliance change or resting period  3/6/2025 0604 by Juan Francisco Crenshaw LPN  Outcome: Progressing  3/5/2025 1730 by Kimberly Thornton RN  Outcome: Progressing  Flowsheets (Taken 3/5/2025 0800)  Skin Integrity Remains Intact: Monitor for areas of redness and/or skin breakdown     Problem: Safety - Adult  Goal: Free from fall injury  3/6/2025 0604 by Juan Francisco Crenshaw LPN  Outcome: Progressing  3/5/2025 1730 by Kimberly Thornton RN  Outcome: Progressing     Problem: Pain  Goal: Verbalizes/displays adequate comfort level or baseline comfort level  3/6/2025 0604 by Juan Francisco Crenshaw LPN  Outcome: Progressing  3/5/2025 1730 by Kimberly Thornton RN  Outcome: Progressing

## 2025-03-06 NOTE — PROGRESS NOTES
(LIPITOR) tablet 40 mg  40 mg Oral Daily    citalopram (CELEXA) tablet 40 mg  40 mg Oral Daily    lisinopril (PRINIVIL;ZESTRIL) tablet 5 mg  5 mg Oral Daily    predniSONE (DELTASONE) tablet 10 mg  10 mg Oral Daily    triazolam (HALCION) tablet 250 mcg  (Patient Supplied)  0.25 mg Oral Nightly PRN    tiotropium (SPIRIVA RESPIMAT) 2.5 MCG/ACT inhaler 2 puff (Patient Supplied)  2 puff Inhalation Daily    I-Joe TABS 1 tablet  1 tablet Oral Daily          Lab Review:     Recent Labs     03/04/25  0844 03/05/25  0355 03/06/25  0228   WBC 14.9* 8.3 8.6   HGB 12.9 11.3* 11.0*   HCT 39.6 35.9 34.1*    357 345     Recent Labs     03/04/25  0844 03/05/25  0355 03/06/25  0228    138 138   K 4.0 4.1 4.2    104 101   CO2 27 28 30   BUN 14 15 18   ALT 45 37 30     No results found for: \"GLUCPOC\"       Assessment / Plan:     Principal Problem:    Acute left ankle pain  Resolved Problems:    * No resolved hospital problems. *        Nicole is a 79 y.o.  female with PMHx past medical history of COPD on 1 L NC at night, rheumatoid arthritis, macular degeneration, radiation induced left lung injury, breast cancer status postlumpectomy and mastectomy, and melanoma who presents after a fall found to have a left bimalleolar ankle fracture.     # Fall/bimalleolar ankle fracture: Acute, present admission.  Secondary to mechanical fall.  Leg is splinted.  Patient is likely increased risk of fractures due to chronic steroid use.  - Ortho consulted--surgical plan per them  -- lovenox  - Pain control with Roxicodone will reduce dose to 2.5 mg and Tylenol  - PT/OT.  Nonweightbearing on left lower extremity.    Drowsiness: occurred overnight.  Possibly intentionally aphasic.  Delirium possible.  Narcotic and benzo induced possible. Withdrawal from alcohol possible.   --reduce narcotic dose        Alcohol abuse: Initial history of 1-2 glass of wine, which son-in-law clarified to be 3-4 glasses minimum or entire bottle of wine  per day.   --CIWA ordered. Will hold ativan for now.      Hypertension: Chronic.  Continue lisinopril     Depression/anxiety: Chronic.  Continue Celexa.     Hyperlipidemia: Chronic.  Continue Lipitor     Rheumatoid arthritis: Chronic.  Continue prednisone 10 mg p.o. once daily.     COPD: Chronic.  Continue Spiriva. continue nasal cannula 1 L at night.     AST elevated: now resolved.       Leukocytosis: acute, present on admission. Now resolved. No source of infection.         **Prior records, notes, labs, radiology, and medications reviewed in [facility] if needed**    Total time spent with patient care: 35 min **I personally saw and examined the patient during this time period**                 Care Plan discussed with: Patient    Discussed:  Care Plan    Prophylaxis:  lovenox    Disposition:  SNF/LTC           ___________________________________________________    Attending Physician: Kartik Lobo MD

## 2025-03-06 NOTE — SIGNIFICANT EVENT
RRT called for drowsiness. On arrival to bedside, pt is resting comfortably on her baseline of 2L NC. She is drowsy, following commands. NIH performed, scored 0. Nursing reports pt w vape pen & purse in bed prior to RRT. Pt denies taking any additional drugs from her purse other than what is prescribed to her here at the hospital. Noted pt's bilateral pupils are pinpointed. She has been prescribed home benzo triazolam as well as CIWA protocol ativan while here in addition to narcotics for her L ankle fracture while here, making polypharmacy in the setting of advanced age a concern. Also concerned of what contents may have been ingested via vape pen/purse at bedside, these items were moved out of pt's reach by nursing. Pt denies use of a CPAP at night PTA. Pt is currently maintaining her airway, in no acute distress.     Plan:  -Obtain ABG, hx of COPD. CO2 retention is a concern.   -Check ammonia (AST slightly elevated, ALP elevated which may be attributed to recent fx). Noted hx of ETOH dependence  -Will check serum tox screen as well w concerns for pt possibly ingesting substance from vape/purse  -HOLD narcotics/CIWA benzos. May need to consider adjustment of home triazolam  -Vitals are stable, no concerns for or sx of infection. PNV to LLE intact (splint in place). Place pt on remote telemetry and continuous pulse ox monitoring  -Low threshold for head imaging if any changes in condition/exam occur    Update: CBC, BMP, ABG, ammonia unremarkable. PO2 65 on ABG, not unexpected in setting of COPD, RT titrated O2 up to 3L NC. Will check UA & head CT. -> head CT reviewed & negative    Vitals:    03/05/25 1947 03/05/25 2017 03/05/25 2252 03/06/25 0230   BP: 97/67 98/71 126/81 116/83   Pulse: 86 81 72 64   Resp: 16  16 14   Temp: 98.4 °F (36.9 °C)  98.1 °F (36.7 °C)    TempSrc: Oral      SpO2: 94% 97% 96% 95%   Weight:       Height:          .Physical Exam  Constitutional:       Comments: Drowsy, arouses to verbal  stimuli   HENT:      Mouth/Throat:      Mouth: Mucous membranes are dry.      Pharynx: Oropharynx is clear.   Eyes:      General: Vision grossly intact. Gaze aligned appropriately.      Extraocular Movements: Extraocular movements intact.   Cardiovascular:      Rate and Rhythm: Normal rate.      Pulses: Normal pulses.   Pulmonary:      Effort: Pulmonary effort is normal.      Breath sounds: Normal breath sounds.   Abdominal:      General: Bowel sounds are normal.      Palpations: Abdomen is soft.   Musculoskeletal:         General: Normal range of motion.      Comments: Weak BLE   Skin:     General: Skin is warm and dry.   Neurological:      General: No focal deficit present.      Cranial Nerves: Cranial nerves 2-12 are intact.      Sensory: Sensation is intact.      Motor: Motor function is intact.   Psychiatric:         Behavior: Behavior is cooperative.

## 2025-03-06 NOTE — PROGRESS NOTES
Rapid Called at 0235    Responded to RRT at 0238 for Altered mental status. Last know well 11 pm. Pt is alert but not oriented. Follows commands, purposeful movement of upper and lower extremities. No loss of sensation. No facial droop.      Provider at bedside: YES SUZY Juarez  Interventions ordered: Labs  Transfer to Higher Level of Care: na  Blood Glucose: 95       Vitals:    03/06/25 0230   BP: 116/83   Pulse: 64   Resp: 14   Temp:    SpO2: 95%        Rapid Ended at 0311  RRT RN assisted with transport to accepting unit NA.    Jim Jarvis, RN

## 2025-03-06 NOTE — PLAN OF CARE
Problem: Discharge Planning  Goal: Discharge to home or other facility with appropriate resources  3/6/2025 0933 by Honey Huston RN  Outcome: [facility] Progressing  3/6/2025 0604 by Juan Francisco Crenshaw LPN  Outcome: Progressing     Problem: Skin/Tissue Integrity  Goal: Skin integrity remains intact  Description: 1.  Monitor for areas of redness and/or skin breakdown  2.  Assess vascular access sites hourly  3.  Every 4-6 hours minimum:  Change oxygen saturation probe site  4.  Every 4-6 hours:  If on nasal continuous positive airway pressure, respiratory therapy assess nares and determine need for appliance change or resting period  3/6/2025 0933 by Honey Huston RN  Outcome: /hospitals Progressing  3/6/2025 0604 by Juan Francisco Crenshaw LPN  Outcome: Progressing     Problem: Safety - Adult  Goal: Free from fall injury  3/6/2025 0933 by Honey Huston RN  Outcome: /hospitals Progressing  3/6/2025 0604 by Juan Francisco Crenshaw LPN  Outcome: Progressing     Problem: Pain  Goal: Verbalizes/displays adequate comfort level or baseline comfort level  3/6/2025 0933 by Honey Huston RN  Outcome: /hospitals Progressing  3/6/2025 0604 by Juan Francisco Crenshaw LPN  Outcome: Progressing

## 2025-03-06 NOTE — CARE COORDINATION
Case Management Note          Per chart review patient is not conversational with staff at this time. Ortho is following for surgery, unknown date at this time. CM following.             ___________________________________________   Damaris Rodriguez RN Case Manager  3/6/2025   4:07 PM

## 2025-03-07 ENCOUNTER — APPOINTMENT (OUTPATIENT)
Facility: HOSPITAL | Age: 80
DRG: 492 | End: 2025-03-07
Payer: MEDICARE

## 2025-03-07 ENCOUNTER — ANESTHESIA EVENT (OUTPATIENT)
Facility: HOSPITAL | Age: 80
End: 2025-03-07
Payer: MEDICARE

## 2025-03-07 ENCOUNTER — ANESTHESIA (OUTPATIENT)
Facility: HOSPITAL | Age: 80
End: 2025-03-07
Payer: MEDICARE

## 2025-03-07 LAB
ALBUMIN SERPL-MCNC: 2.2 G/DL (ref 3.5–5)
ALBUMIN/GLOB SERPL: 0.6 (ref 1.1–2.2)
ALP SERPL-CCNC: 554 U/L (ref 45–117)
ALT SERPL-CCNC: 32 U/L (ref 12–78)
ANION GAP SERPL CALC-SCNC: 6 MMOL/L (ref 2–12)
AST SERPL-CCNC: 38 U/L (ref 15–37)
BASOPHILS # BLD: 0.05 K/UL (ref 0–0.1)
BASOPHILS NFR BLD: 0.5 % (ref 0–1)
BILIRUB SERPL-MCNC: 0.8 MG/DL (ref 0.2–1)
BUN SERPL-MCNC: 12 MG/DL (ref 6–20)
BUN/CREAT SERPL: 26 (ref 12–20)
CALCIUM SERPL-MCNC: 8.1 MG/DL (ref 8.5–10.1)
CHLORIDE SERPL-SCNC: 102 MMOL/L (ref 97–108)
CO2 SERPL-SCNC: 28 MMOL/L (ref 21–32)
CREAT SERPL-MCNC: 0.46 MG/DL (ref 0.55–1.02)
DIFFERENTIAL METHOD BLD: ABNORMAL
EOSINOPHIL # BLD: 0.2 K/UL (ref 0–0.4)
EOSINOPHIL NFR BLD: 2.1 % (ref 0–7)
ERYTHROCYTE [DISTWIDTH] IN BLOOD BY AUTOMATED COUNT: 13.4 % (ref 11.5–14.5)
GLOBULIN SER CALC-MCNC: 3.6 G/DL (ref 2–4)
GLUCOSE SERPL-MCNC: 100 MG/DL (ref 65–100)
HCT VFR BLD AUTO: 33.2 % (ref 35–47)
HGB BLD-MCNC: 10.6 G/DL (ref 11.5–16)
IMM GRANULOCYTES # BLD AUTO: 0.06 K/UL (ref 0–0.04)
IMM GRANULOCYTES NFR BLD AUTO: 0.6 % (ref 0–0.5)
LYMPHOCYTES # BLD: 1.08 K/UL (ref 0.8–3.5)
LYMPHOCYTES NFR BLD: 11.3 % (ref 12–49)
MCH RBC QN AUTO: 33 PG (ref 26–34)
MCHC RBC AUTO-ENTMCNC: 31.9 G/DL (ref 30–36.5)
MCV RBC AUTO: 103.4 FL (ref 80–99)
MONOCYTES # BLD: 0.72 K/UL (ref 0–1)
MONOCYTES NFR BLD: 7.6 % (ref 5–13)
NEUTS SEG # BLD: 7.42 K/UL (ref 1.8–8)
NEUTS SEG NFR BLD: 77.9 % (ref 32–75)
NRBC # BLD: 0 K/UL (ref 0–0.01)
NRBC BLD-RTO: 0 PER 100 WBC
PLATELET # BLD AUTO: 367 K/UL (ref 150–400)
PMV BLD AUTO: 9.2 FL (ref 8.9–12.9)
POTASSIUM SERPL-SCNC: 4.4 MMOL/L (ref 3.5–5.1)
PROT SERPL-MCNC: 5.8 G/DL (ref 6.4–8.2)
RBC # BLD AUTO: 3.21 M/UL (ref 3.8–5.2)
SODIUM SERPL-SCNC: 136 MMOL/L (ref 136–145)
WBC # BLD AUTO: 9.5 K/UL (ref 3.6–11)

## 2025-03-07 PROCEDURE — 2580000003 HC RX 258: Performed by: ORTHOPAEDIC SURGERY

## 2025-03-07 PROCEDURE — 6360000002 HC RX W HCPCS: Performed by: ORTHOPAEDIC SURGERY

## 2025-03-07 PROCEDURE — 1100000000 HC RM PRIVATE

## 2025-03-07 PROCEDURE — 6370000000 HC RX 637 (ALT 250 FOR IP): Performed by: FAMILY MEDICINE

## 2025-03-07 PROCEDURE — 2700000000 HC OXYGEN THERAPY PER DAY

## 2025-03-07 PROCEDURE — 3700000000 HC ANESTHESIA ATTENDED CARE: Performed by: ORTHOPAEDIC SURGERY

## 2025-03-07 PROCEDURE — 2500000003 HC RX 250 WO HCPCS: Performed by: ORTHOPAEDIC SURGERY

## 2025-03-07 PROCEDURE — 7100000001 HC PACU RECOVERY - ADDTL 15 MIN: Performed by: ORTHOPAEDIC SURGERY

## 2025-03-07 PROCEDURE — 85025 COMPLETE CBC W/AUTO DIFF WBC: CPT

## 2025-03-07 PROCEDURE — 80053 COMPREHEN METABOLIC PANEL: CPT

## 2025-03-07 PROCEDURE — 6370000000 HC RX 637 (ALT 250 FOR IP): Performed by: ORTHOPAEDIC SURGERY

## 2025-03-07 PROCEDURE — 2709999900 HC NON-CHARGEABLE SUPPLY: Performed by: ORTHOPAEDIC SURGERY

## 2025-03-07 PROCEDURE — 94761 N-INVAS EAR/PLS OXIMETRY MLT: CPT

## 2025-03-07 PROCEDURE — 2720000010 HC SURG SUPPLY STERILE: Performed by: ORTHOPAEDIC SURGERY

## 2025-03-07 PROCEDURE — 2500000003 HC RX 250 WO HCPCS: Performed by: FAMILY MEDICINE

## 2025-03-07 PROCEDURE — 99231 SBSQ HOSP IP/OBS SF/LOW 25: CPT | Performed by: NURSE PRACTITIONER

## 2025-03-07 PROCEDURE — 3600000014 HC SURGERY LEVEL 4 ADDTL 15MIN: Performed by: ORTHOPAEDIC SURGERY

## 2025-03-07 PROCEDURE — C1713 ANCHOR/SCREW BN/BN,TIS/BN: HCPCS | Performed by: ORTHOPAEDIC SURGERY

## 2025-03-07 PROCEDURE — 2580000003 HC RX 258: Performed by: NURSE ANESTHETIST, CERTIFIED REGISTERED

## 2025-03-07 PROCEDURE — 3600000004 HC SURGERY LEVEL 4 BASE: Performed by: ORTHOPAEDIC SURGERY

## 2025-03-07 PROCEDURE — 6360000002 HC RX W HCPCS: Performed by: ANESTHESIOLOGY

## 2025-03-07 PROCEDURE — 6360000002 HC RX W HCPCS: Performed by: NURSE ANESTHETIST, CERTIFIED REGISTERED

## 2025-03-07 PROCEDURE — 3700000001 HC ADD 15 MINUTES (ANESTHESIA): Performed by: ORTHOPAEDIC SURGERY

## 2025-03-07 PROCEDURE — 6370000000 HC RX 637 (ALT 250 FOR IP): Performed by: NURSE PRACTITIONER

## 2025-03-07 PROCEDURE — 7100000000 HC PACU RECOVERY - FIRST 15 MIN: Performed by: ORTHOPAEDIC SURGERY

## 2025-03-07 PROCEDURE — C9359 IMPLNT,BON VOID FILLER-PUTTY: HCPCS | Performed by: ORTHOPAEDIC SURGERY

## 2025-03-07 PROCEDURE — 0QSH04Z REPOSITION LEFT TIBIA WITH INTERNAL FIXATION DEVICE, OPEN APPROACH: ICD-10-PCS | Performed by: ORTHOPAEDIC SURGERY

## 2025-03-07 PROCEDURE — 0QSK04Z REPOSITION LEFT FIBULA WITH INTERNAL FIXATION DEVICE, OPEN APPROACH: ICD-10-PCS | Performed by: ORTHOPAEDIC SURGERY

## 2025-03-07 PROCEDURE — 2500000003 HC RX 250 WO HCPCS: Performed by: NURSE ANESTHETIST, CERTIFIED REGISTERED

## 2025-03-07 DEVICE — KREULOCK COMPRESSION SCREW SS 3.5X30MM: Type: IMPLANTABLE DEVICE | Site: ANKLE | Status: FUNCTIONAL

## 2025-03-07 DEVICE — GRAFT BNE SUB 7.5GM PTTY SYN SIGNAFUSE: Type: IMPLANTABLE DEVICE | Site: ANKLE | Status: FUNCTIONAL

## 2025-03-07 DEVICE — CORTEX SCREW
Type: IMPLANTABLE DEVICE | Site: ANKLE | Status: FUNCTIONAL
Brand: AXSOS

## 2025-03-07 DEVICE — IMPLANTABLE DEVICE: Type: IMPLANTABLE DEVICE | Site: ANKLE | Status: FUNCTIONAL

## 2025-03-07 DEVICE — SCREW BNE L30MM DIA3.5MM CORT ANK S STL NONLOCKING LO PROF: Type: IMPLANTABLE DEVICE | Site: ANKLE | Status: FUNCTIONAL

## 2025-03-07 DEVICE — PLATE BNE 3 H MED S STL LOK FOR ANK FRAC MGMT: Type: IMPLANTABLE DEVICE | Site: ANKLE | Status: FUNCTIONAL

## 2025-03-07 RX ORDER — BUPIVACAINE HYDROCHLORIDE 2.5 MG/ML
INJECTION, SOLUTION EPIDURAL; INFILTRATION; INTRACAUDAL
Status: DISCONTINUED | OUTPATIENT
Start: 2025-03-07 | End: 2025-03-07 | Stop reason: SDUPTHER

## 2025-03-07 RX ORDER — SODIUM CHLORIDE, SODIUM LACTATE, POTASSIUM CHLORIDE, CALCIUM CHLORIDE 600; 310; 30; 20 MG/100ML; MG/100ML; MG/100ML; MG/100ML
INJECTION, SOLUTION INTRAVENOUS CONTINUOUS
Status: DISCONTINUED | OUTPATIENT
Start: 2025-03-07 | End: 2025-03-08

## 2025-03-07 RX ORDER — FENTANYL CITRATE 50 UG/ML
100 INJECTION, SOLUTION INTRAMUSCULAR; INTRAVENOUS
Status: DISCONTINUED | OUTPATIENT
Start: 2025-03-07 | End: 2025-03-07 | Stop reason: HOSPADM

## 2025-03-07 RX ORDER — SODIUM CHLORIDE 9 MG/ML
INJECTION, SOLUTION INTRAVENOUS PRN
Status: DISCONTINUED | OUTPATIENT
Start: 2025-03-07 | End: 2025-03-14 | Stop reason: HOSPADM

## 2025-03-07 RX ORDER — MIDAZOLAM HYDROCHLORIDE 2 MG/2ML
2 INJECTION, SOLUTION INTRAMUSCULAR; INTRAVENOUS
Status: DISCONTINUED | OUTPATIENT
Start: 2025-03-07 | End: 2025-03-07 | Stop reason: HOSPADM

## 2025-03-07 RX ORDER — SODIUM CHLORIDE, SODIUM LACTATE, POTASSIUM CHLORIDE, CALCIUM CHLORIDE 600; 310; 30; 20 MG/100ML; MG/100ML; MG/100ML; MG/100ML
INJECTION, SOLUTION INTRAVENOUS CONTINUOUS
Status: DISCONTINUED | OUTPATIENT
Start: 2025-03-07 | End: 2025-03-07 | Stop reason: HOSPADM

## 2025-03-07 RX ORDER — FENTANYL CITRATE 50 UG/ML
INJECTION, SOLUTION INTRAMUSCULAR; INTRAVENOUS
Status: DISCONTINUED | OUTPATIENT
Start: 2025-03-07 | End: 2025-03-07 | Stop reason: SDUPTHER

## 2025-03-07 RX ORDER — MIDAZOLAM HYDROCHLORIDE 1 MG/ML
INJECTION, SOLUTION INTRAMUSCULAR; INTRAVENOUS
Status: DISCONTINUED | OUTPATIENT
Start: 2025-03-07 | End: 2025-03-07 | Stop reason: SDUPTHER

## 2025-03-07 RX ORDER — DIPHENHYDRAMINE HYDROCHLORIDE 50 MG/ML
12.5 INJECTION, SOLUTION INTRAMUSCULAR; INTRAVENOUS
Status: DISCONTINUED | OUTPATIENT
Start: 2025-03-07 | End: 2025-03-07 | Stop reason: HOSPADM

## 2025-03-07 RX ORDER — LIDOCAINE HYDROCHLORIDE 10 MG/ML
1 INJECTION, SOLUTION EPIDURAL; INFILTRATION; INTRACAUDAL; PERINEURAL
Status: DISCONTINUED | OUTPATIENT
Start: 2025-03-07 | End: 2025-03-07 | Stop reason: HOSPADM

## 2025-03-07 RX ORDER — PROPOFOL 10 MG/ML
INJECTION, EMULSION INTRAVENOUS
Status: DISCONTINUED | OUTPATIENT
Start: 2025-03-07 | End: 2025-03-07 | Stop reason: SDUPTHER

## 2025-03-07 RX ORDER — SODIUM CHLORIDE 9 MG/ML
INJECTION, SOLUTION INTRAVENOUS CONTINUOUS
Status: DISCONTINUED | OUTPATIENT
Start: 2025-03-07 | End: 2025-03-07 | Stop reason: HOSPADM

## 2025-03-07 RX ORDER — DIPHENHYDRAMINE HCL 25 MG
50 CAPSULE ORAL ONCE
Status: COMPLETED | OUTPATIENT
Start: 2025-03-07 | End: 2025-03-07

## 2025-03-07 RX ORDER — EPHEDRINE SULFATE/0.9% NACL/PF 50 MG/5 ML
SYRINGE (ML) INTRAVENOUS
Status: DISCONTINUED | OUTPATIENT
Start: 2025-03-07 | End: 2025-03-07 | Stop reason: SDUPTHER

## 2025-03-07 RX ORDER — HYDROCODONE BITARTRATE AND ACETAMINOPHEN 5; 325 MG/1; MG/1
1 TABLET ORAL EVERY 6 HOURS PRN
Status: DISCONTINUED | OUTPATIENT
Start: 2025-03-07 | End: 2025-03-08

## 2025-03-07 RX ORDER — SODIUM CHLORIDE 0.9 % (FLUSH) 0.9 %
5-40 SYRINGE (ML) INJECTION PRN
Status: DISCONTINUED | OUTPATIENT
Start: 2025-03-07 | End: 2025-03-14 | Stop reason: HOSPADM

## 2025-03-07 RX ORDER — SODIUM CHLORIDE 0.9 % (FLUSH) 0.9 %
5-40 SYRINGE (ML) INJECTION EVERY 12 HOURS SCHEDULED
Status: DISCONTINUED | OUTPATIENT
Start: 2025-03-07 | End: 2025-03-14 | Stop reason: HOSPADM

## 2025-03-07 RX ORDER — SODIUM CHLORIDE 9 MG/ML
INJECTION, SOLUTION INTRAVENOUS
Status: DISCONTINUED | OUTPATIENT
Start: 2025-03-07 | End: 2025-03-07 | Stop reason: SDUPTHER

## 2025-03-07 RX ORDER — ONDANSETRON 4 MG/1
4 TABLET, ORALLY DISINTEGRATING ORAL EVERY 8 HOURS PRN
Status: DISCONTINUED | OUTPATIENT
Start: 2025-03-07 | End: 2025-03-14 | Stop reason: HOSPADM

## 2025-03-07 RX ORDER — NALOXONE HYDROCHLORIDE 0.4 MG/ML
INJECTION, SOLUTION INTRAMUSCULAR; INTRAVENOUS; SUBCUTANEOUS PRN
Status: DISCONTINUED | OUTPATIENT
Start: 2025-03-07 | End: 2025-03-07 | Stop reason: HOSPADM

## 2025-03-07 RX ORDER — ONDANSETRON 2 MG/ML
4 INJECTION INTRAMUSCULAR; INTRAVENOUS EVERY 6 HOURS PRN
Status: DISCONTINUED | OUTPATIENT
Start: 2025-03-07 | End: 2025-03-14 | Stop reason: HOSPADM

## 2025-03-07 RX ORDER — ONDANSETRON 2 MG/ML
4 INJECTION INTRAMUSCULAR; INTRAVENOUS
Status: DISCONTINUED | OUTPATIENT
Start: 2025-03-07 | End: 2025-03-07 | Stop reason: HOSPADM

## 2025-03-07 RX ORDER — ENOXAPARIN SODIUM 100 MG/ML
40 INJECTION SUBCUTANEOUS DAILY
Status: DISCONTINUED | OUTPATIENT
Start: 2025-03-08 | End: 2025-03-14 | Stop reason: HOSPADM

## 2025-03-07 RX ADMIN — Medication 10 MG: at 17:28

## 2025-03-07 RX ADMIN — FENTANYL CITRATE 100 MCG: 50 INJECTION, SOLUTION INTRAMUSCULAR; INTRAVENOUS at 15:51

## 2025-03-07 RX ADMIN — PREDNISONE 10 MG: 5 TABLET ORAL at 06:38

## 2025-03-07 RX ADMIN — Medication 250 MCG: at 23:33

## 2025-03-07 RX ADMIN — SODIUM CHLORIDE, POTASSIUM CHLORIDE, SODIUM LACTATE AND CALCIUM CHLORIDE: 600; 310; 30; 20 INJECTION, SOLUTION INTRAVENOUS at 21:19

## 2025-03-07 RX ADMIN — PROPOFOL 20 MG: 10 INJECTION, EMULSION INTRAVENOUS at 16:22

## 2025-03-07 RX ADMIN — Medication 10 MG: at 17:05

## 2025-03-07 RX ADMIN — BUPIVACAINE 10 ML: 13.3 INJECTION, SUSPENSION, LIPOSOMAL INFILTRATION at 15:54

## 2025-03-07 RX ADMIN — OXYCODONE HYDROCHLORIDE 2.5 MG: 5 TABLET ORAL at 06:50

## 2025-03-07 RX ADMIN — CITALOPRAM HYDROBROMIDE 40 MG: 20 TABLET ORAL at 08:56

## 2025-03-07 RX ADMIN — Medication 5 MG: at 16:24

## 2025-03-07 RX ADMIN — SODIUM CHLORIDE, PRESERVATIVE FREE 10 ML: 5 INJECTION INTRAVENOUS at 00:39

## 2025-03-07 RX ADMIN — WATER 2000 MG: 1 INJECTION INTRAMUSCULAR; INTRAVENOUS; SUBCUTANEOUS at 23:32

## 2025-03-07 RX ADMIN — SODIUM CHLORIDE: 9 INJECTION, SOLUTION INTRAVENOUS at 15:30

## 2025-03-07 RX ADMIN — LISINOPRIL 5 MG: 5 TABLET ORAL at 08:57

## 2025-03-07 RX ADMIN — PROPOFOL 50 MG: 10 INJECTION, EMULSION INTRAVENOUS at 16:23

## 2025-03-07 RX ADMIN — MIDAZOLAM HYDROCHLORIDE 2 MG: 1 INJECTION, SOLUTION INTRAMUSCULAR; INTRAVENOUS at 15:51

## 2025-03-07 RX ADMIN — FENTANYL CITRATE 50 MCG: 50 INJECTION, SOLUTION INTRAMUSCULAR; INTRAVENOUS at 17:00

## 2025-03-07 RX ADMIN — ACETAMINOPHEN 650 MG: 325 TABLET ORAL at 23:42

## 2025-03-07 RX ADMIN — ACETAMINOPHEN 650 MG: 325 TABLET ORAL at 06:38

## 2025-03-07 RX ADMIN — Medication 1 TABLET: at 08:56

## 2025-03-07 RX ADMIN — Medication 250 MCG: at 00:37

## 2025-03-07 RX ADMIN — PROPOFOL 30 MCG/KG/MIN: 10 INJECTION, EMULSION INTRAVENOUS at 16:25

## 2025-03-07 RX ADMIN — DIPHENHYDRAMINE HCL 50 MG: 25 CAPSULE ORAL at 03:28

## 2025-03-07 RX ADMIN — WATER 2000 MG: 1 INJECTION INTRAMUSCULAR; INTRAVENOUS; SUBCUTANEOUS at 16:25

## 2025-03-07 RX ADMIN — HYDROCODONE BITARTRATE AND ACETAMINOPHEN 1 TABLET: 5; 325 TABLET ORAL at 20:58

## 2025-03-07 RX ADMIN — ATORVASTATIN CALCIUM 40 MG: 20 TABLET, FILM COATED ORAL at 08:57

## 2025-03-07 RX ADMIN — Medication 100 MG: at 08:57

## 2025-03-07 RX ADMIN — SODIUM CHLORIDE, PRESERVATIVE FREE 10 ML: 5 INJECTION INTRAVENOUS at 21:00

## 2025-03-07 RX ADMIN — Medication 10 MG: at 17:54

## 2025-03-07 RX ADMIN — BUPIVACAINE HYDROCHLORIDE 20 ML: 2.5 INJECTION, SOLUTION EPIDURAL; INFILTRATION; INTRACAUDAL; PERINEURAL at 15:55

## 2025-03-07 RX ADMIN — Medication 20 MG: at 16:35

## 2025-03-07 ASSESSMENT — PAIN SCALES - GENERAL
PAINLEVEL_OUTOF10: 4
PAINLEVEL_OUTOF10: 10

## 2025-03-07 ASSESSMENT — PAIN DESCRIPTION - LOCATION
LOCATION: FOOT
LOCATION: ANKLE

## 2025-03-07 ASSESSMENT — PAIN - FUNCTIONAL ASSESSMENT: PAIN_FUNCTIONAL_ASSESSMENT: 0-10

## 2025-03-07 ASSESSMENT — COPD QUESTIONNAIRES: CAT_SEVERITY: MODERATE

## 2025-03-07 ASSESSMENT — PAIN DESCRIPTION - ORIENTATION
ORIENTATION: LEFT
ORIENTATION: LEFT

## 2025-03-07 NOTE — PROGRESS NOTES
Ortho update:    Pt is conversational today. Pt stated that she chose not to speak to me yesterday, but, today she feels better and is willing to talk. Pt does want to proceed with surgery. Pt did eat breakfast this am.     VSS  Breathing even/ nonlabored.   Pt is pleasant, alert.   Left foot in posterior splint. Able to wiggle toes with diff. Cap refill less than 3 seconds. Calves soft/ nontender.     Left bimalleolar ankle fx  Discussed with Dr. Kirby, plan for surgery this afternoon.   Case is posted.   Pt has been NPO since breakfast.   Discussed with family.  Consents to be signed for ORIF left ankle by Dr. Kirby.     Jessie Damico NP

## 2025-03-07 NOTE — CARE COORDINATION
Care Management Initial Assessment  3/7/2025 4:47 PM  If patient is discharged prior to next notation, then this note serves as note for discharge by case management.      Reason for Admission:   Closed fracture of left ankle, initial encounter [S82.892A]  Acute left ankle pain [M25.572]  Chronic obstructive pulmonary disease, unspecified COPD type (HCC) [J44.9]  Rheumatoid arthritis, involving unspecified site, unspecified whether rheumatoid factor present (HCC) [M06.9]  Ground-level fall [W18.30XA]  Procedure(s) (LRB):  LEFT ANKLE OPEN REDUCTION INTERNAL FIXATION (Left)  Day of Surgery      Patient Admission Status: Inpatient  Date Admitted to INP: 3/4/25  RUR: Readmission Risk Score: 11.8      Hospitalization in the last 30 days (Readmission):  No        Advance Care Planning:  Code Status: DNR  Primary Healthcare Decision Maker: (P) Legal Next of Kin  Primary Decision Maker: Nancy Frances  Child - [phone]   Advance Directive: legal NOK     __________________________________________________________________________  Assessment:      03/07/25 [facility]   Service Assessment   Patient Orientation   (patient is sleeping)   History Provided By Child/Family  (son in law leon [phone])   Primary Caregiver Family   Accompanied By/Relationship son in law leon   Support Systems Children   Patient's Healthcare Decision Maker is: Legal Next of Kin   PCP Verified by CM Yes  (Dr. Martinez)   Last Visit to PCP Within last 6 months   Prior Functional Level Assistance with the following:;Bathing;Dressing   Can patient return to prior living arrangement Other (see comment)  (TBD based on ortho plan and therapy recommendations)   Family able to assist with home care needs: Yes   Financial Resources Other (Comment)  ([facility] medicare)   Community Resources None   Social/Functional History   Lives With Daughter  (and son in law)   Type of Home House   Home Layout Two level;Able to Live on Main level with bedroom/bathroom   Home  Access Stairs to enter with rails   Entrance Stairs - Number of Steps 4   Bathroom Equipment Grab bars in shower;Shower chair   Home Equipment Walker - Rolling;Rollator  (transport wc)   Prior Level of Assist for ADLs Needs assistance   Active  No   Patient's  Info daughter Nancy or son in law Maikol   Discharge Planning   Type of Residence House   Living Arrangements Children   Current Services Prior To Admission None   Potential Assistance Needed Other (Comment)  (TBD based on ortho plan and therapy recommendations)   Patient expects to be discharged to: Other (comment)  (TBD)   One/Two Story Residence Two story, live on first floor   Services At/After Discharge    Resource Information Provided? No   Mode of Transport at Discharge Other (see comment)  (TBD)         Comments: CM met with patient and son in law earlier in the day at bedside. Patient was sleeping and son in law maikol asked that CM not wake her up. Patient lives with her daughter and son in law, family assists with ADL's and driving needs.       Discharge Concerns: []Yes [x]No []Unknown   Describe:    Financial concerns/barriers: []Yes, explain: [x]No []Unknown/Not discussed  __________________________________________________________________________    Insurer:   Active Insurance as of 3/4/2025       Primary Coverage       Payor Plan Insurance Group Employer/Plan Group    [facility] MEDICARE [facility] DUAL COMPLETE [id number]       Payor Plan Address Payor Plan Phone Number Payor Plan Fax Number Effective Dates    [facility] [address] [phone]  1/1/2025 - None Entered    [facility] [id number]         Subscriber Name Subscriber Birth Date Member ID       STANISLAV FISHMAN 1945 [id number]               Secondary Coverage       Payor Plan Insurance Group Employer/Plan Group    [facility] MEDICAID COMMUNITY HE[employer] [place]AN [id number]       Payor Plan Address Payor Plan Phone Number Payor Plan Fax Number Effective Dates

## 2025-03-07 NOTE — BRIEF OP NOTE
Brief Postoperative Note      Patient: Nicole Galeano  YOB: 1945  MRN: [id number]    Date of Procedure: 3/7/2025    Pre-op Diagnosis:  Closed LEFT bimalleolar ankle fracture    Post-Op Diagnosis: Same       Procedure:  OPEN REDUCTION INTERNAL FIXATION LEFT BIMALLEOLAR ANKLE FRACTURE  INDEPENDENT INTERPRETATION OF INTRA-OPERATIVE FLUOROSCOPY      Surgeon(s):  Arvind Kirby MD    Assistant:  Surgical Assistant: Eduardo Kwon    Anesthesia: Regional w/ general    Estimated Blood Loss (mL): less than 5cc     Complications: None    Specimens:   * No specimens in log *    Implants:  Implant Name Type Inv. Item Serial No.  Lot No. LRB No. Used Action   GRAFT BNE SUB 7.5GM PTTY SYN SIGNAFUSE - SNA  GRAFT BNE SUB 7.5GM PTTY SYN SIGNAFUSE NA [employer] [facility] [id number] Left 1 Implanted   5 hole fibula plate   NA SOHM DIV_CR NA Left 1 Implanted   2.7x12 locking screw   NA SOHM DIV_CR NA Left 2 Implanted   2.7x14 locking screw   NA SOHM DIV_CR NA Left 1 Implanted   SCREW BNE CRTX 3.5X12 MM TI NS AXSOS 3 - SNA  SCREW BNE CRTX 3.5X12 MM TI NS AXSOS 3 NA [employer] [facility] NA Left 2 Implanted   SCREW BNE L14MM DIA3.5MM STD APRLI TI ST [place]AY LO PROF AXSOS 3 - SNA  SCREW BNE L14MM DIA3.5MM STD APRIL TI ST BAY LO PROF AXSOS 3 NA [employer] [facility] NA Left 1 Implanted   SCREW BNE L20MM DIA3.5MM STD APRIL TI ST [place]AY NONCOMPLIANT LO - SNA  SCREW BNE L20MM DIA3.5MM STD APRIL TI ST BAY NONCOMPLIANT LO NA [employer] [facility] NA Left 1 Implanted   SCREW BNE L22MM DIA3.5MM STD APRIL TI ST BAY LO PROF AXSOS 3 - SNA  SCREW BNE L22MM DIA3.5MM STD APRIL TI ST BAY LO PROF AXSOS 3 NA [employer] [facility] NA Left 1 Implanted   SCREW BNE L24MM DIA3.5MM APRIL TI ALLY FULL THRD T15 DRV - SNA  SCREW BNE L24MM DIA3.5MM APRIL TI ALLY FULL THRD T15 DRV NA [employer] HOWM-WD NA Left 1 Implanted   PLATE BNE 3 H MED S STL BAY FOR ANK FRAC MGMT - SNA

## 2025-03-07 NOTE — ANESTHESIA PROCEDURE NOTES
Peripheral Block    Patient location during procedure: holding area  Reason for block: primary anesthetic and at surgeon's request  Start time: 3/7/2025 3:51 PM  End time: 3/7/2025 4:03 PM  Staffing  Performed by: Alexander Tamez MD  Authorized by: Alexander Tamez MD    Preanesthetic Checklist  Completed: site marked, risks and benefits discussed, surgical/procedural consents, pre-op evaluation, timeout performed, anesthesia consent given, oxygen available and monitors applied/VS acknowledged  Peripheral Block   Prep: ChloraPrep  Patient monitoring: cardiac monitor, continuous pulse ox and frequent blood pressure checks  Block type: Saphenous and Sciatic  Laterality: left  Injection technique: single-shot  Guidance: ultrasound guided    Needle   Needle type: insulated echogenic nerve stimulator needle   Needle gauge: 20 G  Needle localization: ultrasound guidance  Needle length: 5 cm  Assessment   Injection assessment: negative aspiration for heme, no paresthesia on injection, local visualized surrounding nerve on ultrasound and no intravascular symptoms  Slow fractionated injection: yes  Hemodynamics: stable  Outcomes: uncomplicated

## 2025-03-07 NOTE — PROGRESS NOTES
Physical Therapy Note:  Consult received and chart reviewed. Patient admitted post fall and currently ZABRINA for an left ankle ORIF.  Will follow up tomorrow.  Thank you,  Jerrod Cam, PT

## 2025-03-07 NOTE — PROGRESS NOTES
[facility] [place] [facility]  [address] [place] [address] [phone]        Hospitalist Progress Note      NAME: Nicole Galeano   :  1945  MRM:  [id number]    Date/Time of service: 3/7/2025  1:19 PM       Subjective:   Patient was personally seen and examined by me during this time period.  Chart reviewed.    Doing better today than yesterday.  Left leg is uncomfortable.     ROS: per history above       Objective:       Vitals:       Last 24hrs VS reviewed since prior progress note. Most recent are:    Vitals:    25 1257   BP: 108/65   Pulse: 66   Resp: 16   Temp: 98.1 °F (36.7 °C)   SpO2: 93%     SpO2 Readings from Last 6 Encounters:   25 93%   24 95%   24 94%          Intake/Output Summary (Last 24 hours) at 3/7/2025 1319  Last data filed at 3/7/2025 0817  Gross per 24 hour   Intake --   Output 1950 ml   Net -1950 ml        Exam:     Physical Exam:    Gen:    Well-developed, well-nourished, in no acute distress,   HEENT:  Pink conjunctivae, hearing intact to voice, moist mucous membranes  Neck:  Supple, without obvious masses  Resp:  No accessory muscle use, clear breath sounds without wheezes rales or rhonchi  Card:   No murmurs, normal S1, S2 without thrills, bruits or peripheral edema  Abd:  Soft, non-tender, non-distended, no palpable organomegaly   MSK: Left leg is splinted to just below the knee.  Patient is able to move toes without issue.  Skin:   No rashes or ulcers, skin turgor is good  Neuro:  Cranial nerves are grossly intact, no focal motor weakness, follows commands appropriately  Psych:  Good insight, oriented to person, place and time,       Medications Reviewed: (see below)    Lab Data Reviewed: (see below)    ______________________________________________________________________    Medications:     Current Facility-Administered Medications   Medication Dose Route Frequency    oxyCODONE (ROXICODONE) immediate release tablet 2.5  Daily    predniSONE (DELTASONE) tablet 10 mg  10 mg Oral Daily    triazolam (HALCION) tablet 250 mcg  (Patient Supplied)  0.25 mg Oral Nightly PRN    tiotropium (SPIRIVA RESPIMAT) 2.5 MCG/ACT inhaler 2 puff (Patient Supplied)  2 puff Inhalation Daily    I-Joe TABS 1 tablet  1 tablet Oral Daily          Lab Review:     Recent Labs     03/05/25  0355 03/06/25 0228 03/07/25  0239   WBC 8.3 8.6 9.5   HGB 11.3* 11.0* 10.6*   HCT 35.9 34.1* 33.2*    345 367     Recent Labs     03/05/25  0355 03/06/25 0228 03/07/25  0239    138 136   K 4.1 4.2 4.4    101 102   CO2 28 30 28   BUN 15 18 12   ALT 37 30 32     No results found for: \"GLUCPOC\"       Assessment / Plan:     Principal Problem:    Acute left ankle pain  Resolved Problems:    * No resolved hospital problems. *        Nicole is a 79 y.o.  female with PMHx past medical history of COPD on 1 L NC at night, rheumatoid arthritis, macular degeneration, radiation induced left lung injury, breast cancer status postlumpectomy and mastectomy, and melanoma who presents after a fall found to have a left bimalleolar ankle fracture.     # Fall/bimalleolar ankle fracture: Acute, present admission.  Secondary to mechanical fall.  Leg is splinted.  Patient is likely increased risk of fractures due to chronic steroid use.  - Ortho consulted--surgical plan per them  -- hold lovenox  -- NPO  - Pain control with Roxicodone will reduce dose to 2.5 mg and Tylenol  - PT/OT.  Nonweightbearing on left lower extremity.    Nonresponsive episode: acute, not present on admission.  Possibly intentionally aphasic per patient report.  Delirium possible.  Narcotic and benzo induced possible. Withdrawal from alcohol possible.   --reduce narcotic dose  --continue to monitor for withdrawals    Alcohol abuse: Initial history of 1-2 glass of wine, which son-in-law clarified to be 3-4 glasses minimum or entire bottle of wine per day.   --CIWA ordered. Will hold ativan for now.

## 2025-03-07 NOTE — PROGRESS NOTES
[facility] Progress Note    Subjective:    Nicole Galeano is a 79 y.o. female w/ closed LEFT bimalleolar ankle fx    Major Events:.    Pain controlled.      Objective:    Vital signs in last 24 hours:    [unfilled]    Temp (24hrs), Av.2 °F (36.8 °C), Min:97.5 °F (36.4 °C), Max:99.1 °F (37.3 °C)      Labs:    Lab Results   Component Value Date/Time    WBC 9.5 2025 02:39 AM    HGB 10.6 2025 02:39 AM    HCT 33.2 2025 02:39 AM     2025 02:39 AM       Lab Results   Component Value Date/Time     2025 02:39 AM    K 4.4 2025 02:39 AM     2025 02:39 AM    CO2 28 2025 02:39 AM    BUN 12 2025 02:39 AM    CREATININE 0.46 2025 02:39 AM    GLUCOSE 100 2025 02:39 AM       Physical Exam:    General: alert, cooperative, in NAD  Nonlabored resp    LEFT Lower extremity    Dressing: splint c/d/i      Motor: + toe df/pf    Sensory: grossly intact in toes    Vascular: Brisk capillary refill in toes    Assessment/Plan:  Xrays reviewed - LEFT bimalleolar ankle fx.  I discussed surgical and conservative options with her and her family.  I recommended surgery to promote healing, restore anatomy and function and allow for early weight bearing and motion and to potentially prevent non-union, malunion, and post-traumatic osteoarthritis of the LEFT ankle.    Pt NPO since breakfast  Bedrest  Hold AC  Pain control prn  Plan OR this afternoon for ORIF LEFT Bimalleolar ankle fx    MD Bianca [facility]

## 2025-03-07 NOTE — ANESTHESIA PRE PROCEDURE
Department of Anesthesiology  Preprocedure Note       Name:  Nicole Galeano   Age:  79 y.o.  :  1945                                          MRN:  [id number]         Date:  3/7/2025      Surgeon: Surgeon(s):  Arvind Kirby MD    Procedure: Procedure(s):  ANKLE OPEN REDUCTION INTERNAL FIXATION    Medications prior to admission:   Prior to Admission medications    Medication Sig Start Date End Date Taking? Authorizing Provider   triazolam (HALCION) 0.25 MG tablet Take 1 tablet by mouth nightly as needed. Max Daily Amount: 250 mcg   Yes Provider, Historical, MD   Multiple Vitamins-Minerals (PRESERVISION AREDS 2 PO) Take 1 tablet by mouth 2 times daily   Yes Provider, Historical, MD   predniSONE (DELTASONE) 10 MG tablet Take 1 tablet by mouth daily   Yes Provider, Historical, MD   atorvastatin (LIPITOR) 40 MG tablet Take 1 tablet by mouth daily 23  Yes Automatic Reconciliation, Ar   citalopram (CELEXA) 40 MG tablet Take 1 tablet by mouth daily 23  Yes Automatic Reconciliation, Ar   lisinopril (PRINIVIL;ZESTRIL) 5 MG tablet Take 1 tablet by mouth daily 4/15/23  Yes Automatic Reconciliation, Ar   tiotropium (SPIRIVA RESPIMAT) 2.5 MCG/ACT AERS inhaler ceived the following from [facility]: Outside name: Spiriva Respimat 2.5 mcg/actuation inhaler 22  Yes Automatic Reconciliation, Ar   ibuprofen (ADVIL;MOTRIN) 600 MG tablet Take 1 tablet by mouth 4 times daily as needed for Pain  Patient not taking: Reported on 3/4/2025 7/28/24   Kelly Boogie, APRN - NP   OXYGEN E clarify this medication. Outside name: Oxygen    Automatic Reconciliation, Ar       Current medications:    Current Facility-Administered Medications   Medication Dose Route Frequency Provider Last Rate Last Admin   • [START ON 3/8/2025] ceFAZolin (ANCEF) 2,000 mg in sterile water 20 mL IV syringe  2,000 mg IntraVENous On Call Jessie Damico, APRN - NP       • lidocaine PF 1 % injection 1 mL  1 mL

## 2025-03-08 LAB
ALBUMIN SERPL-MCNC: 1.8 G/DL (ref 3.5–5)
ALBUMIN/GLOB SERPL: 0.4 (ref 1.1–2.2)
ALP SERPL-CCNC: 465 U/L (ref 45–117)
ALT SERPL-CCNC: 29 U/L (ref 12–78)
ANION GAP SERPL CALC-SCNC: 6 MMOL/L (ref 2–12)
AST SERPL-CCNC: 30 U/L (ref 15–37)
BASOPHILS # BLD: 0.01 K/UL (ref 0–0.1)
BASOPHILS NFR BLD: 0.1 % (ref 0–1)
BILIRUB SERPL-MCNC: 0.3 MG/DL (ref 0.2–1)
BUN SERPL-MCNC: 15 MG/DL (ref 6–20)
BUN/CREAT SERPL: 25 (ref 12–20)
CALCIUM SERPL-MCNC: 8 MG/DL (ref 8.5–10.1)
CHLORIDE SERPL-SCNC: 103 MMOL/L (ref 97–108)
CO2 SERPL-SCNC: 28 MMOL/L (ref 21–32)
CREAT SERPL-MCNC: 0.59 MG/DL (ref 0.55–1.02)
DIFFERENTIAL METHOD BLD: ABNORMAL
EOSINOPHIL # BLD: 0 K/UL (ref 0–0.4)
EOSINOPHIL NFR BLD: 0 % (ref 0–7)
ERYTHROCYTE [DISTWIDTH] IN BLOOD BY AUTOMATED COUNT: 13.2 % (ref 11.5–14.5)
GLOBULIN SER CALC-MCNC: 4.1 G/DL (ref 2–4)
GLUCOSE SERPL-MCNC: 219 MG/DL (ref 65–100)
HCT VFR BLD AUTO: 32.4 % (ref 35–47)
HGB BLD-MCNC: 10.2 G/DL (ref 11.5–16)
IMM GRANULOCYTES # BLD AUTO: 0.06 K/UL (ref 0–0.04)
IMM GRANULOCYTES NFR BLD AUTO: 0.6 % (ref 0–0.5)
LYMPHOCYTES # BLD: 0.48 K/UL (ref 0.8–3.5)
LYMPHOCYTES NFR BLD: 4.6 % (ref 12–49)
MCH RBC QN AUTO: 33.6 PG (ref 26–34)
MCHC RBC AUTO-ENTMCNC: 31.5 G/DL (ref 30–36.5)
MCV RBC AUTO: 106.6 FL (ref 80–99)
MONOCYTES # BLD: 0.31 K/UL (ref 0–1)
MONOCYTES NFR BLD: 3 % (ref 5–13)
NEUTS SEG # BLD: 9.54 K/UL (ref 1.8–8)
NEUTS SEG NFR BLD: 91.7 % (ref 32–75)
NRBC # BLD: 0 K/UL (ref 0–0.01)
NRBC BLD-RTO: 0 PER 100 WBC
PLATELET # BLD AUTO: 334 K/UL (ref 150–400)
PMV BLD AUTO: 9.2 FL (ref 8.9–12.9)
POTASSIUM SERPL-SCNC: 4.6 MMOL/L (ref 3.5–5.1)
PROT SERPL-MCNC: 5.9 G/DL (ref 6.4–8.2)
RBC # BLD AUTO: 3.04 M/UL (ref 3.8–5.2)
RBC MORPH BLD: ABNORMAL
SODIUM SERPL-SCNC: 137 MMOL/L (ref 136–145)
WBC # BLD AUTO: 10.4 K/UL (ref 3.6–11)

## 2025-03-08 PROCEDURE — 2500000003 HC RX 250 WO HCPCS: Performed by: FAMILY MEDICINE

## 2025-03-08 PROCEDURE — 85025 COMPLETE CBC W/AUTO DIFF WBC: CPT

## 2025-03-08 PROCEDURE — 94640 AIRWAY INHALATION TREATMENT: CPT

## 2025-03-08 PROCEDURE — 2700000000 HC OXYGEN THERAPY PER DAY

## 2025-03-08 PROCEDURE — 6370000000 HC RX 637 (ALT 250 FOR IP): Performed by: ORTHOPAEDIC SURGERY

## 2025-03-08 PROCEDURE — 97530 THERAPEUTIC ACTIVITIES: CPT

## 2025-03-08 PROCEDURE — 6370000000 HC RX 637 (ALT 250 FOR IP): Performed by: FAMILY MEDICINE

## 2025-03-08 PROCEDURE — 94761 N-INVAS EAR/PLS OXIMETRY MLT: CPT

## 2025-03-08 PROCEDURE — 2500000003 HC RX 250 WO HCPCS: Performed by: ORTHOPAEDIC SURGERY

## 2025-03-08 PROCEDURE — 2580000003 HC RX 258: Performed by: ORTHOPAEDIC SURGERY

## 2025-03-08 PROCEDURE — 80053 COMPREHEN METABOLIC PANEL: CPT

## 2025-03-08 PROCEDURE — 99024 POSTOP FOLLOW-UP VISIT: CPT | Performed by: PHYSICIAN ASSISTANT

## 2025-03-08 PROCEDURE — 97161 PT EVAL LOW COMPLEX 20 MIN: CPT

## 2025-03-08 PROCEDURE — 6360000002 HC RX W HCPCS: Performed by: ORTHOPAEDIC SURGERY

## 2025-03-08 PROCEDURE — 1100000000 HC RM PRIVATE

## 2025-03-08 RX ORDER — LACTOBACILLUS RHAMNOSUS GG 10B CELL
1 CAPSULE ORAL
Status: DISCONTINUED | OUTPATIENT
Start: 2025-03-09 | End: 2025-03-14 | Stop reason: HOSPADM

## 2025-03-08 RX ORDER — TRAMADOL HYDROCHLORIDE 50 MG/1
50 TABLET ORAL EVERY 6 HOURS PRN
Status: DISCONTINUED | OUTPATIENT
Start: 2025-03-08 | End: 2025-03-14 | Stop reason: HOSPADM

## 2025-03-08 RX ORDER — SODIUM CHLORIDE, SODIUM LACTATE, POTASSIUM CHLORIDE, CALCIUM CHLORIDE 600; 310; 30; 20 MG/100ML; MG/100ML; MG/100ML; MG/100ML
INJECTION, SOLUTION INTRAVENOUS CONTINUOUS
Status: DISPENSED | OUTPATIENT
Start: 2025-03-08 | End: 2025-03-08

## 2025-03-08 RX ADMIN — HYDROCODONE BITARTRATE AND ACETAMINOPHEN 1 TABLET: 5; 325 TABLET ORAL at 03:13

## 2025-03-08 RX ADMIN — SODIUM CHLORIDE, PRESERVATIVE FREE 10 ML: 5 INJECTION INTRAVENOUS at 09:52

## 2025-03-08 RX ADMIN — SODIUM CHLORIDE, PRESERVATIVE FREE 10 ML: 5 INJECTION INTRAVENOUS at 19:41

## 2025-03-08 RX ADMIN — ACETAMINOPHEN 650 MG: 325 TABLET ORAL at 13:05

## 2025-03-08 RX ADMIN — Medication 250 MCG: at 23:09

## 2025-03-08 RX ADMIN — ATORVASTATIN CALCIUM 40 MG: 20 TABLET, FILM COATED ORAL at 09:51

## 2025-03-08 RX ADMIN — ENOXAPARIN SODIUM 40 MG: 100 INJECTION SUBCUTANEOUS at 09:52

## 2025-03-08 RX ADMIN — ACETAMINOPHEN 650 MG: 325 TABLET ORAL at 22:53

## 2025-03-08 RX ADMIN — CALCIUM CARBONATE: 500 TABLET, CHEWABLE ORAL at 22:54

## 2025-03-08 RX ADMIN — ACETAMINOPHEN 650 MG: 325 TABLET ORAL at 05:34

## 2025-03-08 RX ADMIN — PREDNISONE 10 MG: 5 TABLET ORAL at 05:34

## 2025-03-08 RX ADMIN — LISINOPRIL 5 MG: 5 TABLET ORAL at 09:51

## 2025-03-08 RX ADMIN — CITALOPRAM HYDROBROMIDE 40 MG: 20 TABLET ORAL at 09:51

## 2025-03-08 RX ADMIN — SODIUM CHLORIDE, POTASSIUM CHLORIDE, SODIUM LACTATE AND CALCIUM CHLORIDE: 600; 310; 30; 20 INJECTION, SOLUTION INTRAVENOUS at 18:50

## 2025-03-08 RX ADMIN — WATER 2000 MG: 1 INJECTION INTRAMUSCULAR; INTRAVENOUS; SUBCUTANEOUS at 09:53

## 2025-03-08 RX ADMIN — HYDROCODONE BITARTRATE AND ACETAMINOPHEN 1 TABLET: 5; 325 TABLET ORAL at 19:40

## 2025-03-08 RX ADMIN — SODIUM CHLORIDE, POTASSIUM CHLORIDE, SODIUM LACTATE AND CALCIUM CHLORIDE: 600; 310; 30; 20 INJECTION, SOLUTION INTRAVENOUS at 05:33

## 2025-03-08 ASSESSMENT — PAIN DESCRIPTION - LOCATION
LOCATION: ANKLE
LOCATION: ARM
LOCATION: ANKLE

## 2025-03-08 ASSESSMENT — PAIN SCALES - GENERAL
PAINLEVEL_OUTOF10: 0
PAINLEVEL_OUTOF10: 3
PAINLEVEL_OUTOF10: 1
PAINLEVEL_OUTOF10: 4
PAINLEVEL_OUTOF10: 7
PAINLEVEL_OUTOF10: 3

## 2025-03-08 ASSESSMENT — PAIN DESCRIPTION - DESCRIPTORS: DESCRIPTORS: SORE

## 2025-03-08 ASSESSMENT — PAIN DESCRIPTION - ORIENTATION
ORIENTATION: LEFT
ORIENTATION: LEFT

## 2025-03-08 NOTE — PROGRESS NOTES
[facility]  [address] [place] [address] [phone]        Hospitalist Progress Note      NAME: Nicole Galeano   :  1945  MRM:  [id number]    Date/Time of service: 3/8/2025  11:41 AM       Subjective:   Patient was personally seen and examined by me during this time period.  Chart reviewed.    Feeling much improved compared to yesterday. Denies any complaints.     ROS: per history above       Objective:       Vitals:       Last 24hrs VS reviewed since prior progress note. Most recent are:    Vitals:    25 1119   BP: (!) 146/89   Pulse: 73   Resp: 17   Temp: 97.9 °F (36.6 °C)   SpO2: 92%     SpO2 Readings from Last 6 Encounters:   25 92%   24 95%   24 94%          Intake/Output Summary (Last 24 hours) at 3/8/2025 1141  Last data filed at 3/8/2025 0900  Gross per 24 hour   Intake 1200 ml   Output 800 ml   Net 400 ml        Exam:     Physical Exam:    Gen:    Well-developed, well-nourished, in no acute distress,   HEENT:  Pink conjunctivae, hearing intact to voice, moist mucous membranes  Neck:  Supple, without obvious masses  Resp:  No accessory muscle use, clear breath sounds without wheezes rales or rhonchi  Card:   No murmurs, normal S1, S2 without thrills, bruits or peripheral edema  Abd:  Soft, non-tender, non-distended, no palpable organomegaly   MSK: Left leg is splinted to just below the knee.  Patient able to feel toes on palpation.   Skin:   No rashes or ulcers, skin turgor is good  Neuro:  Cranial nerves are grossly intact, no focal motor weakness, follows commands appropriately  Psych:  Good insight, oriented to person, place and time,       Medications Reviewed: (see below)    Lab Data Reviewed: (see below)    ______________________________________________________________________    Medications:     Current Facility-Administered Medications   Medication Dose Route Frequency    sodium chloride flush 0.9 % injection 5-40  packet 17 g  17 g Oral Daily PRN    acetaminophen (TYLENOL) tablet 650 mg  650 mg Oral Q6H PRN    Or    acetaminophen (TYLENOL) suppository 650 mg  650 mg Rectal Q6H PRN    atorvastatin (LIPITOR) tablet 40 mg  40 mg Oral Daily    citalopram (CELEXA) tablet 40 mg  40 mg Oral Daily    lisinopril (PRINIVIL;ZESTRIL) tablet 5 mg  5 mg Oral Daily    predniSONE (DELTASONE) tablet 10 mg  10 mg Oral Daily    triazolam (HALCION) tablet 250 mcg  (Patient Supplied)  0.25 mg Oral Nightly PRN    tiotropium (SPIRIVA RESPIMAT) 2.5 MCG/ACT inhaler 2 puff (Patient Supplied)  2 puff Inhalation Daily    I-Joe TABS 1 tablet  1 tablet Oral Daily          Lab Review:     Recent Labs     03/06/25 0228 03/07/25 0239 03/08/25 0447   WBC 8.6 9.5 10.4   HGB 11.0* 10.6* 10.2*   HCT 34.1* 33.2* 32.4*    367 334     Recent Labs     03/06/25 0228 03/07/25 0239 03/08/25  0447    136 137   K 4.2 4.4 4.6    102 103   CO2 30 28 28   BUN 18 12 15   ALT 30 32 29     No results found for: \"GLUCPOC\"       Assessment / Plan:     Principal Problem:    Acute left ankle pain  Resolved Problems:    * No resolved hospital problems. *        Nicole is a 79 y.o.  female with PMHx past medical history of COPD on 1 L NC at night, rheumatoid arthritis, macular degeneration, radiation induced left lung injury, breast cancer status postlumpectomy and mastectomy, and melanoma who presents after a fall found to have a left bimalleolar ankle fracture.     # Fall/bimalleolar ankle fracture: Acute, present admission.  Secondary to mechanical fall.  Leg is splinted.  Patient is likely increased risk of fractures due to chronic steroid use.  S/p ORIF on 3/7.   - Ortho consulted  -- lovenox  - Pain control with Roxicodone will reduce dose to 2.5 mg and Tylenol  - PT/OT.      Nonresponsive episode: acute, not present on admission.  Possibly intentionally aphasic per patient report.  Delirium possible.  Narcotic and benzo induced possible. Withdrawal

## 2025-03-08 NOTE — PROGRESS NOTES
PT NOTE - Please issue orders for OT evaluation and treatment. Patient very likely to need SNF rehab placement at discharge. PT will follow daily over weekend.    Thank you,   Tanisha Barnett, PT, DPT

## 2025-03-08 NOTE — PROGRESS NOTES
Physician Progress Note      PATIENT:               STANISLAV FISHMAN  CSN #:                  [id number]  :                       1945  ADMIT DATE:       3/4/2025 7:12 AM  DISCH DATE:  RESPONDING  PROVIDER #:        Kartik Lobo MD          QUERY TEXT:    Pt admitted with ankle  fracture.   Pt noted to have COPD  on  1 L  02  at    night . If possible, please document in the progress notes and discharge   summary if you are evaluating and/or treating any of the following:    The medical record reflects the following:  Risk Factors: alcohol  abuse  Clinical Indicators: per  H&P  \" history of COPD on 1 L NC at night\"  Treatment: 02  continues    Thank you  very  much  Options provided:  -- Chronic respiratory failure with hypoxia  -- Chronic respiratory failure with hypercapnia  -- Chronic respiratory failure with hypoxia and hypercapnia  -- Other - I will add my own diagnosis  -- Disagree - Not applicable / Not valid  -- Disagree - Clinically unable to determine / Unknown  -- Refer to Clinical Documentation Reviewer    PROVIDER RESPONSE TEXT:    This patient has chronic respiratory failure with hypoxia.    Query created by: Kelly Quintanilla on 3/6/2025 7:19 AM      QUERY TEXT:    Pt admitted with ankle  fracture.  Pt noted to have osteoporosis.  If   possible, please document in progress notes and discharge summary if you are   evaluating and/or treating any of the following:    The medical record reflects the following:  Risk Factors: advanced  age  Clinical Indicators: h/o  breast  cancer  ankle  xray-  The bones are osteopenic.  Treatment: splint  applied    Thank you  very much  Options provided:  -- Pathological ankle  fracture  -- Pathological  ankle  fracture due to osteopenia  -- Pathological  ankle  fracture due to osteopenia following fall which would   not usually break a normal, healthy bone  -- Osteoporotic  ankle  Fracture  -- Osteoporotic ankle  fracture following fall which would not

## 2025-03-08 NOTE — PLAN OF CARE
Problem: Physical Therapy - Adult  Goal: By Discharge: Performs mobility at highest level of function for planned discharge setting.  See evaluation for individualized goals.  Description: FUNCTIONAL STATUS PRIOR TO ADMISSION: Patient was modified independent using a rolling walker for very short distance household. Transport w/c and rollator for community.     HOME SUPPORT PRIOR TO ADMISSION: The patient lived with dgt and AMY and required minimal assistance for ADLs and lower body dressing. Ppatient with diagnosis of RA in July 2024 and has slowed built back up to near baseline on steroid therapy but long standing issues with R knee OA before this GLF, L ankle fx and ORIF. 4 RHETT home and remains on 1st level. Unclear as to if transfer tub bench or shower chair (cognitive processing impairment)    Physical Therapy Goals  Initiated 3/8/2025  1.  Patient will move from supine to sit and sit to supine, scoot up and down, and roll side to side in bed with modified independence within 7 day(s).    2.  Patient will perform sit <> stand with moderate assistance within 7 day(s).  3.  Patient will perform lateral scoot transfer from bed to chair and chair to bed with minimal assistance using the least restrictive device within 7 day(s).  4.  Patient will perform SPT with RW with moderate assistance x 2 within 7 day(s).     Outcome: Progressing  PHYSICAL THERAPY EVALUATION    Patient: Nicole Galeano (79 y.o. female)  Date: 3/8/2025  Primary Diagnosis: Closed fracture of left ankle, initial encounter [S82.212A]  Acute left ankle pain [M25.572]  Chronic obstructive pulmonary disease, unspecified COPD type ([facility]) [J44.9]  Rheumatoid arthritis, involving unspecified site, unspecified whether rheumatoid factor present ([facility]) [M06.9]  Ground-level fall [W18.30XA]  Procedure(s) (LRB):  LEFT ANKLE OPEN REDUCTION INTERNAL FIXATION (Left) 1 Day Post-Op   Precautions: Restrictions/Precautions  Restrictions/Precautions: Weight                                                                                                                Pain Rating:  10/10 R knee and left shoulder with attempted pull to stand RW  Pain Intervention(s):   nursing notified and addressing, ice, rest, elevation, and repositioning    Activity Tolerance:   Fair , requires frequent rest breaks, observed shortness of breath on exertion, and desaturates with activity and requires oxygen    After treatment:   Patient left in no apparent distress sitting up in chair and Call bell within reach    COMMUNICATION/EDUCATION:   The patient's plan of care was discussed with: registered nurse    Patient Education  Education Given To: Patient  Education Provided: Role of Therapy;Plan of Care;Precautions;Transfer Training;Mobility Training;Equipment;Fall Prevention Strategies  Education Method: Verbal  Barriers to Learning: Cognition;Readiness to Learn  Education Outcome: Continued education needed    Thank you for this referral.  NEGRITO SILVER, PT  Minutes: 41

## 2025-03-08 NOTE — ANESTHESIA POST-OP
TRANSFER - OUT REPORT:    Verbal report given to yasmeen Galeano  being transferred to [facility] for routine post op         Report consisted of patient's Situation, Background, Assessment and   Recommendations(SBAR).     Information from the following report(s) Nurse Handoff Report was reviewed with the receiving nurse.           Lines:   Peripheral IV 03/04/25 Right;Anterior Forearm (Active)   Site Assessment Clean, dry & intact 03/07/25 1540   Line Status Infusing 03/07/25 1540   Line Care Connections checked and tightened 03/07/25 1540   Phlebitis Assessment No symptoms 03/07/25 1540   Infiltration Assessment 0 03/07/25 1540   Alcohol Cap Used Yes 03/07/25 1540   Dressing Status Clean, dry & intact 03/07/25 1540   Dressing Type Transparent 03/07/25 1540        Opportunity for questions and clarification was provided.      Patient transported with:  Monitor and Registered Nurse

## 2025-03-08 NOTE — ANESTHESIA POSTPROCEDURE EVALUATION
Department of Anesthesiology  Postprocedure Note    Patient: Nicole Galeano  MRN: [id number]  YOB: 1945  Date of evaluation: 3/7/2025    Procedure Summary       Date: 03/07/25 Room / Location: [facility] MAIN OR  / [facility] MAIN OR    Anesthesia Start: 1607 Anesthesia Stop: 1804    Procedure: LEFT ANKLE OPEN REDUCTION INTERNAL FIXATION (Left: Ankle) Diagnosis:       Bimalleolar ankle fracture, left, closed, initial encounter      (Bimalleolar ankle fracture, left, closed, initial encounter [S82.842A])    Surgeons: Arvind Kirby MD Responsible Provider: Ji Bustillos MD    Anesthesia Type: Regional, MAC ASA Status: 3            Anesthesia Type: Regional, MAC    Eddy Phase I: Eddy Score: 7    Eddy Phase II:      Anesthesia Post Evaluation    Patient location during evaluation: PACU  Patient participation: complete - patient participated  Level of consciousness: awake and alert  Airway patency: patent  Nausea & Vomiting: no nausea  Cardiovascular status: blood pressure returned to baseline  Respiratory status: acceptable  Hydration status: euvolemic  Multimodal analgesia pain management approach    No notable events documented.

## 2025-03-08 NOTE — PLAN OF CARE
Problem: Safety - Adult  Goal: Free from fall injury  Outcome: Progressing  Flowsheets (Taken 3/8/2025 1124)  Free From Fall Injury: Instruct family/caregiver on patient safety

## 2025-03-09 ENCOUNTER — APPOINTMENT (OUTPATIENT)
Facility: HOSPITAL | Age: 80
DRG: 492 | End: 2025-03-09
Payer: MEDICARE

## 2025-03-09 LAB
ALBUMIN SERPL-MCNC: 2.1 G/DL (ref 3.5–5)
ALBUMIN/GLOB SERPL: 0.6 (ref 1.1–2.2)
ALP SERPL-CCNC: 498 U/L (ref 45–117)
ALT SERPL-CCNC: 40 U/L (ref 12–78)
ANION GAP SERPL CALC-SCNC: 5 MMOL/L (ref 2–12)
AST SERPL-CCNC: 60 U/L (ref 15–37)
BASOPHILS # BLD: 0.02 K/UL (ref 0–0.1)
BASOPHILS NFR BLD: 0.1 % (ref 0–1)
BILIRUB SERPL-MCNC: 0.6 MG/DL (ref 0.2–1)
BUN SERPL-MCNC: 18 MG/DL (ref 6–20)
BUN/CREAT SERPL: 34 (ref 12–20)
CALCIUM SERPL-MCNC: 8.6 MG/DL (ref 8.5–10.1)
CHLORIDE SERPL-SCNC: 102 MMOL/L (ref 97–108)
CO2 SERPL-SCNC: 30 MMOL/L (ref 21–32)
CREAT SERPL-MCNC: 0.53 MG/DL (ref 0.55–1.02)
CRP SERPL-MCNC: 4.08 MG/DL (ref 0–0.3)
DIFFERENTIAL METHOD BLD: ABNORMAL
EOSINOPHIL # BLD: 0.01 K/UL (ref 0–0.4)
EOSINOPHIL NFR BLD: 0.1 % (ref 0–7)
ERYTHROCYTE [DISTWIDTH] IN BLOOD BY AUTOMATED COUNT: 13.1 % (ref 11.5–14.5)
EST. AVERAGE GLUCOSE BLD GHB EST-MCNC: 105 MG/DL
GLOBULIN SER CALC-MCNC: 3.6 G/DL (ref 2–4)
GLUCOSE SERPL-MCNC: 126 MG/DL (ref 65–100)
HBA1C MFR BLD: 5.3 % (ref 4–5.6)
HCT VFR BLD AUTO: 33.8 % (ref 35–47)
HGB BLD-MCNC: 10.6 G/DL (ref 11.5–16)
IMM GRANULOCYTES # BLD AUTO: 0.14 K/UL (ref 0–0.04)
IMM GRANULOCYTES NFR BLD AUTO: 0.9 % (ref 0–0.5)
LYMPHOCYTES # BLD: 1.29 K/UL (ref 0.8–3.5)
LYMPHOCYTES NFR BLD: 8.5 % (ref 12–49)
MAGNESIUM SERPL-MCNC: 2.1 MG/DL (ref 1.6–2.4)
MCH RBC QN AUTO: 33.4 PG (ref 26–34)
MCHC RBC AUTO-ENTMCNC: 31.4 G/DL (ref 30–36.5)
MCV RBC AUTO: 106.6 FL (ref 80–99)
MONOCYTES # BLD: 1.3 K/UL (ref 0–1)
MONOCYTES NFR BLD: 8.5 % (ref 5–13)
NEUTS SEG # BLD: 12.46 K/UL (ref 1.8–8)
NEUTS SEG NFR BLD: 81.9 % (ref 32–75)
NRBC # BLD: 0 K/UL (ref 0–0.01)
NRBC BLD-RTO: 0 PER 100 WBC
PHOSPHATE SERPL-MCNC: 2.7 MG/DL (ref 2.6–4.7)
PLATELET # BLD AUTO: 395 K/UL (ref 150–400)
PMV BLD AUTO: 9.3 FL (ref 8.9–12.9)
POTASSIUM SERPL-SCNC: 4.3 MMOL/L (ref 3.5–5.1)
PROT SERPL-MCNC: 5.7 G/DL (ref 6.4–8.2)
RBC # BLD AUTO: 3.17 M/UL (ref 3.8–5.2)
SODIUM SERPL-SCNC: 137 MMOL/L (ref 136–145)
TSH SERPL DL<=0.05 MIU/L-ACNC: 0.1 UIU/ML (ref 0.36–3.74)
WBC # BLD AUTO: 15.2 K/UL (ref 3.6–11)

## 2025-03-09 PROCEDURE — 6370000000 HC RX 637 (ALT 250 FOR IP): Performed by: FAMILY MEDICINE

## 2025-03-09 PROCEDURE — 1100000000 HC RM PRIVATE

## 2025-03-09 PROCEDURE — 6370000000 HC RX 637 (ALT 250 FOR IP): Performed by: INTERNAL MEDICINE

## 2025-03-09 PROCEDURE — 94640 AIRWAY INHALATION TREATMENT: CPT

## 2025-03-09 PROCEDURE — 86140 C-REACTIVE PROTEIN: CPT

## 2025-03-09 PROCEDURE — 84100 ASSAY OF PHOSPHORUS: CPT

## 2025-03-09 PROCEDURE — 2500000003 HC RX 250 WO HCPCS: Performed by: ORTHOPAEDIC SURGERY

## 2025-03-09 PROCEDURE — 6360000002 HC RX W HCPCS: Performed by: ORTHOPAEDIC SURGERY

## 2025-03-09 PROCEDURE — 94761 N-INVAS EAR/PLS OXIMETRY MLT: CPT

## 2025-03-09 PROCEDURE — 2500000003 HC RX 250 WO HCPCS: Performed by: FAMILY MEDICINE

## 2025-03-09 PROCEDURE — 83735 ASSAY OF MAGNESIUM: CPT

## 2025-03-09 PROCEDURE — 6360000002 HC RX W HCPCS: Performed by: INTERNAL MEDICINE

## 2025-03-09 PROCEDURE — 2700000000 HC OXYGEN THERAPY PER DAY

## 2025-03-09 PROCEDURE — 80053 COMPREHEN METABOLIC PANEL: CPT

## 2025-03-09 PROCEDURE — 99024 POSTOP FOLLOW-UP VISIT: CPT | Performed by: PHYSICIAN ASSISTANT

## 2025-03-09 PROCEDURE — 2500000003 HC RX 250 WO HCPCS: Performed by: INTERNAL MEDICINE

## 2025-03-09 PROCEDURE — 97530 THERAPEUTIC ACTIVITIES: CPT

## 2025-03-09 PROCEDURE — 85025 COMPLETE CBC W/AUTO DIFF WBC: CPT

## 2025-03-09 PROCEDURE — 83036 HEMOGLOBIN GLYCOSYLATED A1C: CPT

## 2025-03-09 PROCEDURE — 84443 ASSAY THYROID STIM HORMONE: CPT

## 2025-03-09 RX ORDER — DIPHENHYDRAMINE HCL 25 MG
25 CAPSULE ORAL EVERY 6 HOURS PRN
Status: DISCONTINUED | OUTPATIENT
Start: 2025-03-09 | End: 2025-03-14 | Stop reason: HOSPADM

## 2025-03-09 RX ORDER — VIT A/VIT C/VIT E/ZINC/COPPER 4296-226
1 CAPSULE ORAL 2 TIMES DAILY
Status: DISCONTINUED | OUTPATIENT
Start: 2025-03-09 | End: 2025-03-14 | Stop reason: HOSPADM

## 2025-03-09 RX ORDER — DIPHENHYDRAMINE HYDROCHLORIDE 50 MG/ML
25 INJECTION, SOLUTION INTRAMUSCULAR; INTRAVENOUS EVERY 6 HOURS PRN
Status: DISCONTINUED | OUTPATIENT
Start: 2025-03-09 | End: 2025-03-09

## 2025-03-09 RX ADMIN — ACETAMINOPHEN 650 MG: 325 TABLET ORAL at 05:41

## 2025-03-09 RX ADMIN — SODIUM CHLORIDE, PRESERVATIVE FREE 10 ML: 5 INJECTION INTRAVENOUS at 20:27

## 2025-03-09 RX ADMIN — WATER 1000 MG: 1 INJECTION INTRAMUSCULAR; INTRAVENOUS; SUBCUTANEOUS at 09:18

## 2025-03-09 RX ADMIN — PREDNISONE 10 MG: 5 TABLET ORAL at 05:41

## 2025-03-09 RX ADMIN — ENOXAPARIN SODIUM 40 MG: 100 INJECTION SUBCUTANEOUS at 09:18

## 2025-03-09 RX ADMIN — ACETAMINOPHEN 650 MG: 325 TABLET ORAL at 23:17

## 2025-03-09 RX ADMIN — DIPHENHYDRAMINE HCL 25 MG: 25 CAPSULE ORAL at 13:08

## 2025-03-09 RX ADMIN — LISINOPRIL 5 MG: 5 TABLET ORAL at 09:18

## 2025-03-09 RX ADMIN — DIPHENHYDRAMINE HCL 25 MG: 25 CAPSULE ORAL at 23:17

## 2025-03-09 RX ADMIN — Medication 1 CAPSULE: at 20:25

## 2025-03-09 RX ADMIN — ATORVASTATIN CALCIUM 40 MG: 20 TABLET, FILM COATED ORAL at 09:17

## 2025-03-09 RX ADMIN — Medication 1 CAPSULE: at 09:17

## 2025-03-09 RX ADMIN — CITALOPRAM HYDROBROMIDE 40 MG: 20 TABLET ORAL at 09:18

## 2025-03-09 RX ADMIN — TRAMADOL HYDROCHLORIDE 50 MG: 50 TABLET, COATED ORAL at 05:40

## 2025-03-09 RX ADMIN — Medication 250 MCG: at 23:17

## 2025-03-09 ASSESSMENT — PAIN SCALES - GENERAL
PAINLEVEL_OUTOF10: 3
PAINLEVEL_OUTOF10: 3

## 2025-03-09 NOTE — PLAN OF CARE
Problem: Physical Therapy - Adult  Goal: By Discharge: Performs mobility at highest level of function for planned discharge setting.  See evaluation for individualized goals.  Description: FUNCTIONAL STATUS PRIOR TO ADMISSION: Patient was modified independent using a rolling walker for very short distance household. Transport w/c and rollator for community.     HOME SUPPORT PRIOR TO ADMISSION: The patient lived with dgt and AMY and required minimal assistance for ADLs and lower body dressing. Ppatient with diagnosis of RA in July 2024 and has slowed built back up to near baseline on steroid therapy but long standing issues with R knee OA before this GLF, L ankle fx and ORIF. 4 RHETT home and remains on 1st level. Unclear as to if transfer tub bench or shower chair (cognitive processing impairment)    Physical Therapy Goals  Initiated 3/8/2025  1.  Patient will move from supine to sit and sit to supine, scoot up and down, and roll side to side in bed with modified independence within 7 day(s).    2.  Patient will perform sit <> stand with moderate assistance within 7 day(s).  3.  Patient will perform lateral scoot transfer from bed to chair and chair to bed with minimal assistance using the least restrictive device within 7 day(s).  4.  Patient will perform SPT with RW with moderate assistance x 2 within 7 day(s).     Outcome: Not Progressing     PHYSICAL THERAPY TREATMENT    Patient: Nicole Galeano (79 y.o. female)  Date: 3/9/2025  Diagnosis: Closed fracture of left ankle, initial encounter [S82.892A]  Acute left ankle pain [M25.572]  Chronic obstructive pulmonary disease, unspecified COPD type ([facility]) [J44.9]  Rheumatoid arthritis, involving unspecified site, unspecified whether rheumatoid factor present ([facility]) [M06.9]  Ground-level fall [W18.30XA] Acute left ankle pain  Procedure(s) (LRB):  LEFT ANKLE OPEN REDUCTION INTERNAL FIXATION (Left) 2 Days Post-Op  Precautions:

## 2025-03-09 NOTE — PROGRESS NOTES
Orthopaedic Progress Note  Post Op day: 1 Day Post-Op    2025 9:07 PM     Patient: Nicole Galeano MRN: [id number]  SSN: [id number]    YOB: 1945  Age: 79 y.o.  Sex: female      Admit date:  3/4/2025   Procedures:  Procedure(s):  LEFT ANKLE OPEN REDUCTION INTERNAL FIXATION  Admitting Physician:  Kartik Lobo MD   Surgeon:  Surgeons and Role:     * Arvind Kirby MD - Primary    Consulting Physician(s): Treatment Team:   Kartik Lobo, MD Mcrae, Andres Lagunas, MD Gardner, La MALDONADO, APRN - NP  Evgeny, Maria Isabel Daneils, Arvind Bruner, MD Patterson, Maggie Godinez, Alba MALDONADO, Purnima Davalos    SUBJECTIVE:     Nicole Galeano is a 79 y.o. female is 1 Day Post-Op s/p Procedure(s):  LEFT ANKLE OPEN REDUCTION INTERNAL FIXATION with an appropriate level of post-operative pain.  Family at bedside.  Patient perseverating on events overnight of 3/6.  No complaints of nausea, vomiting, dizziness, lightheadedness, chest pain, or shortness of breath.    OBJECTIVE:       Physical Exam:  General: Alert, cooperative, no distress.    Respiratory: Respirations unlabored  Neurological:  Neurovascular exam within normal limits.  Motor: + DF/PF.   Musculoskeletal: Calves soft, supple, non-tender upon palpation.  Left ankle in a short leg splint.   Moves toes.  BCR of all digits.  SILT LLE.    Dressing/Wound:  Clean, dry and intact. No significant erythema or swelling.      Vital Signs:      Patient Vitals for the past 8 hrs:   BP Temp Temp src Pulse Resp SpO2   25 1939 (!) 145/81 97.7 °F (36.5 °C) Oral 71 15 (!) 15 %   25 1757 -- -- -- 72 16 95 %   25 1523 134/85 97.5 °F (36.4 °C) Oral 71 17 97 %                                          Temp (24hrs), Av.7 °F (36.5 °C), Min:97.3 °F (36.3 °C), Max:98.2 °F (36.8 °C)      Labs:        Recent Labs     25  0447   HCT 32.4*   HGB 10.2*     Lab Results   Component Value Date/Time      03/08/2025 04:47 AM    K 4.6 03/08/2025 04:47 AM     03/08/2025 04:47 AM    CO2 28 03/08/2025 04:47 AM    BUN 15 03/08/2025 04:47 AM       PT/OT:                Patient mobility                         ASSESSMENT / PLAN:   Principal Problem:    Acute left ankle pain  Resolved Problems:    * No resolved hospital problems. *          A: 1. S/P Left ankle ORIF POD 1        Pain Control: Tylenol.  Roxicodone 5mg.     DVT Prophylaxis: Lovenox 40 mg SC daily   Hemodynamics: Anemia.  ABLA.  Worsened from surgery.  Monitor.     Activity: NWB LLE.  Patient is perseverating on her right knee RA.  Reports she does not feel like she will be able to maintain NWB on LLE.  Continually asked for a wheelchair.  Family was quite frustrated with her and she can't return home in her current state.  Discussed possible need for long term care as she will be NWB for an extended period of time.   Disposition: SNF.     Signed By:  RAFY Torres    [facility] [place]  [facility]

## 2025-03-09 NOTE — PLAN OF CARE
Problem: Discharge Planning  Goal: Discharge to home or other facility with appropriate resources  3/9/2025 1628 by Henry Wick RN  Outcome: Progressing  3/9/2025 1619 by Henry Wick RN  Outcome: Progressing     Problem: Skin/Tissue Integrity  Goal: Skin integrity remains intact  Description: 1.  Monitor for areas of redness and/or skin breakdown  2.  Assess vascular access sites hourly  3.  Every 4-6 hours minimum:  Change oxygen saturation probe site  4.  Every 4-6 hours:  If on nasal continuous positive airway pressure, respiratory therapy assess nares and determine need for appliance change or resting period  3/9/2025 1628 by Henry Wick RN  Outcome: Progressing  3/9/2025 1619 by Henry Wick RN  Outcome: Progressing     Problem: Safety - Adult  Goal: Free from fall injury  3/9/2025 1628 by Henry Wick RN  Outcome: Progressing  3/9/2025 1619 by Henry Wick RN  Outcome: Progressing     Problem: Pain  Goal: Verbalizes/displays adequate comfort level or baseline comfort level  3/9/2025 1628 by Henry Wick RN  Outcome: Progressing  3/9/2025 1619 by Henry Wick RN  Outcome: Progressing     Problem: Respiratory - Adult  Goal: Achieves optimal ventilation and oxygenation  3/9/2025 1628 by Henry Wick RN  Outcome: Progressing  3/9/2025 1619 by Henry Wick RN  Outcome: Progressing  3/9/2025 0854 by Hortencia Barreto, RT  Outcome: Progressing     Problem: Physical Therapy - Adult  Goal: By Discharge: Performs mobility at highest level of function for planned discharge setting.  See evaluation for individualized goals.  Description: FUNCTIONAL STATUS PRIOR TO ADMISSION: Patient was modified independent using a rolling walker for very short distance household. Transport w/c and rollator for community.     HOME SUPPORT PRIOR TO ADMISSION: The patient lived with dgt and AMY and required minimal assistance for ADLs and lower body dressing. Ppatient with diagnosis of RA in July 2024 and has slowed built back up  to near baseline on steroid therapy but long standing issues with R knee OA before this GLF, L ankle fx and ORIF. 4 RHETT home and remains on 1st level. Unclear as to if transfer tub bench or shower chair (cognitive processing impairment)    Physical Therapy Goals  Initiated 3/8/2025  1.  Patient will move from supine to sit and sit to supine, scoot up and down, and roll side to side in bed with modified independence within 7 day(s).    2.  Patient will perform sit <> stand with moderate assistance within 7 day(s).  3.  Patient will perform lateral scoot transfer from bed to chair and chair to bed with minimal assistance using the least restrictive device within 7 day(s).  4.  Patient will perform SPT with RW with moderate assistance x 2 within 7 day(s).     3/9/2025 1315 by Basilio Escalera, PT  Outcome: Not Progressing

## 2025-03-09 NOTE — PROGRESS NOTES
[facility]  [address] [place] [address] [phone]        Hospitalist Progress Note      NAME: Nicole Galeano   :  1945  MRM:  [id number]    Date/Time of service: 3/9/2025  1:07 PM       Subjective:     Chief Complaint:  Patient was personally seen and examined by me during this time period.  Chart reviewed.  C/o left leg pain.  No fevers       Objective:       Vitals:       Last 24hrs VS reviewed since prior progress note. Most recent are:    Vitals:    25 1209   BP: 108/68   Pulse: 80   Resp: 15   Temp: 97.5 °F (36.4 °C)   SpO2: 94%     SpO2 Readings from Last 6 Encounters:   25 94%   24 95%   24 94%          Intake/Output Summary (Last 24 hours) at 3/9/2025 1307  Last data filed at 3/9/2025 0647  Gross per 24 hour   Intake 3609.56 ml   Output 300 ml   Net 3309.56 ml        Exam:     Physical Exam:    Gen:  elderly, frail, NAD  HEENT:  Pink conjunctivae, PERRL, hearing intact to voice, moist mucous membranes  Neck:  Supple, without masses, thyroid non-tender  Resp:  No accessory muscle use, clear breath sounds without wheezes rales or rhonchi  Card:  No murmurs, normal S1, S2 without thrills, +edema  Abd:  Soft, non-tender, non-distended, normoactive bowel sounds are present  Musc:  No cyanosis or clubbing, LLE splint/dressing c/d/i  Skin:  No rashes  Neuro:  Cranial nerves 3-12 are grossly intact, follows commands appropriately  Psych:  fair insight, oriented to person, place and time, alert    Medications Reviewed: (see below)    Lab Data Reviewed: (see below)    ______________________________________________________________________    Medications:     Current Facility-Administered Medications   Medication Dose Route Frequency    cefTRIAXone (ROCEPHIN) 1,000 mg in sterile water 10 mL IV syringe  1,000 mg IntraVENous Q24H    diphenhydrAMINE (BENADRYL) capsule 25 mg  25 mg Oral Q6H PRN    traMADol (ULTRAM) tablet 50 mg  50 mg  with patient care: 45 Minutes **I personally saw and examined the patient during this time period**                 Care Plan discussed with: Patient, nursing     Discussed:  Care Plan    Prophylaxis:  Lovenox    Disposition:  SNF/LTC           ___________________________________________________    Attending Physician: Gilbert Valenzuela MD

## 2025-03-09 NOTE — PLAN OF CARE
Problem: Discharge Planning  Goal: Discharge to home or other facility with appropriate resources  Outcome: Progressing     Problem: Skin/Tissue Integrity  Goal: Skin integrity remains intact  Description: 1.  Monitor for areas of redness and/or skin breakdown  2.  Assess vascular access sites hourly  3.  Every 4-6 hours minimum:  Change oxygen saturation probe site  4.  Every 4-6 hours:  If on nasal continuous positive airway pressure, respiratory therapy assess nares and determine need for appliance change or resting period  Outcome: Progressing     Problem: Safety - Adult  Goal: Free from fall injury  Outcome: Progressing     Problem: Pain  Goal: Verbalizes/displays adequate comfort level or baseline comfort level  Outcome: Progressing     Problem: Respiratory - Adult  Goal: Achieves optimal ventilation and oxygenation  3/9/2025 1619 by Henry Wick RN  Outcome: Progressing  3/9/2025 0854 by Hortencia Barreto, RT  Outcome: Progressing     Problem: Physical Therapy - Adult  Goal: By Discharge: Performs mobility at highest level of function for planned discharge setting.  See evaluation for individualized goals.  Description: FUNCTIONAL STATUS PRIOR TO ADMISSION: Patient was modified independent using a rolling walker for very short distance household. Transport w/c and rollator for community.     HOME SUPPORT PRIOR TO ADMISSION: The patient lived with dgt and AMY and required minimal assistance for ADLs and lower body dressing. Ppatient with diagnosis of RA in July 2024 and has slowed built back up to near baseline on steroid therapy but long standing issues with R knee OA before this GLF, L ankle fx and ORIF. 4 RHETT home and remains on 1st level. Unclear as to if transfer tub bench or shower chair (cognitive processing impairment)    Physical Therapy Goals  Initiated 3/8/2025  1.  Patient will move from supine to sit and sit to supine, scoot up and down, and roll side to side in bed with modified independence within  7 day(s).    2.  Patient will perform sit <> stand with moderate assistance within 7 day(s).  3.  Patient will perform lateral scoot transfer from bed to chair and chair to bed with minimal assistance using the least restrictive device within 7 day(s).  4.  Patient will perform SPT with RW with moderate assistance x 2 within 7 day(s).     3/9/2025 1315 by Basilio Escalera, PT  Outcome: Not Progressing

## 2025-03-10 ENCOUNTER — APPOINTMENT (OUTPATIENT)
Facility: HOSPITAL | Age: 80
DRG: 492 | End: 2025-03-10
Payer: MEDICARE

## 2025-03-10 LAB
-: ABNORMAL
ALBUMIN SERPL-MCNC: 2 G/DL (ref 3.5–5)
ALBUMIN/GLOB SERPL: 0.5 (ref 1.1–2.2)
ALP SERPL-CCNC: 425 U/L (ref 45–117)
ALT SERPL-CCNC: 37 U/L (ref 12–78)
ANION GAP SERPL CALC-SCNC: 5 MMOL/L (ref 2–12)
AST SERPL-CCNC: 40 U/L (ref 15–37)
BILIRUB SERPL-MCNC: 0.3 MG/DL (ref 0.2–1)
BUN SERPL-MCNC: 21 MG/DL (ref 6–20)
BUN/CREAT SERPL: 37 (ref 12–20)
CALCIUM SERPL-MCNC: 8.8 MG/DL (ref 8.5–10.1)
CHLORIDE SERPL-SCNC: 103 MMOL/L (ref 97–108)
CO2 SERPL-SCNC: 30 MMOL/L (ref 21–32)
CREAT SERPL-MCNC: 0.57 MG/DL (ref 0.55–1.02)
CRP SERPL-MCNC: 3.69 MG/DL (ref 0–0.3)
ERYTHROCYTE [DISTWIDTH] IN BLOOD BY AUTOMATED COUNT: 13.6 % (ref 11.5–14.5)
GLOBULIN SER CALC-MCNC: 4.1 G/DL (ref 2–4)
GLUCOSE SERPL-MCNC: 149 MG/DL (ref 65–100)
HAV IGM SER QL: NONREACTIVE
HBV CORE IGM SER QL: REACTIVE
HBV SURFACE AG SER QL: 0.66 INDEX
HBV SURFACE AG SER QL: NEGATIVE
HCT VFR BLD AUTO: 34.5 % (ref 35–47)
HCV AB SER IA-ACNC: 0.08 INDEX
HCV AB SERPL QL IA: NONREACTIVE
HGB BLD-MCNC: 11 G/DL (ref 11.5–16)
MAGNESIUM SERPL-MCNC: 2.2 MG/DL (ref 1.6–2.4)
MCH RBC QN AUTO: 33.5 PG (ref 26–34)
MCHC RBC AUTO-ENTMCNC: 31.9 G/DL (ref 30–36.5)
MCV RBC AUTO: 105.2 FL (ref 80–99)
NRBC # BLD: 0 K/UL (ref 0–0.01)
NRBC BLD-RTO: 0 PER 100 WBC
PHOSPHATE SERPL-MCNC: 2.7 MG/DL (ref 2.6–4.7)
PLATELET # BLD AUTO: 408 K/UL (ref 150–400)
PMV BLD AUTO: 9.3 FL (ref 8.9–12.9)
POTASSIUM SERPL-SCNC: 3.7 MMOL/L (ref 3.5–5.1)
PROT SERPL-MCNC: 6.1 G/DL (ref 6.4–8.2)
RBC # BLD AUTO: 3.28 M/UL (ref 3.8–5.2)
SODIUM SERPL-SCNC: 138 MMOL/L (ref 136–145)
T4 FREE SERPL-MCNC: 1.1 NG/DL (ref 0.8–1.5)
WBC # BLD AUTO: 10.8 K/UL (ref 3.6–11)

## 2025-03-10 PROCEDURE — 97530 THERAPEUTIC ACTIVITIES: CPT

## 2025-03-10 PROCEDURE — 6360000002 HC RX W HCPCS: Performed by: ORTHOPAEDIC SURGERY

## 2025-03-10 PROCEDURE — 81001 URINALYSIS AUTO W/SCOPE: CPT

## 2025-03-10 PROCEDURE — 80053 COMPREHEN METABOLIC PANEL: CPT

## 2025-03-10 PROCEDURE — 84100 ASSAY OF PHOSPHORUS: CPT

## 2025-03-10 PROCEDURE — 76705 ECHO EXAM OF ABDOMEN: CPT

## 2025-03-10 PROCEDURE — 6370000000 HC RX 637 (ALT 250 FOR IP): Performed by: INTERNAL MEDICINE

## 2025-03-10 PROCEDURE — 85027 COMPLETE CBC AUTOMATED: CPT

## 2025-03-10 PROCEDURE — 6360000002 HC RX W HCPCS: Performed by: INTERNAL MEDICINE

## 2025-03-10 PROCEDURE — 86140 C-REACTIVE PROTEIN: CPT

## 2025-03-10 PROCEDURE — 94761 N-INVAS EAR/PLS OXIMETRY MLT: CPT

## 2025-03-10 PROCEDURE — 1100000000 HC RM PRIVATE

## 2025-03-10 PROCEDURE — 97165 OT EVAL LOW COMPLEX 30 MIN: CPT

## 2025-03-10 PROCEDURE — 94640 AIRWAY INHALATION TREATMENT: CPT

## 2025-03-10 PROCEDURE — 6370000000 HC RX 637 (ALT 250 FOR IP): Performed by: FAMILY MEDICINE

## 2025-03-10 PROCEDURE — 83735 ASSAY OF MAGNESIUM: CPT

## 2025-03-10 PROCEDURE — 2700000000 HC OXYGEN THERAPY PER DAY

## 2025-03-10 PROCEDURE — 84439 ASSAY OF FREE THYROXINE: CPT

## 2025-03-10 PROCEDURE — 2500000003 HC RX 250 WO HCPCS: Performed by: ORTHOPAEDIC SURGERY

## 2025-03-10 PROCEDURE — 80074 ACUTE HEPATITIS PANEL: CPT

## 2025-03-10 PROCEDURE — 2500000003 HC RX 250 WO HCPCS: Performed by: INTERNAL MEDICINE

## 2025-03-10 RX ADMIN — Medication 1 CAPSULE: at 23:07

## 2025-03-10 RX ADMIN — LISINOPRIL 5 MG: 5 TABLET ORAL at 08:10

## 2025-03-10 RX ADMIN — ATORVASTATIN CALCIUM 40 MG: 20 TABLET, FILM COATED ORAL at 08:10

## 2025-03-10 RX ADMIN — Medication 1 CAPSULE: at 08:09

## 2025-03-10 RX ADMIN — SODIUM CHLORIDE, PRESERVATIVE FREE 10 ML: 5 INJECTION INTRAVENOUS at 20:09

## 2025-03-10 RX ADMIN — DIPHENHYDRAMINE HCL 25 MG: 25 CAPSULE ORAL at 20:27

## 2025-03-10 RX ADMIN — WATER 1000 MG: 1 INJECTION INTRAMUSCULAR; INTRAVENOUS; SUBCUTANEOUS at 08:12

## 2025-03-10 RX ADMIN — Medication 1 CAPSULE: at 08:10

## 2025-03-10 RX ADMIN — Medication 250 MCG: at 20:07

## 2025-03-10 RX ADMIN — ACETAMINOPHEN 650 MG: 325 TABLET ORAL at 08:10

## 2025-03-10 RX ADMIN — PREDNISONE 10 MG: 5 TABLET ORAL at 08:09

## 2025-03-10 RX ADMIN — CALCIUM CARBONATE 1 TABLET: 500 TABLET, CHEWABLE ORAL at 23:33

## 2025-03-10 RX ADMIN — DIPHENHYDRAMINE HCL 25 MG: 25 CAPSULE ORAL at 08:17

## 2025-03-10 RX ADMIN — ENOXAPARIN SODIUM 40 MG: 100 INJECTION SUBCUTANEOUS at 08:14

## 2025-03-10 RX ADMIN — CITALOPRAM HYDROBROMIDE 40 MG: 20 TABLET ORAL at 08:09

## 2025-03-10 NOTE — DISCHARGE INSTRUCTIONS
Arvind Kirby MD  Foot and Ankle Surgery  General Orthopaedics      POST-OP Instructions      BLOOD CLOTS: To prevent blood clot formation, you have been prescribed Lovenox 40mg subcutaneous once daily for four weeks. After the lovenox has been completed you will then transition to aspirin 81mg tablets to be taken by mouth twice daily for 2 weeks.     PAIN CONTROL: For pain control, you have been prescribed narcotic    medication (Oxycodone or Norco). Take as prescribed and wean as able.     ***VERY IMPORTANT - PLEASE READ***.  If you were given a nerve block by the anesthesia team to numb your leg, you must start taking pain medication BEFORE the block wears off EVEN IF YOU ARE NOT HAVING PAIN.  If you do not start taking pain medication before the block wears off, you will be behind on pain control.  The block will usually wear off in 12-24 hours after surgery, so you must have pain medication in your system before the block wears off.    Take pain medication with food if possible to minimize stomach irritation. You may    take tylenol (acetaminophen) with Oxycodone but not with medications that    already have acetaminophen in them such as Norco. While taking narcotics, you    may have constipation. A stool softener is recommended-- you may use an over-    the-counter stool softener or use the one that has been prescribed for you. We    recommend no ibuprofen (Advil, Motrin, etc) since there are some studies that    show it may interfere with bone healing. Do not drink alcohol or drive while using    narcotic pain medication.            *** VERY IMPORTANT - PLEASE READ*** Please monitor the supply of your pain medicine closely and if it looks like you're going to run out of pain medicine over the weekend please call the office on THURSDAY BEFORE 3pm prior to the weekend to request a refill.  The on call physician for nights and weekends CANNOT refill pain medicine.  You will either have to wait until Monday  your post operative visit. Keep it    clean and dry. Mild to moderate blood or drainage after surgery is expected.    2. Keep your operative extremity elevated. Avoid pressure under the heel by    placing pillows under your calf, not your foot.    FOLLOWUP INSTRUCTIONS:    1. Follow up in clinic with your surgeon within 2-3 weeks. If your appointment has    not already been made at the time of discharge, you will need to call to make the    appointment.    2. The general scheduling number is [phone]    3. Contact your physician’s office during office hours.  For medical emergencies    call [phone].

## 2025-03-10 NOTE — PLAN OF CARE
Problem: Discharge Planning  Goal: Discharge to home or other facility with appropriate resources  3/10/2025 0344 by Julianne Lopez RN  Outcome: Progressing  3/9/2025 1628 by Henry Wick RN  Outcome: Progressing  3/9/2025 1619 by Henry Wick RN  Outcome: Progressing     Problem: Skin/Tissue Integrity  Goal: Skin integrity remains intact  Description: 1.  Monitor for areas of redness and/or skin breakdown  2.  Assess vascular access sites hourly  3.  Every 4-6 hours minimum:  Change oxygen saturation probe site  4.  Every 4-6 hours:  If on nasal continuous positive airway pressure, respiratory therapy assess nares and determine need for appliance change or resting period  3/10/2025 0344 by Julianne Lopez RN  Outcome: Progressing  3/9/2025 1628 by Henry Wick RN  Outcome: Progressing  3/9/2025 1619 by Henry Wick RN  Outcome: Progressing     Problem: Safety - Adult  Goal: Free from fall injury  3/9/2025 1628 by Henry Wick RN  Outcome: Progressing  3/9/2025 1619 by Henry Wick RN  Outcome: Progressing     Problem: Pain  Goal: Verbalizes/displays adequate comfort level or baseline comfort level  3/9/2025 1628 by Henry Wick RN  Outcome: Progressing  3/9/2025 1619 by Henry Wick RN  Outcome: Progressing     Problem: Respiratory - Adult  Goal: Achieves optimal ventilation and oxygenation  3/9/2025 1628 by Henry Wick RN  Outcome: Progressing  3/9/2025 1619 by Henry Wick, RN  Outcome: Progressing

## 2025-03-10 NOTE — PLAN OF CARE
Problem: Discharge Planning  Goal: Discharge to home or other facility with appropriate resources  3/10/2025 0823 by Henry Wick, RN  Outcome: Progressing  3/10/2025 0344 by Julianne Lopez RN  Outcome: Progressing     Problem: Skin/Tissue Integrity  Goal: Skin integrity remains intact  Description: 1.  Monitor for areas of redness and/or skin breakdown  2.  Assess vascular access sites hourly  3.  Every 4-6 hours minimum:  Change oxygen saturation probe site  4.  Every 4-6 hours:  If on nasal continuous positive airway pressure, respiratory therapy assess nares and determine need for appliance change or resting period  3/10/2025 0823 by Henry Wick, RN  Outcome: Progressing  3/10/2025 0344 by Julianne Lopez RN  Outcome: Progressing     Problem: Safety - Adult  Goal: Free from fall injury  Outcome: Progressing     Problem: Pain  Goal: Verbalizes/displays adequate comfort level or baseline comfort level  Outcome: Progressing     Problem: Respiratory - Adult  Goal: Achieves optimal ventilation and oxygenation  Outcome: Progressing

## 2025-03-10 NOTE — PROGRESS NOTES
Physician Progress Note      PATIENT:               STANISLAV FISHMAN  CSN #:                  [id number]  :                       1945  ADMIT DATE:       3/4/2025 7:12 AM  DISCH DATE:  RESPONDING  PROVIDER #:        Gilbert MERCADO Do, MD          QUERY TEXT:    Patient admitted with fractured  ankle.  Noted documentation of\"  Anemia.    ABLA.  Worsened from surgery. \"  on  notes 3/8.     In order to support the   diagnosis of   ABLA.  please include additional clinical indicators in your   documentation.  Or please document if the diagnosis of  ABLA.  has been ruled   out after further study.    The medical record reflects the following:  Risk Factors: h/o  breast  cancer  and  melanoma;  surgery  EBL  <   5  cc's  Clinical Indicators: H&H on admit   12.9/39.6;  surgery  on  3/7   :    3/8    H&H   10.2/32.4;  Treatment: H&H  monitored    Thank you  very  much  Options provided:  -- acute  blood loss anemia present as evidenced by, please  give  treatment,   Please document evidence.  -- acute  blood loss anemia  was ruled out  -- Other - I will add my own diagnosis  -- Disagree - Not applicable / Not valid  -- Disagree - Clinically unable to determine / Unknown  -- Refer to Clinical Documentation Reviewer    PROVIDER RESPONSE TEXT:    acute blood loss anemia was ruled out after study.    Query created by: Kelly Quintanilla on 3/10/2025 1:46 PM      Electronically signed by:  Gilbert MERCADO Do, MD 3/10/2025 6:09 PM

## 2025-03-10 NOTE — OP NOTE
[facility]          [address] [place]  [address]                            OPERATIVE REPORT      PATIENT NAME: STANISLAV FISHMAN        : 1945  MED REC NO: [id number]                       ROOM: [employer]  ACCOUNT NO: [id number]                       ADMIT DATE: 2025  PROVIDER: Arvind Kirby MD    DATE OF SERVICE:  2025    PREOPERATIVE DIAGNOSES:  Closed left bimalleolar ankle fracture.    POSTOPERATIVE DIAGNOSES:  Closed left bimalleolar ankle fracture.    PROCEDURES PERFORMED:       1. Open reduction and internal fixation of left bimalleolar ankle fracture.     2. Independent interpretation of intraoperative fluoroscopy.    SURGEON:  Arvind Kirby MD    ASSISTANT:  Eduardo Dyson.    ANESTHESIA:  Regional with general.    ESTIMATED BLOOD LOSS:  Less than 5 mL.    SPECIMENS REMOVED:  None.    INTRAOPERATIVE FINDINGS:  No infection present.    Other findings, closed left bimalleolar ankle fracture with stable syndesmosis.     COMPLICATIONS:  None.    IMPLANTS:  Arthrex medial hook plate and screws, Halifax lateral plate and screws, and bone putty.    INDICATIONS:  This is an 79-year-old female who suffered a ground level fall and a closed left bimalleolar ankle fracture.  I offered both conservative and surgical management options to her.  I discussed the risks of surgery which include, but not limited to complications of anesthesia including death, pain, bleeding, infection, damage to surrounding structures, nonunion, malunion, DVT, PE, wound healing problems, continued ankle pain, the development of posttraumatic osteoarthritis, and the need for further surgery.  The patient verbalized understanding and elected to proceed with surgical intervention.    DESCRIPTION OF PROCEDURE:  The correct patient, extremity, and operation were identified in the preoperative holding area and I marked her left lower extremity at the left ankle.  The

## 2025-03-10 NOTE — CONSULTS
Comprehensive Nutrition Assessment    Type and Reason for Visit: Initial, Consult    Nutrition Recommendations/Plan:   Continue Regular diet  Adjusted ONS to Ensure HP BID  And Grant BID       Malnutrition Assessment:  Malnutrition Status:  Severe malnutrition (03/10/25 1015)    Context:  Acute Illness     Findings of the 6 clinical characteristics of malnutrition:  Energy Intake:  No decrease in energy intake  Weight Loss:  No weight loss     Body Fat Loss:  Mild body fat loss     Muscle Mass Loss:  No muscle mass loss    Fluid Accumulation:  No fluid accumulation     Strength:  Not Performed       Nutrition Assessment:    Past medical hx:       Diagnosis Date    Arthritis     Breast cancer (HCC) 10/2020    Breast cancer, left breast (HCC) 2003, 2012    lumpectomy/chemo/XRT, then mastectomy and anastrozole in 2012    Cancer (HCC)     SEVERAL SKIN - MELANOMA    COPD (chronic obstructive pulmonary disease) ([facility])     Hypertension     Ill-defined condition     MACULAR DEGENERATION       Consult for ONS received. Adjusted ONS to Ensure HP (Low waqas/High pro) as pt is eating well. Pt is eating >75% on average. Added Grant BID for would healing. Noted allergy to Red dye diet - excludes strawberry flavored Ensure. No chew/swallow issue reported. Last BM 4 days ago, recommend scheduled bowel regimen. Encourage PO and Ensure and Grant. Monitor wegith.       Current Nutrition Therapies:  ADULT DIET; Regular  ADULT ORAL NUTRITION SUPPLEMENT; Lunch, Dinner; Low Calorie/High Protein Oral Supplement  ADULT ORAL NUTRITION SUPPLEMENT; Breakfast, Lunch; Wound Healing Oral Supplement  Meal Intake:   Patient Vitals for the past 168 hrs:   PO Meals Eaten (%)   03/08/25 1806 76 - 100%   03/08/25 0900 76 - 100%   03/05/25 1728 76 - 100%     Supplement Intake:  No data found.  Nutrition Support: n/a    Nutritionally Significant Medications:  Scheduled Meds:   cefTRIAXone (ROCEPHIN) IV  1,000 mg IntraVENous Q24H    PreserVision AREDS  1

## 2025-03-10 NOTE — PROGRESS NOTES
Orthopaedic Progress Note  Post Op day: 2 Days Post-Op    2025 11:18 PM     Patient: Nicole Galeano MRN: [id number]  SSN: [id number]    YOB: 1945  Age: 79 y.o.  Sex: female      Admit date:  3/4/2025  Procedures:  Procedure(s):  LEFT ANKLE OPEN REDUCTION INTERNAL FIXATION  Admitting Physician:  Kartik Lobo MD   Surgeon:  Surgeons and Role:     * Arvind Kirby MD - Primary    Consulting Physician(s): Treatment Team:   Gilbert Valenzuela, Andres Dvais MD Cox, Jenny L, APRN - NP  Damaris Pepper Simi, Arvind Bruner MD Do, Khoi B, Aide Rmairez    SUBJECTIVE:     Nicole Galeano is a 79 y.o. female is 2 Days Post-Op s/p Procedure(s):  LEFT ANKLE OPEN REDUCTION INTERNAL FIXATION with an appropriate level of post-operative pain.  She is upset with therapy.  She is also upset that we are not understanding of her limitations due to her left arm.  No complaints of nausea, vomiting, dizziness, lightheadedness, chest pain, or shortness of breath.    OBJECTIVE:       Physical Exam:  General: Alert, cooperative, no distress.    Respiratory: Respirations unlabored  Neurological:  Neurovascular exam within normal limits.  Motor: + DF/PF.   Musculoskeletal: Calves soft, supple, non-tender upon palpation.  Left ankle in a short leg splint.   Moves toes.  BCR of all digits.  SILT LLE.    Dressing/Wound:  Clean, dry and intact. No significant erythema or swelling.          Vital Signs:      Patient Vitals for the past 8 hrs:   BP Temp Temp src Pulse Resp SpO2   25 2250 123/81 98.1 °F (36.7 °C) Oral 89 18 90 %   25 195 (!) 136/99 97.9 °F (36.6 °C) Oral 67 18 94 %   25 1552 120/79 97.5 °F (36.4 °C) Axillary 82 16 91 %                                          Temp (24hrs), Av.8 °F (36.6 °C), Min:97.5 °F (36.4 °C), Max:98.1 °F (36.7 °C)      Labs:        Recent Labs     25  0323   HCT 33.8*   HGB 10.6*     Lab Results

## 2025-03-10 NOTE — PLAN OF CARE
Problem: Physical Therapy - Adult  Goal: By Discharge: Performs mobility at highest level of function for planned discharge setting.  See evaluation for individualized goals.  Description: FUNCTIONAL STATUS PRIOR TO ADMISSION: Patient was modified independent using a rolling walker for very short distance household. Transport w/c and rollator for community.     HOME SUPPORT PRIOR TO ADMISSION: The patient lived with dgt and AMY and required minimal assistance for ADLs and lower body dressing. Ppatient with diagnosis of RA in July 2024 and has slowed built back up to near baseline on steroid therapy but long standing issues with R knee OA before this GLF, L ankle fx and ORIF. 4 RHETT home and remains on 1st level. Unclear as to if transfer tub bench or shower chair (cognitive processing impairment)    Physical Therapy Goals  Initiated 3/8/2025  1.  Patient will move from supine to sit and sit to supine, scoot up and down, and roll side to side in bed with modified independence within 7 day(s).    2.  Patient will perform sit <> stand with moderate assistance within 7 day(s).  3.  Patient will perform lateral scoot transfer from bed to chair and chair to bed with minimal assistance using the least restrictive device within 7 day(s).  4.  Patient will perform SPT with RW with moderate assistance x 2 within 7 day(s).     Outcome: Progressing   PHYSICAL THERAPY TREATMENT    Patient: Nicole Galeano (79 y.o. female)  Date: 3/10/2025  Diagnosis: Closed fracture of left ankle, initial encounter [S82.892A]  Acute left ankle pain [M25.572]  Chronic obstructive pulmonary disease, unspecified COPD type ([facility]) [J44.9]  Rheumatoid arthritis, involving unspecified site, unspecified whether rheumatoid factor present ([facility]) [M06.9]  Ground-level fall [W18.30XA] Acute left ankle pain  Procedure(s) (LRB):  LEFT ANKLE OPEN REDUCTION INTERNAL FIXATION (Left) 3 Days Post-Op  Precautions:

## 2025-03-10 NOTE — PROGRESS NOTES
Physician Progress Note      PATIENT:               STANISLAV FISHMAN  CSN #:                  [id number]  :                       1945  ADMIT DATE:       3/4/2025 7:12 AM  DISCH DATE:  RESPONDING  PROVIDER #:        Gilbert MERCADO Do, MD          QUERY TEXT:    Pt admitted with ankle  fracture.   Pt noted to have   AMS.  If possible,   please document in the progress notes and discharge summary if you are   evaluating and / or treating any of the following:    The medical record reflects the following:  Risk Factors: alcohol abuse  Clinical Indicators: RRT  for  drowsiness \"    Possibly intentionally aphasic.    Delirium possible.  Narcotic and benzo induced possible. Withdrawal from   alcohol possible.  -  Treatment: -reduce narcotic dose;  CT  head    Thank you   very  much  Options provided:  -- Drug-induced encephalopathy possibly  due  to  narcotic  and  benzos  -- Toxic encephalopathy possibly   due   to  narcotic  and  benzos  -- AMS  due  to  alcohol withdrawal  -- Other - I will add my own diagnosis  -- Disagree - Not applicable / Not valid  -- Disagree - Clinically unable to determine / Unknown  -- Refer to Clinical Documentation Reviewer    PROVIDER RESPONSE TEXT:    This patient has Toxic encephalopathy possibly due to narcotic and benzos    Query created by: Kelly Quintanilla on 3/10/2025 5:52 AM      Electronically signed by:  Gilbert MERCADO Do, MD 3/10/2025 9:47 AM

## 2025-03-10 NOTE — PLAN OF CARE
Problem: Occupational Therapy - Adult  Goal: By Discharge: Performs self-care activities at highest level of function for planned discharge setting.  See evaluation for individualized goals.  Description: FUNCTIONAL STATUS PRIOR TO ADMISSION:  Patient reports she had a functional decline in August 2024 due to R knee rheumatoid arthritis requiring ADL assist but had improved to modified independent level before current admission for GLF/ L ankle fx.  She was ambulating short household distances with RW.    HOME SUPPORT: Patient resided at home with her daughter and son-in-law to provide assistance.    Occupational Therapy Goals:  Initiated 3/10/2025  1.  Patient will perform bathing with Minimal Assist within 7 day(s).  2.  Patient will perform lower body dressing with Moderate Assist within 7 day(s).  3.  Patient will perform upper body dressing with Set-up within 7 day(s).  4.  Patient will perform toilet transfers with Moderate Assist  within 7 day(s).  5.  Patient will perform all aspects of toileting with Moderate Assist within 7 day(s).  6.  Patient will participate in upper extremity therapeutic exercise/activities with Supervision for 10 minutes within 7 day(s).    7.  Patient will utilize fall prevention techniques during functional activities with verbal cues within 7 day(s).    Outcome: Progressing     OCCUPATIONAL THERAPY EVALUATION    Patient: Nicole Galeano (79 y.o. female)  Date: 3/10/2025  Primary Diagnosis: Closed fracture of left ankle, initial encounter [S82.892A]  Acute left ankle pain [M25.572]  Chronic obstructive pulmonary disease, unspecified COPD type ([facility]) [J44.9]  Rheumatoid arthritis, involving unspecified site, unspecified whether rheumatoid factor present ([facility]) [M06.9]  Ground-level fall [W18.30XA]  Procedure(s) (LRB):  LEFT ANKLE OPEN REDUCTION INTERNAL FIXATION (Left) 3 Days Post-Op     Precautions: Weight Bearing, Fall Risk, Bed Alarm   Left Lower Extremity Weight Bearing:

## 2025-03-10 NOTE — PROGRESS NOTES
[facility]  [address] [place] [address] [phone]        Hospitalist Progress Note      NAME: Nicole Galeano   :  1945  MRM:  [id number]    Date/Time of service: 3/10/2025  9:47 AM       Subjective:     Chief Complaint:  Patient was personally seen and examined by me during this time period.  Chart reviewed.  No chest pain, SOB.  Did not participate much in therapy yesterday        Objective:       Vitals:       Last 24hrs VS reviewed since prior progress note. Most recent are:    Vitals:    03/10/25 0759   BP: (!) 120/91   Pulse: 62   Resp: 16   Temp: 98.1 °F (36.7 °C)   SpO2: 92%     SpO2 Readings from Last 6 Encounters:   03/10/25 92%   24 95%   24 94%          Intake/Output Summary (Last 24 hours) at 3/10/2025 0947  Last data filed at 3/10/2025 0400  Gross per 24 hour   Intake 960 ml   Output 1275 ml   Net -315 ml        Exam:     Physical Exam:    Gen:  elderly, frail, NAD  HEENT:  Pink conjunctivae, PERRL, hearing intact to voice, moist mucous membranes  Neck:  Supple, without masses, thyroid non-tender  Resp:  No accessory muscle use, clear breath sounds without wheezes rales or rhonchi  Card:  No murmurs, normal S1, S2 without thrills, +edema  Abd:  Soft, non-tender, non-distended, normoactive bowel sounds are present  Musc:  No cyanosis or clubbing, LLE splint/dressing c/d/i  Skin:  No rashes  Neuro:  Cranial nerves 3-12 are grossly intact, follows commands appropriately  Psych:  fair insight, oriented to person, place and time, alert    Medications Reviewed: (see below)    Lab Data Reviewed: (see below)    ______________________________________________________________________    Medications:     Current Facility-Administered Medications   Medication Dose Route Frequency    cefTRIAXone (ROCEPHIN) 1,000 mg in sterile water 10 mL IV syringe  1,000 mg IntraVENous Q24H    diphenhydrAMINE (BENADRYL) capsule 25 mg  25 mg Oral Q6H PRN     Cont spiriva, nebs prn    10) RA: cont daily prednisone    **Prior records, notes, labs, radiology, and medications reviewed in [facility]**    Total time spent with patient care: 35 Minutes **I personally saw and examined the patient during this time period**                 Care Plan discussed with: Patient, nursing     Discussed:  Care Plan    Prophylaxis:  Lovenox    Disposition:  SNF/LTC pending           ___________________________________________________    Attending Physician: Gilbert Valenzuela MD

## 2025-03-11 LAB
APPEARANCE UR: CLEAR
BACTERIA URNS QL MICRO: NEGATIVE /HPF
BILIRUB UR QL: NEGATIVE
COLOR UR: NORMAL
EPITH CASTS URNS QL MICRO: NORMAL /LPF
GLUCOSE UR STRIP.AUTO-MCNC: NEGATIVE MG/DL
HGB UR QL STRIP: NEGATIVE
HYALINE CASTS URNS QL MICRO: NORMAL /LPF (ref 0–2)
KETONES UR QL STRIP.AUTO: NEGATIVE MG/DL
LEUKOCYTE ESTERASE UR QL STRIP.AUTO: NEGATIVE
NITRITE UR QL STRIP.AUTO: NEGATIVE
PH UR STRIP: 8 (ref 5–8)
PROT UR STRIP-MCNC: NEGATIVE MG/DL
RBC #/AREA URNS HPF: NORMAL /HPF (ref 0–5)
SP GR UR REFRACTOMETRY: 1.01 (ref 1–1.03)
URINE CULTURE IF INDICATED: NORMAL
UROBILINOGEN UR QL STRIP.AUTO: 1 EU/DL (ref 0.2–1)
WBC URNS QL MICRO: NORMAL /HPF (ref 0–4)

## 2025-03-11 PROCEDURE — 6360000002 HC RX W HCPCS: Performed by: INTERNAL MEDICINE

## 2025-03-11 PROCEDURE — 97535 SELF CARE MNGMENT TRAINING: CPT

## 2025-03-11 PROCEDURE — 6370000000 HC RX 637 (ALT 250 FOR IP): Performed by: FAMILY MEDICINE

## 2025-03-11 PROCEDURE — 6360000002 HC RX W HCPCS: Performed by: ORTHOPAEDIC SURGERY

## 2025-03-11 PROCEDURE — 97116 GAIT TRAINING THERAPY: CPT

## 2025-03-11 PROCEDURE — 2500000003 HC RX 250 WO HCPCS: Performed by: INTERNAL MEDICINE

## 2025-03-11 PROCEDURE — 97530 THERAPEUTIC ACTIVITIES: CPT

## 2025-03-11 PROCEDURE — 94640 AIRWAY INHALATION TREATMENT: CPT

## 2025-03-11 PROCEDURE — 2700000000 HC OXYGEN THERAPY PER DAY

## 2025-03-11 PROCEDURE — 6370000000 HC RX 637 (ALT 250 FOR IP): Performed by: INTERNAL MEDICINE

## 2025-03-11 PROCEDURE — 94761 N-INVAS EAR/PLS OXIMETRY MLT: CPT

## 2025-03-11 PROCEDURE — 2500000003 HC RX 250 WO HCPCS: Performed by: ORTHOPAEDIC SURGERY

## 2025-03-11 PROCEDURE — 1100000000 HC RM PRIVATE

## 2025-03-11 RX ADMIN — ACETAMINOPHEN 650 MG: 325 TABLET ORAL at 23:21

## 2025-03-11 RX ADMIN — LISINOPRIL 5 MG: 5 TABLET ORAL at 08:41

## 2025-03-11 RX ADMIN — DIPHENHYDRAMINE HCL 25 MG: 25 CAPSULE ORAL at 23:22

## 2025-03-11 RX ADMIN — DIPHENHYDRAMINE HCL 25 MG: 25 CAPSULE ORAL at 06:33

## 2025-03-11 RX ADMIN — Medication 250 MCG: at 23:22

## 2025-03-11 RX ADMIN — CALCIUM CARBONATE: 500 TABLET, CHEWABLE ORAL at 23:21

## 2025-03-11 RX ADMIN — SODIUM CHLORIDE, PRESERVATIVE FREE 10 ML: 5 INJECTION INTRAVENOUS at 08:42

## 2025-03-11 RX ADMIN — Medication 1 CAPSULE: at 09:04

## 2025-03-11 RX ADMIN — SODIUM CHLORIDE, PRESERVATIVE FREE 10 ML: 5 INJECTION INTRAVENOUS at 22:54

## 2025-03-11 RX ADMIN — ENOXAPARIN SODIUM 40 MG: 100 INJECTION SUBCUTANEOUS at 08:40

## 2025-03-11 RX ADMIN — WATER 1000 MG: 1 INJECTION INTRAMUSCULAR; INTRAVENOUS; SUBCUTANEOUS at 08:39

## 2025-03-11 RX ADMIN — PREDNISONE 10 MG: 5 TABLET ORAL at 06:33

## 2025-03-11 RX ADMIN — ATORVASTATIN CALCIUM 40 MG: 20 TABLET, FILM COATED ORAL at 08:41

## 2025-03-11 RX ADMIN — Medication 1 CAPSULE: at 22:54

## 2025-03-11 RX ADMIN — ACETAMINOPHEN 650 MG: 325 TABLET ORAL at 06:33

## 2025-03-11 RX ADMIN — CITALOPRAM HYDROBROMIDE 40 MG: 20 TABLET ORAL at 08:41

## 2025-03-11 ASSESSMENT — PAIN SCALES - GENERAL
PAINLEVEL_OUTOF10: 2
PAINLEVEL_OUTOF10: 3
PAINLEVEL_OUTOF10: 1
PAINLEVEL_OUTOF10: 3

## 2025-03-11 ASSESSMENT — PAIN DESCRIPTION - LOCATION
LOCATION: GENERALIZED
LOCATION: GENERALIZED

## 2025-03-11 ASSESSMENT — PAIN DESCRIPTION - ORIENTATION
ORIENTATION: RIGHT;LEFT
ORIENTATION: RIGHT;LEFT;ANTERIOR;POSTERIOR

## 2025-03-11 ASSESSMENT — PAIN DESCRIPTION - PAIN TYPE: TYPE: CHRONIC PAIN

## 2025-03-11 ASSESSMENT — PAIN DESCRIPTION - ONSET: ONSET: SUDDEN

## 2025-03-11 ASSESSMENT — PAIN DESCRIPTION - DIRECTION: RADIATING_TOWARDS: GENERALIZED

## 2025-03-11 ASSESSMENT — PAIN DESCRIPTION - FREQUENCY: FREQUENCY: INTERMITTENT

## 2025-03-11 ASSESSMENT — PAIN DESCRIPTION - DESCRIPTORS
DESCRIPTORS: ACHING
DESCRIPTORS: ACHING;BURNING

## 2025-03-11 ASSESSMENT — PAIN - FUNCTIONAL ASSESSMENT: PAIN_FUNCTIONAL_ASSESSMENT: PREVENTS OR INTERFERES SOME ACTIVE ACTIVITIES AND ADLS

## 2025-03-11 NOTE — PROGRESS NOTES
[facility]  [address] [place] [address] [phone]        Hospitalist Progress Note      NAME: Nicole Galeano   :  1945  MRM:  [id number]    Date/Time of service: 3/11/2025  9:58 AM       Subjective:     Chief Complaint:  Patient was personally seen and examined by me during this time period.  Chart reviewed.  No abd pain, fevers       Objective:       Vitals:       Last 24hrs VS reviewed since prior progress note. Most recent are:    Vitals:    25 0821   BP:    Pulse:    Resp:    Temp:    SpO2: 96%     SpO2 Readings from Last 6 Encounters:   25 96%   24 95%   24 94%          Intake/Output Summary (Last 24 hours) at 3/11/2025 0958  Last data filed at 3/11/2025 0600  Gross per 24 hour   Intake 810 ml   Output 2375 ml   Net -1565 ml        Exam:     Physical Exam:    Gen:  elderly, frail, NAD  HEENT:  Pink conjunctivae, PERRL, hearing intact to voice, moist mucous membranes  Neck:  Supple, without masses, thyroid non-tender  Resp:  No accessory muscle use, clear breath sounds without wheezes rales or rhonchi  Card:  No murmurs, normal S1, S2 without thrills, +edema  Abd:  Soft, non-tender, non-distended, normoactive bowel sounds are present  Musc:  No cyanosis or clubbing, LLE splint/dressing c/d/i  Skin:  No rashes  Neuro:  Cranial nerves 3-12 are grossly intact, follows commands appropriately  Psych:  fair insight, oriented to person, place and time, alert    Medications Reviewed: (see below)    Lab Data Reviewed: (see below)    ______________________________________________________________________    Medications:     Current Facility-Administered Medications   Medication Dose Route Frequency    diphenhydrAMINE (BENADRYL) capsule 25 mg  25 mg Oral Q6H PRN    PreserVision AREDS CAPS 1 capsule (Patient Supplied)  1 capsule Oral BID    traMADol (ULTRAM) tablet 50 mg  50 mg Oral Q6H PRN    lactobacillus (CULTURELLE) capsule 1

## 2025-03-11 NOTE — CARE COORDINATION
03/11/25  4:16 PM  [facility] has denied. [facility] has accepted. CM has called pt's daughter Nancy [phone] to provide information. Additional preferences were received in the following order. 1. [facility], 2. [facility], 3. [facility], 4. [facility], 5. [facility], 6. [place]. CM [facility] epic [facility] Acceptances remain pending. Patient will need auth. CM following.             Care Management Progress Note      Reason for Admission:   Closed fracture of left ankle, initial encounter [S82.892A]  Acute left ankle pain [M25.572]  Chronic obstructive pulmonary disease, unspecified COPD type ([facility]) [J44.9]  Rheumatoid arthritis, involving unspecified site, unspecified whether rheumatoid factor present ([facility]) [M06.9]  Ground-level fall [W18.30XA]  Procedure(s) (LRB):  LEFT ANKLE OPEN REDUCTION INTERNAL FIXATION (Left)  4 Days Post-Op      Patient Admission Status: Inpatient  RUR: 13%  Hospitalization in the last 30 days (Readmission):  No        Transition of care plan:  Patient was discussed in IDR and continues to be medically managed. Therapy is following.     Dispo: SNF.   If SNF:  Date FOC offered: 3/11/25.  Preferences received for [facility] and [facility]. CM has sent referrals in Epic. CM to email SNF list to pt's daughter Nancy at [email or web address] . Patient and daughter continue to review SNF for additional preferences.    Accepting facility:   Date authorization started with reference number: Patient will need insurance authorization.   Date authorization received and expires:     Discharge plan communicated with patient and/or discharge caregiver: Yes . CM discussed SNF list/preferences with patient at the bedside and daughter daughter Nancy on speaker phone.    Date 1st IMM letter given: 3/8/25.    Outpatient follow-up.    Transport at discharge: most likely stretcher under her medicaid policy due to progress with

## 2025-03-11 NOTE — PLAN OF CARE
Problem: Physical Therapy - Adult  Goal: By Discharge: Performs mobility at highest level of function for planned discharge setting.  See evaluation for individualized goals.  Description: FUNCTIONAL STATUS PRIOR TO ADMISSION: Patient was modified independent using a rolling walker for very short distance household. Transport w/c and rollator for community.     HOME SUPPORT PRIOR TO ADMISSION: The patient lived with dgt and AMY and required minimal assistance for ADLs and lower body dressing. Ppatient with diagnosis of RA in July 2024 and has slowed built back up to near baseline on steroid therapy but long standing issues with R knee OA before this GLF, L ankle fx and ORIF. 4 RHETT home and remains on 1st level. Unclear as to if transfer tub bench or shower chair (cognitive processing impairment)    Physical Therapy Goals  Initiated 3/8/2025  1.  Patient will move from supine to sit and sit to supine, scoot up and down, and roll side to side in bed with modified independence within 7 day(s).    2.  Patient will perform sit <> stand with moderate assistance within 7 day(s).  3.  Patient will perform lateral scoot transfer from bed to chair and chair to bed with minimal assistance using the least restrictive device within 7 day(s).  4.  Patient will perform SPT with RW with moderate assistance x 2 within 7 day(s).     Outcome: Progressing   PHYSICAL THERAPY TREATMENT    Patient: Nicole Galeano (79 y.o. female)  Date: 3/11/2025  Diagnosis: Closed fracture of left ankle, initial encounter [S82.892A]  Acute left ankle pain [M25.572]  Chronic obstructive pulmonary disease, unspecified COPD type ([facility]) [J44.9]  Rheumatoid arthritis, involving unspecified site, unspecified whether rheumatoid factor present ([facility]) [M06.9]  Ground-level fall [W18.30XA] Acute left ankle pain  Procedure(s) (LRB):  LEFT ANKLE OPEN REDUCTION INTERNAL FIXATION (Left) 4 Days Post-Op  Precautions:  therapist and registered nurse    Patient Education  Education Given To: Patient  Education Provided: Role of Therapy;Mobility Training;Plan of Care;Energy Conservation;Fall Prevention Strategies;Home Exercise Program;IADL Safety;Precautions;Orientation;ADL Adaptive Strategies;Transfer Training;Equipment  Education Method: Verbal  Barriers to Learning: Cognition;Readiness to Learn  Education Outcome: Verbalized understanding;Continued education needed;Unable to demonstrate understanding      KENDELL WARREN, PT,WCC.  Minutes: 24

## 2025-03-11 NOTE — PLAN OF CARE
Weight Bearing            Chart, occupational therapy assessment, plan of care, and goals were reviewed.    ASSESSMENT  Patient is progressing in OT goals.  She continues to report R knee and L shoulder pain (baseline) but had improved functional use of these limbs with weightbearing today.  She expressed frustration with purewick and was very motivated to transfer to BS for toileting.  She required modA x2 to stand from raised EOB and maxAx2 to pivot to BSC using RW with difficulty maintaining LLE NWB during pivot.  From BS, she required maxAx2 to  Carrie Stedy, which was utilized for transfer to chair with totalA.  Despite progress, she remains significantly below her independent baseline and requires mod-maxA for LB ADLs.  Anticipate prolonged functional recovery given LLE NWB restriction + comorbidities.  Recommend moderate intensity rehab at d/c.          PLAN :  Patient continues to benefit from skilled intervention to address the above impairments.  Continue treatment per established plan of care to address goals.    Recommendation for discharge: (in order for the patient to meet his/her long term goals):   Moderate intensity short-term skilled occupational therapy up to 5x/week         SUBJECTIVE:   Patient stated “It's hard to use that purewick.”    OBJECTIVE DATA SUMMARY:     Functional Mobility and Transfers for ADLs:  Bed Mobility:  Bed Mobility Training  Supine to Sit: Supervision     Transfers:   Transfer Training  Transfer Training: Yes  Sit to Stand: Partial/Moderate assistance;Substantial/Maximal assistance;2 Person assistance (modAx2 from raised EOB, maxAx2 from BSC- lower surface although in highest position)  Stand Pivot Transfers: Substantial/Maximal assistance;2 Person assistance (maxAx2 with RW + difficulty pivoting while maintaining LLE NWB.)           Balance:     Balance  Sitting: Intact  Standing: Impaired  Standing - Static: Fair;Poor  Standing - Dynamic: Poor      ADL  Intervention:           Toileting: Moderate assistance  Toileting Skilled Clinical Factors: wiped anterior seated on BSC after urinating.  infer modA overall for clothing management         Pain Rating:  Patient reported baseline L shoulder and R knee pain- although not as functionally limiting today      Activity Tolerance:   Good  VSS on room air- 94% SPO2    After treatment:   Patient left in no apparent distress sitting up in chair, Call bell within reach, and Bed/ chair alarm activated    COMMUNICATION/EDUCATION:   The patient's plan of care was discussed with: physical therapist and registered nurse         Thank you for this referral.  Dillon Aviles, DURGA  Minutes: 29

## 2025-03-12 PROBLEM — E43 SEVERE PROTEIN-ENERGY MALNUTRITION: Status: ACTIVE | Noted: 2025-03-12

## 2025-03-12 LAB
ALBUMIN SERPL-MCNC: 2.2 G/DL (ref 3.5–5)
ALBUMIN/GLOB SERPL: 0.6 (ref 1.1–2.2)
ALP SERPL-CCNC: 435 U/L (ref 45–117)
ALT SERPL-CCNC: 48 U/L (ref 12–78)
ANION GAP SERPL CALC-SCNC: 6 MMOL/L (ref 2–12)
AST SERPL-CCNC: 47 U/L (ref 15–37)
BILIRUB SERPL-MCNC: 0.8 MG/DL (ref 0.2–1)
BUN SERPL-MCNC: 16 MG/DL (ref 6–20)
BUN/CREAT SERPL: 28 (ref 12–20)
CALCIUM SERPL-MCNC: 9.1 MG/DL (ref 8.5–10.1)
CHLORIDE SERPL-SCNC: 102 MMOL/L (ref 97–108)
CO2 SERPL-SCNC: 31 MMOL/L (ref 21–32)
CREAT SERPL-MCNC: 0.58 MG/DL (ref 0.55–1.02)
ERYTHROCYTE [DISTWIDTH] IN BLOOD BY AUTOMATED COUNT: 13.8 % (ref 11.5–14.5)
GLOBULIN SER CALC-MCNC: 3.5 G/DL (ref 2–4)
GLUCOSE SERPL-MCNC: 82 MG/DL (ref 65–100)
HCT VFR BLD AUTO: 34.4 % (ref 35–47)
HGB BLD-MCNC: 10.8 G/DL (ref 11.5–16)
MAGNESIUM SERPL-MCNC: 2.3 MG/DL (ref 1.6–2.4)
MCH RBC QN AUTO: 33 PG (ref 26–34)
MCHC RBC AUTO-ENTMCNC: 31.4 G/DL (ref 30–36.5)
MCV RBC AUTO: 105.2 FL (ref 80–99)
NRBC # BLD: 0 K/UL (ref 0–0.01)
NRBC BLD-RTO: 0 PER 100 WBC
PHOSPHATE SERPL-MCNC: 4.3 MG/DL (ref 2.6–4.7)
PLATELET # BLD AUTO: 366 K/UL (ref 150–400)
PMV BLD AUTO: 9.4 FL (ref 8.9–12.9)
POTASSIUM SERPL-SCNC: 4.6 MMOL/L (ref 3.5–5.1)
PROT SERPL-MCNC: 5.7 G/DL (ref 6.4–8.2)
RBC # BLD AUTO: 3.27 M/UL (ref 3.8–5.2)
SODIUM SERPL-SCNC: 139 MMOL/L (ref 136–145)
WBC # BLD AUTO: 11 K/UL (ref 3.6–11)

## 2025-03-12 PROCEDURE — 6370000000 HC RX 637 (ALT 250 FOR IP): Performed by: FAMILY MEDICINE

## 2025-03-12 PROCEDURE — 1100000000 HC RM PRIVATE

## 2025-03-12 PROCEDURE — 2500000003 HC RX 250 WO HCPCS: Performed by: FAMILY MEDICINE

## 2025-03-12 PROCEDURE — 94761 N-INVAS EAR/PLS OXIMETRY MLT: CPT

## 2025-03-12 PROCEDURE — 2500000003 HC RX 250 WO HCPCS: Performed by: ORTHOPAEDIC SURGERY

## 2025-03-12 PROCEDURE — 6360000002 HC RX W HCPCS: Performed by: ORTHOPAEDIC SURGERY

## 2025-03-12 PROCEDURE — 2700000000 HC OXYGEN THERAPY PER DAY

## 2025-03-12 PROCEDURE — 80053 COMPREHEN METABOLIC PANEL: CPT

## 2025-03-12 PROCEDURE — 85027 COMPLETE CBC AUTOMATED: CPT

## 2025-03-12 PROCEDURE — 83735 ASSAY OF MAGNESIUM: CPT

## 2025-03-12 PROCEDURE — 84100 ASSAY OF PHOSPHORUS: CPT

## 2025-03-12 PROCEDURE — 6370000000 HC RX 637 (ALT 250 FOR IP): Performed by: INTERNAL MEDICINE

## 2025-03-12 PROCEDURE — 97535 SELF CARE MNGMENT TRAINING: CPT

## 2025-03-12 PROCEDURE — 97530 THERAPEUTIC ACTIVITIES: CPT

## 2025-03-12 RX ADMIN — SODIUM CHLORIDE, PRESERVATIVE FREE 10 ML: 5 INJECTION INTRAVENOUS at 08:41

## 2025-03-12 RX ADMIN — ATORVASTATIN CALCIUM 40 MG: 20 TABLET, FILM COATED ORAL at 08:37

## 2025-03-12 RX ADMIN — ACETAMINOPHEN 650 MG: 325 TABLET ORAL at 23:25

## 2025-03-12 RX ADMIN — SODIUM CHLORIDE, PRESERVATIVE FREE 10 ML: 5 INJECTION INTRAVENOUS at 20:40

## 2025-03-12 RX ADMIN — ACETAMINOPHEN 650 MG: 325 TABLET ORAL at 06:21

## 2025-03-12 RX ADMIN — DIPHENHYDRAMINE HCL 25 MG: 25 CAPSULE ORAL at 11:18

## 2025-03-12 RX ADMIN — Medication 1 CAPSULE: at 08:39

## 2025-03-12 RX ADMIN — LISINOPRIL 5 MG: 5 TABLET ORAL at 08:38

## 2025-03-12 RX ADMIN — CITALOPRAM HYDROBROMIDE 40 MG: 20 TABLET ORAL at 08:37

## 2025-03-12 RX ADMIN — CALCIUM CARBONATE: 500 TABLET, CHEWABLE ORAL at 23:03

## 2025-03-12 RX ADMIN — SODIUM CHLORIDE, PRESERVATIVE FREE 10 ML: 5 INJECTION INTRAVENOUS at 20:39

## 2025-03-12 RX ADMIN — ENOXAPARIN SODIUM 40 MG: 100 INJECTION SUBCUTANEOUS at 08:39

## 2025-03-12 RX ADMIN — SODIUM CHLORIDE, PRESERVATIVE FREE 10 ML: 5 INJECTION INTRAVENOUS at 08:40

## 2025-03-12 RX ADMIN — DIPHENHYDRAMINE HCL 25 MG: 25 CAPSULE ORAL at 06:21

## 2025-03-12 RX ADMIN — TRAMADOL HYDROCHLORIDE 50 MG: 50 TABLET, COATED ORAL at 11:18

## 2025-03-12 RX ADMIN — Medication 1 CAPSULE: at 21:57

## 2025-03-12 RX ADMIN — Medication 1 CAPSULE: at 08:38

## 2025-03-12 RX ADMIN — PREDNISONE 10 MG: 5 TABLET ORAL at 06:21

## 2025-03-12 RX ADMIN — Medication 250 MCG: at 23:02

## 2025-03-12 ASSESSMENT — PAIN SCALES - GENERAL
PAINLEVEL_OUTOF10: 1
PAINLEVEL_OUTOF10: 3
PAINLEVEL_OUTOF10: 4
PAINLEVEL_OUTOF10: 0

## 2025-03-12 ASSESSMENT — PAIN DESCRIPTION - ONSET: ONSET: SUDDEN

## 2025-03-12 ASSESSMENT — PAIN DESCRIPTION - DESCRIPTORS
DESCRIPTORS: CRAMPING;DISCOMFORT
DESCRIPTORS: ACHING

## 2025-03-12 ASSESSMENT — PAIN DESCRIPTION - LOCATION
LOCATION: HEAD
LOCATION: FINGER (COMMENT WHICH ONE)

## 2025-03-12 ASSESSMENT — PAIN - FUNCTIONAL ASSESSMENT: PAIN_FUNCTIONAL_ASSESSMENT: ACTIVITIES ARE NOT PREVENTED

## 2025-03-12 ASSESSMENT — PAIN DESCRIPTION - PAIN TYPE: TYPE: ACUTE PAIN

## 2025-03-12 ASSESSMENT — PAIN DESCRIPTION - FREQUENCY: FREQUENCY: INTERMITTENT

## 2025-03-12 ASSESSMENT — PAIN DESCRIPTION - ORIENTATION: ORIENTATION: RIGHT;LEFT

## 2025-03-12 NOTE — CARE COORDINATION
Care Management Progress Note      Reason for Admission:   Closed fracture of left ankle, initial encounter [S82.742A]  Acute left ankle pain [M25.572]  Chronic obstructive pulmonary disease, unspecified COPD type ([facility]) [J44.9]  Rheumatoid arthritis, involving unspecified site, unspecified whether rheumatoid factor present ([facility]) [M06.9]  Ground-level fall [W18.30XA]  Procedure(s) (LRB):  LEFT ANKLE OPEN REDUCTION INTERNAL FIXATION (Left)  5 Days Post-Op      Patient Admission Status: Inpatient  RUR: 13%  Hospitalization in the last 30 days (Readmission):  No        Transition of care plan:  Patient was discussed in IDR and is medically ready for discharge. Therapy is following.     Dispo: SNF.   If SNF :  Date FOC offered: 3/11 & 3/12.  Final facility preference was received this morning from pt's daughter Nancy after she discussed with the patient. They would like to move forward with [facility].   Accepting facility: [facility]  Date authorization started with reference number: Facility has started auth today Wednesday 3/12. Reference # is not available at this time.   Date authorization received and expires:     Discharge plan communicated with patient and/or discharge caregiver: Yes.      Date 1st IMM letter given: 3/8/25.    Outpatient follow-up.    Transport at discharge: most likely stretcher under her medicaid policy due to progress with therapy and NWB status.         ___________________________________________   Damaris Rodriguez RN Case Manager  3/12/2025   10:41 AM

## 2025-03-12 NOTE — PLAN OF CARE
Problem: Discharge Planning  Goal: Discharge to home or other facility with appropriate resources  3/12/2025 1008 by Thelma Macias RN  Outcome: Progressing  3/11/2025 2121 by Julianne Lopez RN  Outcome: Progressing     Problem: Skin/Tissue Integrity  Goal: Skin integrity remains intact  Description: 1.  Monitor for areas of redness and/or skin breakdown  2.  Assess vascular access sites hourly  3.  Every 4-6 hours minimum:  Change oxygen saturation probe site  4.  Every 4-6 hours:  If on nasal continuous positive airway pressure, respiratory therapy assess nares and determine need for appliance change or resting period  3/12/2025 1008 by Thelma Macias RN  Outcome: Progressing  3/11/2025 2121 by Julianne Lopez RN  Outcome: Progressing  Flowsheets (Taken 3/11/2025 1935)  Skin Integrity Remains Intact: Monitor for areas of redness and/or skin breakdown     Problem: Safety - Adult  Goal: Free from fall injury  3/12/2025 1008 by Thelma Macias RN  Outcome: Progressing  3/11/2025 2121 by Julianne Lopez RN  Outcome: Progressing  Flowsheets (Taken 3/11/2025 1935)  Free From Fall Injury: Instruct family/caregiver on patient safety     Problem: Pain  Goal: Verbalizes/displays adequate comfort level or baseline comfort level  Outcome: Progressing     Problem: Respiratory - Adult  Goal: Achieves optimal ventilation and oxygenation  3/12/2025 1008 by Thelma Macias RN  Outcome: Progressing  3/12/2025 0202 by Goldie Aviles, RT  Outcome: Progressing

## 2025-03-12 NOTE — PLAN OF CARE
Problem: Discharge Planning  Goal: Discharge to home or other facility with appropriate resources  3/11/2025 2121 by Julianne Lopez RN  Outcome: Progressing  3/11/2025 1545 by Giuliana Burnett RN  Outcome: Progressing     Problem: Skin/Tissue Integrity  Goal: Skin integrity remains intact  Description: 1.  Monitor for areas of redness and/or skin breakdown  2.  Assess vascular access sites hourly  3.  Every 4-6 hours minimum:  Change oxygen saturation probe site  4.  Every 4-6 hours:  If on nasal continuous positive airway pressure, respiratory therapy assess nares and determine need for appliance change or resting period  3/11/2025 2121 by Julianne Lopez RN  Outcome: Progressing  3/11/2025 1545 by Giuliana Burnett RN  Outcome: Progressing     Problem: Safety - Adult  Goal: Free from fall injury  3/11/2025 2121 by Julianne Lopez RN  Outcome: Progressing  3/11/2025 1545 by Giuliana Burnett RN  Outcome: Progressing     Problem: Pain  Goal: Verbalizes/displays adequate comfort level or baseline comfort level  Recent Flowsheet Documentation  Taken 3/11/2025 1935 by Julianne Lopez RN  Verbalizes/displays adequate comfort level or baseline comfort level: Assess pain using appropriate pain scale  3/11/2025 1545 by Giuliana Burnett RN  Outcome: Progressing     Problem: Physical Therapy - Adult  Goal: By Discharge: Performs mobility at highest level of function for planned discharge setting.  See evaluation for individualized goals.  Description: FUNCTIONAL STATUS PRIOR TO ADMISSION: Patient was modified independent using a rolling walker for very short distance household. Transport w/c and rollator for community.     HOME SUPPORT PRIOR TO ADMISSION: The patient lived with dgt and AMY and required minimal assistance for ADLs and lower body dressing. Ppatient with diagnosis of RA in July 2024 and has slowed built back up to near baseline on steroid therapy but long standing   within 7 day(s).  5.  Patient will perform all aspects of toileting with Moderate Assist within 7 day(s).  6.  Patient will participate in upper extremity therapeutic exercise/activities with Supervision for 10 minutes within 7 day(s).    7.  Patient will utilize fall prevention techniques during functional activities with verbal cues within 7 day(s).    3/11/2025 1203 by Dillon Aviles, DURGA  Outcome: Progressing

## 2025-03-12 NOTE — PLAN OF CARE
Problem: Occupational Therapy - Adult  Goal: By Discharge: Performs self-care activities at highest level of function for planned discharge setting.  See evaluation for individualized goals.  Description: FUNCTIONAL STATUS PRIOR TO ADMISSION:  Patient reports she had a functional decline in August 2024 due to R knee rheumatoid arthritis requiring ADL assist but had improved to modified independent level before current admission for GLF/ L ankle fx.  She was ambulating short household distances with RW.    HOME SUPPORT: Patient resided at home with her daughter and son-in-law to provide assistance.    Occupational Therapy Goals:  Initiated 3/10/2025  1.  Patient will perform bathing with Minimal Assist within 7 day(s).  2.  Patient will perform lower body dressing with Moderate Assist within 7 day(s).  3.  Patient will perform upper body dressing with Set-up within 7 day(s).  4.  Patient will perform toilet transfers with Moderate Assist  within 7 day(s).  5.  Patient will perform all aspects of toileting with Moderate Assist within 7 day(s).  6.  Patient will participate in upper extremity therapeutic exercise/activities with Supervision for 10 minutes within 7 day(s).    7.  Patient will utilize fall prevention techniques during functional activities with verbal cues within 7 day(s).    Outcome: Progressing   OCCUPATIONAL THERAPY TREATMENT  Patient: Nicole Galeano (79 y.o. female)  Date: 3/12/2025  Primary Diagnosis: Closed fracture of left ankle, initial encounter [S82.892A]  Acute left ankle pain [M25.572]  Chronic obstructive pulmonary disease, unspecified COPD type ([facility]) [J44.9]  Rheumatoid arthritis, involving unspecified site, unspecified whether rheumatoid factor present ([facility]) [M06.9]  Ground-level fall [W18.30XA]  Procedure(s) (LRB):  LEFT ANKLE OPEN REDUCTION INTERNAL FIXATION (Left) 5 Days Post-Op   Precautions: Weight Bearing, Fall Risk, Bed Alarm   Left Lower Extremity Weight Bearing: Non  Weight Bearing            Chart, occupational therapy assessment, plan of care, and goals were reviewed.    ASSESSMENT  Patient continues to benefit from skilled OT services and is slowly progressing towards goals. Pt sat edge of bed and participated with grooming tasks. Good sitting balance. She stated getting out of bed yesterday \"wore her out.\" PT offered her to slide to chair however she refused to get out of bed. RN aware.             PLAN :  Patient continues to benefit from skilled intervention to address the above impairments.  Continue treatment per established plan of care to address goals.    Recommend with staff: ACTIVITIES OF DAILY LIVING,s there ex, there act    Recommend next OT session: cont towards goals    Recommendation for discharge: (in order for the patient to meet his/her long term goals):   Moderate intensity short-term skilled occupational therapy up to 5x/week    Other factors to consider for discharge: patient's current support system is unable to meet their requirements for physical assistance and concern for safely navigating or managing the home environment    IF patient discharges home will need the following DME:        SUBJECTIVE:   Patient stated “I need my mirror.”    OBJECTIVE DATA SUMMARY:   Cognitive/Behavioral Status:  Orientation  Overall Orientation Status: Within Normal Limits  Orientation Level: Oriented X4       Functional Mobility and Transfers for ADLs:  Bed Mobility:    Mod I supine to sit    Transfers:    Not tested           Balance:      Intact sitting balance      ADL Intervention:       Grooming: Modified independent    Grooming Skilled Clinical Factors: seated edge of bed         Product Used : Bath wipes (CHG wipes)        Activity Tolerance:   Fair   Please refer to the flowsheet for vital signs taken during this treatment.    After treatment:   Patient left in no apparent distress in bed and Call bell within reach    COMMUNICATION/EDUCATION:   The patient's

## 2025-03-12 NOTE — PROGRESS NOTES
[facility]ANDR[facility]  [address] [place] [address] [phone]        Hospitalist Progress Note      NAME: Nicole Galeano   :  1945  MRM:  [id number]    Date/Time of service: 3/12/2025  11:41 AM       Subjective:     Chief Complaint:  Patient was personally seen and examined by me during this time period.  Chart reviewed.  Leg pain better.  No abd pain, chest pain       Objective:       Vitals:       Last 24hrs VS reviewed since prior progress note. Most recent are:    Vitals:    25 0817   BP: (!) 146/95   Pulse: 65   Resp: 16   Temp: 97.5 °F (36.4 °C)   SpO2: 95%     SpO2 Readings from Last 6 Encounters:   25 95%   24 95%   24 94%          Intake/Output Summary (Last 24 hours) at 3/12/2025 1141  Last data filed at 3/12/2025 0400  Gross per 24 hour   Intake 960 ml   Output 1225 ml   Net -265 ml        Exam:     Physical Exam:    Gen:  elderly, frail, NAD  HEENT:  Pink conjunctivae, PERRL, hearing intact to voice, moist mucous membranes  Neck:  Supple, without masses, thyroid non-tender  Resp:  No accessory muscle use, clear breath sounds without wheezes rales or rhonchi  Card:  No murmurs, normal S1, S2 without thrills, +edema  Abd:  Soft, non-tender, non-distended, normoactive bowel sounds are present  Musc:  No cyanosis or clubbing, LLE splint/dressing c/d/I, some swelling  Skin:  No rashes  Neuro:  Cranial nerves 3-12 are grossly intact, follows commands appropriately  Psych:  fair insight, oriented to person, place and time, alert    Medications Reviewed: (see below)    Lab Data Reviewed: (see below)    ______________________________________________________________________    Medications:     Current Facility-Administered Medications   Medication Dose Route Frequency    diphenhydrAMINE (BENADRYL) capsule 25 mg  25 mg Oral Q6H PRN    PreserVision AREDS CAPS 1 capsule (Patient Supplied)  1 capsule Oral BID    traMADol (ULTRAM) tablet 50

## 2025-03-12 NOTE — PLAN OF CARE
Problem: Physical Therapy - Adult  Goal: By Discharge: Performs mobility at highest level of function for planned discharge setting.  See evaluation for individualized goals.  Description: FUNCTIONAL STATUS PRIOR TO ADMISSION: Patient was modified independent using a rolling walker for very short distance household. Transport w/c and rollator for community.     HOME SUPPORT PRIOR TO ADMISSION: The patient lived with dgt and AMY and required minimal assistance for ADLs and lower body dressing. Ppatient with diagnosis of RA in July 2024 and has slowed built back up to near baseline on steroid therapy but long standing issues with R knee OA before this GLF, L ankle fx and ORIF. 4 RHETT home and remains on 1st level. Unclear as to if transfer tub bench or shower chair (cognitive processing impairment)    Physical Therapy Goals  Initiated 3/8/2025  1.  Patient will move from supine to sit and sit to supine, scoot up and down, and roll side to side in bed with modified independence within 7 day(s).    2.  Patient will perform sit <> stand with moderate assistance within 7 day(s).  3.  Patient will perform lateral scoot transfer from bed to chair and chair to bed with minimal assistance using the least restrictive device within 7 day(s).  4.  Patient will perform SPT with RW with moderate assistance x 2 within 7 day(s).     Outcome: Progressing   PHYSICAL THERAPY TREATMENT    Patient: Nicole Galeano (79 y.o. female)  Date: 3/12/2025  Diagnosis: Closed fracture of left ankle, initial encounter [S82.892A]  Acute left ankle pain [M25.572]  Chronic obstructive pulmonary disease, unspecified COPD type ([facility]) [J44.9]  Rheumatoid arthritis, involving unspecified site, unspecified whether rheumatoid factor present ([facility]) [M06.9]  Ground-level fall [W18.30XA] Acute left ankle pain  Procedure(s) (LRB):  LEFT ANKLE OPEN REDUCTION INTERNAL FIXATION (Left) 5 Days Post-Op  Precautions:  Restrictions/Precautions  Restrictions/Precautions: Weight Bearing, Fall Risk, Bed Alarm  Activity Level: Up with Assist Lower Extremity Weight Bearing Restrictions  Left Lower Extremity Weight Bearing: Non Weight Bearing          ASSESSMENT:  Patient continues to benefit from skilled PT services.Pt has not been able to maintain NWB standing.Pt declined performing lateral slide to chair.Pt reports arthritic hands become to painful pushing.Pt was able to come to sit with CGA.Pt performed bi lateral knee extensions with cues.Pt sat 15 minutes before return to supine CGA.Pt tolerated treatment  well.Continue goals.         PLAN:  Patient continues to benefit from skilled intervention to address the above impairments.  Continue treatment per established plan of care.        Recommendation for discharge: (in order for the patient to meet his/her long term goals):   Moderate intensity short-term skilled physical therapy up to 5x/week    Other factors to consider for discharge:       IF patient discharges home will need the following DME:        SUBJECTIVE:       OBJECTIVE DATA SUMMARY:   Critical Behavior:  Orientation  Overall Orientation Status: Within Normal Limits  Orientation Level: Oriented X4       Functional Mobility Training:  Bed Mobility:  Bed Mobility Training  Supine to Sit: Contact guard assistance  Sit to Supine: Contact guard assistance  Transfers:     Balance:  Balance  Sitting: Intact            Activity Tolerance:   Fair     After treatment:   Patient left in no apparent distress in bed      COMMUNICATION/EDUCATION:   The patient's plan of care was discussed with: physical therapist           Mxa Rockwell, PTA  Minutes: 23

## 2025-03-13 PROCEDURE — 2500000003 HC RX 250 WO HCPCS: Performed by: ORTHOPAEDIC SURGERY

## 2025-03-13 PROCEDURE — 97535 SELF CARE MNGMENT TRAINING: CPT

## 2025-03-13 PROCEDURE — 97530 THERAPEUTIC ACTIVITIES: CPT

## 2025-03-13 PROCEDURE — 94640 AIRWAY INHALATION TREATMENT: CPT

## 2025-03-13 PROCEDURE — 2500000003 HC RX 250 WO HCPCS: Performed by: FAMILY MEDICINE

## 2025-03-13 PROCEDURE — 6360000002 HC RX W HCPCS: Performed by: ORTHOPAEDIC SURGERY

## 2025-03-13 PROCEDURE — 1100000000 HC RM PRIVATE

## 2025-03-13 PROCEDURE — 6370000000 HC RX 637 (ALT 250 FOR IP): Performed by: FAMILY MEDICINE

## 2025-03-13 PROCEDURE — 2700000000 HC OXYGEN THERAPY PER DAY

## 2025-03-13 PROCEDURE — 94761 N-INVAS EAR/PLS OXIMETRY MLT: CPT

## 2025-03-13 PROCEDURE — 6370000000 HC RX 637 (ALT 250 FOR IP): Performed by: INTERNAL MEDICINE

## 2025-03-13 RX ADMIN — PREDNISONE 10 MG: 5 TABLET ORAL at 06:09

## 2025-03-13 RX ADMIN — Medication 1 CAPSULE: at 22:48

## 2025-03-13 RX ADMIN — SODIUM CHLORIDE, PRESERVATIVE FREE 5 ML: 5 INJECTION INTRAVENOUS at 10:08

## 2025-03-13 RX ADMIN — ENOXAPARIN SODIUM 40 MG: 100 INJECTION SUBCUTANEOUS at 10:09

## 2025-03-13 RX ADMIN — LISINOPRIL 5 MG: 5 TABLET ORAL at 10:06

## 2025-03-13 RX ADMIN — ATORVASTATIN CALCIUM 40 MG: 20 TABLET, FILM COATED ORAL at 10:06

## 2025-03-13 RX ADMIN — Medication 1 CAPSULE: at 10:06

## 2025-03-13 RX ADMIN — ACETAMINOPHEN 650 MG: 325 TABLET ORAL at 22:48

## 2025-03-13 RX ADMIN — SODIUM CHLORIDE, PRESERVATIVE FREE 10 ML: 5 INJECTION INTRAVENOUS at 22:58

## 2025-03-13 RX ADMIN — TRAMADOL HYDROCHLORIDE 50 MG: 50 TABLET, COATED ORAL at 12:29

## 2025-03-13 RX ADMIN — Medication 250 MCG: at 22:48

## 2025-03-13 RX ADMIN — ACETAMINOPHEN 650 MG: 325 TABLET ORAL at 10:24

## 2025-03-13 RX ADMIN — CALCIUM CARBONATE: 500 TABLET, CHEWABLE ORAL at 22:55

## 2025-03-13 RX ADMIN — SODIUM CHLORIDE, PRESERVATIVE FREE 5 ML: 5 INJECTION INTRAVENOUS at 10:09

## 2025-03-13 RX ADMIN — CITALOPRAM HYDROBROMIDE 40 MG: 20 TABLET ORAL at 10:06

## 2025-03-13 ASSESSMENT — PAIN DESCRIPTION - DESCRIPTORS
DESCRIPTORS: SORE
DESCRIPTORS: SORE

## 2025-03-13 ASSESSMENT — PAIN DESCRIPTION - ORIENTATION
ORIENTATION: LEFT
ORIENTATION: LEFT;RIGHT

## 2025-03-13 ASSESSMENT — PAIN SCALES - GENERAL
PAINLEVEL_OUTOF10: 3
PAINLEVEL_OUTOF10: 5

## 2025-03-13 ASSESSMENT — PAIN DESCRIPTION - LOCATION
LOCATION: FINGER (COMMENT WHICH ONE)
LOCATION: KNEE;LEG;ANKLE

## 2025-03-13 NOTE — PLAN OF CARE
Problem: Physical Therapy - Adult  Goal: By Discharge: Performs mobility at highest level of function for planned discharge setting.  See evaluation for individualized goals.  Description: FUNCTIONAL STATUS PRIOR TO ADMISSION: Patient was modified independent using a rolling walker for very short distance household. Transport w/c and rollator for community.     HOME SUPPORT PRIOR TO ADMISSION: The patient lived with dgt and AMY and required minimal assistance for ADLs and lower body dressing. Ppatient with diagnosis of RA in July 2024 and has slowed built back up to near baseline on steroid therapy but long standing issues with R knee OA before this GLF, L ankle fx and ORIF. 4 RHETT home and remains on 1st level. Unclear as to if transfer tub bench or shower chair (cognitive processing impairment)    Physical Therapy Goals  Initiated 3/8/2025  1.  Patient will move from supine to sit and sit to supine, scoot up and down, and roll side to side in bed with modified independence within 7 day(s).    2.  Patient will perform sit <> stand with moderate assistance within 7 day(s).  3.  Patient will perform lateral scoot transfer from bed to chair and chair to bed with minimal assistance using the least restrictive device within 7 day(s).  4.  Patient will perform SPT with RW with moderate assistance x 2 within 7 day(s).     Outcome: Progressing   PHYSICAL THERAPY TREATMENT    Patient: Nicole Galeano (79 y.o. female)  Date: 3/13/2025  Diagnosis: Closed fracture of left ankle, initial encounter [S82.892A]  Acute left ankle pain [M25.572]  Chronic obstructive pulmonary disease, unspecified COPD type ([facility]) [J44.9]  Rheumatoid arthritis, involving unspecified site, unspecified whether rheumatoid factor present ([facility]) [M06.9]  Ground-level fall [W18.30XA] Acute left ankle pain  Procedure(s) (LRB):  LEFT ANKLE OPEN REDUCTION INTERNAL FIXATION (Left) 6 Days Post-Op  Precautions:    Critical Behavior:          Functional Mobility Training:  Bed Mobility:  Bed Mobility Training  Bed Mobility Training: Yes  Overall Level of Assistance: Supervision  Interventions: Safety awareness training  Rolling: Supervision  Supine to Sit: Supervision  Sit to Supine: Supervision  Scooting: Supervision  Transfers:  Transfer Training  Transfer Training: Yes  Overall Level of Assistance: Partial/Moderate assistance;2 Person assistance  Interventions: Safety awareness training  Bed to Chair: Partial/Moderate assistance;2 Person assistance  Balance:  Balance  Sitting: Intact   Ambulation/Gait Training:              Neuro Re-Education:                    Pain Ratin/10   Pain Intervention(s):   nursing notified and addressing    Activity Tolerance:   Good    After treatment:   Patient left in no apparent distress sitting up in chair, Call bell within reach, Bed/ chair alarm activated, and Heels elevated for pressure relief      COMMUNICATION/EDUCATION:   The patient's plan of care was discussed with: occupational therapist and registered nurse           KENDELL WARREN, PT,WCC.  Minutes: 24

## 2025-03-13 NOTE — PROGRESS NOTES
Physician Progress Note      PATIENT:               STANISLAV FISHMAN  CSN #:                  [id number]  :                       1945  ADMIT DATE:       3/4/2025 7:12 AM  DISCH DATE:  RESPONDING  PROVIDER #:        Lashawn Spencer MD          QUERY TEXT:    Patient admitted with fractured  Noted to have   severe  malnutrition on eval    3/10.   If possible, please document in progress notes and discharge summary   if you are evaluating and /or treating any of the following:    The medical record reflects the following:  Risk Factors: h/o breast  cancer;  chronic  psychogenic non responsive   episodes  Clinical Indicators:  Severe malnutrition (03/10/25 1015)  Context:  Acute Illness  Findings of the 6 clinical characteristics of malnutrition:  Energy Intake:  No decrease in energy intake  Weight Loss:  No weight loss  Body Fat Loss:  Mild body fat loss  Muscle Mass Loss:  No muscle mass loss  Fluid Accumulation:  No fluid accumulation   Strength:  Not Performed    Treatment: Grant BID ; Adjusted ONS to Ensure HP BID    ASPEN Criteria:    https://aspenjournals.onlinelibrary.villalobos.com/doi/full/10.1177/874403838951084  5    Thank you very much  Options provided:  -- Protein calorie malnutrition severe  -- Other - I will add my own diagnosis  -- Disagree - Not applicable / Not valid  -- Disagree - Clinically unable to determine / Unknown  -- Refer to Clinical Documentation Reviewer    PROVIDER RESPONSE TEXT:    This patient has severe protein calorie malnutrition.    Query created by: Kelly Quintanilla on 3/13/2025 11:09 AM      Electronically signed by:  Lashawn Spencer MD 3/13/2025 1:25 PM

## 2025-03-13 NOTE — PLAN OF CARE
Problem: Occupational Therapy - Adult  Goal: By Discharge: Performs self-care activities at highest level of function for planned discharge setting.  See evaluation for individualized goals.  Description: FUNCTIONAL STATUS PRIOR TO ADMISSION:  Patient reports she had a functional decline in August 2024 due to R knee rheumatoid arthritis requiring ADL assist but had improved to modified independent level before current admission for GLF/ L ankle fx.  She was ambulating short household distances with RW.    HOME SUPPORT: Patient resided at home with her daughter and son-in-law to provide assistance.    Occupational Therapy Goals:  Initiated 3/10/2025  1.  Patient will perform bathing with Minimal Assist within 7 day(s).  2.  Patient will perform lower body dressing with Moderate Assist within 7 day(s).  3.  Patient will perform upper body dressing with Set-up within 7 day(s).  4.  Patient will perform toilet transfers with Moderate Assist  within 7 day(s).  5.  Patient will perform all aspects of toileting with Moderate Assist within 7 day(s).  6.  Patient will participate in upper extremity therapeutic exercise/activities with Supervision for 10 minutes within 7 day(s).    7.  Patient will utilize fall prevention techniques during functional activities with verbal cues within 7 day(s).    Outcome: Progressing   OCCUPATIONAL THERAPY TREATMENT  Patient: Nicole Galeano (79 y.o. female)  Date: 3/13/2025  Primary Diagnosis: Closed fracture of left ankle, initial encounter [S82.892A]  Acute left ankle pain [M25.572]  Chronic obstructive pulmonary disease, unspecified COPD type ([facility]) [J44.9]  Rheumatoid arthritis, involving unspecified site, unspecified whether rheumatoid factor present ([facility]) [M06.9]  Ground-level fall [W18.30XA]  Procedure(s) (LRB):  LEFT ANKLE OPEN REDUCTION INTERNAL FIXATION (Left) 6 Days Post-Op   Precautions: Weight Bearing, Fall Risk, Bed Alarm   Left Lower Extremity Weight Bearing: Non  judgement       UE Dressing: Setup       LE Dressing: Maximum assistance;Based on clinical judgement          Product Used : Bath wipes      Pain Rating:  Pain noted with L hand      Activity Tolerance:   Poor to Fair  Please refer to the flowsheet for vital signs taken during this treatment.    After treatment:   Patient left in no apparent distress sitting up in chair and Call bell within reach    COMMUNICATION/EDUCATION:   The patient's plan of care was discussed with: physical therapist and registered nurse         Thank you for this referral.  Leah Yee OT  Minutes: 25

## 2025-03-13 NOTE — PLAN OF CARE
Problem: Physical Therapy - Adult  Goal: By Discharge: Performs mobility at highest level of function for planned discharge setting.  See evaluation for individualized goals.  Description: FUNCTIONAL STATUS PRIOR TO ADMISSION: Patient was modified independent using a rolling walker for very short distance household. Transport w/c and rollator for community.     HOME SUPPORT PRIOR TO ADMISSION: The patient lived with dgt and AMY and required minimal assistance for ADLs and lower body dressing. Ppatient with diagnosis of RA in July 2024 and has slowed built back up to near baseline on steroid therapy but long standing issues with R knee OA before this GLF, L ankle fx and ORIF. 4 RHETT home and remains on 1st level. Unclear as to if transfer tub bench or shower chair (cognitive processing impairment)    Physical Therapy Goals  Initiated 3/8/2025  1.  Patient will move from supine to sit and sit to supine, scoot up and down, and roll side to side in bed with modified independence within 7 day(s).    2.  Patient will perform sit <> stand with moderate assistance within 7 day(s).  3.  Patient will perform lateral scoot transfer from bed to chair and chair to bed with minimal assistance using the least restrictive device within 7 day(s).  4.  Patient will perform SPT with RW with moderate assistance x 2 within 7 day(s).     3/12/2025 1626 by Max Rockwell, PTA  Outcome: Progressing     Problem: Occupational Therapy - Adult  Goal: By Discharge: Performs self-care activities at highest level of function for planned discharge setting.  See evaluation for individualized goals.  Description: FUNCTIONAL STATUS PRIOR TO ADMISSION:  Patient reports she had a functional decline in August 2024 due to R knee rheumatoid arthritis requiring ADL assist but had improved to modified independent level before current admission for GLF/ L ankle fx.  She was ambulating short household distances with RW.    HOME SUPPORT: Patient resided at  home with her daughter and son-in-law to provide assistance.    Occupational Therapy Goals:  Initiated 3/10/2025  1.  Patient will perform bathing with Minimal Assist within 7 day(s).  2.  Patient will perform lower body dressing with Moderate Assist within 7 day(s).  3.  Patient will perform upper body dressing with Set-up within 7 day(s).  4.  Patient will perform toilet transfers with Moderate Assist  within 7 day(s).  5.  Patient will perform all aspects of toileting with Moderate Assist within 7 day(s).  6.  Patient will participate in upper extremity therapeutic exercise/activities with Supervision for 10 minutes within 7 day(s).    7.  Patient will utilize fall prevention techniques during functional activities with verbal cues within 7 day(s).    3/12/2025 1144 by Ayana Watson OTA  Outcome: Progressing

## 2025-03-13 NOTE — PLAN OF CARE
Problem: Physical Therapy - Adult  Goal: By Discharge: Performs mobility at highest level of function for planned discharge setting.  See evaluation for individualized goals.  Description: FUNCTIONAL STATUS PRIOR TO ADMISSION: Patient was modified independent using a rolling walker for very short distance household. Transport w/c and rollator for community.     HOME SUPPORT PRIOR TO ADMISSION: The patient lived with dgt and AMY and required minimal assistance for ADLs and lower body dressing. Ppatient with diagnosis of RA in July 2024 and has slowed built back up to near baseline on steroid therapy but long standing issues with R knee OA before this GLF, L ankle fx and ORIF. 4 RHETT home and remains on 1st level. Unclear as to if transfer tub bench or shower chair (cognitive processing impairment)    Physical Therapy Goals  Initiated 3/8/2025  1.  Patient will move from supine to sit and sit to supine, scoot up and down, and roll side to side in bed with modified independence within 7 day(s).    2.  Patient will perform sit <> stand with moderate assistance within 7 day(s).  3.  Patient will perform lateral scoot transfer from bed to chair and chair to bed with minimal assistance using the least restrictive device within 7 day(s).  4.  Patient will perform SPT with RW with moderate assistance x 2 within 7 day(s).     3/13/2025 1651 by Henny Toro, PT  Outcome: Progressing   PHYSICAL THERAPY TREATMENT    Patient: Nicole Galeano (79 y.o. female)  Date: 3/13/2025  Diagnosis: Closed fracture of left ankle, initial encounter [S82.892A]  Acute left ankle pain [M25.572]  Chronic obstructive pulmonary disease, unspecified COPD type ([facility]) [J44.9]  Rheumatoid arthritis, involving unspecified site, unspecified whether rheumatoid factor present ([facility]) [M06.9]  Ground-level fall [W18.30XA] Acute left ankle pain  Procedure(s) (LRB):  LEFT ANKLE OPEN REDUCTION INTERNAL FIXATION (Left) 6 Days Post-Op  Precautions:

## 2025-03-13 NOTE — PROGRESS NOTES
SANDRA COX [facility]  [address] [place] [address] [phone]        Hospitalist Progress Note      NAME: Nicole Galeano   :  1945  MRM:  [id number]    Date/Time of service: 3/13/2025  10:35 AM       Subjective:     Chief Complaint:  Patient was personally seen and examined by me during this time period.  Chart reviewed.  No acute events.  Denied any complaints at this time.       Objective:       Vitals:       Last 24hrs VS reviewed since prior progress note. Most recent are:    Vitals:    25 0741   BP: (!) 148/80   Pulse: 62   Resp: 20   Temp: 98.1 °F (36.7 °C)   SpO2: 91%     SpO2 Readings from Last 6 Encounters:   25 91%   24 95%   24 94%        No intake or output data in the 24 hours ending 25 1035       Exam:     Physical Exam:    Gen:  elderly, frail, NAD  HEENT:  Pink conjunctivae, PERRL, hearing intact to voice, moist mucous membranes  Neck:  Supple, without masses, thyroid non-tender  Resp:  No accessory muscle use, clear breath sounds without wheezes rales or rhonchi  Card:  No murmurs, normal S1, S2 without thrills, +edema  Abd:  Soft, non-tender, non-distended, normoactive bowel sounds are present  Musc:  No cyanosis or clubbing, LLE splint/dressing c/d/I, some swelling  Skin:  No rashes  Neuro:  Cranial nerves 3-12 are grossly intact, follows commands appropriately  Psych:  fair insight, oriented to person, place and time, alert    Medications Reviewed: (see below)    Lab Data Reviewed: (see below)    ______________________________________________________________________    Medications:     Current Facility-Administered Medications   Medication Dose Route Frequency    diphenhydrAMINE (BENADRYL) capsule 25 mg  25 mg Oral Q6H PRN    PreserVision AREDS CAPS 1 capsule (Patient Supplied)  1 capsule Oral BID    traMADol (ULTRAM) tablet 50 mg  50 mg Oral Q6H PRN    lactobacillus (CULTURELLE) capsule 1 capsule  1 capsule Oral

## 2025-03-13 NOTE — CARE COORDINATION
Care Management Progress Note      Reason for Admission:   Closed fracture of left ankle, initial encounter [S82.072A]  Acute left ankle pain [M25.572]  Chronic obstructive pulmonary disease, unspecified COPD type ([facility]) [J44.9]  Rheumatoid arthritis, involving unspecified site, unspecified whether rheumatoid factor present ([facility]) [M06.9]  Ground-level fall [W18.30XA]  Procedure(s) (LRB):  LEFT ANKLE OPEN REDUCTION INTERNAL FIXATION (Left)  6 Days Post-Op      Patient Admission Status: Inpatient  RUR: 13%  Hospitalization in the last 30 days (Readmission):  No        Transition of care plan:  Patient was discussed in IDR and is medically ready for discharge. Therapy is following.     Dispo: SNF.   If SNF:  Date FOC offered: 3/11 & 3/12.  Accepting facility: [facility]  Date authorization started with reference number: Facility started authorization Wednesday 3/12. Reference # [id number].  Date authorization received and expires:     Discharge plan communicated with patient and/or discharge caregiver: Yes.  CM has updated patient at bedside today Thursday 3/13.    Date 1st IMM letter given: 3/8/25. 2nd IMM: 3/13/25.    Outpatient follow-up.    Transport at discharge: most likely stretcher under her medicaid policy due to progress with therapy and NWB status.         ___________________________________________   Damaris Rodriguez RN Case Manager  3/13/2025   10:51 AM

## 2025-03-14 VITALS
SYSTOLIC BLOOD PRESSURE: 92 MMHG | OXYGEN SATURATION: 95 % | RESPIRATION RATE: 16 BRPM | TEMPERATURE: 97.9 F | WEIGHT: 160 LBS | HEART RATE: 78 BPM | HEIGHT: 63 IN | BODY MASS INDEX: 28.35 KG/M2 | DIASTOLIC BLOOD PRESSURE: 66 MMHG

## 2025-03-14 LAB
6 ACETYLMORPHINE: NEGATIVE
AMPHETAMINES SERPL QL SCN: NEGATIVE NG/ML
BARBITURATES SERPL QL SCN: NEGATIVE UG/ML
BENZODIAZ SERPL QL SCN: NEGATIVE NG/ML
CANNABINOIDS SERPL QL SCN: NEGATIVE NG/ML
COCAINE+BZE SERPL QL SCN: NEGATIVE NG/ML
CODEINE: NEGATIVE NG/ML
DIHYDROCODEINE: NEGATIVE NG/ML
FENTANYL: NEGATIVE NG/ML
HYDROCODONE: NEGATIVE NG/ML
HYDROMORPHONE: NEGATIVE NG/ML
MEPERIDINE: NEGATIVE NG/ML
METHADONE SERPL QL SCN: NEGATIVE NG/ML
MORPHINE: NEGATIVE NG/ML
OPIATES CONFIRMATION: NEGATIVE
OPIATES SERPL QL SCN: NEGATIVE NG/ML
OXYCOCONE: 7.3 NG/ML
OXYCODONE CONF: POSITIVE
OXYCODONE+OXYMORPHONE SERPLBLD QL SCN: ABNORMAL NG/ML
OXYMORPHONE: NEGATIVE NG/ML
PCP SERPL QL SCN: NEGATIVE NG/ML
PROPOXYPH SERPL QL SCN: NEGATIVE NG/ML
TRAMADOL: NEGATIVE NG/ML

## 2025-03-14 PROCEDURE — 6370000000 HC RX 637 (ALT 250 FOR IP): Performed by: INTERNAL MEDICINE

## 2025-03-14 PROCEDURE — 94640 AIRWAY INHALATION TREATMENT: CPT

## 2025-03-14 PROCEDURE — 2500000003 HC RX 250 WO HCPCS: Performed by: ORTHOPAEDIC SURGERY

## 2025-03-14 PROCEDURE — 6370000000 HC RX 637 (ALT 250 FOR IP): Performed by: FAMILY MEDICINE

## 2025-03-14 PROCEDURE — 6360000002 HC RX W HCPCS: Performed by: ORTHOPAEDIC SURGERY

## 2025-03-14 PROCEDURE — 94761 N-INVAS EAR/PLS OXIMETRY MLT: CPT

## 2025-03-14 RX ORDER — TRAMADOL HYDROCHLORIDE 50 MG/1
50 TABLET ORAL EVERY 6 HOURS PRN
Qty: 12 TABLET | Refills: 0 | Status: SHIPPED | OUTPATIENT
Start: 2025-03-14 | End: 2025-03-17

## 2025-03-14 RX ORDER — ENOXAPARIN SODIUM 100 MG/ML
40 INJECTION SUBCUTANEOUS DAILY
Qty: 8 ML | Refills: 0 | Status: SHIPPED | OUTPATIENT
Start: 2025-03-15 | End: 2025-04-04

## 2025-03-14 RX ADMIN — Medication 1 CAPSULE: at 08:48

## 2025-03-14 RX ADMIN — SODIUM CHLORIDE, PRESERVATIVE FREE 10 ML: 5 INJECTION INTRAVENOUS at 08:49

## 2025-03-14 RX ADMIN — ONDANSETRON 4 MG: 4 TABLET, ORALLY DISINTEGRATING ORAL at 16:56

## 2025-03-14 RX ADMIN — ACETAMINOPHEN 650 MG: 325 TABLET ORAL at 06:17

## 2025-03-14 RX ADMIN — Medication 1 TABLET: at 08:47

## 2025-03-14 RX ADMIN — PREDNISONE 10 MG: 5 TABLET ORAL at 06:17

## 2025-03-14 RX ADMIN — Medication 1 CAPSULE: at 08:46

## 2025-03-14 RX ADMIN — ATORVASTATIN CALCIUM 40 MG: 20 TABLET, FILM COATED ORAL at 08:46

## 2025-03-14 RX ADMIN — DIPHENHYDRAMINE HCL 25 MG: 25 CAPSULE ORAL at 08:53

## 2025-03-14 RX ADMIN — TRAMADOL HYDROCHLORIDE 50 MG: 50 TABLET, COATED ORAL at 16:56

## 2025-03-14 RX ADMIN — LISINOPRIL 5 MG: 5 TABLET ORAL at 08:46

## 2025-03-14 RX ADMIN — CITALOPRAM HYDROBROMIDE 40 MG: 20 TABLET ORAL at 08:46

## 2025-03-14 RX ADMIN — ENOXAPARIN SODIUM 40 MG: 100 INJECTION SUBCUTANEOUS at 08:47

## 2025-03-14 ASSESSMENT — PAIN DESCRIPTION - ORIENTATION: ORIENTATION: LEFT

## 2025-03-14 ASSESSMENT — PAIN DESCRIPTION - LOCATION: LOCATION: ANKLE

## 2025-03-14 ASSESSMENT — PAIN DESCRIPTION - DESCRIPTORS: DESCRIPTORS: ACHING

## 2025-03-14 NOTE — PROGRESS NOTES
Occupational Therapy and Physical Therapy  Attempted to see for tx and on arrival patient stated \"I'm taking a break from therapy today\" reported she discussed with MD and due to pending discharge today or tomorrow she wants to rest. Attempted bed level activity or edge of bed and continued to decline.   Lory Biggs, OTR/L

## 2025-03-14 NOTE — CARE COORDINATION
Transition of Care Plan to SNF/Rehab    Communication to Patient/Family:   Met with patient and family and they are agreeable to the transition plan. The Plan for Transition of Care is related to the following treatment goals: Closed fracture of left ankle, initial encounter [S82.892A]  Acute left ankle pain [M25.572]  Chronic obstructive pulmonary disease, unspecified COPD type ([facility]CC) [J44.9]  Rheumatoid arthritis, involving unspecified site, unspecified whether rheumatoid factor present (HCC) [M06.9]  Ground-level fall [W18.30XA]      The Patient and/or patient representative was provided with a choice of provider and agrees  with the discharge plan.      Yes [x] No []      A Freedom of choice list was provided with basic dialogue that supports the patient's individualized plan of care/goals and shares the quality data associated with the providers.       Yes [x] No []      SNF/Rehab Transition:  Patient has been accepted to [facility] SNF/Rehab and meets criteria for admission.       SNF reports auth has been received? [x]  Medicare 3 night stay satisfied? []   Inpatient Admission Dates: 3/4/25 - 3/14/25      Patient will be transported by [employer] [facility] and expected to leave at 5pm, ETA: 1621. CM booked trip under pt's medicaid [facility] [phone] with [employer]. Trip# [id number] received from medicaid and given to Diamond at [facility] [phone]. Patient and patient's daughter Nancy have been updated to auth approval & transport time, both remain in agreement.   [x] Packet on chart (if needed)  [x] PCS completed (if applicable)       Communication to SNF/Rehab:  Bedside RN, Pat, has been notified to update the transition plan to the facility and call report ( [phone], room 128 ).  Discharge information has been updated on the AVS. And communicated to facility via [employer], or CC link.     Discharge instructions to be fax'd to facility via [] AllScripts [x] CCLink      Nursing Please

## 2025-03-14 NOTE — DISCHARGE SUMMARY
Hospitalist Discharge Summary     Patient ID:  Nicole Galeano  [id number]  79 y.o.  1945    Admit date: 3/4/2025    Discharge date and time: 3/14/2025    Admission Diagnoses: Closed fracture of left ankle, initial encounter [S82.892A]  Acute left ankle pain [M25.572]  Chronic obstructive pulmonary disease, unspecified COPD type ([facility]) [J44.9]  Rheumatoid arthritis, involving unspecified site, unspecified whether rheumatoid factor present (HCC) [M06.9]  Ground-level fall [W18.30XA]    Discharge Diagnoses:    Principal Problem:    Acute left ankle pain  Active Problems:    Severe protein-energy malnutrition  Resolved Problems:    * No resolved hospital problems. *         Hospital Course:     80 yo hx of HTN, COPD on 1L O2, RA, breast CA, etoh abuse, presented w/ L bimalleolar ankle fx, s/p ORIF by Dr. Kirby on 03/07.  Left ankle bimalleoar fracture s/p ORIF on 3/7.  Complicated by intermittent unresponsiveness, likely psychogenic.  Needs SNF     1) Acute L bimalleolar ankle fx: due to mechanical fall.  S/p ORIF by Dr. Kirby on 03/07.  Cont NWB of LLE.  Use lovenox for DVT prophy for total 4 weeks.  Evaluated by PT, OT.  Okay to discharge to SNF.      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  Ammonia wnl.  Head CT neg.  Recommended stopping etoh.  Limit opiates, benzo.       3) UTI: urine Cx neg so far.  Received IV CTX     4) Elevated LFTs: likely from etoh abuse.  Has persistent elevated alk phos, but bili neg.  Abd U/S showed cholelithiasis without ductal dilatation.  Counseled on etoh cessation      5) Abnormal thyroid function: TSH 0.1.  Free T4 normal at 1.1.  Will need repeat thyroid function testing in 4 to 6 weeks     6) Etoh abuse: no withdrawal.  Already counseled on cessation     7) HTN: cont lisinopril     8) Depression/anxiety: cont celexa     9) COPD: use 1L O2 at night.  Cont spiriva, nebs prn     10) RA: cont daily

## 2025-03-14 NOTE — PROGRESS NOTES
care: 30 Minutes **I personally saw and examined the patient during this time period**                 Care Plan discussed with: Patient, nursing, CM    Discussed:  Care Plan    Prophylaxis:  Lovenox    Disposition:  SNF/LTC pending Auth           ___________________________________________________    Attending Physician: Lashawn Spencer MD

## 2025-03-14 NOTE — PLAN OF CARE
Problem: Skin/Tissue Integrity  Goal: Skin integrity remains intact  Description: 1.  Monitor for areas of redness and/or skin breakdown  2.  Assess vascular access sites hourly  3.  Every 4-6 hours minimum:  Change oxygen saturation probe site  4.  Every 4-6 hours:  If on nasal continuous positive airway pressure, respiratory therapy assess nares and determine need for appliance change or resting period  Outcome: Progressing     Problem: Safety - Adult  Goal: Free from fall injury  Outcome: Progressing     Problem: Pain  Goal: Verbalizes/displays adequate comfort level or baseline comfort level  Outcome: Progressing     Problem: Respiratory - Adult  Goal: Achieves optimal ventilation and oxygenation  3/14/2025 0718 by Marilyn Ramos, RT  Outcome: Progressing

## 2025-03-17 ENCOUNTER — OFFICE VISIT (OUTPATIENT)
Facility: CLINIC | Age: 80
End: 2025-03-17

## 2025-03-17 DIAGNOSIS — F10.10 ETOH ABUSE: ICD-10-CM

## 2025-03-17 DIAGNOSIS — G92.9 TOXIC ENCEPHALOPATHY, UNSPECIFIED TOXIN: ICD-10-CM

## 2025-03-17 DIAGNOSIS — E43 SEVERE PROTEIN-ENERGY MALNUTRITION: ICD-10-CM

## 2025-03-17 DIAGNOSIS — D63.8 ANEMIA, CHRONIC DISEASE: ICD-10-CM

## 2025-03-17 DIAGNOSIS — M25.572 ACUTE LEFT ANKLE PAIN: Primary | ICD-10-CM

## 2025-03-17 DIAGNOSIS — M06.9 RHEUMATOID ARTHRITIS, INVOLVING UNSPECIFIED SITE, UNSPECIFIED WHETHER RHEUMATOID FACTOR PRESENT (HCC): ICD-10-CM

## 2025-03-17 DIAGNOSIS — D68.59 HYPERCOAGULABLE STATE: ICD-10-CM
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
## 2025-03-17 DIAGNOSIS — J41.8 MIXED SIMPLE AND MUCOPURULENT CHRONIC BRONCHITIS (HCC): ICD-10-CM

## 2025-03-17 DIAGNOSIS — F33.2 RECURRENT ENDOGENOUS DEPRESSION (HCC): ICD-10-CM

## 2025-03-17 DIAGNOSIS — S82.842A CLOSED BIMALLEOLAR FRACTURE OF LEFT ANKLE, INITIAL ENCOUNTER: ICD-10-CM

## 2025-03-17 DIAGNOSIS — E03.8 TSH DEFICIENCY: ICD-10-CM

## 2025-03-17 DIAGNOSIS — I10 PRIMARY HYPERTENSION: ICD-10-CM

## 2025-03-18 ENCOUNTER — OFFICE VISIT (OUTPATIENT)
Facility: CLINIC | Age: 80
End: 2025-03-18
Payer: MEDICARE

## 2025-03-18 DIAGNOSIS — F32.A DEPRESSION, UNSPECIFIED DEPRESSION TYPE: ICD-10-CM

## 2025-03-18 DIAGNOSIS — F10.10 ETOH ABUSE: ICD-10-CM

## 2025-03-18 DIAGNOSIS — F41.9 ANXIETY: ICD-10-CM

## 2025-03-18 DIAGNOSIS — R53.81 DEBILITY: ICD-10-CM

## 2025-03-18 DIAGNOSIS — J42 CHRONIC BRONCHITIS, UNSPECIFIED CHRONIC BRONCHITIS TYPE (HCC): ICD-10-CM

## 2025-03-18 DIAGNOSIS — Z98.890 STATUS POST ORIF OF FRACTURE OF ANKLE: Primary | ICD-10-CM

## 2025-03-18 DIAGNOSIS — M06.9 RHEUMATOID ARTHRITIS, INVOLVING UNSPECIFIED SITE, UNSPECIFIED WHETHER RHEUMATOID FACTOR PRESENT (HCC): ICD-10-CM

## 2025-03-18 DIAGNOSIS — Z91.81 AT HIGH RISK FOR FALLS: ICD-10-CM

## 2025-03-18 DIAGNOSIS — I10 HYPERTENSION, UNSPECIFIED TYPE: ICD-10-CM

## 2025-03-18 DIAGNOSIS — E78.5 HYPERLIPIDEMIA, UNSPECIFIED HYPERLIPIDEMIA TYPE: ICD-10-CM

## 2025-03-18 DIAGNOSIS — Z87.81 STATUS POST ORIF OF FRACTURE OF ANKLE: Primary | ICD-10-CM

## 2025-03-18 PROCEDURE — 99310 SBSQ NF CARE HIGH MDM 45: CPT

## 2025-03-18 PROCEDURE — 1123F ACP DISCUSS/DSCN MKR DOCD: CPT

## 2025-03-18 NOTE — ASSESSMENT & PLAN NOTE
Chronic, not at goal (unstable), continue current treatment plan and medication adherence emphasized  Continue with pain management physical therapy occupational therapy will continue with tramadol 50 mg 1 tablet every 4-6 hours Narcan for overdose

## 2025-03-18 NOTE — PROGRESS NOTES
3/17/2025    Nicole Galeano (:  1945) is a 79 y.o. female, was presented to emergency room on 2025 with left ankle pain and consequently left ankle bimalleolar fracture due to mechanical fall with current medical history of hypertension COPD 24-hour oxygen dependent history of breast cancer history of ethanol abuse hypercholesterolemia recurrent depression hypertension, patient was discharged to skilled nursing facility on 2025 with a discharge diagnosis of closed fracture of the left ankle acute left ankle pain COPD rheumatoid arthritis ground-level fall severe protein energy malnutrition hypertension hypercholesterolemia recurrent depression hypercoagulable state 24-hour oxygen dependent, urinary tract infection elevated liver function test abnormal thyroid function test, anemia of chronic disease elevated BUN, Patient seen and examined today,in bed comfortably vital signs are reviewed in a stable range, baseline lab workup from the emergency room and hospital visit is at and is being in stable level,  pain is nicely controlled, having no skin tear denies suicidal homicidal ideation has no illusion hallucination patient last alcohol intake was few days before admission CIWA score at 0 no nausea vomiting having no diarrhea no tremor noted, as per the patient appetite still down but getting better, cough and shortness of breath has also improved with the current medication denies any recent fall, has been compliant with medication medical advice,  pt lives at home and single with 1 kid,   patient’s personality, and behavior normal and patient is also able to communicate, follow commands and emotionaly stable and the mood is flat.  patient can adjust to Institutional living,  pt has ++ mental Illnesses,  and unfortunately functional status is deconditioned due to the long term chronic medical condition and advanced age  patient had abnormal functional capacity at risk for fall.

## 2025-03-18 NOTE — PROGRESS NOTES
and insight.  Genitourinary: No suprapubic tenderness or flank tenderness  Heme, lymph, immuno: No pallor;           Assessment/Plans:   LEFT ANKLE OPEN REDUCTION INTERNAL FIXATION    Left ankle bimalleoar fracture s/p ORIF on 3/7/25 by dr. Kirby  She has to f/u with Dr Kirby on 3/21/25, staff requested to ensure appoitnemtn and transporation in place  LLE NWB, in cast  C/o of itiching -benedryl ordered   Hypertension  Continue lisinopril hold parameters in place blood pressure trends reviewed remained stable no headache no dizziness no lightheadedness no vision changes reported.  Baseline labs ordered to monitor kidney functions electrolytes.  Depression/anxiety  Continue Celexa and Triazolam    COPD  Continue inhalers nebulizers as needed patient is chronically on oxygen at home.  Rheumatoid arthritis  Continue prednisone  A1c done in the hospital 3/9/2020 25-5.3  EtOH abuse  No withdrawal symptoms continue to closely monitor patient.  Ammonia levels-22  within normal limits in the hospital  Ground-level fall/debility/high risk for falls  Patient presented to emergency room with ground-level fall status post fracture to left ankle.  Fall precautions in place no falls reported continue PT OT.  Hyperlipidemia  Continue atorvastatin  ISAIAH Morrow - NP

## 2025-03-19 ENCOUNTER — OFFICE VISIT (OUTPATIENT)
Facility: CLINIC | Age: 80
End: 2025-03-19
Payer: MEDICARE

## 2025-03-19 DIAGNOSIS — M06.9 RHEUMATOID ARTHRITIS, INVOLVING UNSPECIFIED SITE, UNSPECIFIED WHETHER RHEUMATOID FACTOR PRESENT (HCC): ICD-10-CM

## 2025-03-19 DIAGNOSIS — F10.10 ETOH ABUSE: ICD-10-CM

## 2025-03-19 DIAGNOSIS — Z98.890 STATUS POST ORIF OF FRACTURE OF ANKLE: Primary | ICD-10-CM

## 2025-03-19 DIAGNOSIS — Z91.81 AT HIGH RISK FOR FALLS: ICD-10-CM

## 2025-03-19 DIAGNOSIS — E78.5 HYPERLIPIDEMIA, UNSPECIFIED HYPERLIPIDEMIA TYPE: ICD-10-CM

## 2025-03-19 DIAGNOSIS — F41.9 ANXIETY: ICD-10-CM

## 2025-03-19 DIAGNOSIS — F32.A DEPRESSION, UNSPECIFIED DEPRESSION TYPE: ICD-10-CM

## 2025-03-19 DIAGNOSIS — Z87.81 STATUS POST ORIF OF FRACTURE OF ANKLE: Primary | ICD-10-CM

## 2025-03-19 DIAGNOSIS — I10 HYPERTENSION, UNSPECIFIED TYPE: ICD-10-CM

## 2025-03-19 PROCEDURE — 99309 SBSQ NF CARE MODERATE MDM 30: CPT

## 2025-03-19 PROCEDURE — 1123F ACP DISCUSS/DSCN MKR DOCD: CPT

## 2025-03-19 NOTE — PROGRESS NOTES
REDUCTION INTERNAL FIXATION    Left ankle bimalleoar fracture s/p ORIF on 3/7/25 by dr. Kirby  She has to f/u with Dr Kirby on 3/21/25, staff requested to ensure appoitnemtn and transporation in place  LLE NWB, in cast  C/o of itiching -benedryl ordered   Lovenox 40 mg/0.4 ml x 20 days end date 4/9/25  Hypertension  Continue lisinopril hold parameters in place blood pressure trends reviewed remained stable no headache no dizziness no lightheadedness no vision changes reported.  Baseline labs ordered to monitor kidney functions electrolytes.  Depression/anxiety  Continue Celexa and Triazolam    COPD  Continue inhalers nebulizers as needed patient is chronically on oxygen at home.  Rheumatoid arthritis  Continue prednisone  A1c done in the hospital 3/9/2020 25-5.3  EtOH abuse  No withdrawal symptoms continue to closely monitor patient.  Ammonia levels-22  within normal limits in the hospital  Ground-level fall/debility/high risk for falls  Patient presented to emergency room with ground-level fall status post fracture to left ankle.  Fall precautions in place no falls reported continue PT OT.  Hyperlipidemia  Continue atorvastatin  Carlos Young, APRN - NP

## 2025-03-21 ENCOUNTER — OFFICE VISIT (OUTPATIENT)
Facility: CLINIC | Age: 80
End: 2025-03-21

## 2025-03-21 DIAGNOSIS — R53.81 DEBILITY: ICD-10-CM

## 2025-03-21 DIAGNOSIS — F32.A DEPRESSION, UNSPECIFIED DEPRESSION TYPE: ICD-10-CM

## 2025-03-21 DIAGNOSIS — Z98.890 STATUS POST ORIF OF FRACTURE OF ANKLE: Primary | ICD-10-CM

## 2025-03-21 DIAGNOSIS — Z91.81 AT HIGH RISK FOR FALLS: ICD-10-CM

## 2025-03-21 DIAGNOSIS — I10 HYPERTENSION, UNSPECIFIED TYPE: ICD-10-CM

## 2025-03-21 DIAGNOSIS — Z87.81 STATUS POST ORIF OF FRACTURE OF ANKLE: Primary | ICD-10-CM

## 2025-03-21 DIAGNOSIS — F10.10 ETOH ABUSE: ICD-10-CM

## 2025-03-21 DIAGNOSIS — M06.9 RHEUMATOID ARTHRITIS, INVOLVING UNSPECIFIED SITE, UNSPECIFIED WHETHER RHEUMATOID FACTOR PRESENT (HCC): ICD-10-CM

## 2025-03-21 DIAGNOSIS — E78.5 HYPERLIPIDEMIA, UNSPECIFIED HYPERLIPIDEMIA TYPE: ICD-10-CM

## 2025-03-21 DIAGNOSIS — F41.9 ANXIETY: ICD-10-CM

## 2025-03-21 NOTE — PROGRESS NOTES
place.    Psychiatric: Not agitated.  Appropriate affect, mood, judgment and insight.  Genitourinary: No suprapubic tenderness or flank tenderness  Heme, lymph, immuno: No pallor;           Assessment/Plans:   LEFT ANKLE OPEN REDUCTION INTERNAL FIXATION    Left ankle bimalleoar fracture s/p ORIF on 3/7/25 by dr. Kirby  She has to f/u with Dr Kirby staff requested to ensure appointment and transporation in place  LLE NWB, in cast  C/o of itiching -benedryl ordered   Lovenox 40 mg/0.4 ml x 20 days end date 4/9/25  Hypertension  Continue lisinopril hold parameters in place blood pressure trends reviewed remained stable no headache no dizziness no lightheadedness no vision changes reported.  Baseline labs ordered to monitor kidney functions electrolytes.  Depression/anxiety  Continue Celexa and Triazolam    COPD  Continue inhalers nebulizers as needed patient is chronically on oxygen at home.  Rheumatoid arthritis  Continue prednisone  A1c done in the hospital 3/9/2020 25-5.3  EtOH abuse  No withdrawal symptoms continue to closely monitor patient.  Ammonia levels-22  within normal limits in the hospital  Ground-level fall/debility/high risk for falls  Patient presented to emergency room with ground-level fall status post fracture to left ankle.  Fall precautions in place no falls reported continue PT OT.  Hyperlipidemia  Continue atorvastatin  ISAIAH Morrow - SUZY

## 2025-03-25 ENCOUNTER — OFFICE VISIT (OUTPATIENT)
Facility: CLINIC | Age: 80
End: 2025-03-25
Payer: MEDICARE

## 2025-03-25 DIAGNOSIS — Z98.890 STATUS POST ORIF OF FRACTURE OF ANKLE: Primary | ICD-10-CM

## 2025-03-25 DIAGNOSIS — F41.9 ANXIETY: ICD-10-CM

## 2025-03-25 DIAGNOSIS — I10 HYPERTENSION, UNSPECIFIED TYPE: ICD-10-CM

## 2025-03-25 DIAGNOSIS — F10.10 ETOH ABUSE: ICD-10-CM

## 2025-03-25 DIAGNOSIS — R53.81 DEBILITY: ICD-10-CM

## 2025-03-25 DIAGNOSIS — M06.9 RHEUMATOID ARTHRITIS, INVOLVING UNSPECIFIED SITE, UNSPECIFIED WHETHER RHEUMATOID FACTOR PRESENT (HCC): ICD-10-CM

## 2025-03-25 DIAGNOSIS — Z91.81 AT HIGH RISK FOR FALLS: ICD-10-CM

## 2025-03-25 DIAGNOSIS — E78.5 HYPERLIPIDEMIA, UNSPECIFIED HYPERLIPIDEMIA TYPE: ICD-10-CM

## 2025-03-25 DIAGNOSIS — Z87.81 STATUS POST ORIF OF FRACTURE OF ANKLE: Primary | ICD-10-CM

## 2025-03-25 DIAGNOSIS — F32.A DEPRESSION, UNSPECIFIED DEPRESSION TYPE: ICD-10-CM

## 2025-03-25 DIAGNOSIS — S82.842A CLOSED BIMALLEOLAR FRACTURE OF LEFT ANKLE, INITIAL ENCOUNTER: ICD-10-CM

## 2025-03-25 PROCEDURE — 99310 SBSQ NF CARE HIGH MDM 45: CPT

## 2025-03-25 PROCEDURE — 1123F ACP DISCUSS/DSCN MKR DOCD: CPT

## 2025-03-25 NOTE — PROGRESS NOTES
PLACE OF SERVICE:  [facility] [address] [place] [address]       SKILLED VISIT      Chief Complaint:  skilled visit ,orthopedic notes reviewed.labs reviewed.   HPI  Patient is a 79-year-old female with history of hypertension COPD on 1 L oxygen at home rheumatoid arthritis breast cancer EtOH abuse presented with L bimalleolar ankle fx after she had a mechanical fall she is now, s/p ORIF by Dr. Kirby on 03/07. 7.  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 TSH done in the hospital was 0.1 Free T41.1 Will follow-up with TSH in the facility per hospital discharge instructions in 4 to 6 weeks.  After stabilizing in the hospital patient now presents to SNF for continued PT OT skilled nursing.    Seen patient in room awake alert oriented x 3 mentation at the sling not in any acute distress vital signs in facility remained stable she remains afebrile no fever no temperature noted working with therapy notes   Patient yesterday had follow-up appointment with her orthopedic surgeon Dr. Kirby. she reported she has follow-up appointment 4/21/2025 staff requested to ensure she has transportation arranged.  Reviewed patient's orthopedic  follow-up note in epic. Seen by : Jim Greenfield PA-C  .X-ray done to left ankle shows : interval healing of the bimalleolar fracture plate screw construct in good positions and no evidence of loosening failure or migration of

## 2025-03-26 ENCOUNTER — OFFICE VISIT (OUTPATIENT)
Facility: CLINIC | Age: 80
End: 2025-03-26
Payer: MEDICARE

## 2025-03-26 DIAGNOSIS — Z98.890 STATUS POST ORIF OF FRACTURE OF ANKLE: Primary | ICD-10-CM

## 2025-03-26 DIAGNOSIS — F10.10 ETOH ABUSE: ICD-10-CM

## 2025-03-26 DIAGNOSIS — R53.81 DEBILITY: ICD-10-CM

## 2025-03-26 DIAGNOSIS — F32.A DEPRESSION, UNSPECIFIED DEPRESSION TYPE: ICD-10-CM

## 2025-03-26 DIAGNOSIS — F41.9 ANXIETY: ICD-10-CM
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
## 2025-03-26 DIAGNOSIS — Z87.81 STATUS POST ORIF OF FRACTURE OF ANKLE: Primary | ICD-10-CM

## 2025-03-26 DIAGNOSIS — J42 CHRONIC BRONCHITIS, UNSPECIFIED CHRONIC BRONCHITIS TYPE (HCC): ICD-10-CM

## 2025-03-26 DIAGNOSIS — M06.9 RHEUMATOID ARTHRITIS, INVOLVING UNSPECIFIED SITE, UNSPECIFIED WHETHER RHEUMATOID FACTOR PRESENT (HCC): ICD-10-CM

## 2025-03-26 DIAGNOSIS — Z91.81 AT HIGH RISK FOR FALLS: ICD-10-CM

## 2025-03-26 DIAGNOSIS — M25.572 ACUTE LEFT ANKLE PAIN: ICD-10-CM

## 2025-03-26 DIAGNOSIS — S82.842A CLOSED BIMALLEOLAR FRACTURE OF LEFT ANKLE, INITIAL ENCOUNTER: ICD-10-CM

## 2025-03-26 DIAGNOSIS — I10 HYPERTENSION, UNSPECIFIED TYPE: ICD-10-CM

## 2025-03-26 DIAGNOSIS — E78.5 HYPERLIPIDEMIA, UNSPECIFIED HYPERLIPIDEMIA TYPE: ICD-10-CM

## 2025-03-26 PROCEDURE — 99308 SBSQ NF CARE LOW MDM 20: CPT

## 2025-03-26 PROCEDURE — 1123F ACP DISCUSS/DSCN MKR DOCD: CPT

## 2025-03-26 NOTE — PROGRESS NOTES
PLACE OF SERVICE:  [facility] [address] [place] [address]       SKILLED VISIT      Chief Complaint:  skilled visit ,  HPI  Patient is a 79-year-old female with history of hypertension COPD on 1 L oxygen at home rheumatoid arthritis breast cancer EtOH abuse presented with L bimalleolar ankle fx after she had a mechanical fall she is now, s/p ORIF by Dr. Kirby on 03/07. 7.  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 TSH done in the hospital was 0.1 Free T41.1 Will follow-up with TSH in the facility per hospital discharge instructions in 4 to 6 weeks.  After stabilizing in the hospital patient now presents to SNF for continued PT OT skilled nursing.    Seen patient in room awake alert oriented x 3 mentation at the sling not in any acute distress vital signs in facility remained stable she remains afebrile no fever no temperature noted working with therapy notes   Patient yesterday had follow-up appointment with her orthopedic surgeon Dr. Kirby. she reported she has follow-up appointment 4/21/2025 staff requested to ensure she has transportation arranged.  Reviewed patient's orthopedic  follow-up note in epic. Seen by : Jim Greenfield PA-C  .X-ray done to left ankle shows : interval healing of the bimalleolar fracture plate screw construct in good positions and no evidence of loosening failure or migration of the hardware .Cast/Spint/Dressing and WB

## 2025-03-27 ENCOUNTER — OFFICE VISIT (OUTPATIENT)
Facility: CLINIC | Age: 80
End: 2025-03-27

## 2025-03-27 DIAGNOSIS — R53.81 DEBILITY: ICD-10-CM

## 2025-03-27 DIAGNOSIS — F10.10 ETOH ABUSE: ICD-10-CM

## 2025-03-27 DIAGNOSIS — J42 CHRONIC BRONCHITIS, UNSPECIFIED CHRONIC BRONCHITIS TYPE (HCC): ICD-10-CM

## 2025-03-27 DIAGNOSIS — Z98.890 STATUS POST ORIF OF FRACTURE OF ANKLE: Primary | ICD-10-CM

## 2025-03-27 DIAGNOSIS — F41.9 ANXIETY: ICD-10-CM

## 2025-03-27 DIAGNOSIS — Z91.81 AT HIGH RISK FOR FALLS: ICD-10-CM
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
## 2025-03-27 DIAGNOSIS — I10 HYPERTENSION, UNSPECIFIED TYPE: ICD-10-CM

## 2025-03-27 DIAGNOSIS — F32.A DEPRESSION, UNSPECIFIED DEPRESSION TYPE: ICD-10-CM

## 2025-03-27 DIAGNOSIS — S82.842A CLOSED BIMALLEOLAR FRACTURE OF LEFT ANKLE, INITIAL ENCOUNTER: ICD-10-CM

## 2025-03-27 DIAGNOSIS — E78.5 HYPERLIPIDEMIA, UNSPECIFIED HYPERLIPIDEMIA TYPE: ICD-10-CM

## 2025-03-27 DIAGNOSIS — M06.9 RHEUMATOID ARTHRITIS, INVOLVING UNSPECIFIED SITE, UNSPECIFIED WHETHER RHEUMATOID FACTOR PRESENT (HCC): ICD-10-CM

## 2025-03-27 DIAGNOSIS — M25.572 ACUTE LEFT ANKLE PAIN: ICD-10-CM

## 2025-03-27 DIAGNOSIS — Z87.81 STATUS POST ORIF OF FRACTURE OF ANKLE: Primary | ICD-10-CM

## 2025-03-28 NOTE — PROGRESS NOTES
PLACE OF SERVICE:  [facility] [address] [place] [address]       SKILLED VISIT      Chief Complaint:  skilled visit ,  HPI  Patient is a 79-year-old female with history of hypertension COPD on 1 L oxygen at home rheumatoid arthritis breast cancer EtOH abuse presented with L bimalleolar ankle fx after she had a mechanical fall she is now, s/p ORIF by Dr. Kirby on 03/07. 7.  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 TSH done in the hospital was 0.1 Free T41.1 Will follow-up with TSH in the facility per hospital discharge instructions in 4 to 6 weeks.  After stabilizing in the hospital patient now presents to SNF for continued PT OT skilled nursing.    Seen patient in room awake alert oriented x 3 mentation at the sling not in any acute distress vital signs in facility remained stable she remains afebrile no fever no temperature noted working with therapy notes   Patient had follow-up appointment with her orthopedic surgeon Dr. Kirby. she reported she has follow-up appointment 4/21/2025 staff requested to ensure she has transportation arranged.  Reviewed patient's orthopedic  follow-up note in epic. Seen by : Jim Greenfield PA-C  .X-ray done to left ankle shows : interval healing of the bimalleolar fracture plate screw construct in good positions and no evidence of loosening failure or migration of the hardware .Cast/Spint/Dressing and WB Plan:  Cast

## 2025-03-31 ENCOUNTER — OFFICE VISIT (OUTPATIENT)
Facility: CLINIC | Age: 80
End: 2025-03-31
Payer: MEDICARE
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
## 2025-03-31 DIAGNOSIS — Z98.890 STATUS POST ORIF OF FRACTURE OF ANKLE: Primary | ICD-10-CM

## 2025-03-31 DIAGNOSIS — R53.81 DEBILITY: ICD-10-CM

## 2025-03-31 DIAGNOSIS — J42 CHRONIC BRONCHITIS, UNSPECIFIED CHRONIC BRONCHITIS TYPE (HCC): ICD-10-CM

## 2025-03-31 DIAGNOSIS — M25.572 ACUTE LEFT ANKLE PAIN: ICD-10-CM

## 2025-03-31 DIAGNOSIS — M06.9 RHEUMATOID ARTHRITIS, INVOLVING UNSPECIFIED SITE, UNSPECIFIED WHETHER RHEUMATOID FACTOR PRESENT (HCC): ICD-10-CM

## 2025-03-31 DIAGNOSIS — S82.842A CLOSED BIMALLEOLAR FRACTURE OF LEFT ANKLE, INITIAL ENCOUNTER: ICD-10-CM

## 2025-03-31 DIAGNOSIS — F41.9 ANXIETY: ICD-10-CM

## 2025-03-31 DIAGNOSIS — Z91.81 AT HIGH RISK FOR FALLS: ICD-10-CM

## 2025-03-31 DIAGNOSIS — F32.A DEPRESSION, UNSPECIFIED DEPRESSION TYPE: ICD-10-CM

## 2025-03-31 DIAGNOSIS — I10 PRIMARY HYPERTENSION: ICD-10-CM

## 2025-03-31 DIAGNOSIS — F10.10 ETOH ABUSE: ICD-10-CM

## 2025-03-31 DIAGNOSIS — Z87.81 STATUS POST ORIF OF FRACTURE OF ANKLE: Primary | ICD-10-CM

## 2025-03-31 DIAGNOSIS — E78.5 HYPERLIPIDEMIA, UNSPECIFIED HYPERLIPIDEMIA TYPE: ICD-10-CM

## 2025-03-31 DIAGNOSIS — E03.8 TSH DEFICIENCY: ICD-10-CM

## 2025-03-31 DIAGNOSIS — I10 HYPERTENSION, UNSPECIFIED TYPE: ICD-10-CM

## 2025-03-31 PROCEDURE — 99310 SBSQ NF CARE HIGH MDM 45: CPT

## 2025-03-31 PROCEDURE — 1123F ACP DISCUSS/DSCN MKR DOCD: CPT

## 2025-03-31 NOTE — PROGRESS NOTES
PLACE OF SERVICE:  [facility] [address] [place] [address]       SKILLED VISIT      Chief Complaint:  skilled visit ,family meeting   HPI  Patient is a 79-year-old female with history of hypertension COPD on 1 L oxygen at home rheumatoid arthritis breast cancer EtOH abuse presented with L bimalleolar ankle fx after she had a mechanical fall she is now, s/p ORIF by Dr. Kirby on 03/07. 7.  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 TSH done in the hospital was 0.1 Free T41.1 Will follow-up with TSH in the facility per hospital discharge instructions in 4 to 6 weeks.  After stabilizing in the hospital patient now presents to SNF for continued PT OT skilled nursing.    Seen patient in room awake alert oriented x 3 mentation at the this time.  Today she reported dysuria over the weekend urinalysis was collected will await cultures before starting her on antibiotics at this time started patient on Pyridium for her complaints of dysuria.  No other associated symptoms reported no flank pain no suprapubic pain noted.  Patient also had requested to d change her Tylenol to as needed have addressed patient's request.  Patient also requested that her tramadol at in the evening be given 4 hours prior to her triazolam, I have adjusted medication timing per patient request.  Provider met patient's family by bedside.  Answered all question concerns.  Vitals in the

## 2025-04-02 ENCOUNTER — OFFICE VISIT (OUTPATIENT)
Facility: CLINIC | Age: 80
End: 2025-04-02

## 2025-04-02 DIAGNOSIS — N39.0 URINARY TRACT INFECTION WITHOUT HEMATURIA, SITE UNSPECIFIED: Primary | ICD-10-CM

## 2025-04-02 DIAGNOSIS — J44.9 CHRONIC OBSTRUCTIVE PULMONARY DISEASE, UNSPECIFIED COPD TYPE (HCC): ICD-10-CM

## 2025-04-02 DIAGNOSIS — I10 HYPERTENSION, UNSPECIFIED TYPE: ICD-10-CM

## 2025-04-02 DIAGNOSIS — Z91.81 AT HIGH RISK FOR FALLS: ICD-10-CM

## 2025-04-02 DIAGNOSIS — F41.9 ANXIETY: ICD-10-CM

## 2025-04-02 DIAGNOSIS — E78.5 HYPERLIPIDEMIA, UNSPECIFIED HYPERLIPIDEMIA TYPE: ICD-10-CM

## 2025-04-02 DIAGNOSIS — Z87.81 STATUS POST ORIF OF FRACTURE OF ANKLE: ICD-10-CM

## 2025-04-02 DIAGNOSIS — Z98.890 STATUS POST ORIF OF FRACTURE OF ANKLE: ICD-10-CM

## 2025-04-02 DIAGNOSIS — M06.9 RHEUMATOID ARTHRITIS, INVOLVING UNSPECIFIED SITE, UNSPECIFIED WHETHER RHEUMATOID FACTOR PRESENT (HCC): ICD-10-CM

## 2025-04-02 DIAGNOSIS — F10.10 ETOH ABUSE: ICD-10-CM

## 2025-04-02 DIAGNOSIS — S82.842A CLOSED BIMALLEOLAR FRACTURE OF LEFT ANKLE, INITIAL ENCOUNTER: ICD-10-CM

## 2025-04-02 DIAGNOSIS — R53.81 DEBILITY: ICD-10-CM

## 2025-04-02 DIAGNOSIS — F32.A DEPRESSION, UNSPECIFIED DEPRESSION TYPE: ICD-10-CM

## 2025-04-02 DIAGNOSIS — M25.572 ACUTE LEFT ANKLE PAIN: ICD-10-CM

## 2025-04-02 NOTE — PROGRESS NOTES
PLACE OF SERVICE:  [facility] [address] [place] [address]       SKILLED VISIT      Chief Complaint:  skilled visit  Dysuria, N/V  HPI  Patient is a 79-year-old female with history of hypertension COPD on 1 L oxygen at home rheumatoid arthritis breast cancer EtOH abuse presented with L bimalleolar ankle fx after she had a mechanical fall she is now, s/p ORIF by Dr. Kirby on 03/07. 7.  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 TSH done in the hospital was 0.1 Free T41.1 Will follow-up with TSH in the facility per hospital discharge instructions in 4 to 6 weeks.  After stabilizing in the hospital patient now presents to SNF for continued PT OT skilled nursing.    Seen patient in room awake alert oriented x 3 mentation at the this time.  She continues to report dysuria will start patient on Macrobid 100 mg twice a day x 7 days and she will continue Pyridium  . No other associated symptoms reported no flank pain no suprapubic pain noted.  Patient also reported that she feels nauseous no vomiting reported and suspect secondary to Pyridium will add Zofran as needed patient in agreement with plan of care.  Abdomen is soft nondistended nontender bowel sounds are present tolerating her meals well.  .  Wants her Triazolam changed to as needed once a day have addressed patient needs..    During the last visit patient also had requested to d change her

## 2025-04-07 ENCOUNTER — OFFICE VISIT (OUTPATIENT)
Facility: CLINIC | Age: 80
End: 2025-04-07
Payer: MEDICARE

## 2025-04-07 DIAGNOSIS — I10 HYPERTENSION, UNSPECIFIED TYPE: ICD-10-CM

## 2025-04-07 DIAGNOSIS — Z91.81 AT HIGH RISK FOR FALLS: ICD-10-CM

## 2025-04-07 DIAGNOSIS — E78.5 HYPERLIPIDEMIA, UNSPECIFIED HYPERLIPIDEMIA TYPE: ICD-10-CM

## 2025-04-07 DIAGNOSIS — F10.10 ETOH ABUSE: ICD-10-CM

## 2025-04-07 DIAGNOSIS — F32.A DEPRESSION, UNSPECIFIED DEPRESSION TYPE: ICD-10-CM

## 2025-04-07 DIAGNOSIS — J44.9 CHRONIC OBSTRUCTIVE PULMONARY DISEASE, UNSPECIFIED COPD TYPE (HCC): ICD-10-CM

## 2025-04-07 DIAGNOSIS — S82.842A CLOSED BIMALLEOLAR FRACTURE OF LEFT ANKLE, INITIAL ENCOUNTER: ICD-10-CM

## 2025-04-07 DIAGNOSIS — Z98.890 STATUS POST ORIF OF FRACTURE OF ANKLE: ICD-10-CM

## 2025-04-07 DIAGNOSIS — Z87.81 STATUS POST ORIF OF FRACTURE OF ANKLE: ICD-10-CM

## 2025-04-07 DIAGNOSIS — J42 CHRONIC BRONCHITIS, UNSPECIFIED CHRONIC BRONCHITIS TYPE (HCC): ICD-10-CM
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
## 2025-04-07 DIAGNOSIS — F41.9 ANXIETY: ICD-10-CM

## 2025-04-07 DIAGNOSIS — M25.572 ACUTE LEFT ANKLE PAIN: ICD-10-CM

## 2025-04-07 DIAGNOSIS — N39.0 URINARY TRACT INFECTION WITHOUT HEMATURIA, SITE UNSPECIFIED: Primary | ICD-10-CM

## 2025-04-07 DIAGNOSIS — R53.81 DEBILITY: ICD-10-CM

## 2025-04-07 DIAGNOSIS — M06.9 RHEUMATOID ARTHRITIS, INVOLVING UNSPECIFIED SITE, UNSPECIFIED WHETHER RHEUMATOID FACTOR PRESENT (HCC): ICD-10-CM

## 2025-04-07 DIAGNOSIS — E03.8 TSH DEFICIENCY: ICD-10-CM

## 2025-04-07 PROCEDURE — 1123F ACP DISCUSS/DSCN MKR DOCD: CPT

## 2025-04-07 PROCEDURE — 99310 SBSQ NF CARE HIGH MDM 45: CPT

## 2025-04-07 NOTE — PROGRESS NOTES
PLACE OF SERVICE:  [facility] [address] [place] [address]       SKILLED VISIT      Chief Complaint:  skilled visit  UTI, rheumatologist consult follow-up with management  HPI  Patient is a 79-year-old female with history of hypertension COPD on 1 L oxygen at home rheumatoid arthritis breast cancer EtOH abuse presented with L bimalleolar ankle fx after she had a mechanical fall she is now, s/p ORIF by Dr. Kirby on 03/07. 7.  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 TSH done in the hospital was 0.1 Free T41.1 Will follow-up with TSH in the facility per hospital discharge instructions in 4 to 6 weeks.  After stabilizing in the hospital patient now presents to SNF for continued PT OT skilled nursing.    Seen patient in room awake alert oriented x 3 mentation at the this time.        Continue on Macrobid 100 mg twice a day x 7 days and she will continue Pyridium  . No other associated symptoms reported no flank pain no suprapubic pain noted.  She remains afebrile no fever no temperature  Patient also reported that she feels nauseous no vomiting reported and suspect secondary to Pyridium continue  Zofran as needed   .  Abdomen is soft nondistended nontender bowel sounds are present tolerating her meals well.  .  Wants her Triazolam changed to as needed once a day have addressed patient needs..  Today patient continues to have concerns that she has not

## 2025-04-09 ENCOUNTER — OFFICE VISIT (OUTPATIENT)
Facility: CLINIC | Age: 80
End: 2025-04-09

## 2025-04-09 DIAGNOSIS — M06.9 RHEUMATOID ARTHRITIS, INVOLVING UNSPECIFIED SITE, UNSPECIFIED WHETHER RHEUMATOID FACTOR PRESENT (HCC): Primary | ICD-10-CM

## 2025-04-09 DIAGNOSIS — J44.9 CHRONIC OBSTRUCTIVE PULMONARY DISEASE, UNSPECIFIED COPD TYPE (HCC): ICD-10-CM

## 2025-04-09 DIAGNOSIS — I10 HYPERTENSION, UNSPECIFIED TYPE: ICD-10-CM

## 2025-04-09 DIAGNOSIS — Z87.81 STATUS POST ORIF OF FRACTURE OF ANKLE: ICD-10-CM

## 2025-04-09 DIAGNOSIS — M25.572 ACUTE LEFT ANKLE PAIN: ICD-10-CM

## 2025-04-09 DIAGNOSIS — E03.8 TSH DEFICIENCY: ICD-10-CM

## 2025-04-09 DIAGNOSIS — Z98.890 STATUS POST ORIF OF FRACTURE OF ANKLE: ICD-10-CM

## 2025-04-09 DIAGNOSIS — F32.A DEPRESSION, UNSPECIFIED DEPRESSION TYPE: ICD-10-CM

## 2025-04-09 DIAGNOSIS — S82.842A CLOSED BIMALLEOLAR FRACTURE OF LEFT ANKLE, INITIAL ENCOUNTER: ICD-10-CM

## 2025-04-09 DIAGNOSIS — N39.0 URINARY TRACT INFECTION WITHOUT HEMATURIA, SITE UNSPECIFIED: ICD-10-CM

## 2025-04-09 DIAGNOSIS — F41.9 ANXIETY: ICD-10-CM

## 2025-04-09 DIAGNOSIS — Z91.81 AT HIGH RISK FOR FALLS: ICD-10-CM

## 2025-04-09 DIAGNOSIS — E78.5 HYPERLIPIDEMIA, UNSPECIFIED HYPERLIPIDEMIA TYPE: ICD-10-CM

## 2025-04-09 DIAGNOSIS — F10.10 ETOH ABUSE: ICD-10-CM

## 2025-04-09 DIAGNOSIS — R53.81 DEBILITY: ICD-10-CM
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
## 2025-04-09 NOTE — PROGRESS NOTES
as written postop. -4/9/25     Hypertension  Continue lisinopril hold parameters in place blood pressure trends reviewed remained stable no headache no dizziness no lightheadedness no vision changes reported.  Baseline labs ordered to monitor kidney functions electrolytes.  Reviewed and remained stable  Depression/anxiety  Seen by in-house psych notes reviewed will continue to follow recommendations  Continue Celexa and Triazolam    COPD  Continue inhalers nebulizers as needed patient is chronically on oxygen at home.  Rheumatoid arthritis  Continue  prednisone to 20 mg daily  Continue Accu-Cheks daily blood sugar trends reviewed remained stable  Patient reports effective results  Continue diclofenac gel for pain management  A1c done in the hospital 3/9/2020 25-5.3  EtOH abuse  No withdrawal symptoms continue to closely monitor patient.  Ammonia levels-22  within normal limits in the hospital  Ground-level fall/debility/high risk for falls  Patient presented to emergency room with ground-level fall status post fracture to left ankle.  Fall precautions in place no falls reported continue PT OT.  Notes reviewed  Hyperlipidemia  Continue atorvastatin    ISAIAH Morrow NP

## 2025-04-11 ENCOUNTER — OFFICE VISIT (OUTPATIENT)
Facility: CLINIC | Age: 80
End: 2025-04-11

## 2025-04-11 DIAGNOSIS — E78.5 HYPERLIPIDEMIA, UNSPECIFIED HYPERLIPIDEMIA TYPE: ICD-10-CM

## 2025-04-11 DIAGNOSIS — I10 HYPERTENSION, UNSPECIFIED TYPE: ICD-10-CM

## 2025-04-11 DIAGNOSIS — R53.81 DEBILITY: ICD-10-CM

## 2025-04-11 DIAGNOSIS — M06.9 RHEUMATOID ARTHRITIS, INVOLVING UNSPECIFIED SITE, UNSPECIFIED WHETHER RHEUMATOID FACTOR PRESENT (HCC): Primary | ICD-10-CM

## 2025-04-11 DIAGNOSIS — Z87.81 STATUS POST ORIF OF FRACTURE OF ANKLE: ICD-10-CM

## 2025-04-11 DIAGNOSIS — M25.572 ACUTE LEFT ANKLE PAIN: ICD-10-CM

## 2025-04-11 DIAGNOSIS — F10.10 ETOH ABUSE: ICD-10-CM

## 2025-04-11 DIAGNOSIS — S82.842A CLOSED BIMALLEOLAR FRACTURE OF LEFT ANKLE, INITIAL ENCOUNTER: ICD-10-CM

## 2025-04-11 DIAGNOSIS — N39.0 URINARY TRACT INFECTION WITHOUT HEMATURIA, SITE UNSPECIFIED: ICD-10-CM

## 2025-04-11 DIAGNOSIS — Z98.890 STATUS POST ORIF OF FRACTURE OF ANKLE: ICD-10-CM

## 2025-04-11 DIAGNOSIS — F41.9 ANXIETY: ICD-10-CM

## 2025-04-11 DIAGNOSIS — Z91.81 AT HIGH RISK FOR FALLS: ICD-10-CM

## 2025-04-11 NOTE — PROGRESS NOTES
PLACE OF SERVICE:  [facility] [address] [place] [address]       SKILLED VISIT      Chief Complaint:  30 day re certification     HPI  Patient seen for 30-day recertification.  Plan of care reviewed medication reviewed patient certified for skilled level of care  Patient is a 79-year-old female with history of hypertension COPD on 1 L oxygen at home rheumatoid arthritis breast cancer EtOH abuse presented with L bimalleolar ankle fx after she had a mechanical fall she is now, s/p ORIF by Dr. Kirby on 03/07. 7.  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 TSH done in the hospital was 0.1 Free T41.1 Will follow-up with TSH in the facility per hospital discharge instructions in 4 to 6 weeks.  After stabilizing in the hospital patient now presents to SNF for continued PT OT skilled nursing.    Seen patient in room awake alert oriented x 3 mentation at the this time.    Completed  Macrobid 100 mg twice a day x 7 days and she will continue Pyridium.  .  She continues to complain of frequency.  Told me that she goes to the bathroom 30 minutes later she has to go back to the bathroom again.  She suspects her UTI has not been cleared.  I have reordered UA CNS on her.  Also told nurse to place hat in the pot and monitor patient's postvoid residual.  Patient voided 250 mL in the hat with 43 residual in the bladder scanner.   she remains afebrile no fever no

## 2025-07-06 ENCOUNTER — HOSPITAL ENCOUNTER (INPATIENT)
Facility: HOSPITAL | Age: 80
LOS: 25 days | Discharge: HOSPICE | End: 2025-07-31
Attending: EMERGENCY MEDICINE | Admitting: INTERNAL MEDICINE
Payer: MEDICARE

## 2025-07-06 ENCOUNTER — APPOINTMENT (OUTPATIENT)
Facility: HOSPITAL | Age: 80
End: 2025-07-06
Payer: MEDICARE

## 2025-07-06 DIAGNOSIS — K66.8 INTRA-ABDOMINAL FREE AIR OF UNKNOWN ETIOLOGY: ICD-10-CM

## 2025-07-06 DIAGNOSIS — G93.40 ACUTE ENCEPHALOPATHY: Primary | ICD-10-CM

## 2025-07-06 DIAGNOSIS — M54.50 ACUTE MIDLINE LOW BACK PAIN WITHOUT SCIATICA: ICD-10-CM

## 2025-07-06 PROBLEM — G93.41 ACUTE METABOLIC ENCEPHALOPATHY: Status: ACTIVE | Noted: 2025-07-06

## 2025-07-06 LAB
ALBUMIN SERPL-MCNC: 2.7 G/DL (ref 3.5–5)
ALBUMIN/GLOB SERPL: 0.7 (ref 1.1–2.2)
ALP SERPL-CCNC: 263 U/L (ref 45–117)
ALT SERPL-CCNC: 30 U/L (ref 12–78)
ANION GAP SERPL CALC-SCNC: 4 MMOL/L (ref 2–12)
APPEARANCE UR: CLEAR
AST SERPL-CCNC: 28 U/L (ref 15–37)
BACTERIA URNS QL MICRO: NEGATIVE /HPF
BASOPHILS # BLD: 0.01 K/UL (ref 0–0.1)
BASOPHILS NFR BLD: 0.1 % (ref 0–1)
BILIRUB SERPL-MCNC: 1.1 MG/DL (ref 0.2–1)
BILIRUB UR QL: NEGATIVE
BUN SERPL-MCNC: 15 MG/DL (ref 6–20)
BUN/CREAT SERPL: 33 (ref 12–20)
CALCIUM SERPL-MCNC: 8.9 MG/DL (ref 8.5–10.1)
CHLORIDE SERPL-SCNC: 105 MMOL/L (ref 97–108)
CO2 SERPL-SCNC: 30 MMOL/L (ref 21–32)
COLOR UR: ABNORMAL
CREAT SERPL-MCNC: 0.46 MG/DL (ref 0.55–1.02)
DIFFERENTIAL METHOD BLD: ABNORMAL
EKG ATRIAL RATE: 76 BPM
EKG DIAGNOSIS: NORMAL
EKG P AXIS: 57 DEGREES
EKG P-R INTERVAL: 156 MS
EKG Q-T INTERVAL: 438 MS
EKG QRS DURATION: 74 MS
EKG QTC CALCULATION (BAZETT): 492 MS
EKG R AXIS: -28 DEGREES
EKG T AXIS: 41 DEGREES
EKG VENTRICULAR RATE: 76 BPM
EOSINOPHIL # BLD: 0 K/UL (ref 0–0.4)
EOSINOPHIL NFR BLD: 0 % (ref 0–7)
EPITH CASTS URNS QL MICRO: ABNORMAL /LPF
ERYTHROCYTE [DISTWIDTH] IN BLOOD BY AUTOMATED COUNT: 17.8 % (ref 11.5–14.5)
GLOBULIN SER CALC-MCNC: 4.1 G/DL (ref 2–4)
GLUCOSE SERPL-MCNC: 131 MG/DL (ref 65–100)
GLUCOSE UR STRIP.AUTO-MCNC: NEGATIVE MG/DL
HCT VFR BLD AUTO: 38.7 % (ref 35–47)
HGB BLD-MCNC: 12.5 G/DL (ref 11.5–16)
HGB UR QL STRIP: NEGATIVE
HYALINE CASTS URNS QL MICRO: ABNORMAL /LPF (ref 0–2)
IMM GRANULOCYTES # BLD AUTO: 0.05 K/UL (ref 0–0.04)
IMM GRANULOCYTES NFR BLD AUTO: 0.5 % (ref 0–0.5)
KETONES UR QL STRIP.AUTO: 15 MG/DL
LEUKOCYTE ESTERASE UR QL STRIP.AUTO: ABNORMAL
LYMPHOCYTES # BLD: 0.32 K/UL (ref 0.8–3.5)
LYMPHOCYTES NFR BLD: 3.1 % (ref 12–49)
MCH RBC QN AUTO: 30.8 PG (ref 26–34)
MCHC RBC AUTO-ENTMCNC: 32.3 G/DL (ref 30–36.5)
MCV RBC AUTO: 95.3 FL (ref 80–99)
MONOCYTES # BLD: 0.26 K/UL (ref 0–1)
MONOCYTES NFR BLD: 2.5 % (ref 5–13)
NEUTS SEG # BLD: 9.56 K/UL (ref 1.8–8)
NEUTS SEG NFR BLD: 93.8 % (ref 32–75)
NITRITE UR QL STRIP.AUTO: NEGATIVE
NRBC # BLD: 0 K/UL (ref 0–0.01)
NRBC BLD-RTO: 0 PER 100 WBC
PH UR STRIP: 8 (ref 5–8)
PLATELET # BLD AUTO: 279 K/UL (ref 150–400)
PMV BLD AUTO: 10.2 FL (ref 8.9–12.9)
POTASSIUM SERPL-SCNC: 4.3 MMOL/L (ref 3.5–5.1)
PROT SERPL-MCNC: 6.8 G/DL (ref 6.4–8.2)
PROT UR STRIP-MCNC: NEGATIVE MG/DL
RBC # BLD AUTO: 4.06 M/UL (ref 3.8–5.2)
RBC #/AREA URNS HPF: ABNORMAL /HPF (ref 0–5)
RBC MORPH BLD: ABNORMAL
SODIUM SERPL-SCNC: 139 MMOL/L (ref 136–145)
SP GR UR REFRACTOMETRY: 1.01
URINE CULTURE IF INDICATED: ABNORMAL
UROBILINOGEN UR QL STRIP.AUTO: 2 EU/DL (ref 0.2–1)
WBC # BLD AUTO: 10.2 K/UL (ref 3.6–11)
WBC URNS QL MICRO: ABNORMAL /HPF (ref 0–4)

## 2025-07-06 PROCEDURE — 1100000000 HC RM PRIVATE

## 2025-07-06 PROCEDURE — 80053 COMPREHEN METABOLIC PANEL: CPT

## 2025-07-06 PROCEDURE — 6370000000 HC RX 637 (ALT 250 FOR IP): Performed by: INTERNAL MEDICINE

## 2025-07-06 PROCEDURE — 93005 ELECTROCARDIOGRAM TRACING: CPT | Performed by: EMERGENCY MEDICINE

## 2025-07-06 PROCEDURE — 94640 AIRWAY INHALATION TREATMENT: CPT

## 2025-07-06 PROCEDURE — 81001 URINALYSIS AUTO W/SCOPE: CPT

## 2025-07-06 PROCEDURE — 96374 THER/PROPH/DIAG INJ IV PUSH: CPT

## 2025-07-06 PROCEDURE — 71045 X-RAY EXAM CHEST 1 VIEW: CPT

## 2025-07-06 PROCEDURE — 70450 CT HEAD/BRAIN W/O DYE: CPT

## 2025-07-06 PROCEDURE — 99285 EMERGENCY DEPT VISIT HI MDM: CPT

## 2025-07-06 PROCEDURE — 2700000000 HC OXYGEN THERAPY PER DAY

## 2025-07-06 PROCEDURE — 85025 COMPLETE CBC W/AUTO DIFF WBC: CPT

## 2025-07-06 PROCEDURE — 6360000002 HC RX W HCPCS: Performed by: INTERNAL MEDICINE

## 2025-07-06 PROCEDURE — 36415 COLL VENOUS BLD VENIPUNCTURE: CPT

## 2025-07-06 PROCEDURE — 6360000002 HC RX W HCPCS: Performed by: EMERGENCY MEDICINE

## 2025-07-06 PROCEDURE — 6370000000 HC RX 637 (ALT 250 FOR IP): Performed by: PHYSICIAN ASSISTANT

## 2025-07-06 PROCEDURE — 6370000000 HC RX 637 (ALT 250 FOR IP): Performed by: EMERGENCY MEDICINE

## 2025-07-06 RX ORDER — ONDANSETRON 2 MG/ML
4 INJECTION INTRAMUSCULAR; INTRAVENOUS EVERY 6 HOURS PRN
Status: DISCONTINUED | OUTPATIENT
Start: 2025-07-06 | End: 2025-07-28

## 2025-07-06 RX ORDER — KETOROLAC TROMETHAMINE 30 MG/ML
15 INJECTION, SOLUTION INTRAMUSCULAR; INTRAVENOUS ONCE
Status: COMPLETED | OUTPATIENT
Start: 2025-07-06 | End: 2025-07-06

## 2025-07-06 RX ORDER — ACETAMINOPHEN 325 MG/1
650 TABLET ORAL EVERY 6 HOURS PRN
Status: DISCONTINUED | OUTPATIENT
Start: 2025-07-06 | End: 2025-07-15

## 2025-07-06 RX ORDER — ENOXAPARIN SODIUM 100 MG/ML
40 INJECTION SUBCUTANEOUS DAILY
Status: DISCONTINUED | OUTPATIENT
Start: 2025-07-07 | End: 2025-07-15

## 2025-07-06 RX ORDER — SODIUM CHLORIDE 0.9 % (FLUSH) 0.9 %
5-40 SYRINGE (ML) INJECTION PRN
Status: DISCONTINUED | OUTPATIENT
Start: 2025-07-06 | End: 2025-07-31 | Stop reason: HOSPADM

## 2025-07-06 RX ORDER — POTASSIUM CHLORIDE 1500 MG/1
40 TABLET, EXTENDED RELEASE ORAL PRN
Status: DISCONTINUED | OUTPATIENT
Start: 2025-07-06 | End: 2025-07-15

## 2025-07-06 RX ORDER — SODIUM CHLORIDE 0.9 % (FLUSH) 0.9 %
5-40 SYRINGE (ML) INJECTION EVERY 12 HOURS SCHEDULED
Status: DISCONTINUED | OUTPATIENT
Start: 2025-07-06 | End: 2025-07-31

## 2025-07-06 RX ORDER — ONDANSETRON 4 MG/1
4 TABLET, ORALLY DISINTEGRATING ORAL EVERY 8 HOURS PRN
Status: DISCONTINUED | OUTPATIENT
Start: 2025-07-06 | End: 2025-07-28

## 2025-07-06 RX ORDER — ACETAMINOPHEN 500 MG
1000 TABLET ORAL
Status: COMPLETED | OUTPATIENT
Start: 2025-07-06 | End: 2025-07-06

## 2025-07-06 RX ORDER — ACETAMINOPHEN 650 MG/1
650 SUPPOSITORY RECTAL EVERY 6 HOURS PRN
Status: DISCONTINUED | OUTPATIENT
Start: 2025-07-06 | End: 2025-07-31 | Stop reason: HOSPADM

## 2025-07-06 RX ORDER — POLYETHYLENE GLYCOL 3350 17 G/17G
17 POWDER, FOR SOLUTION ORAL DAILY PRN
Status: DISCONTINUED | OUTPATIENT
Start: 2025-07-06 | End: 2025-07-13

## 2025-07-06 RX ORDER — PREDNISONE 20 MG/1
20 TABLET ORAL DAILY
Status: DISCONTINUED | OUTPATIENT
Start: 2025-07-07 | End: 2025-07-15

## 2025-07-06 RX ORDER — LISINOPRIL 5 MG/1
5 TABLET ORAL DAILY
Status: DISCONTINUED | OUTPATIENT
Start: 2025-07-06 | End: 2025-07-08

## 2025-07-06 RX ORDER — SODIUM CHLORIDE 9 MG/ML
INJECTION, SOLUTION INTRAVENOUS PRN
Status: DISCONTINUED | OUTPATIENT
Start: 2025-07-06 | End: 2025-07-31 | Stop reason: HOSPADM

## 2025-07-06 RX ORDER — KETOROLAC TROMETHAMINE 30 MG/ML
15 INJECTION, SOLUTION INTRAMUSCULAR; INTRAVENOUS 2 TIMES DAILY PRN
Status: DISPENSED | OUTPATIENT
Start: 2025-07-06 | End: 2025-07-11

## 2025-07-06 RX ORDER — MAGNESIUM SULFATE IN WATER 40 MG/ML
2000 INJECTION, SOLUTION INTRAVENOUS PRN
Status: DISCONTINUED | OUTPATIENT
Start: 2025-07-06 | End: 2025-07-15

## 2025-07-06 RX ORDER — POTASSIUM CHLORIDE 7.45 MG/ML
10 INJECTION INTRAVENOUS PRN
Status: DISCONTINUED | OUTPATIENT
Start: 2025-07-06 | End: 2025-07-15

## 2025-07-06 RX ADMIN — MELATONIN 3 MG: at 22:54

## 2025-07-06 RX ADMIN — IPRATROPIUM BROMIDE 0.5 MG: 0.5 SOLUTION RESPIRATORY (INHALATION) at 19:11

## 2025-07-06 RX ADMIN — ACETAMINOPHEN 650 MG: 325 TABLET ORAL at 21:57

## 2025-07-06 RX ADMIN — ACETAMINOPHEN 1000 MG: 500 TABLET ORAL at 15:32

## 2025-07-06 RX ADMIN — KETOROLAC TROMETHAMINE 15 MG: 30 INJECTION, SOLUTION INTRAMUSCULAR at 15:35

## 2025-07-06 RX ADMIN — LISINOPRIL 5 MG: 5 TABLET ORAL at 20:35

## 2025-07-06 ASSESSMENT — PAIN DESCRIPTION - PAIN TYPE: TYPE: ACUTE PAIN

## 2025-07-06 ASSESSMENT — PAIN - FUNCTIONAL ASSESSMENT
PAIN_FUNCTIONAL_ASSESSMENT: PREVENTS OR INTERFERES SOME ACTIVE ACTIVITIES AND ADLS
PAIN_FUNCTIONAL_ASSESSMENT: PREVENTS OR INTERFERES SOME ACTIVE ACTIVITIES AND ADLS
PAIN_FUNCTIONAL_ASSESSMENT: 0-10

## 2025-07-06 ASSESSMENT — PAIN SCALES - GENERAL
PAINLEVEL_OUTOF10: 1
PAINLEVEL_OUTOF10: 6
PAINLEVEL_OUTOF10: 2
PAINLEVEL_OUTOF10: 2
PAINLEVEL_OUTOF10: 10
PAINLEVEL_OUTOF10: 2

## 2025-07-06 ASSESSMENT — PAIN DESCRIPTION - ORIENTATION
ORIENTATION: LOWER
ORIENTATION: LOWER

## 2025-07-06 ASSESSMENT — PAIN DESCRIPTION - LOCATION
LOCATION: BACK;ABDOMEN
LOCATION: BACK
LOCATION: BACK

## 2025-07-06 ASSESSMENT — PAIN DESCRIPTION - DESCRIPTORS
DESCRIPTORS: ACHING
DESCRIPTORS: ACHING

## 2025-07-06 NOTE — ED NOTES
TRANSFER - OUT REPORT:    Verbal report given to Chely PARRA on iNcole Galeano  being transferred to [facility] for routine progression of patient care       Report consisted of patient's Situation, Background, Assessment and   Recommendations(SBAR).     Information from the following report(s) Nurse Handoff Report, ED SBAR, and MAR was reviewed with the receiving nurse.    [place] Fall Assessment:    Presents to emergency department  because of falls (Syncope, seizure, or loss of consciousness): No  Age > 70: Yes  Altered Mental Status, Intoxication with alcohol or substance confusion (Disorientation, impaired judgment, poor safety awaremess, or inability to follow instructions): No  Impaired Mobility: Ambulates or transfers with assistive devices or assistance; Unable to ambulate or transer.: Yes  Nursing Judgement: Yes          Lines:   Peripheral IV 07/06/25 Posterior;Right Hand (Active)        Opportunity for questions and clarification was provided.      Patient transported with:  Tech

## 2025-07-06 NOTE — ED PROVIDER NOTES
[facility] 1 ORTHOPEDICS  EMERGENCY DEPARTMENT ENCOUNTER       Pt Name: Nicole Galeano  MRN: [id number]  Birthdate 1945  Date of evaluation: 7/6/2025  Provider: Brad Colon MD   PCP: Tessie Martinez FNP  Note Started: 7:42 PM 7/6/25     CHIEF COMPLAINT       Chief Complaint   Patient presents with    Back Pain     BIBEMS from [facility] c/o back pain pt reports she had back fx 3 weeks ago. 10/10 pain. Family concerned that pain medications  (prednisone and oxycodone)that were prescribed were causing hallucinations so she is no longer taking those. A&Ox4      HISTORY OF PRESENT ILLNESS: 1 or more elements      History From: Patient, History limited by: None     Nicole Galeano is a 79 y.o. female with a history of back pain with recently diagnosed compression fractures, COPD who presents with back pain.  She has had several weeks of mid and low back pain, had a CT and MRI diagnosing T6, T2, L2 and L4 compression fractures.  She is currently prescribed Tylenol, lidocaine, Celebrex, prednisone, tramadol without relief to her symptoms.  She presents for ongoing severe back pain.  She denies weakness or numbness to her upper or lower extremities.     Nursing Notes were all reviewed and agreed with or any disagreements were addressed in the HPI.     REVIEW OF SYSTEMS      Positives and Pertinent negatives as per HPI.    PAST HISTORY     Past Medical History:  Past Medical History:   Diagnosis Date    Arthritis     Breast cancer (HCC) 10/2020    Breast cancer, left breast (HCC) 2003, 2012    lumpectomy/chemo/XRT, then mastectomy and anastrozole in 2012    Cancer (HCC)     SEVERAL SKIN - MELANOMA    COPD (chronic obstructive pulmonary disease) (HCC)     Hypertension     Ill-defined condition     MACULAR DEGENERATION       Past Surgical History:  Past Surgical History:   Procedure Laterality Date    ANKLE FRACTURE SURGERY Left 3/7/2025    LEFT ANKLE OPEN REDUCTION INTERNAL FIXATION performed by Mirta  Negative NEG      Urobilinogen, Urine 2.0 (H) 0.2 - 1.0 EU/dL    Nitrite, Urine Negative NEG      Leukocyte Esterase, Urine TRACE (A) NEG      WBC, UA 0-4 0 - 4 /hpf    RBC, UA 0-5 0 - 5 /hpf    Epithelial Cells, UA MODERATE (A) FEW /lpf    BACTERIA, URINE Negative NEG /hpf    Urine Culture if Indicated CULTURE NOT INDICATED BY UA RESULT CNI      Hyaline Casts, UA 0-2 0 - 2 /lpf       EKG: All EKGs independently interpreted by myself     RADIOLOGY:  Non-plain film images such as CT, Ultrasound and MRI are read by the radiologist. Plain radiographic images are visualized and preliminarily interpreted by the ED Provider with the findings documented in the MDM section.     Interpretation per the Radiologist below, if available at the time of this note:     CT HEAD WO CONTRAST   Final Result   No acute intracranial hemorrhage, mass or infarct.          Electronically signed by Adarsh Bowen MD      XR CHEST PORTABLE   Final Result      No acute process on portable chest.         Electronically signed by Shiv Mattson MD           PROCEDURES   Unless otherwise noted below or in ED Course, none  Procedures     CRITICAL CARE TIME       EMERGENCY DEPARTMENT COURSE and DIFFERENTIAL DIAGNOSIS/MDM   Vitals:    Vitals:    07/06/25 1830 07/06/25 1845 07/06/25 1912 07/06/25 1929   BP: 116/67 128/75     Pulse: 65 65 60    Resp: 15 22 17    Temp:       TempSrc:       SpO2:   95% 95%   Weight:       Height:            Patient was given the following medications:  Medications   lisinopril (PRINIVIL;ZESTRIL) tablet 5 mg (has no administration in time range)   sodium chloride flush 0.9 % injection 5-40 mL (has no administration in time range)   sodium chloride flush 0.9 % injection 5-40 mL (has no administration in time range)   0.9 % sodium chloride infusion (has no administration in time range)   potassium chloride (KLOR-CON M) extended release tablet 40 mEq (has no administration in time range)     Or   potassium bicarb-citric acid

## 2025-07-06 NOTE — H&P
Hospitalist Admission Note    NAME:   Nicole Galeano   : 1945   MRN: [id number]     Date/Time: 2025 5:44 PM    Patient PCP: Tessie Martinez FNP    ______________________________________________________________________  Given the patient's current clinical presentation, I have a high level of concern for decompensation if discharged from the emergency department.  Complex decision making was performed, which includes reviewing the patient's available past medical records, laboratory results, and x-ray films.       My assessment of this patient's clinical condition and my plan of care is as follows.    Assessment / Plan:    Acute metabolic encephalopathy likely related to medications including prednisone, tramadol  - She recently was diagnosed to have lumbar compression fracture and started on multiple medications leading to confusion  - Most likely related to medications but will rule out other causes  - Exam is nonfocal.  CT head shows no acute process  - Urinalysis is not clean-catch and will repeat  - Hold all medications that can cause encephalopathy including prednisone, Celexa and also tramadol  - Check TSH, vitamin B12 and also ammonia level  -She is currently alert awake and able to follow commands she is on 3 L and saturating at about 93%.  Oxygen requirements are close to baseline and she is not hypoxic  - If she becomes hypoxic or if she becomes drowsy, consider getting blood gas    Recent diagnosis of multiple lumbar compression fractures  - Use Tylenol and Toradol for pain control.  Avoid medications that can cause encephalopathy  -She was offered kyphoplasty but did not want to proceed due to risk of anesthesia as they recommended general anesthesia and according to her daughter  - We will have to get MRI from [facility] and send it to our radiologist and see if these lesions are amenable for kyphoplasty and can be done safely without complications from anesthesia    COPD not in

## 2025-07-07 LAB
AMMONIA PLAS-SCNC: <10 UMOL/L
ANION GAP SERPL CALC-SCNC: 5 MMOL/L (ref 2–12)
BASOPHILS # BLD: 0.02 K/UL (ref 0–0.1)
BASOPHILS NFR BLD: 0.2 % (ref 0–1)
BUN SERPL-MCNC: 25 MG/DL (ref 6–20)
BUN/CREAT SERPL: 41 (ref 12–20)
CALCIUM SERPL-MCNC: 8.9 MG/DL (ref 8.5–10.1)
CHLORIDE SERPL-SCNC: 105 MMOL/L (ref 97–108)
CO2 SERPL-SCNC: 31 MMOL/L (ref 21–32)
CREAT SERPL-MCNC: 0.61 MG/DL (ref 0.55–1.02)
DIFFERENTIAL METHOD BLD: ABNORMAL
EOSINOPHIL # BLD: 0.03 K/UL (ref 0–0.4)
EOSINOPHIL NFR BLD: 0.3 % (ref 0–7)
ERYTHROCYTE [DISTWIDTH] IN BLOOD BY AUTOMATED COUNT: 17.6 % (ref 11.5–14.5)
GLUCOSE SERPL-MCNC: 93 MG/DL (ref 65–100)
HCT VFR BLD AUTO: 42.8 % (ref 35–47)
HGB BLD-MCNC: 13.4 G/DL (ref 11.5–16)
IMM GRANULOCYTES # BLD AUTO: 0.07 K/UL (ref 0–0.04)
IMM GRANULOCYTES NFR BLD AUTO: 0.7 % (ref 0–0.5)
LYMPHOCYTES # BLD: 1 K/UL (ref 0.8–3.5)
LYMPHOCYTES NFR BLD: 9.9 % (ref 12–49)
MAGNESIUM SERPL-MCNC: 2.3 MG/DL (ref 1.6–2.4)
MCH RBC QN AUTO: 30.5 PG (ref 26–34)
MCHC RBC AUTO-ENTMCNC: 31.3 G/DL (ref 30–36.5)
MCV RBC AUTO: 97.3 FL (ref 80–99)
MONOCYTES # BLD: 1.06 K/UL (ref 0–1)
MONOCYTES NFR BLD: 10.5 % (ref 5–13)
NEUTS SEG # BLD: 7.91 K/UL (ref 1.8–8)
NEUTS SEG NFR BLD: 78.4 % (ref 32–75)
NRBC # BLD: 0 K/UL (ref 0–0.01)
NRBC BLD-RTO: 0 PER 100 WBC
PLATELET # BLD AUTO: 288 K/UL (ref 150–400)
PMV BLD AUTO: 9.9 FL (ref 8.9–12.9)
POTASSIUM SERPL-SCNC: 3.3 MMOL/L (ref 3.5–5.1)
RBC # BLD AUTO: 4.4 M/UL (ref 3.8–5.2)
SODIUM SERPL-SCNC: 141 MMOL/L (ref 136–145)
TSH SERPL DL<=0.05 MIU/L-ACNC: 0.01 UIU/ML (ref 0.36–3.74)
WBC # BLD AUTO: 10.1 K/UL (ref 3.6–11)

## 2025-07-07 PROCEDURE — 6370000000 HC RX 637 (ALT 250 FOR IP)

## 2025-07-07 PROCEDURE — 36415 COLL VENOUS BLD VENIPUNCTURE: CPT

## 2025-07-07 PROCEDURE — 1100000000 HC RM PRIVATE

## 2025-07-07 PROCEDURE — 82607 VITAMIN B-12: CPT

## 2025-07-07 PROCEDURE — 6370000000 HC RX 637 (ALT 250 FOR IP): Performed by: INTERNAL MEDICINE

## 2025-07-07 PROCEDURE — 80048 BASIC METABOLIC PNL TOTAL CA: CPT

## 2025-07-07 PROCEDURE — 82140 ASSAY OF AMMONIA: CPT

## 2025-07-07 PROCEDURE — 2500000003 HC RX 250 WO HCPCS: Performed by: INTERNAL MEDICINE

## 2025-07-07 PROCEDURE — 2580000003 HC RX 258: Performed by: INTERNAL MEDICINE

## 2025-07-07 PROCEDURE — 94761 N-INVAS EAR/PLS OXIMETRY MLT: CPT

## 2025-07-07 PROCEDURE — 85025 COMPLETE CBC W/AUTO DIFF WBC: CPT

## 2025-07-07 PROCEDURE — 97535 SELF CARE MNGMENT TRAINING: CPT

## 2025-07-07 PROCEDURE — 84439 ASSAY OF FREE THYROXINE: CPT

## 2025-07-07 PROCEDURE — 97116 GAIT TRAINING THERAPY: CPT

## 2025-07-07 PROCEDURE — 6360000002 HC RX W HCPCS: Performed by: INTERNAL MEDICINE

## 2025-07-07 PROCEDURE — 97166 OT EVAL MOD COMPLEX 45 MIN: CPT

## 2025-07-07 PROCEDURE — 97162 PT EVAL MOD COMPLEX 30 MIN: CPT

## 2025-07-07 PROCEDURE — 94640 AIRWAY INHALATION TREATMENT: CPT

## 2025-07-07 PROCEDURE — 2700000000 HC OXYGEN THERAPY PER DAY

## 2025-07-07 PROCEDURE — 84443 ASSAY THYROID STIM HORMONE: CPT

## 2025-07-07 PROCEDURE — 83735 ASSAY OF MAGNESIUM: CPT

## 2025-07-07 RX ORDER — SODIUM CHLORIDE 9 MG/ML
INJECTION, SOLUTION INTRAVENOUS CONTINUOUS
Status: DISCONTINUED | OUTPATIENT
Start: 2025-07-07 | End: 2025-07-08

## 2025-07-07 RX ORDER — 0.9 % SODIUM CHLORIDE 0.9 %
500 INTRAVENOUS SOLUTION INTRAVENOUS ONCE
Status: COMPLETED | OUTPATIENT
Start: 2025-07-07 | End: 2025-07-07

## 2025-07-07 RX ORDER — FLUTICASONE PROPIONATE 50 MCG
1 SPRAY, SUSPENSION (ML) NASAL DAILY PRN
Status: DISCONTINUED | OUTPATIENT
Start: 2025-07-07 | End: 2025-07-28

## 2025-07-07 RX ORDER — POTASSIUM CHLORIDE 1500 MG/1
20 TABLET, EXTENDED RELEASE ORAL ONCE
Status: COMPLETED | OUTPATIENT
Start: 2025-07-07 | End: 2025-07-07

## 2025-07-07 RX ORDER — SIMETHICONE 80 MG
80 TABLET,CHEWABLE ORAL EVERY 6 HOURS PRN
Status: DISCONTINUED | OUTPATIENT
Start: 2025-07-07 | End: 2025-07-15

## 2025-07-07 RX ADMIN — SIMETHICONE 80 MG: 80 TABLET, CHEWABLE ORAL at 11:33

## 2025-07-07 RX ADMIN — ENOXAPARIN SODIUM 40 MG: 100 INJECTION SUBCUTANEOUS at 08:29

## 2025-07-07 RX ADMIN — PREDNISONE 20 MG: 20 TABLET ORAL at 08:29

## 2025-07-07 RX ADMIN — KETOROLAC TROMETHAMINE 15 MG: 30 INJECTION, SOLUTION INTRAMUSCULAR at 21:14

## 2025-07-07 RX ADMIN — ACETAMINOPHEN 650 MG: 325 TABLET ORAL at 11:13

## 2025-07-07 RX ADMIN — ACETAMINOPHEN 650 MG: 325 TABLET ORAL at 23:20

## 2025-07-07 RX ADMIN — SODIUM CHLORIDE, PRESERVATIVE FREE 10 ML: 5 INJECTION INTRAVENOUS at 08:30

## 2025-07-07 RX ADMIN — POTASSIUM CHLORIDE 20 MEQ: 1500 TABLET, EXTENDED RELEASE ORAL at 12:33

## 2025-07-07 RX ADMIN — KETOROLAC TROMETHAMINE 15 MG: 30 INJECTION, SOLUTION INTRAMUSCULAR at 08:29

## 2025-07-07 RX ADMIN — ACETAMINOPHEN 650 MG: 325 TABLET ORAL at 05:15

## 2025-07-07 RX ADMIN — FLUTICASONE PROPIONATE 1 SPRAY: 50 SPRAY, METERED NASAL at 23:24

## 2025-07-07 RX ADMIN — SODIUM CHLORIDE 500 ML: 0.9 INJECTION, SOLUTION INTRAVENOUS at 12:16

## 2025-07-07 RX ADMIN — ACETAMINOPHEN 650 MG: 325 TABLET ORAL at 18:17

## 2025-07-07 RX ADMIN — IPRATROPIUM BROMIDE 0.5 MG: 0.5 SOLUTION RESPIRATORY (INHALATION) at 08:06

## 2025-07-07 RX ADMIN — SODIUM CHLORIDE: 0.9 INJECTION, SOLUTION INTRAVENOUS at 13:38

## 2025-07-07 RX ADMIN — SODIUM CHLORIDE, PRESERVATIVE FREE 10 ML: 5 INJECTION INTRAVENOUS at 05:15

## 2025-07-07 ASSESSMENT — PAIN DESCRIPTION - FREQUENCY
FREQUENCY: CONTINUOUS

## 2025-07-07 ASSESSMENT — PAIN DESCRIPTION - LOCATION
LOCATION: BACK;ABDOMEN
LOCATION: BACK

## 2025-07-07 ASSESSMENT — PAIN DESCRIPTION - PAIN TYPE
TYPE: ACUTE PAIN

## 2025-07-07 ASSESSMENT — PAIN DESCRIPTION - ORIENTATION
ORIENTATION: LOWER
ORIENTATION: POSTERIOR
ORIENTATION: POSTERIOR
ORIENTATION: POSTERIOR;MID;LOWER

## 2025-07-07 ASSESSMENT — PAIN SCALES - GENERAL
PAINLEVEL_OUTOF10: 4
PAINLEVEL_OUTOF10: 10
PAINLEVEL_OUTOF10: 5
PAINLEVEL_OUTOF10: 9
PAINLEVEL_OUTOF10: 2
PAINLEVEL_OUTOF10: 4
PAINLEVEL_OUTOF10: 4
PAINLEVEL_OUTOF10: 10
PAINLEVEL_OUTOF10: 6
PAINLEVEL_OUTOF10: 8

## 2025-07-07 ASSESSMENT — PAIN - FUNCTIONAL ASSESSMENT
PAIN_FUNCTIONAL_ASSESSMENT: PREVENTS OR INTERFERES SOME ACTIVE ACTIVITIES AND ADLS
PAIN_FUNCTIONAL_ASSESSMENT: PREVENTS OR INTERFERES SOME ACTIVE ACTIVITIES AND ADLS
PAIN_FUNCTIONAL_ASSESSMENT: PREVENTS OR INTERFERES WITH MANY ACTIVE NOT PASSIVE ACTIVITIES
PAIN_FUNCTIONAL_ASSESSMENT: PREVENTS OR INTERFERES SOME ACTIVE ACTIVITIES AND ADLS
PAIN_FUNCTIONAL_ASSESSMENT: PREVENTS OR INTERFERES WITH MANY ACTIVE NOT PASSIVE ACTIVITIES
PAIN_FUNCTIONAL_ASSESSMENT: PREVENTS OR INTERFERES WITH MANY ACTIVE NOT PASSIVE ACTIVITIES

## 2025-07-07 ASSESSMENT — PAIN DESCRIPTION - DESCRIPTORS
DESCRIPTORS: ACHING;THROBBING
DESCRIPTORS: ACHING;THROBBING
DESCRIPTORS: ACHING;THROBBING;CRAMPING
DESCRIPTORS: ACHING
DESCRIPTORS: ACHING;THROBBING
DESCRIPTORS: ACHING;CRAMPING;THROBBING
DESCRIPTORS: ACHING;THROBBING

## 2025-07-07 ASSESSMENT — PAIN DESCRIPTION - ONSET
ONSET: PROGRESSIVE

## 2025-07-07 NOTE — PLAN OF CARE
Problem: Respiratory - Adult  Goal: Achieves optimal ventilation and oxygenation  7/7/2025 1024 by Yasmeen Dunaway LPN  Outcome: Progressing  Flowsheets (Taken 7/7/2025 0759)  Achieves optimal ventilation and oxygenation:   Assess for changes in respiratory status   Assess for changes in mentation and behavior   Position to facilitate oxygenation and minimize respiratory effort   Oxygen supplementation based on oxygen saturation or arterial blood gases   Encourage broncho-pulmonary hygiene including cough, deep breathe, incentive spirometry   Assess the need for suctioning and aspirate as needed   Assess and instruct to report shortness of breath or any respiratory difficulty   Respiratory therapy support as indicated  7/7/2025 0025 by Kayla Vasquez RN  Outcome: Progressing     Problem: Pain  Goal: Verbalizes/displays adequate comfort level or baseline comfort level  7/7/2025 1024 by Yasmeen Dunaway LPN  Outcome: Progressing  7/7/2025 0025 by Kayla Vasquez RN  Outcome: Progressing     Problem: Neurosensory - Adult  Goal: Achieves stable or improved neurological status  7/7/2025 1024 by Yasmeen Dunaway LPN  Outcome: Progressing  Flowsheets (Taken 7/7/2025 0759)  Achieves stable or improved neurological status:   Assess for and report changes in neurological status   Initiate measures to prevent increased intracranial pressure   Maintain blood pressure and fluid volume within ordered parameters to optimize cerebral perfusion and minimize risk of hemorrhage   Monitor temperature, glucose, and sodium. Initiate appropriate interventions as ordered  7/7/2025 0025 by Kayla Vasquez RN  Outcome: Progressing  Goal: Achieves maximal functionality and self care  7/7/2025 1024 by Yasmeen Dunaway LPN  Outcome: Progressing  Flowsheets (Taken 7/7/2025 0759)  Achieves maximal functionality and self care:   Monitor swallowing and airway patency with patient fatigue and changes in neurological status   Encourage and

## 2025-07-07 NOTE — PLAN OF CARE
Problem: Physical Therapy - Adult  Goal: By Discharge: Performs mobility at highest level of function for planned discharge setting.  See evaluation for individualized goals.  Description: FUNCTIONAL STATUS PRIOR TO ADMISSION: Questionable historian. Per chart pt was at [facility] for rehab.    HOME SUPPORT PRIOR TO ADMISSION: Pt was at [facility] for rehab.    Physical Therapy Goals  Initiated 7/7/2025  1.  Patient will move from supine to sit and sit to supine in bed with independence within 7 day(s).    2.  Patient will perform sit to stand with modified independence within 7 day(s).  3.  Patient will transfer from bed to chair and chair to bed with modified independence using the least restrictive device within 7 day(s).  4.  Patient will ambulate with modified independence for 100 feet with the least restrictive device within 7 day(s).       Outcome: Progressing     PHYSICAL THERAPY EVALUATION    Patient: Nicole Galeano (79 y.o. female)  Date: 7/7/2025  Primary Diagnosis: Acute encephalopathy [G93.40]  Acute midline low back pain without sciatica [M54.50]  Acute metabolic encephalopathy [G93.41]       Precautions: Restrictions/Precautions  Restrictions/Precautions: Bed Alarm, Fall Risk            ASSESSMENT :   DEFICITS/IMPAIRMENTS:   The patient is limited by decreased functional mobility, strength, balance     Based on the impairments listed above pt with decreased functional mobility. She was received in supine on 2L and cleared by nursing to mobilize. Vitals stable during session. Pt is self limiting but agreeable to mobilize OOB and ambulated around the room with RW and no loss of balance noted.     Patient will benefit from skilled intervention to address the above impairments.    Functional Outcome Measure:  The patient scored 23/24 on the [facility] outcome measure which is indicative of good functional mobility.           PLAN :  Recommendations and Planned Interventions:   bed mobility training,

## 2025-07-07 NOTE — PLAN OF CARE
Problem: Occupational Therapy - Adult  Goal: By Discharge: Performs self-care activities at highest level of function for planned discharge setting.  See evaluation for individualized goals.  Description: FUNCTIONAL STATUS PRIOR TO ADMISSION:  Patient questionable historian secondary to confusion. Per chart review, she was admitted from SNF rehab. She reported she was independent in ADLs and mobility prior to admission.    ,  ,  ,  ,  ,  ,  ,  ,  ,  ,       HOME SUPPORT: Patient lived with daughter and son in law.    Occupational Therapy Goals:  Initiated 7/7/2025  1.  Patient will perform grooming standing at sink with Supervision within 7 day(s).  2.  Patient will perform upper body dressing with Modified [place] within 7 day(s).  3.  Patient will perform lower body dressing with Supervision within 7 day(s).  4.  Patient will perform toilet transfers with Supervision  within 7 day(s).  5.  Patient will perform all aspects of toileting with Supervision within 7 day(s).  6.  Patient will participate in upper extremity therapeutic exercise/activities with Supervision for 5 minutes within 7 day(s).    Outcome: Progressing   OCCUPATIONAL THERAPY EVALUATION    Patient: Nicole Galeano (79 y.o. female)  Date: 7/7/2025  Primary Diagnosis: Acute encephalopathy [G93.40]  Acute midline low back pain without sciatica [M54.50]  Acute metabolic encephalopathy [G93.41]         Precautions: Bed Alarm, Fall Risk                  ASSESSMENT :  The patient is limited by decreased functional mobility, independence in ADLs, high-level IADLs, ROM, strength, body mechanics, activity tolerance, endurance, safety awareness, cognition, attention/concentration, coordination, balance, posture, fine-motor control, increased pain levels.    Based on the impairments listed above patient is functioning below her baseline for ADLs and functional mobility. She is now completing ADLs with set-up to min assist and functional mobility  Cancer (HCC)     SEVERAL SKIN - MELANOMA    COPD (chronic obstructive pulmonary disease) (HCC)     Hypertension     Ill-defined condition     MACULAR DEGENERATION     Past Surgical History:   Procedure Laterality Date    ANKLE FRACTURE SURGERY Left 3/7/2025    LEFT ANKLE OPEN REDUCTION INTERNAL FIXATION performed by Arvind Kirby MD at [facility] MAIN OR    APPENDECTOMY      BREAST LUMPECTOMY Left         BREAST LUMPECTOMY Right 4/15/2021    RIGHT BREAST LUMPECTOMY WITH ULTRASOUND performed by Andres Cortes Jr., MD at [facility] AMBULATORY OR     SECTION      COLONOSCOPY      MASTECTOMY Left         US BREAST BIOPSY W LOC DEVICE 1ST LESION RIGHT Right 10/29/2020    US BREAST NEEDLE BIOPSY RIGHT 10/29/2020 [facility] RAD MAMMO          Expanded or extensive additional review of patient history:   Social/Functional History  Lives With:  (from [facility])      Hand Dominance: right     EXAMINATION OF PERFORMANCE DEFICITS:    Cognitive/Behavioral Status:  Orientation  Overall Orientation Status: Impaired  Orientation Level: Oriented to person;Disoriented to time;Disoriented to situation;Disoriented to place  Cognition  Overall Cognitive Status: Exceptions  Arousal/Alertness: Appears intact  Following Commands: Follows one step commands with repetition  Attention Span: Difficulty attending to directions  Memory: Impaired  Safety Judgement: Impaired;Decreased awareness of need for safety  Problem Solving: Impaired;Decreased awareness of errors  Insights: Decreased awareness of deficits  Initiation: Requires cues for some  Sequencing: Requires cues for some    Hearing:   Hearing  Hearing: Exceptions to [facility]  Hearing Exceptions: Hard of hearing/hearing concerns    Vision/Perceptual:                Vision  Vision: Impaired         Range of Motion:   AROM: Within functional limits  PROM: Within functional limits      Strength:  Strength: Generally decreased, functional      Coordination:  Coordination: Generally decreased,

## 2025-07-07 NOTE — PROGRESS NOTES
End of Shift Note    Bedside shift change report given to FIDEL Griffiths (oncoming nurse) by Yasmeen Dunaway LPN (offgoing nurse).  Report included the following information SBAR, ED Summary, Intake/Output, MAR, and Recent Results    Shift worked:  7a-7p     Shift summary and any significant changes:     Pt resting comfortably in bed, vital signs stable, pain managed with PRN tylenol and toradol, voiding with purewick, several loose BMs today, rapid response called for vasovagal response, see prior note, awaiting MRI from [facility] for kypho     Concerns for physician to address:  Kypho     Zone phone for oncoming shift:   [phone]       Activity:  Level of Assistance: Moderate assist, patient does 50-74%  Number times ambulated in hallways past shift: 0  Number of times OOB to chair past shift: 1    Cardiac:   Cardiac Monitoring: Yes           Access:  Current line(s): PIV     Genitourinary:   Urinary Status: Voiding, External catheter    Respiratory:   O2 Device: Nasal cannula  Chronic home O2 use?: YES  Incentive spirometer at bedside: YES    GI:  Last BM (including prior to admit): 07/04/25  Current diet:  ADULT DIET; Regular  Passing flatus: YES    Pain Management:   Patient states pain is manageable on current regimen: NO    Skin:  Rafa Scale Score: 19  Interventions: Wound Offloading (Prevention Methods): Bed, pressure reduction mattress, Elevate heels, Pillows, Repositioning, Turning    Patient Safety:  Fall Risk: Nursing Judgement-Fall Risk High(Add Comments): Yes  Fall Risk Interventions  Nursing Judgement-Fall Risk High(Add Comments): Yes  Toilet Every 2 Hours-In Advance of Need: Yes  Hourly Visual Checks: Awake  Fall Visual Posted: [place]  Room Door Open: Yes  Alarm On: Bed  Patient Moved Closer to Nursing Station: No    Active Consults:   IP CONSULT TO INTERVENTIONAL RADIOLOGY    Length of Stay:  Expected LOS: 3  Actual LOS: 1    Yasmeen Dunaway LPN

## 2025-07-07 NOTE — PROGRESS NOTES
Hospitalist Progress Note    NAME:   Nicole Galeano   : 1945   MRN: [id number]     Date/Time: 2025 1:29 PM  Patient PCP: Tessie Martinez FNP    Estimated discharge date:   Barriers: Clinical improvement      Assessment / Plan:    Acute metabolic encephalopathy likely related to medications including prednisone, tramadol  - She recently was diagnosed to have lumbar compression fracture and started on multiple medications leading to confusion  - Most likely related to medications but will rule out other causes  - Exam is nonfocal.  CT head shows no acute process  - Urinalysis is not clean-catch and will repeat  - Hold all medications that can cause encephalopathy including prednisone, Celexa and also tramadol  -Mentation has improved  Will monitor closely       Presyncope/vasovagal  -Volume depletion, received IVF bolus earlier  -Continue IVF,  cc/h  If no improvement may need further workup  Recent diagnosis of multiple lumbar compression fractures  - Use Tylenol and Toradol for pain control.  Avoid medications that can cause encephalopathy  -She was offered kyphoplasty but did not want to proceed due to risk of anesthesia as they recommended general anesthesia and according to her daughter  - Awaiting imaging from [facility]       COPD not in exacerbation  Chronic respiratory failure due to COPD on 1 L  Rheumatoid arthritis  History of breast cancer  History of EtOH use  Depression  Anxiety     - Continue home inhalers  - Per daughter, recently prednisone dose was increased leading to altered mental status.  At nursing home, she was receiving close to 60 mg it seems with taper.  Will start at 20 mg.  - Resume PTA lisinopril  -Resume Celexa in a.m. if mental status is improved                             Medical Decision Making:   I personally reviewed labs: CBC, BMP  I personally reviewed imaging: CT head  I personally reviewed EKG:  Toxic drug monitoring:   Discussed case

## 2025-07-07 NOTE — PROGRESS NOTES
Orders received, chart reviewed and patient evaluated by occupational therapy. Pending progression with skilled acute occupational therapy, recommend:    Moderate intensity short-term skilled occupational therapy up to 5x/week    Recommend with nursing patient to complete as able in order to maintain strength, endurance and independence: OOB to chair 3x/day, ADLs with set-up to min assist and performing toileting with x1 assist to bathroom using rolling walker. Thank you for your assistance.     Full evaluation to follow.     Hayley Dawson, OTR/L, OTD

## 2025-07-07 NOTE — PROGRESS NOTES
End of Shift Note    Bedside shift change report given to Yasmeen LOPEZ (oncoming nurse) by Kayla Vasquez RN (offgoing nurse).  Report included the following information SBAR, Kardex, MAR, and Recent Results    Shift worked:  night     Shift summary and any significant changes:     Patient voiding,on oxygen via [place]ula 2 lt that's her baseline,no distress noted pain managed with tylenol     Concerns for physician to address:  Plan of care     Zone phone for oncoming shift:          Activity:  Level of Assistance: Moderate assist, patient does 50-74%  Number times ambulated in hallways past shift: 0  Number of times OOB to chair past shift: 0    Cardiac:   Cardiac Monitoring: Yes           Access:  Current line(s): PIV     Genitourinary:   Urinary Status: Voiding, External catheter    Respiratory:   O2 Device: None (Room air)  Chronic home O2 use?: YES  Incentive spirometer at bedside: YES    GI:  Last BM (including prior to admit): 07/04/25  Current diet:  ADULT DIET; Regular  Passing flatus: YES    Pain Management:   Patient states pain is manageable on current regimen: YES    Skin:  Rafa Scale Score: 18  Interventions: Wound Offloading (Prevention Methods): Bed, pressure reduction mattress, Elevate heels, Pillows, Repositioning    Patient Safety:  Fall Risk: Nursing Judgement-Fall Risk High(Add Comments): Yes  Fall Risk Interventions  Nursing Judgement-Fall Risk High(Add Comments): Yes  Toilet Every 2 Hours-In Advance of Need: Yes  Hourly Visual Checks: In bed, Eyes closed  Fall Visual Posted: Socks, Fall sign posted  Room Door Open: Deferred to promote rest  Alarm On: Bed  Patient Moved Closer to Nursing Station: No    Active Consults:   None    Length of Stay:  Expected LOS: 2  Actual LOS: 1    Kayla Vasquez, RN

## 2025-07-07 NOTE — SIGNIFICANT EVENT
RAPID RESPONSE TEAM NOTE:    1207-Writer responded to overhead RRT called for patient \"passing out\" while on commode having a bowel movement. Patient became pale, diaphoretic, and minimally responsive-returned to bed by nursing staff.    Assessment: Pt. is initially lethargic, but remains oriented x4 and becoming more alert.    Vital signs as follows: 94/62 (upon getting back in bed), 76/53, 93HR, 19RR, 97.3 axillary, and 92%SpO2 on O2 @ 2LPM via NC.    Interventions:    Dr. Petersen at the bedside. New orders received for: NS 500mL bolus and then 100mL/hr.    Outcome:    Patient to remain in her current room at this time.    Please call with any questions or concerns  Polly Piper, TATYN, RN, Charge III  RRT [phone]

## 2025-07-07 NOTE — PROGRESS NOTES
Pt stated she needed to have a bowel movement, assisted to bedside commode. While on bedside commode, pt stated she felt \"dizzy\". Blood pressure 137/73. While assessing pt, pt became unresponsive and diaphoretic, pulse present and breathing independently. Rapid response called and pt placed in bed by nursing staff. Pt lethargic but increasingly responsive. Blood pressure 94/62. Pt opening eyes to stimulation. Blood pressure 76/53. Received verbal order for Normal Saline bolus 500 mL and continuous fluids at 100mL by MIKE Petersen MD. Rapid response still at bedside to assess. Blood pressures continuing to be in 70/50s. MD aware. Pt alert and oriented to baseline, still slightly lethargic.

## 2025-07-08 LAB
T4 FREE SERPL-MCNC: 2.4 NG/DL (ref 0.8–1.5)
TSH SERPL DL<=0.05 MIU/L-ACNC: 0.01 UIU/ML (ref 0.36–3.74)
VIT B12 SERPL-MCNC: 178 PG/ML (ref 193–986)

## 2025-07-08 PROCEDURE — 94761 N-INVAS EAR/PLS OXIMETRY MLT: CPT

## 2025-07-08 PROCEDURE — 6370000000 HC RX 637 (ALT 250 FOR IP)

## 2025-07-08 PROCEDURE — 99222 1ST HOSP IP/OBS MODERATE 55: CPT | Performed by: INTERNAL MEDICINE

## 2025-07-08 PROCEDURE — 2700000000 HC OXYGEN THERAPY PER DAY

## 2025-07-08 PROCEDURE — 2580000003 HC RX 258: Performed by: INTERNAL MEDICINE

## 2025-07-08 PROCEDURE — 2500000003 HC RX 250 WO HCPCS: Performed by: INTERNAL MEDICINE

## 2025-07-08 PROCEDURE — 6360000002 HC RX W HCPCS

## 2025-07-08 PROCEDURE — 6360000002 HC RX W HCPCS: Performed by: INTERNAL MEDICINE

## 2025-07-08 PROCEDURE — 6370000000 HC RX 637 (ALT 250 FOR IP): Performed by: INTERNAL MEDICINE

## 2025-07-08 PROCEDURE — 94640 AIRWAY INHALATION TREATMENT: CPT

## 2025-07-08 PROCEDURE — 1100000000 HC RM PRIVATE

## 2025-07-08 RX ORDER — ACETAMINOPHEN 325 MG/1
650 TABLET ORAL EVERY 8 HOURS SCHEDULED
Status: DISCONTINUED | OUTPATIENT
Start: 2025-07-08 | End: 2025-07-15

## 2025-07-08 RX ORDER — ALPRAZOLAM 0.25 MG
0.25 TABLET ORAL 2 TIMES DAILY PRN
Status: DISCONTINUED | OUTPATIENT
Start: 2025-07-08 | End: 2025-07-15

## 2025-07-08 RX ORDER — ATENOLOL 25 MG/1
25 TABLET ORAL DAILY
Status: DISCONTINUED | OUTPATIENT
Start: 2025-07-08 | End: 2025-07-18

## 2025-07-08 RX ORDER — METHIMAZOLE 5 MG/1
10 TABLET ORAL ONCE
Status: COMPLETED | OUTPATIENT
Start: 2025-07-08 | End: 2025-07-08

## 2025-07-08 RX ORDER — KETOROLAC TROMETHAMINE 30 MG/ML
15 INJECTION, SOLUTION INTRAMUSCULAR; INTRAVENOUS ONCE
Status: COMPLETED | OUTPATIENT
Start: 2025-07-08 | End: 2025-07-08

## 2025-07-08 RX ORDER — METHIMAZOLE 5 MG/1
5 TABLET ORAL DAILY
Status: DISCONTINUED | OUTPATIENT
Start: 2025-07-08 | End: 2025-07-08

## 2025-07-08 RX ORDER — LIDOCAINE 4 G/G
1 PATCH TOPICAL DAILY
Status: DISCONTINUED | OUTPATIENT
Start: 2025-07-08 | End: 2025-07-13

## 2025-07-08 RX ORDER — METHIMAZOLE 5 MG/1
5 TABLET ORAL DAILY
Status: DISCONTINUED | OUTPATIENT
Start: 2025-07-09 | End: 2025-07-15

## 2025-07-08 RX ADMIN — PREDNISONE 20 MG: 20 TABLET ORAL at 09:40

## 2025-07-08 RX ADMIN — KETOROLAC TROMETHAMINE 15 MG: 30 INJECTION, SOLUTION INTRAMUSCULAR at 09:47

## 2025-07-08 RX ADMIN — METHIMAZOLE 10 MG: 5 TABLET ORAL at 14:28

## 2025-07-08 RX ADMIN — ACETAMINOPHEN 650 MG: 325 TABLET ORAL at 13:33

## 2025-07-08 RX ADMIN — ALPRAZOLAM 0.25 MG: 0.5 TABLET ORAL at 17:31

## 2025-07-08 RX ADMIN — SODIUM CHLORIDE, PRESERVATIVE FREE 10 ML: 5 INJECTION INTRAVENOUS at 20:57

## 2025-07-08 RX ADMIN — KETOROLAC TROMETHAMINE 15 MG: 30 INJECTION, SOLUTION INTRAMUSCULAR at 20:55

## 2025-07-08 RX ADMIN — ACETAMINOPHEN 650 MG: 325 TABLET ORAL at 20:55

## 2025-07-08 RX ADMIN — ENOXAPARIN SODIUM 40 MG: 100 INJECTION SUBCUTANEOUS at 09:58

## 2025-07-08 RX ADMIN — ACETAMINOPHEN 650 MG: 325 TABLET ORAL at 04:57

## 2025-07-08 RX ADMIN — ACETAMINOPHEN 650 MG: 325 TABLET ORAL at 09:56

## 2025-07-08 RX ADMIN — IPRATROPIUM BROMIDE 0.5 MG: 0.5 SOLUTION RESPIRATORY (INHALATION) at 07:22

## 2025-07-08 RX ADMIN — IPRATROPIUM BROMIDE 0.5 MG: 0.5 SOLUTION RESPIRATORY (INHALATION) at 20:32

## 2025-07-08 RX ADMIN — SODIUM CHLORIDE: 0.9 INJECTION, SOLUTION INTRAVENOUS at 09:59

## 2025-07-08 RX ADMIN — KETOROLAC TROMETHAMINE 15 MG: 30 INJECTION, SOLUTION INTRAMUSCULAR at 02:20

## 2025-07-08 RX ADMIN — ATENOLOL 25 MG: 25 TABLET ORAL at 09:58

## 2025-07-08 RX ADMIN — SODIUM CHLORIDE, PRESERVATIVE FREE 10 ML: 5 INJECTION INTRAVENOUS at 09:40

## 2025-07-08 ASSESSMENT — PAIN SCALES - GENERAL
PAINLEVEL_OUTOF10: 5
PAINLEVEL_OUTOF10: 6
PAINLEVEL_OUTOF10: 6
PAINLEVEL_OUTOF10: 8
PAINLEVEL_OUTOF10: 7
PAINLEVEL_OUTOF10: 5
PAINLEVEL_OUTOF10: 7

## 2025-07-08 ASSESSMENT — PAIN DESCRIPTION - DESCRIPTORS
DESCRIPTORS: ACHING

## 2025-07-08 ASSESSMENT — PAIN - FUNCTIONAL ASSESSMENT
PAIN_FUNCTIONAL_ASSESSMENT: PREVENTS OR INTERFERES SOME ACTIVE ACTIVITIES AND ADLS

## 2025-07-08 ASSESSMENT — PAIN DESCRIPTION - ORIENTATION
ORIENTATION: POSTERIOR
ORIENTATION: POSTERIOR
ORIENTATION: LOWER
ORIENTATION: LOWER

## 2025-07-08 ASSESSMENT — PAIN DESCRIPTION - PAIN TYPE: TYPE: ACUTE PAIN

## 2025-07-08 ASSESSMENT — PAIN DESCRIPTION - ONSET: ONSET: ON-GOING

## 2025-07-08 ASSESSMENT — PAIN DESCRIPTION - LOCATION
LOCATION: BACK

## 2025-07-08 ASSESSMENT — PAIN DESCRIPTION - FREQUENCY: FREQUENCY: CONTINUOUS

## 2025-07-08 NOTE — PROGRESS NOTES
End of Shift Note    Bedside shift change report given to Regnie PARRA (oncoming nurse) by Aye Francisco, FIDEL (offgoing nurse).  Report included the following information SBAR and MAR    Shift worked:  7pm-7am     Shift summary and any significant changes:     Patient was very anxious all night,  slept intermittently, also complained of pain all night and stated pain meds are not working. Voiding via pule wick, passed loose once this shift, normal saline insitu and running at 100ml, vital signs stable.      Concerns for physician to address:  Code status.     Zone phone for oncoming shift:     [phone]     Activity:  Level of Assistance: Moderate assist, patient does 50-74%  Number times ambulated in hallways past shift: 0  Number of times OOB to chair past shift: 0    Cardiac:   Cardiac Monitoring: Yes           Access:  Current line(s): PIV    Genitourinary:   Urinary Status: Voiding, External catheter    Respiratory:   O2 Device: Nasal cannula  Chronic home O2 use?: NO  Incentive spirometer at bedside: NO    GI:  Last BM (including prior to admit): 07/04/25  Current diet:  ADULT DIET; Regular  Passing flatus: YES    Pain Management:   Patient states pain is manageable on current regimen: NO    Skin:  Rafa Scale Score: 19  Interventions: Wound Offloading (Prevention Methods): Bed, pressure reduction mattress    Patient Safety:  Fall Risk: Nursing Judgement-Fall Risk High(Add Comments): Yes  Fall Risk Interventions  Nursing Judgement-Fall Risk High(Add Comments): Yes  Toilet Every 2 Hours-In Advance of Need: Yes  Hourly Visual Checks: Awake, In bed  Fall Visual Posted: Armband, Fall sign posted, Socks  Room Door Open: Deferred to promote rest  Alarm On: Bed  Patient Moved Closer to Nursing Station: No    Active Consults:   IP CONSULT TO INTERVENTIONAL RADIOLOGY    Length of Stay:  Expected LOS: 3  Actual LOS: 2    Aye Francisco, RN

## 2025-07-08 NOTE — PLAN OF CARE
Problem: Respiratory - Adult  Goal: Achieves optimal ventilation and oxygenation  7/8/2025 1832 by Regine Camara RN  Outcome: Progressing  7/8/2025 0952 by Mily Ruiz, RT  Outcome: Progressing     Problem: Pain  Goal: Verbalizes/displays adequate comfort level or baseline comfort level  Outcome: Progressing     Problem: Neurosensory - Adult  Goal: Achieves stable or improved neurological status  Outcome: Progressing  Goal: Achieves maximal functionality and self care  Outcome: Progressing     Problem: Cardiovascular - Adult  Goal: Absence of cardiac dysrhythmias or at baseline  Outcome: Progressing     Problem: Skin/Tissue Integrity - Adult  Goal: Skin integrity remains intact  Outcome: Progressing  Goal: Incisions, wounds, or drain sites healing without S/S of infection  Outcome: Progressing     Problem: Musculoskeletal - Adult  Goal: Return mobility to safest level of function  Outcome: Progressing  Goal: Return ADL status to a safe level of function  Outcome: Progressing     Problem: Gastrointestinal - Adult  Goal: Maintains or returns to baseline bowel function  Outcome: Progressing     Problem: Genitourinary - Adult  Goal: Absence of urinary retention  Outcome: Progressing     Problem: Infection - Adult  Goal: Absence of infection during hospitalization  Outcome: Progressing     Problem: Metabolic/Fluid and Electrolytes - Adult  Goal: Electrolytes maintained within normal limits  Outcome: Progressing     Problem: Safety - Adult  Goal: Free from fall injury  Outcome: Progressing     Problem: Confusion  Goal: Confusion, delirium, dementia, or psychosis is improved or at baseline  Description: INTERVENTIONS:  1. Assess for possible contributors to thought disturbance, including medications, impaired vision or hearing, underlying metabolic abnormalities, dehydration, psychiatric diagnoses, and notify attending LIP  2. [place] high risk fall precautions, as indicated  3. Provide frequent short

## 2025-07-08 NOTE — PLAN OF CARE
Problem: Skin/Tissue Integrity - Adult  Goal: Skin integrity remains intact  Outcome: Progressing     Problem: Musculoskeletal - Adult  Goal: Return mobility to safest level of function  Outcome: Progressing     Problem: Musculoskeletal - Adult  Goal: Return ADL status to a safe level of function  Outcome: Progressing     Problem: Gastrointestinal - Adult  Goal: Maintains or returns to baseline bowel function  Outcome: Progressing     Problem: Safety - Adult  Goal: Free from fall injury  Outcome: Progressing     Problem: Confusion  Goal: Confusion, delirium, dementia, or psychosis is improved or at baseline  Description: INTERVENTIONS:  1. Assess for possible contributors to thought disturbance, including medications, impaired vision or hearing, underlying metabolic abnormalities, dehydration, psychiatric diagnoses, and notify attending LIP  2. [place] high risk fall precautions, as indicated  3. Provide frequent short contacts to provide reality reorientation, refocusing and direction  4. Decrease environmental stimuli, including noise as appropriate  5. Monitor and intervene to maintain adequate nutrition, hydration, elimination, sleep and activity  6. If unable to ensure safety without constant attention obtain sitter and review sitter guidelines with assigned personnel  7. Initiate Psychosocial CNS and Spiritual Care consult, as indicated  Outcome: Progressing

## 2025-07-08 NOTE — PROGRESS NOTES
Spoke with [facility] staff member who stated MRI images should cross over to [employer] system in roughly 10 minutes. MIKE barlow.

## 2025-07-08 NOTE — PROGRESS NOTES
Hospitalist Progress Note    NAME:   Nicole Galeano   : 1945   MRN: [id number]     Date/Time: 2025 12:17 PM  Patient PCP: Tessie Martinez FNP    Estimated discharge date:   Barriers: Clinical improvement      Assessment / Plan:    Acute metabolic encephalopathy likely related to medications including prednisone, tramadol  - She recently was diagnosed to have lumbar compression fracture and started on multiple medications leading to confusion  - Most likely related to medications but will rule out other causes  - Exam is nonfocal.  CT head shows no acute process  - Urinalysis is not clean-catch and will repeat  - Hold all medications that can cause encephalopathy including prednisone, Celexa and also tramadol  -Mentation has improved  Will monitor closely      Recent diagnosis of multiple lumbar compression fractures  -MRI at [facility], T6, T7, L2, and L4.  Compression fracture without retropulsion.  Refused kyphoplasty over there, was discharged to SNF returned back as narcotics were making her mentation worse so wants to avoid dose and wants  kyphoplaty  - Use Tylenol and Toradol for pain control.  Avoid medications that can cause encephalopathy  - Discussed with IR, currently no MRI images yet, discussed with nursing staff to get those images.   Consulted pulmonary patient's request for preop eval    Presyncope/vasovagal,   -Volume depletion, received IVF bolus earlier  -Continue IVF,  cc/h  Blood pressures improved         COPD not in exacerbation  Chronic respiratory failure due to COPD on 1 L  Rheumatoid arthritis  History of breast cancer  History of EtOH use  Depression  Anxiety     - Continue home inhalers  - Per daughter, recently prednisone dose was increased leading to altered mental status.  At nursing home, she was receiving close to 60 mg it seems with taper.  Will start at 20 mg.  - Resume PTA lisinopril  -Resume Celexa in a.m. if mental status is improved

## 2025-07-08 NOTE — CARE COORDINATION
Care Management Initial Assessment       RUR: 12% Low RUR  Readmission? No  1st IM letter given? Yes - 7/6  1st  letter given: No     07/08/25 9245   Service Assessment   Patient Orientation Alert and Oriented;Person;Place;Situation;Self   Cognition Alert   History Provided By Patient   Primary Caregiver Self   Accompanied By/Relationship no one   Support Systems Children  (Nancy Frances (Child)  [phone] and Jas Daniels  [phone])   Patient's Healthcare Decision Maker is: Legal Next of Kin   PCP Verified by CM Yes   Last Visit to PCP Within last year   Prior Functional Level Assistance with the following:;Shopping;Housework;Cooking   Current Functional Level Assistance with the following:;Shopping;Housework;Cooking   Can patient return to prior living arrangement No  (The patient refused)   Ability to make needs known: Good   Family able to assist with home care needs: No   Would you like for me to discuss the discharge plan with any other family members/significant others, and if so, who? No  (The patient declined for CM to call children)   Financial Resources Medicaid;Medicare   Community Resources Transportation   CM/SW Referral Disease Management Education;ADLs/IADLs   Social/Functional History   Lives With Other (Comment)  (facility staff)   Type of Home Facility   Home Layout One level   Home Access Level entry   Home Equipment Rollator;Walker - Rolling;Wheelchair - Manual   Active  No   Patient's  Info Meicaid and patients children Nancy Frances (Child) [phone] and Jas Daniels [phone]   Discharge Planning   Type of Residence Long-Term Care   Living Arrangements Alone   Current Services Prior To Admission Skilled Nursing Facility   Potential Assistance Needed Skilled Nursing Facility   Patient expects to be discharged to: Skilled nursing facility         The  (CM) conducted an initial meeting call with the patient , formally introducing themselves and

## 2025-07-08 NOTE — CONSULTS
injection 15 mg  15 mg IntraVENous BID PRN    ipratropium (ATROVENT) 0.02 % nebulizer solution 0.5 mg  0.5 mg Nebulization BID RT    predniSONE (DELTASONE) tablet 20 mg  20 mg Oral Daily    melatonin tablet 3 mg  3 mg Oral Nightly PRN     Allergies   Allergen Reactions    Povidone-Iodine Hives    Red Dye #40 (Allura Red) Hives    Adhesive Tape Rash     Family History   Problem Relation Age of Onset    Anesth Problems Neg Hx     Abdominal aortic aneurysm Brother     Cancer Sister         MELANOMA, LUNG    Cancer Father         SKIN    Heart Disease Father     Thyroid Disease Mother      Social History     Socioeconomic History    Marital status: Single     Spouse name: Not on file    Number of children: Not on file    Years of education: Not on file    Highest education level: Not on file   Occupational History    Not on file   Tobacco Use    Smoking status: Former     Current packs/day: 0.00     Types: Cigarettes     Quit date: 2011     Years since quittin.2    Smokeless tobacco: Never   Substance and Sexual Activity    Alcohol use: Yes     Alcohol/week: 2.0 standard drinks of alcohol    Drug use: Never    Sexual activity: Not on file   Other Topics Concern    Not on file   Social History Narrative    Not on file     Social Drivers of Health     Financial Resource Strain: Low Risk  (10/21/2022)    Overall Financial Resource Strain (CARDIA)     Difficulty of Paying Living Expenses: Not hard at all   Food Insecurity: No Food Insecurity (3/4/2025)    Hunger Vital Sign     Worried About Running Out of Food in the Last Year: Never true     Ran Out of Food in the Last Year: Never true   Transportation Needs: No Transportation Needs (3/4/2025)    PRAPARE - Transportation     Lack of Transportation (Medical): No     Lack of Transportation (Non-Medical): No   Physical Activity: Not on file   Stress: Not on file   Social Connections: Not on file   Intimate Partner Violence: Not on file   Housing Stability: Low Risk   (3/4/2025)    Housing Stability Vital Sign     Unable to Pay for Housing in the Last Year: No     Number of Times Moved in the Last Year: 1     Homeless in the Last Year: No     Review of Systems:  As noted in HPI    Physical Examination:  Blood pressure (!) 157/98, pulse 71, temperature 98.1 °F (36.7 °C), resp. rate 16, height 1.575 m (5' 2\"), weight 67.8 kg (149 lb 8 oz), SpO2 97%.  General: pleasant, no distress, good eye contact  HEENT: no exopthalmos, + periorbital edema, no scleral/conjunctival injection, EOMI, no lid lag or stare  Neck: supple, no thyromegaly, masses, lymph nodes, or carotid bruits, no supraclavicular or dorsocervical fat pads  Cardiovascular: regular, normal rate, normal S1 and S2, no murmurs/rubs/gallops   Respiratory: clear to auscultation bilaterally  Gastrointestinal: soft, nontender, nondistended, no masses, no hepatosplenomegaly  Musculoskeletal: no proximal muscle weakness in upper or lower extremities  Integumentary: no acanthosis nigricans, no rashes, no edema  Neurological: reflexes 2+ at biceps, no tremor  Psychiatric: normal mood and affect    Data Reviewed:   Component      Latest Ref Rng 3/9/2025 3/10/2025 7/7/2025   TSH, 3rd Generation      0.36 - 3.74 uIU/mL 0.10 (L)   0.01 (L)    TSH, 3rd Generation      0.36 - 3.74 uIU/mL   0.01 (L)    T4 Free      0.8 - 1.5 NG/DL  1.1  2.4 (H)        Assessment/Plan:   1) Hyperthyroidism > She reports a long standing concern for thyroid issues but \"I have had repeated thyroid labs drawn, which has been normal.\" The low TSH has been in the face of acute illness and her FT4 in March 2025 was normal.  Since her TSH was so low with an elevated FT4 I will start pt on MMI 10mg now followed by 5mg daily.  I will order TRAb, TSI, TPO and TgAb looking for evidence of Grave's, or Hashimoto's thyroiditis as the cause of her hyperthyroidism.    I will want to follow up with pt as an OP in 4-6 weeks.      There are no outpatient Patient Instructions on

## 2025-07-08 NOTE — H&P
Pulmonary, Critical Care, and Sleep Medicine      Name: Nicole Galeano MRN: [id number]   : 1945 Hospital: [facility]   Date: 2025  Admission date: 2025 Hospital Day: 3   Patient PCP: Tessie Martinez FNP    History:   Pt is acutely ill. Medical records and data reviewed. Pt seen in consultation    IMPRESSION:   chronic respiratory failure with Hypoxia and mild Hypercapnea-1 L/min  Mild to Moderate COPD- not in acute exacerbation; well compensated   Mild pulmonary hypertension by echo  Preprocedure pulmonary evaluation-kyphoplasty  Multilevel lumbar vertebral compression fractures-acute to subacute with severe pain  Remote former cigarette smoker  Alcohol use disorder  Rheumatoid arthritis chronic steroids; rheumatologist Dr. Karina Hyman  Hyperlipidemia   Remote history of breast cancer [id number] status post lumpectomy, mastectomy, chemoradiation  Melanoma status post multiple skin excisions  Body mass index is 27.34 kg/m².      RECOMMENDATIONS/PLAN:   May proceed with general anesthesia for kyphoplasty as planned from pulmonary standpoint  Agree with standard monitoring including oximetry, end-tidal CO2 capnography  Aspiration precautions  Continue bronchodilators   As needed rescue inhalers-patient may be sensitive to beta agonist as she does not use a rescue inhaler at home  DVT prophylaxis  Prescription drug management with home med reconciliation reviewed  Thanks you for asking us to see pt while in the hospital     Interval history:         [x] High complexity decision making was performed  [x] See my orders for details      Initial HPI:      I was asked by Josse Ritter MD to see Nicole Galeano  a 79 y.o.   female in consultation for a chief complaint of COPD preprocedure evaluation    Excerpts from admission 2025 or consult notes as follows:     \"  Nicole Galeano is a 79 y.o.  female with PMHx significant for COPD, rheumatoid  anesthesia.  Patient would have to lie prone for the procedure.  She is willing to do that with general anesthesia.    Allergies   Allergen Reactions    Povidone-Iodine Hives    Red Dye #40 (Allura Red) Hives    Adhesive Tape Rash       MAR reviewed and pertinent medications noted or modified as needed        MEDICATIONS:     Current Facility-Administered Medications:     lidocaine 4 % external patch 1 patch, 1 patch, TransDERmal, Daily, Zo Magallanes APRN - NP, 1 patch at 07/08/25 0221    atenolol (TENORMIN) tablet 25 mg, 25 mg, Oral, Daily, Bert Petersen MD, 25 mg at 07/08/25 0958    acetaminophen (TYLENOL) tablet 650 mg, 650 mg, Oral, 3 times per day, Bert Petersen MD, 650 mg at 07/08/25 0956    simethicone (MYLICON) chewable tablet 80 mg, 80 mg, Oral, Q6H PRN, Bert Petersen MD, 80 mg at 07/07/25 1133    0.9 % sodium chloride infusion, , IntraVENous, Continuous, Josse Ritter MD, Last Rate: 100 mL/hr at 07/08/25 0959, New Bag at 07/08/25 0959    fluticasone (FLONASE) 50 MCG/ACT nasal spray 1 spray, 1 spray, Each Nostril, Daily PRN, Zo Magallanes APRN - NP, 1 spray at 07/07/25 2324    sodium chloride flush 0.9 % injection 5-40 mL, 5-40 mL, IntraVENous, 2 times per day, Josse Ritter MD, 10 mL at 07/08/25 0940    sodium chloride flush 0.9 % injection 5-40 mL, 5-40 mL, IntraVENous, PRN, Josse Ritter MD    0.9 % sodium chloride infusion, , IntraVENous, PRN, Josse Ritter MD    potassium chloride (KLOR-CON M) extended release tablet 40 mEq, 40 mEq, Oral, PRN **OR** potassium bicarb-citric acid (EFFER-K) effervescent tablet 40 mEq, 40 mEq, Oral, PRN **OR** potassium chloride 10 mEq/100 mL IVPB (Peripheral Line), 10 mEq, IntraVENous, PRN, Josse Ritter MD    magnesium sulfate 2000 mg in 50 mL IVPB premix, 2,000 mg, IntraVENous, PRN, Josse Ritter MD    enoxaparin (LOVENOX) injection 40 mg, 40 mg, SubCUTAneous, Daily, Josse Ritter MD, 40 mg at 07/08/25 0958    ondansetron (ZOFRAN-ODT)

## 2025-07-08 NOTE — PROGRESS NOTES
[facility] Consult Service Progress NOTE  Pulmonary, Critical Care, and Sleep Medicine    Name: Nicole Galeano MRN: [id number]   : 1945 Hospital: [facility]   Date: 2025  Admission Date: 2025     Pulmonology consult received. Chart and notes reviewed. Data reviewed.  Pt is acutely ill and is an established patient of [facility] and we appreciate the opportunity to help during this admission and to provide continuity of care. Full note to follow.       Hospital Day: 3    Abdirahman Rahman MD

## 2025-07-09 PROCEDURE — 86800 THYROGLOBULIN ANTIBODY: CPT

## 2025-07-09 PROCEDURE — 6360000002 HC RX W HCPCS: Performed by: INTERNAL MEDICINE

## 2025-07-09 PROCEDURE — 94761 N-INVAS EAR/PLS OXIMETRY MLT: CPT

## 2025-07-09 PROCEDURE — 84445 ASSAY OF TSI GLOBULIN: CPT

## 2025-07-09 PROCEDURE — 2500000003 HC RX 250 WO HCPCS: Performed by: INTERNAL MEDICINE

## 2025-07-09 PROCEDURE — 1100000000 HC RM PRIVATE

## 2025-07-09 PROCEDURE — 83520 IMMUNOASSAY QUANT NOS NONAB: CPT

## 2025-07-09 PROCEDURE — 6370000000 HC RX 637 (ALT 250 FOR IP): Performed by: INTERNAL MEDICINE

## 2025-07-09 PROCEDURE — 2700000000 HC OXYGEN THERAPY PER DAY

## 2025-07-09 PROCEDURE — 6370000000 HC RX 637 (ALT 250 FOR IP)

## 2025-07-09 PROCEDURE — 86376 MICROSOMAL ANTIBODY EACH: CPT

## 2025-07-09 PROCEDURE — 36415 COLL VENOUS BLD VENIPUNCTURE: CPT

## 2025-07-09 RX ORDER — CYANOCOBALAMIN 1000 UG/ML
1000 INJECTION, SOLUTION INTRAMUSCULAR; SUBCUTANEOUS DAILY
Status: DISPENSED | OUTPATIENT
Start: 2025-07-09 | End: 2025-07-12

## 2025-07-09 RX ORDER — MULTIVITAMIN WITH IRON
1000 TABLET ORAL DAILY
Status: DISCONTINUED | OUTPATIENT
Start: 2025-07-11 | End: 2025-07-31

## 2025-07-09 RX ORDER — OXYCODONE HYDROCHLORIDE 5 MG/1
2.5 TABLET ORAL EVERY 4 HOURS PRN
Refills: 0 | Status: DISCONTINUED | OUTPATIENT
Start: 2025-07-09 | End: 2025-07-12

## 2025-07-09 RX ADMIN — PREDNISONE 20 MG: 20 TABLET ORAL at 08:44

## 2025-07-09 RX ADMIN — ATENOLOL 25 MG: 25 TABLET ORAL at 08:45

## 2025-07-09 RX ADMIN — ACETAMINOPHEN 650 MG: 325 TABLET ORAL at 21:42

## 2025-07-09 RX ADMIN — ENOXAPARIN SODIUM 40 MG: 100 INJECTION SUBCUTANEOUS at 08:57

## 2025-07-09 RX ADMIN — OXYCODONE 2.5 MG: 5 TABLET ORAL at 23:26

## 2025-07-09 RX ADMIN — ALPRAZOLAM 0.25 MG: 0.5 TABLET ORAL at 23:46

## 2025-07-09 RX ADMIN — KETOROLAC TROMETHAMINE 15 MG: 30 INJECTION, SOLUTION INTRAMUSCULAR at 08:44

## 2025-07-09 RX ADMIN — CYANOCOBALAMIN 1000 MCG: 1000 INJECTION, SOLUTION INTRAMUSCULAR; SUBCUTANEOUS at 15:12

## 2025-07-09 RX ADMIN — SODIUM CHLORIDE, PRESERVATIVE FREE 10 ML: 5 INJECTION INTRAVENOUS at 21:42

## 2025-07-09 RX ADMIN — ALPRAZOLAM 0.25 MG: 0.5 TABLET ORAL at 08:45

## 2025-07-09 RX ADMIN — OXYCODONE 2.5 MG: 5 TABLET ORAL at 12:09

## 2025-07-09 RX ADMIN — ACETAMINOPHEN 650 MG: 325 TABLET ORAL at 08:44

## 2025-07-09 RX ADMIN — METHIMAZOLE 5 MG: 5 TABLET ORAL at 08:43

## 2025-07-09 RX ADMIN — ACETAMINOPHEN 650 MG: 325 TABLET ORAL at 03:16

## 2025-07-09 RX ADMIN — SODIUM CHLORIDE, PRESERVATIVE FREE 10 ML: 5 INJECTION INTRAVENOUS at 09:15

## 2025-07-09 RX ADMIN — ACETAMINOPHEN 650 MG: 325 TABLET ORAL at 14:45

## 2025-07-09 RX ADMIN — OXYCODONE 2.5 MG: 5 TABLET ORAL at 17:20

## 2025-07-09 ASSESSMENT — PAIN DESCRIPTION - DESCRIPTORS
DESCRIPTORS: ACHING

## 2025-07-09 ASSESSMENT — PAIN SCALES - GENERAL
PAINLEVEL_OUTOF10: 0
PAINLEVEL_OUTOF10: 6
PAINLEVEL_OUTOF10: 7
PAINLEVEL_OUTOF10: 4
PAINLEVEL_OUTOF10: 7
PAINLEVEL_OUTOF10: 5
PAINLEVEL_OUTOF10: 5
PAINLEVEL_OUTOF10: 3
PAINLEVEL_OUTOF10: 10

## 2025-07-09 ASSESSMENT — PAIN DESCRIPTION - PAIN TYPE: TYPE: ACUTE PAIN

## 2025-07-09 ASSESSMENT — PAIN DESCRIPTION - ORIENTATION
ORIENTATION: LOWER
ORIENTATION: LOWER;POSTERIOR
ORIENTATION: LOWER
ORIENTATION: POSTERIOR

## 2025-07-09 ASSESSMENT — PAIN - FUNCTIONAL ASSESSMENT: PAIN_FUNCTIONAL_ASSESSMENT: PREVENTS OR INTERFERES SOME ACTIVE ACTIVITIES AND ADLS

## 2025-07-09 ASSESSMENT — PAIN DESCRIPTION - LOCATION
LOCATION: BACK

## 2025-07-09 ASSESSMENT — PAIN DESCRIPTION - ONSET: ONSET: ON-GOING

## 2025-07-09 ASSESSMENT — PAIN DESCRIPTION - FREQUENCY: FREQUENCY: INTERMITTENT
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
## 2025-07-09 NOTE — PLAN OF CARE
Problem: Respiratory - Adult  Goal: Achieves optimal ventilation and oxygenation  7/9/2025 1016 by Desmond Mccracken RN  Outcome: Progressing  Flowsheets (Taken 7/9/2025 0747)  Achieves optimal ventilation and oxygenation: Assess for changes in respiratory status  7/8/2025 2133 by Georgie Patel RCP  Outcome: Progressing     Problem: Pain  Goal: Verbalizes/displays adequate comfort level or baseline comfort level  7/9/2025 1016 by Desmond Mccracken RN  Outcome: Progressing  7/8/2025 2147 by Aye Francisco RN  Outcome: Progressing     Problem: Neurosensory - Adult  Goal: Achieves stable or improved neurological status  7/9/2025 1016 by Desmond Mccracken RN  Outcome: Progressing  Flowsheets (Taken 7/9/2025 0747)  Achieves stable or improved neurological status: Assess for and report changes in neurological status  7/8/2025 2147 by Aye Francisco RN  Outcome: Progressing  Goal: Achieves maximal functionality and self care  7/9/2025 1016 by Desmond Mccracken RN  Outcome: Progressing  Flowsheets (Taken 7/9/2025 0747)  Achieves maximal functionality and self care: Monitor swallowing and airway patency with patient fatigue and changes in neurological status  7/8/2025 2147 by Aye Francisco RN  Outcome: Progressing     Problem: Cardiovascular - Adult  Goal: Absence of cardiac dysrhythmias or at baseline  7/9/2025 1016 by Desmond Mccracken RN  Outcome: Progressing  Flowsheets (Taken 7/9/2025 0747)  Absence of cardiac dysrhythmias or at baseline: Monitor cardiac rate and rhythm  7/8/2025 2147 by Aye Francisco RN  Outcome: Progressing     Problem: Skin/Tissue Integrity - Adult  Goal: Skin integrity remains intact  7/9/2025 1016 by Desmond Mccracken RN  Outcome: Progressing  Flowsheets (Taken 7/9/2025 0747)  Skin Integrity Remains Intact: Monitor for areas of redness and/or skin breakdown  7/8/2025 2147 by Aye Francisco RN  Outcome: Progressing  Goal: Incisions, wounds, or drain sites healing

## 2025-07-09 NOTE — CONSULTS
INTERVENTIONAL RADIOLOGY  Consult Note      Patient:  Nicole Galeano  :  1945  Age:  79 y.o.  MRN:  [id number]    Today's Date:  2025  Admission Date:  2025  Hospital Day:  3  Consult requested by:  Bert Petersen MD      CC / HPI   Nicole Galeano is a 79 y.o. female with a history of intractable back pain and multilevel thoracic and lumbar vertebral compression fractures.  IR consult is requested for possible kyphoplasty.      PAST MEDICAL HISTORY  Past Medical History:   Diagnosis Date    Arthritis     Breast cancer (HCC) 10/2020    Breast cancer, left breast (HCC) ,     lumpectomy/chemo/XRT, then mastectomy and anastrozole in     Cancer (HCC)     SEVERAL SKIN - MELANOMA    COPD (chronic obstructive pulmonary disease) (HCC)     Hypertension     Ill-defined condition     MACULAR DEGENERATION       PAST SURGICAL HISTORY  Past Surgical History:   Procedure Laterality Date    ANKLE FRACTURE SURGERY Left 3/7/2025    LEFT ANKLE OPEN REDUCTION INTERNAL FIXATION performed by Arvind Kirby MD at [facility] MAIN OR    APPENDECTOMY      BREAST LUMPECTOMY Left         BREAST LUMPECTOMY Right 4/15/2021    RIGHT BREAST LUMPECTOMY WITH ULTRASOUND performed by Andres Cortes Jr., MD at [facility] AMBULATORY OR     SECTION      COLONOSCOPY      MASTECTOMY Left         US BREAST BIOPSY W LOC DEVICE 1ST LESION RIGHT Right 10/29/2020    US BREAST NEEDLE BIOPSY RIGHT 10/29/2020 [facility] RAD [facility]MO       SOCIAL HISTORY  Social History     Socioeconomic History    Marital status: Single     Spouse name: Not on file    Number of children: Not on file    Years of education: Not on file    Highest education level: Not on file   Occupational History    Not on file   Tobacco Use    Smoking status: Former     Current packs/day: 0.00     Types: Cigarettes     Quit date: 2011     Years since quittin.2    Smokeless tobacco: Never   Substance and Sexual Activity    Alcohol use: Yes

## 2025-07-09 NOTE — PROGRESS NOTES
Pulmonary, Critical Care, and Sleep Medicine      Name: Nicole Galeano MRN: [id number]   : 1945 Hospital: [facility]   Date: 2025  Admission date: 2025 Hospital Day: 4   Patient PCP: Tessie Martinez FNP    History:   Pt is acutely ill. Medical records and data reviewed. Pt seen in consultation    IMPRESSION:   chronic respiratory failure with Hypoxia and mild Hypercapnea-1 L/min  Mild to Moderate COPD- not in acute exacerbation; well compensated   Mild pulmonary hypertension by echo  Preprocedure pulmonary evaluation-kyphoplasty  Multilevel lumbar vertebral compression fractures-acute to subacute with severe pain  Remote former cigarette smoker  Alcohol use disorder  Rheumatoid arthritis chronic steroids; rheumatologist Dr. Karina Hyman  Hyperlipidemia   Remote history of breast cancer [id number] status post lumpectomy, mastectomy, chemoradiation  Melanoma status post multiple skin excisions  Body mass index is 27.34 kg/m².      RECOMMENDATIONS/PLAN:   May proceed with general anesthesia for kyphoplasty as planned from pulmonary standpoint  Agree with standard monitoring including oximetry, end-tidal CO2 capnography  Aspiration precautions  Continue bronchodilators   As needed rescue inhalers-patient may be sensitive to beta agonist as she does not use a rescue inhaler at home  DVT prophylaxis  Prescription drug management with home med reconciliation reviewed  Thanks you for asking us to see pt while in the hospital     Interval history:    2025: Slept some last night.  Still in pain.  No wheezing.  Anxiously awaiting scheduling of kyphoplasty.         [x] High complexity decision making was performed  [x] See my orders for details      Initial HPI:      I was asked by Josse Ritter MD to see Nicole Galeano  a 79 y.o.   female in consultation for a chief complaint of COPD preprocedure evaluation    Excerpts from admission 2025 or consult  components found for: \"CULT\"     Xray Result (most recent): image(s) personally reviewed  CT HEAD WO CONTRAST  Narrative: INDICATION: AMS     Exam: Noncontrast CT of the brain is performed with 5 mm collimation.    CT dose reduction was achieved with the use of the standardized protocol  tailored for this examination and automatic exposure control for dose  modulation.    Direct comparison is made to prior CT dated March 2024    FINDINGS: There is no acute intracranial hemorrhage, mass, mass effect or  herniation. Ventricular system is normal. The gray-white matter differentiation  is well-preserved. The mastoid air cells are well pneumatized. The visualized  paranasal sinuses are normal.  Impression: No acute intracranial hemorrhage, mass or infarct.     Electronically signed by Adarsh Bowen MD  XR CHEST PORTABLE  Narrative: EXAM:  XR CHEST PORTABLE    INDICATION: AMS     COMPARISON: January 2023    TECHNIQUE: portable chest AP view    FINDINGS: There is chronic cardiomegaly. The pulmonary vasculature is within  normal limits.     The lungs and pleural spaces are clear. The visualized bones and upper abdomen  are age-appropriate.  Impression: No acute process on portable chest.    Electronically signed by Shiv Mattson MD    CT HEAD WO CONTRAST   Final Result   No acute intracranial hemorrhage, mass or infarct.          Electronically signed by Adarsh Bowen MD      XR CHEST PORTABLE   Final Result      No acute process on portable chest.         Electronically signed by Shiv Mattson MD      IR KYPHOPLASTY THORACIC 1 VERTEBRAL BODY    (Results Pending)   IR KYPHOPLASTY LUMBAR 1 VERTEBRAL BODY    (Results Pending)         I personally reviewed laboratory testing, pulmonary imaging, radiology reports, and pulse oximetry data.    Please note that this dictation was completed with [employer], the [employer] voice recognition software.  Quite often unanticipated grammatical, syntax, homophones, and other interpretive errors are

## 2025-07-09 NOTE — PROGRESS NOTES
End of Shift Note    Bedside shift change report given to Desmond PARRA(oncoming nurse) by Aye Francisco RN (offgoing nurse).  Report included the following information SBAR and MAR    Shift worked:  7PM-7AM     Shift summary and any significant changes:      Patient slept intermittently, pain managed with prn Toradol and tylenol, patient stated  pain meds are not working. Voiding via pure wick, vital signs stable.        Concerns for physician to address:       Zone phone for oncoming shift:     [phone]     Activity:  Level of Assistance: Moderate assist, patient does 50-74%  Number times ambulated in hallways past shift: 0  Number of times OOB to chair past shift: 0    Cardiac:   Cardiac Monitoring: Yes           Access:  Current line(s): PIV    Genitourinary:   Urinary Status: Voiding, External catheter    Respiratory:   O2 Device: Nasal cannula  Chronic home O2 use?: NO  Incentive spirometer at bedside: YES    GI:  Last BM (including prior to admit): 07/08/25  Current diet:  ADULT DIET; Regular  Passing flatus: YES    Pain Management:   Patient states pain is manageable on current regimen: YES    Skin:  Rafa Scale Score: 19  Interventions: Wound Offloading (Prevention Methods): Bed, pressure reduction mattress    Patient Safety:  Fall Risk: Nursing Judgement-Fall Risk High(Add Comments): Yes  Fall Risk Interventions  Nursing Judgement-Fall Risk High(Add Comments): Yes  Toilet Every 2 Hours-In Advance of Need: Yes  Hourly Visual Checks: Awake, In bed  Fall Visual Posted: Armband  Room Door Open: Yes  Alarm On: Bed  Patient Moved Closer to Nursing Station: No    Active Consults:   IP CONSULT TO INTERVENTIONAL RADIOLOGY  IP CONSULT TO ENDOCRINOLOGY  IP CONSULT TO PULMONOLOGY    Length of Stay:  Expected LOS: 4  Actual LOS: 2    Aye Francisco RN

## 2025-07-09 NOTE — CONSULTS
Pulmonary, Critical Care, and Sleep Medicine      Name: Nicole Galeano MRN: [id number]   : 1945 Hospital: [facility]   Date: 2025  Admission date: 2025 Hospital Day: 4   Patient PCP: Tessie Martinez FNP    History:   Pt is acutely ill. Medical records and data reviewed. Pt seen in consultation    IMPRESSION:   chronic respiratory failure with Hypoxia and mild Hypercapnea-1 L/min  Mild to Moderate COPD- not in acute exacerbation; well compensated   Mild pulmonary hypertension by echo  Preprocedure pulmonary evaluation-kyphoplasty  Multilevel lumbar vertebral compression fractures-acute to subacute with severe pain  Remote former cigarette smoker  Alcohol use disorder  Rheumatoid arthritis chronic steroids; rheumatologist Dr. Karina Hyman  Hyperlipidemia   Remote history of breast cancer [id number] status post lumpectomy, mastectomy, chemoradiation  Melanoma status post multiple skin excisions  Body mass index is 27.34 kg/m².      RECOMMENDATIONS/PLAN:   May proceed with general anesthesia for kyphoplasty as planned from pulmonary standpoint  Agree with standard monitoring including oximetry, end-tidal CO2 capnography  Aspiration precautions  Continue bronchodilators   As needed rescue inhalers-patient may be sensitive to beta agonist as she does not use a rescue inhaler at home  DVT prophylaxis  Prescription drug management with home med reconciliation reviewed  Thanks you for asking us to see pt while in the hospital     Interval history:         [x] High complexity decision making was performed  [x] See my orders for details      Initial HPI:      I was asked by Josse Ritter MD to see Nicole Galeano  a 79 y.o.   female in consultation for a chief complaint of COPD preprocedure evaluation    Excerpts from admission 2025 or consult notes as follows:     \"  Nicole Galeano is a 79 y.o.  female with PMHx significant for COPD, rheumatoid

## 2025-07-09 NOTE — PLAN OF CARE
Problem: Pain  Goal: Verbalizes/displays adequate comfort level or baseline comfort level  7/8/2025 2147 by Aye Francisco RN  Outcome: Progressing  7/8/2025 1832 by Regine Camara RN  Outcome: Progressing     Problem: Neurosensory - Adult  Goal: Achieves stable or improved neurological status  7/8/2025 2147 by Aye Francisco RN  Outcome: Progressing  7/8/2025 1832 by Regine Camara RN  Outcome: Progressing     Problem: Neurosensory - Adult  Goal: Achieves maximal functionality and self care  7/8/2025 2147 by Aye Francisco RN  Outcome: Progressing  7/8/2025 1832 by eRgine Camara RN  Outcome: Progressing     Problem: Cardiovascular - Adult  Goal: Absence of cardiac dysrhythmias or at baseline  7/8/2025 2147 by Aye Francisco RN  Outcome: Progressing  7/8/2025 1832 by Regine Camara RN  Outcome: Progressing     Problem: Skin/Tissue Integrity - Adult  Goal: Skin integrity remains intact  7/8/2025 2147 by Aye Francisco RN  Outcome: Progressing  7/8/2025 1832 by Regine Camara RN  Outcome: Progressing     Problem: Musculoskeletal - Adult  Goal: Return mobility to safest level of function  7/8/2025 2147 by Aye Francisco RN  Outcome: Progressing  7/8/2025 1832 by Regine Camara RN  Outcome: Progressing     Problem: Musculoskeletal - Adult  Goal: Return ADL status to a safe level of function  7/8/2025 2147 by Aye Francisco RN  Outcome: Progressing  7/8/2025 1832 by Regine Camara RN  Outcome: Progressing     Problem: Gastrointestinal - Adult  Goal: Maintains or returns to baseline bowel function  7/8/2025 2147 by Aye Francisco RN  Outcome: Progressing  7/8/2025 1832 by Regine Camara RN  Outcome: Progressing

## 2025-07-10 ENCOUNTER — ANESTHESIA EVENT (OUTPATIENT)
Facility: HOSPITAL | Age: 80
End: 2025-07-10
Payer: MEDICARE

## 2025-07-10 ENCOUNTER — APPOINTMENT (OUTPATIENT)
Facility: HOSPITAL | Age: 80
End: 2025-07-10
Payer: MEDICARE

## 2025-07-10 ENCOUNTER — ANESTHESIA (OUTPATIENT)
Facility: HOSPITAL | Age: 80
End: 2025-07-10
Payer: MEDICARE

## 2025-07-10 LAB
ANION GAP SERPL CALC-SCNC: 5 MMOL/L (ref 2–12)
BASOPHILS # BLD: 0.04 K/UL (ref 0–0.1)
BASOPHILS NFR BLD: 0.3 % (ref 0–1)
BUN SERPL-MCNC: 17 MG/DL (ref 6–20)
BUN/CREAT SERPL: 35 (ref 12–20)
CALCIUM SERPL-MCNC: 8.7 MG/DL (ref 8.5–10.1)
CHLORIDE SERPL-SCNC: 102 MMOL/L (ref 97–108)
CO2 SERPL-SCNC: 29 MMOL/L (ref 21–32)
CREAT SERPL-MCNC: 0.48 MG/DL (ref 0.55–1.02)
DIFFERENTIAL METHOD BLD: ABNORMAL
EOSINOPHIL # BLD: 0.06 K/UL (ref 0–0.4)
EOSINOPHIL NFR BLD: 0.4 % (ref 0–7)
ERYTHROCYTE [DISTWIDTH] IN BLOOD BY AUTOMATED COUNT: 17 % (ref 11.5–14.5)
GLUCOSE SERPL-MCNC: 95 MG/DL (ref 65–100)
HCT VFR BLD AUTO: 42.8 % (ref 35–47)
HGB BLD-MCNC: 13.2 G/DL (ref 11.5–16)
IMM GRANULOCYTES # BLD AUTO: 0.08 K/UL (ref 0–0.04)
IMM GRANULOCYTES NFR BLD AUTO: 0.6 % (ref 0–0.5)
LYMPHOCYTES # BLD: 1.6 K/UL (ref 0.8–3.5)
LYMPHOCYTES NFR BLD: 11.7 % (ref 12–49)
MAGNESIUM SERPL-MCNC: 1.9 MG/DL (ref 1.6–2.4)
MCH RBC QN AUTO: 30.1 PG (ref 26–34)
MCHC RBC AUTO-ENTMCNC: 30.8 G/DL (ref 30–36.5)
MCV RBC AUTO: 97.7 FL (ref 80–99)
MONOCYTES # BLD: 1.27 K/UL (ref 0–1)
MONOCYTES NFR BLD: 9.3 % (ref 5–13)
NEUTS SEG # BLD: 10.64 K/UL (ref 1.8–8)
NEUTS SEG NFR BLD: 77.7 % (ref 32–75)
NRBC # BLD: 0 K/UL (ref 0–0.01)
NRBC BLD-RTO: 0 PER 100 WBC
PLATELET # BLD AUTO: 263 K/UL (ref 150–400)
PMV BLD AUTO: 10.7 FL (ref 8.9–12.9)
POTASSIUM SERPL-SCNC: 3.6 MMOL/L (ref 3.5–5.1)
RBC # BLD AUTO: 4.38 M/UL (ref 3.8–5.2)
SODIUM SERPL-SCNC: 136 MMOL/L (ref 136–145)
TSH RECEP AB SER-ACNC: <1.1 IU/L (ref 0–1.75)
TSI ACT/NOR SER: <0.1 IU/L (ref 0–0.55)
WBC # BLD AUTO: 13.7 K/UL (ref 3.6–11)

## 2025-07-10 PROCEDURE — 36415 COLL VENOUS BLD VENIPUNCTURE: CPT

## 2025-07-10 PROCEDURE — C1713 ANCHOR/SCREW BN/BN,TIS/BN: HCPCS

## 2025-07-10 PROCEDURE — 7100000001 HC PACU RECOVERY - ADDTL 15 MIN

## 2025-07-10 PROCEDURE — 94761 N-INVAS EAR/PLS OXIMETRY MLT: CPT

## 2025-07-10 PROCEDURE — 0PS43ZZ REPOSITION THORACIC VERTEBRA, PERCUTANEOUS APPROACH: ICD-10-PCS | Performed by: STUDENT IN AN ORGANIZED HEALTH CARE EDUCATION/TRAINING PROGRAM

## 2025-07-10 PROCEDURE — 2580000003 HC RX 258

## 2025-07-10 PROCEDURE — 6360000002 HC RX W HCPCS: Performed by: INTERNAL MEDICINE

## 2025-07-10 PROCEDURE — 80048 BASIC METABOLIC PNL TOTAL CA: CPT

## 2025-07-10 PROCEDURE — 6370000000 HC RX 637 (ALT 250 FOR IP): Performed by: INTERNAL MEDICINE

## 2025-07-10 PROCEDURE — 6360000002 HC RX W HCPCS: Performed by: STUDENT IN AN ORGANIZED HEALTH CARE EDUCATION/TRAINING PROGRAM

## 2025-07-10 PROCEDURE — 3700000000 HC ANESTHESIA ATTENDED CARE

## 2025-07-10 PROCEDURE — 1100000000 HC RM PRIVATE

## 2025-07-10 PROCEDURE — 2500000003 HC RX 250 WO HCPCS: Performed by: INTERNAL MEDICINE

## 2025-07-10 PROCEDURE — 6370000000 HC RX 637 (ALT 250 FOR IP)

## 2025-07-10 PROCEDURE — 77003 FLUOROGUIDE FOR SPINE INJECT: CPT

## 2025-07-10 PROCEDURE — 0PU43JZ SUPPLEMENT THORACIC VERTEBRA WITH SYNTHETIC SUBSTITUTE, PERCUTANEOUS APPROACH: ICD-10-PCS | Performed by: STUDENT IN AN ORGANIZED HEALTH CARE EDUCATION/TRAINING PROGRAM

## 2025-07-10 PROCEDURE — 2700000000 HC OXYGEN THERAPY PER DAY

## 2025-07-10 PROCEDURE — 2500000003 HC RX 250 WO HCPCS

## 2025-07-10 PROCEDURE — 83735 ASSAY OF MAGNESIUM: CPT

## 2025-07-10 PROCEDURE — 6360000004 HC RX CONTRAST MEDICATION: Performed by: STUDENT IN AN ORGANIZED HEALTH CARE EDUCATION/TRAINING PROGRAM

## 2025-07-10 PROCEDURE — 85025 COMPLETE CBC W/AUTO DIFF WBC: CPT

## 2025-07-10 PROCEDURE — 0QS03ZZ REPOSITION LUMBAR VERTEBRA, PERCUTANEOUS APPROACH: ICD-10-PCS | Performed by: STUDENT IN AN ORGANIZED HEALTH CARE EDUCATION/TRAINING PROGRAM

## 2025-07-10 PROCEDURE — 7100000000 HC PACU RECOVERY - FIRST 15 MIN

## 2025-07-10 PROCEDURE — 3700000001 HC ADD 15 MINUTES (ANESTHESIA)

## 2025-07-10 PROCEDURE — 0QU03JZ SUPPLEMENT LUMBAR VERTEBRA WITH SYNTHETIC SUBSTITUTE, PERCUTANEOUS APPROACH: ICD-10-PCS | Performed by: STUDENT IN AN ORGANIZED HEALTH CARE EDUCATION/TRAINING PROGRAM

## 2025-07-10 PROCEDURE — 6360000002 HC RX W HCPCS

## 2025-07-10 RX ORDER — ROCURONIUM BROMIDE 10 MG/ML
INJECTION, SOLUTION INTRAVENOUS
Status: DISCONTINUED | OUTPATIENT
Start: 2025-07-10 | End: 2025-07-10 | Stop reason: SDUPTHER

## 2025-07-10 RX ORDER — FENTANYL CITRATE 50 UG/ML
INJECTION, SOLUTION INTRAMUSCULAR; INTRAVENOUS
Status: DISCONTINUED | OUTPATIENT
Start: 2025-07-10 | End: 2025-07-10 | Stop reason: SDUPTHER

## 2025-07-10 RX ORDER — SUCCINYLCHOLINE CHLORIDE 20 MG/ML
INJECTION INTRAMUSCULAR; INTRAVENOUS
Status: DISCONTINUED | OUTPATIENT
Start: 2025-07-10 | End: 2025-07-10 | Stop reason: SDUPTHER

## 2025-07-10 RX ORDER — LIDOCAINE HYDROCHLORIDE 20 MG/ML
INJECTION, SOLUTION EPIDURAL; INFILTRATION; INTRACAUDAL; PERINEURAL
Status: DISCONTINUED | OUTPATIENT
Start: 2025-07-10 | End: 2025-07-10 | Stop reason: SDUPTHER

## 2025-07-10 RX ORDER — ONDANSETRON 2 MG/ML
INJECTION INTRAMUSCULAR; INTRAVENOUS
Status: DISCONTINUED | OUTPATIENT
Start: 2025-07-10 | End: 2025-07-10 | Stop reason: SDUPTHER

## 2025-07-10 RX ORDER — CEFAZOLIN SODIUM/WATER 2 G/20 ML
SYRINGE (ML) INTRAVENOUS
Status: DISCONTINUED | OUTPATIENT
Start: 2025-07-10 | End: 2025-07-10 | Stop reason: SDUPTHER

## 2025-07-10 RX ORDER — BUPIVACAINE HYDROCHLORIDE 5 MG/ML
20 INJECTION, SOLUTION EPIDURAL; INTRACAUDAL; PERINEURAL ONCE
Status: COMPLETED | OUTPATIENT
Start: 2025-07-10 | End: 2025-07-10

## 2025-07-10 RX ORDER — LIDOCAINE HYDROCHLORIDE 20 MG/ML
20 INJECTION, SOLUTION INFILTRATION; PERINEURAL ONCE
Status: COMPLETED | OUTPATIENT
Start: 2025-07-10 | End: 2025-07-10

## 2025-07-10 RX ORDER — EPHEDRINE SULFATE/0.9% NACL/PF 50 MG/5 ML
SYRINGE (ML) INTRAVENOUS
Status: DISCONTINUED | OUTPATIENT
Start: 2025-07-10 | End: 2025-07-10 | Stop reason: SDUPTHER

## 2025-07-10 RX ORDER — HEPARIN SODIUM 200 [USP'U]/100ML
200 INJECTION, SOLUTION INTRAVENOUS ONCE
Status: DISCONTINUED | OUTPATIENT
Start: 2025-07-10 | End: 2025-07-15

## 2025-07-10 RX ORDER — CITALOPRAM HYDROBROMIDE 20 MG/1
40 TABLET ORAL DAILY
Status: DISCONTINUED | OUTPATIENT
Start: 2025-07-10 | End: 2025-07-15

## 2025-07-10 RX ORDER — SODIUM CHLORIDE 9 MG/ML
INJECTION, SOLUTION INTRAVENOUS
Status: DISCONTINUED | OUTPATIENT
Start: 2025-07-10 | End: 2025-07-10 | Stop reason: SDUPTHER

## 2025-07-10 RX ORDER — DEXAMETHASONE SODIUM PHOSPHATE 4 MG/ML
INJECTION, SOLUTION INTRA-ARTICULAR; INTRALESIONAL; INTRAMUSCULAR; INTRAVENOUS; SOFT TISSUE
Status: DISCONTINUED | OUTPATIENT
Start: 2025-07-10 | End: 2025-07-10 | Stop reason: SDUPTHER

## 2025-07-10 RX ORDER — GLYCOPYRROLATE 0.2 MG/ML
INJECTION INTRAMUSCULAR; INTRAVENOUS
Status: DISCONTINUED | OUTPATIENT
Start: 2025-07-10 | End: 2025-07-10 | Stop reason: SDUPTHER

## 2025-07-10 RX ADMIN — Medication 10 MG: at 09:21

## 2025-07-10 RX ADMIN — PHENYLEPHRINE HYDROCHLORIDE 40 MCG/MIN: 10 INJECTION INTRAVENOUS at 09:23

## 2025-07-10 RX ADMIN — HEPARIN SODIUM 100 UNITS: 200 INJECTION, SOLUTION INTRAVENOUS at 11:35

## 2025-07-10 RX ADMIN — CITALOPRAM HYDROBROMIDE 40 MG: 20 TABLET ORAL at 14:17

## 2025-07-10 RX ADMIN — ACETAMINOPHEN 650 MG: 325 TABLET ORAL at 05:27

## 2025-07-10 RX ADMIN — LIDOCAINE HYDROCHLORIDE 80 MG: 20 INJECTION, SOLUTION EPIDURAL; INFILTRATION; INTRACAUDAL; PERINEURAL at 09:06

## 2025-07-10 RX ADMIN — ROCURONIUM BROMIDE 10 MG: 10 INJECTION INTRAVENOUS at 10:15

## 2025-07-10 RX ADMIN — ONDANSETRON HYDROCHLORIDE 4 MG: 2 INJECTION, SOLUTION INTRAMUSCULAR; INTRAVENOUS at 09:30

## 2025-07-10 RX ADMIN — SUGAMMADEX 200 MG: 100 INJECTION, SOLUTION INTRAVENOUS at 11:25

## 2025-07-10 RX ADMIN — ROCURONIUM BROMIDE 20 MG: 10 INJECTION INTRAVENOUS at 09:14

## 2025-07-10 RX ADMIN — SUCCINYLCHOLINE CHLORIDE 100 MG: 20 INJECTION, SOLUTION INTRAMUSCULAR; INTRAVENOUS at 09:06

## 2025-07-10 RX ADMIN — LIDOCAINE HYDROCHLORIDE 10 ML: 20 INJECTION, SOLUTION INFILTRATION; PERINEURAL at 09:32

## 2025-07-10 RX ADMIN — FENTANYL CITRATE 25 MCG: 50 INJECTION, SOLUTION INTRAMUSCULAR; INTRAVENOUS at 09:52

## 2025-07-10 RX ADMIN — Medication 2 G: at 09:10

## 2025-07-10 RX ADMIN — ACETAMINOPHEN 650 MG: 325 TABLET ORAL at 20:21

## 2025-07-10 RX ADMIN — SODIUM CHLORIDE: 9 INJECTION, SOLUTION INTRAVENOUS at 08:57

## 2025-07-10 RX ADMIN — FENTANYL CITRATE 25 MCG: 50 INJECTION, SOLUTION INTRAMUSCULAR; INTRAVENOUS at 09:06

## 2025-07-10 RX ADMIN — KETOROLAC TROMETHAMINE 15 MG: 30 INJECTION, SOLUTION INTRAMUSCULAR at 05:27

## 2025-07-10 RX ADMIN — GLYCOPYRROLATE 0.2 MG: 0.2 INJECTION, SOLUTION INTRAMUSCULAR; INTRAVENOUS at 09:23

## 2025-07-10 RX ADMIN — KETOROLAC TROMETHAMINE 15 MG: 30 INJECTION, SOLUTION INTRAMUSCULAR at 18:50

## 2025-07-10 RX ADMIN — BUPIVACAINE HYDROCHLORIDE 20 ML: 5 INJECTION, SOLUTION EPIDURAL; INTRACAUDAL; PERINEURAL at 10:00

## 2025-07-10 RX ADMIN — DEXAMETHASONE SODIUM PHOSPHATE 4 MG: 4 INJECTION, SOLUTION INTRAMUSCULAR; INTRAVENOUS at 09:30

## 2025-07-10 RX ADMIN — Medication 10 MG: at 09:12

## 2025-07-10 RX ADMIN — SODIUM CHLORIDE, PRESERVATIVE FREE 10 ML: 5 INJECTION INTRAVENOUS at 20:22

## 2025-07-10 RX ADMIN — PHENYLEPHRINE HYDROCHLORIDE 100 MCG: 10 INJECTION INTRAVENOUS at 09:14

## 2025-07-10 RX ADMIN — PREDNISONE 20 MG: 20 TABLET ORAL at 14:17

## 2025-07-10 RX ADMIN — ACETAMINOPHEN 650 MG: 325 TABLET ORAL at 14:17

## 2025-07-10 RX ADMIN — ALPRAZOLAM 0.25 MG: 0.5 TABLET ORAL at 18:50

## 2025-07-10 RX ADMIN — ROCURONIUM BROMIDE 10 MG: 10 INJECTION INTRAVENOUS at 10:55

## 2025-07-10 RX ADMIN — PHENYLEPHRINE HYDROCHLORIDE 100 MCG: 10 INJECTION INTRAVENOUS at 09:10

## 2025-07-10 RX ADMIN — ROCURONIUM BROMIDE 5 MG: 10 INJECTION INTRAVENOUS at 09:06

## 2025-07-10 RX ADMIN — PROPOFOL 100 MG: 10 INJECTION, EMULSION INTRAVENOUS at 09:06

## 2025-07-10 RX ADMIN — IOHEXOL 15 ML: 300 INJECTION, SOLUTION INTRAVENOUS at 09:43

## 2025-07-10 ASSESSMENT — PAIN DESCRIPTION - FREQUENCY
FREQUENCY: INTERMITTENT
FREQUENCY: INTERMITTENT

## 2025-07-10 ASSESSMENT — PAIN DESCRIPTION - DESCRIPTORS
DESCRIPTORS: ACHING

## 2025-07-10 ASSESSMENT — PAIN DESCRIPTION - PAIN TYPE
TYPE: ACUTE PAIN
TYPE: ACUTE PAIN

## 2025-07-10 ASSESSMENT — PAIN - FUNCTIONAL ASSESSMENT
PAIN_FUNCTIONAL_ASSESSMENT: 0-10
PAIN_FUNCTIONAL_ASSESSMENT: ACTIVITIES ARE NOT PREVENTED
PAIN_FUNCTIONAL_ASSESSMENT: ACTIVITIES ARE NOT PREVENTED

## 2025-07-10 ASSESSMENT — PAIN DESCRIPTION - ONSET
ONSET: GRADUAL
ONSET: GRADUAL

## 2025-07-10 ASSESSMENT — COPD QUESTIONNAIRES: CAT_SEVERITY: MODERATE

## 2025-07-10 ASSESSMENT — PAIN SCALES - GENERAL
PAINLEVEL_OUTOF10: 5
PAINLEVEL_OUTOF10: 2
PAINLEVEL_OUTOF10: 7
PAINLEVEL_OUTOF10: 8

## 2025-07-10 ASSESSMENT — PAIN DESCRIPTION - LOCATION
LOCATION: BACK

## 2025-07-10 ASSESSMENT — PAIN DESCRIPTION - ORIENTATION
ORIENTATION: LEFT
ORIENTATION: LOWER;POSTERIOR
ORIENTATION: RIGHT;MID

## 2025-07-10 ASSESSMENT — ENCOUNTER SYMPTOMS: SHORTNESS OF BREATH: 1

## 2025-07-10 NOTE — CARE COORDINATION
Met with pt at bedside to review discharge plan status. [facility] declined, as they are unable to offer long-term care; [facility] declined due to lack of bed availability with no anticipated openings coming up; [facility] has not responded. Pt verbalized understanding of status of previously sent referrals. Discussed pt's long-term plan, pt stated that care team may speak with pt's family for updates but pt will be only decision maker, CM validated this and will proceed per pt's wishes. Pt shared that she is not able to return to living independently as she was doing before breaking her ankle; discussed need for facility that will accept Medicaid as payer source for LTC. CM suggested sending additional referrals, pt voiced agreement, requested [facility],  to send referrals accordingly. CM offered to send referral to [facility] to see if any available residential [facility] or similar facilities accept Medicaid, pt in agreement with CM sending this referral. Pt reported that a friend will be picking up her cat from her children's home, shared that this friend may be able to move pt in with him as long as pt would be able to have caregiver support through Medicaid; pt to discuss this further with her friend and will update CM when CM brings list of accepting SNFs to bedside. CM advised pt that Medicaid caregiver support is based on eligibility guidelines and pt may not qualify, pt verbalized understanding. CM will reach out to [facility] and [facility], pt's previous SNFs, to determine whether pt has had a UAI completed and to verify Medicare SNF days used.    Inna Mata, [facility]  [phone]

## 2025-07-10 NOTE — PROGRESS NOTES
End of Shift Note    Bedside shift change report given to FIDEL Holland (oncoming nurse) by LUCRECIA WARNER RN (offgoing nurse).  Report included the following information SBAR, Kardex, Intake/Output, and MAR    Shift worked:  [id number]     Shift summary and any significant changes:     Patient returned from PACU s/p kyphoplasty of T6, T7, L2 and L4. Pt has 8 kyphoplasty incision sites, bandaids intact with minimal breakthrough bleeding. Pt has minimal complaint of pain and states she feels much better after kyphoplasty. Scheduled tylenol given. PRN xanax given x1 and toradol x1.     Concerns for physician to address:    Zone phone for oncoming shift:   [phone]       Activity:  Level of Assistance: Maximum assist, patient does 25-49%  Number times ambulated in hallways past shift: 0  Number of times OOB to chair past shift: 0    Cardiac:   Cardiac Monitoring: Yes      Cardiac Rhythm: Sinus rhythm    Access:  Current line(s): PIV     Genitourinary:   Urinary Status: Has not voided    Respiratory:   O2 Device: Nasal cannula  Chronic home O2 use?: YES  Incentive spirometer at bedside: N/A    GI:  Last BM (including prior to admit): 07/08/25  Current diet:  ADULT DIET; Regular  Passing flatus: YES    Pain Management:   Patient states pain is manageable on current regimen: N/A    Skin:  Rafa Scale Score: 17  Interventions: Wound Offloading (Prevention Methods): Repositioning, Turning    Patient Safety:  Fall Risk: Nursing Judgement-Fall Risk High(Add Comments): Yes  Fall Risk Interventions  Nursing Judgement-Fall Risk High(Add Comments): Yes  Toilet Every 2 Hours-In Advance of Need: Yes  Hourly Visual Checks: Awake, In bed  Fall Visual Posted: Armband, Fall sign posted, Socks  Room Door Open: Deferred to promote rest  Alarm On: Bed  Patient Moved Closer to Nursing Station: No    Active Consults:   IP CONSULT TO INTERVENTIONAL RADIOLOGY  IP CONSULT TO ENDOCRINOLOGY  IP CONSULT TO PULMONOLOGY    Length of

## 2025-07-10 NOTE — PROGRESS NOTES
Occupational Therapy note:    Chart reviewed and patient currently ZABRINA for procedure. Will defer and continue to follow.    Hayley Dawson, OTR/L, OTD

## 2025-07-10 NOTE — PLAN OF CARE
Problem: Respiratory - Adult  Goal: Achieves optimal ventilation and oxygenation  7/10/2025 1427 by Bobbi Dodson RN  Outcome: Progressing  7/10/2025 0227 by Skyler Khanna RN  Outcome: Progressing  7/10/2025 0031 by Maggi Cervantes RCP  Outcome: Progressing     Problem: Pain  Goal: Verbalizes/displays adequate comfort level or baseline comfort level  7/10/2025 1427 by Bobbi Dodson RN  Outcome: Progressing  7/10/2025 0227 by Skyler Khanna RN  Outcome: Progressing  Flowsheets  Taken 7/9/2025 1445 by Desmond Mccracken RN  Verbalizes/displays adequate comfort level or baseline comfort level: Encourage patient to monitor pain and request assistance  Taken 7/9/2025 1245 by Desmond Mccracken RN  Verbalizes/displays adequate comfort level or baseline comfort level: Encourage patient to monitor pain and request assistance     Problem: Neurosensory - Adult  Goal: Achieves stable or improved neurological status  7/10/2025 1427 by Bobbi Dodson RN  Outcome: Progressing  7/10/2025 0227 by Skyler Khanna RN  Outcome: Progressing  Goal: Achieves maximal functionality and self care  7/10/2025 1427 by Bobbi Dodson RN  Outcome: Progressing  7/10/2025 0227 by Skyler Khanna RN  Outcome: Progressing     Problem: Cardiovascular - Adult  Goal: Absence of cardiac dysrhythmias or at baseline  7/10/2025 1427 by Bobbi Dodson RN  Outcome: Progressing  7/10/2025 0227 by Skyler Khanna RN  Outcome: Progressing     Problem: Skin/Tissue Integrity - Adult  Goal: Skin integrity remains intact  7/10/2025 1427 by Bobbi Dodson RN  Outcome: Progressing  7/10/2025 0227 by Skyler Khanna RN  Outcome: Progressing  Goal: Incisions, wounds, or drain sites healing without S/S of infection  7/10/2025 1427 by Bobbi Dodson RN  Outcome: Progressing  7/10/2025 0227 by Skyler Khanna RN  Outcome: Progressing     Problem: Musculoskeletal - Adult  Goal: Return mobility to  Monitor and intervene to maintain adequate nutrition, hydration, elimination, sleep and activity  6. If unable to ensure safety without constant attention obtain sitter and review sitter guidelines with assigned personnel  7. Initiate Psychosocial CNS and Spiritual Care consult, as indicated  7/10/2025 1427 by Bobbi Dodson, RN  Outcome: Progressing  7/10/2025 0227 by Skyler Khanna, RN  Outcome: Progressing

## 2025-07-10 NOTE — CONSULTS
Radiology History and Physical    Patient: Nicole Galeano 79 y.o. female       Chief Complaint: Back Pain (BIBEMS from [facility] c/o back pain pt reports she had back fx 3 weeks ago. 10/10 pain. Family concerned that pain medications  (prednisone and oxycodone)that were prescribed were causing hallucinations so she is no longer taking those. A&Ox4 )    History of Present Illness: Ms. Galeano is an 79 y.o. woman with HTN, COPD, RA, history of breast cancer, who presents with intractable back pain. The patient reports that she was working with physical therapy a few weeks ago, after which she had severe upper and lower back pain. She was initially evaluated at [facility], where an MRI showed acute fractures of T6, T7, L2, and L4. She was offered kyphoplasty at that time, but declined. However, after returning home, she continues to have severe pain limiting her ability to perform her ADLs and returned to [facility] for pain management.    History:    Past Medical History:   Diagnosis Date    Arthritis     Breast cancer (HCC) 10/2020    Breast cancer, left breast (HCC) 2003, 2012    lumpectomy/chemo/XRT, then mastectomy and anastrozole in 2012    Cancer (HCC)     SEVERAL SKIN - MELANOMA    COPD (chronic obstructive pulmonary disease) (HCC)     Hypertension     Ill-defined condition     MACULAR DEGENERATION     Family History   Problem Relation Age of Onset    Anesth Problems Neg Hx     Abdominal aortic aneurysm Brother     Cancer Sister         MELANOMA, LUNG    Cancer Father         SKIN    Heart Disease Father     Thyroid Disease Mother      Social History     Socioeconomic History    Marital status: Single     Spouse name: Not on file    Number of children: Not on file    Years of education: Not on file    Highest education level: Not on file   Occupational History    Not on file   Tobacco Use    Smoking status: Former     Current packs/day: 0.00     Types: Cigarettes     Quit date: 4/9/2011

## 2025-07-10 NOTE — PROGRESS NOTES
PT Note    Chart reviewed in prep for PT session.  Noted pt is currently ZABRINA for a kyphoplasty.  Will defer an continue to follow.

## 2025-07-10 NOTE — PROGRESS NOTES
Pulmonary, Critical Care, and Sleep Medicine      Name: Nicole Galeano MRN: [id number]   : 1945 Hospital: [facility]   Date: 7/10/2025  Admission date: 2025 Hospital Day: 5   Patient PCP: Tessie Martinez FNP    History:   Medical records and data reviewed. Pt seen in consultation    IMPRESSION:   chronic respiratory failure with Hypoxia and mild Hypercapnea-1 L/min  Mild to Moderate COPD- not in acute exacerbation; well compensated   Mild pulmonary hypertension by echo  S/p T6, T7, L2, L4 kyphoplasty 7/10/25 for...  Multilevel lumbar vertebral compression fractures-acute to subacute with severe pain  Remote former cigarette smoker  Alcohol use disorder  Rheumatoid arthritis chronic steroids; rheumatologist Dr. Karina Hyman  Hyperlipidemia   Remote history of breast cancer [id number] status post lumpectomy, mastectomy, chemoradiation  Melanoma status post multiple skin excisions  Body mass index is 27.25 kg/m².      RECOMMENDATIONS/PLAN:   May discharge from pulmonary perspective when cleared by primary team  Continue home bronchodilators   As needed rescue inhalers-patient may be sensitive to beta agonist as she does not use a rescue inhaler at home  F/u Dr. Rony Aquino [facility]  Prescription drug management with home med reconciliation reviewed  Thanks you for asking us to see pt while in the hospital     Interval history:  7/10/25: back from kyphoplasty. No breathing issues. Pain 100% better    2025: Slept some last night.  Still in pain.  No wheezing.  Anxiously awaiting scheduling of kyphoplasty.         [x] High complexity decision making was performed  [x] See my orders for details      Initial HPI:      I was asked by Josse Ritter MD to see Nicole Galeano  a 79 y.o.   female in consultation for a chief complaint of COPD preprocedure evaluation    Excerpts from admission 2025 or consult notes as follows:     \"   on her side but now must lie on her back.      Offered kyphoplasty with general anesthesia.  Patient would have to lie prone for the procedure.  She is willing to do that with general anesthesia.    Allergies   Allergen Reactions    Povidone-Iodine Hives    Red Dye #40 (Allura Red) Hives    Adhesive Tape Rash       MAR reviewed and pertinent medications noted or modified as needed        MEDICATIONS:     Current Facility-Administered Medications:     citalopram (CELEXA) tablet 40 mg, 40 mg, Oral, Daily, Gurpreet Petersen MD, 40 mg at 07/10/25 1417    heparin 2 units/mL solution in 0.9% sodium chloride, 200 mL, Irrigation, Once, Honey Mccarthy MD    oxyCODONE (ROXICODONE) immediate release tablet 2.5 mg, 2.5 mg, Oral, Q4H PRN, Brad Keller Jr., MD, 2.5 mg at 07/09/25 2326    cyanocobalamin injection 1,000 mcg, 1,000 mcg, IntraMUSCular, Daily, Brad Keller Jr., MD, 1,000 mcg at 07/09/25 1512    [START ON 7/11/2025] vitamin B-12 (CYANOCOBALAMIN) tablet 1,000 mcg, 1,000 mcg, Oral, Daily, Brad Keller Jr., MD    lidocaine 4 % external patch 1 patch, 1 patch, TransDERmal, Daily, Zo Magallanes APRN - NP, 1 patch at 07/10/25 1417    atenolol (TENORMIN) tablet 25 mg, 25 mg, Oral, Daily, Bert Petersen MD, 25 mg at 07/09/25 0845    acetaminophen (TYLENOL) tablet 650 mg, 650 mg, Oral, 3 times per day, Bert Petersen MD, 650 mg at 07/10/25 1417    methIMAzole (TAPAZOLE) tablet 5 mg, 5 mg, Oral, Daily, Bert Petersen MD, 5 mg at 07/09/25 0843    ALPRAZolam (XANAX) tablet 0.25 mg, 0.25 mg, Oral, BID PRN, Bert Petersen MD, 0.25 mg at 07/09/25 2346    simethicone (MYLICON) chewable tablet 80 mg, 80 mg, Oral, Q6H PRN, Bert Petersen MD, 80 mg at 07/07/25 1133    fluticasone (FLONASE) 50 MCG/ACT nasal spray 1 spray, 1 spray, Each Nostril, Daily PRN, Zo Magallanes APRN - NP, 1 spray at 07/07/25 0884    sodium chloride flush 0.9 % injection 5-40 mL, 5-40 mL, IntraVENous, 2 times per day, [place]

## 2025-07-10 NOTE — PROGRESS NOTES
End of Shift Note    Bedside shift change report given to FIDEL Webb (oncoming nurse) by Skyler Khanna RN (offgoing nurse).  Report included the following information SBAR and MAR    Shift worked:  Night     Shift summary and any significant changes:    Patient is stable, pain managed with prn oxycodone and toradol injection, awaiting to go for kyphoplasty this morning, no form of acute distress observed.     Concerns for physician to address:  None     Zone phone for oncoming shift:          Activity:  Level of Assistance: Moderate assist, patient does 50-74%  Number times ambulated in hallways past shift: 0  Number of times OOB to chair past shift: 0    Cardiac:   Cardiac Monitoring: Yes           Access:  Current line(s): PIV    Genitourinary:   Urinary Status: Voiding, External catheter    Respiratory:   O2 Device: Nasal cannula  Chronic home O2 use?: NO  Incentive spirometer at bedside: YES    GI:  Last BM (including prior to admit): 07/08/25  Current diet:  Diet NPO Exceptions are: Ice Chips, Sips of Water with Meds  Passing flatus: YES    Pain Management:   Patient states pain is manageable on current regimen: YES    Skin:  Rafa Scale Score: 17  Interventions: Wound Offloading (Prevention Methods): Bed, pressure reduction mattress, Elevate heels, Pillows, Repositioning, Turning    Patient Safety:  Fall Risk: Nursing Judgement-Fall Risk High(Add Comments): Yes  Fall Risk Interventions  Nursing Judgement-Fall Risk High(Add Comments): Yes  Toilet Every 2 Hours-In Advance of Need: Yes  Hourly Visual Checks: Awake, In bed, Quiet  Fall Visual Posted: Armband, Socks, Fall sign posted  Room Door Open: Deferred to promote rest  Alarm On: Bed  Patient Moved Closer to Nursing Station: No    Active Consults:   IP CONSULT TO INTERVENTIONAL RADIOLOGY  IP CONSULT TO ENDOCRINOLOGY  IP CONSULT TO PULMONOLOGY    Length of Stay:  Expected LOS: 5  Actual LOS: 4    Skyler Khanna RN

## 2025-07-10 NOTE — PROGRESS NOTES
Hospitalist Progress Note    NAME:   Nicole Galeano   : 1945   MRN: [id number]     Date/Time: 7/10/2025 9:23 AM  Patient PCP: Tessie Martinez FNP    Estimated discharge date: ?  Barriers: Clinical improvement s/p Kyphoplasty today      Assessment / Plan:    Acute metabolic encephalopathy POA - resolved  likely related to medications including prednisone, tramadol  Recently was diagnosed to have lumbar compression fracture   started on multiple medications leading to confusion, Most likely related to medication  Exam is nonfocal.    CT head shows no acute process  Urinalysis 0-4 WBCs, 0-5 RBCs, negative bacteria, moderate epithelial   Does not matter if it is a clean-catch,  it is completely normal  Held all medications that can cause encephalopathy including prednisone, Celexa and also tramadol  Currently alert and following commands, oriented to birthday, current place, current year, current present  Pain still uncontrolled, will Cont low-dose oxycodone and see how she tolerates it  Planning for kyphoplasty TODAY AM by IR  Will monitor closely    B12 deficiency POA level 178  We will discuss with patient if this is a new diagnosis  IM B12 shots and then oral B12 at discharge  Follow-up with PCP to recheck levels      Multiple thoracic and lumbar compression fractures POA  MRI at [facility], T6, T7, L2, and L4 Compression fracture without retropulsion.    Refused kyphoplasty over there, was discharged to SNF returned back as meds were making her mentation worse   Now agreeable to kyphoplasty  Use Tylenol and Toradol for pain control, low-dose oxycodone  IR for unable to access the MRI images from [facility]  IP pulmonary patient's request for preop eval    Presyncope/vasovagal,   Volume depletion, received IVF bolus earlier  S/p IVF, NS - now off  it  Blood pressures improved     COPD not in exacerbation  Chronic respiratory failure due to COPD on 1 L  Rheumatoid arthritis  History of breast

## 2025-07-10 NOTE — PLAN OF CARE
Problem: Respiratory - Adult  Goal: Achieves optimal ventilation and oxygenation  7/10/2025 0227 by Skyler Khanna RN  Outcome: Progressing  7/10/2025 0031 by Maggi Cervantes RCP  Outcome: Progressing     Problem: Pain  Goal: Verbalizes/displays adequate comfort level or baseline comfort level  Outcome: Progressing  Flowsheets  Taken 7/9/2025 1445 by Desmond Mccracken, FIDEL  Verbalizes/displays adequate comfort level or baseline comfort level: Encourage patient to monitor pain and request assistance  Taken 7/9/2025 1245 by Desmond Mccracken RN  Verbalizes/displays adequate comfort level or baseline comfort level: Encourage patient to monitor pain and request assistance     Problem: Neurosensory - Adult  Goal: Achieves stable or improved neurological status  Outcome: Progressing  Goal: Achieves maximal functionality and self care  Outcome: Progressing     Problem: Cardiovascular - Adult  Goal: Absence of cardiac dysrhythmias or at baseline  Outcome: Progressing     Problem: Skin/Tissue Integrity - Adult  Goal: Skin integrity remains intact  Outcome: Progressing  Goal: Incisions, wounds, or drain sites healing without S/S of infection  Outcome: Progressing     Problem: Musculoskeletal - Adult  Goal: Return mobility to safest level of function  Outcome: Progressing  Goal: Return ADL status to a safe level of function  Outcome: Progressing     Problem: Gastrointestinal - Adult  Goal: Maintains or returns to baseline bowel function  Outcome: Progressing     Problem: Genitourinary - Adult  Goal: Absence of urinary retention  Outcome: Progressing     Problem: Infection - Adult  Goal: Absence of infection during hospitalization  Outcome: Progressing     Problem: Metabolic/Fluid and Electrolytes - Adult  Goal: Electrolytes maintained within normal limits  Outcome: Progressing     Problem: Safety - Adult  Goal: Free from fall injury  Outcome: Progressing     Problem: Confusion  Goal: Confusion, delirium, dementia, or

## 2025-07-10 NOTE — PROGRESS NOTES
0745: Patient arrived to [facility] via stretcher. Patient is A&Ox4 on 2L NC in NAD at this time. Code status, allergies, and NPO status reviewed with patient prior to procedure.     0939: Procedural time-out called.  Vitals and procedural care being monitored by anesthesia.  See associated flowsheets.

## 2025-07-10 NOTE — PROCEDURES
Brief Postoperative Note    Nicole Galeano  YOB: 1945  [id number]    Pre-operative Diagnosis: Multilevel thoracic and lumbar compression fractures     Post-operative Diagnosis: Same    Procedure: T6, T7, L2, L4 kyphoplasty     Anesthesia: General    Surgeons/Assistants: Honey Mccarthy MD    Estimated Blood Loss: Minimal     Complications: None    Specimens: Was Not Obtained    Findings: T6, T7, L2, L4 kyphoplasty     Electronically signed by Honey Mccarthy MD on 7/10/2025 at 11:18 AM

## 2025-07-10 NOTE — ANESTHESIA POSTPROCEDURE EVALUATION
Department of Anesthesiology  Postprocedure Note    Patient: Nicole Galeano  MRN: [id number]  YOB: 1945  Date of evaluation: 7/10/2025    Procedure Summary       Date: 07/10/25 Room / Location: [facility] ANGIO IR    Anesthesia Start: 0857 Anesthesia Stop: 1146    Procedure: IR KYPHOPLASTY THORACIC 1 VERTEBRAL BODY Diagnosis: (T6, T7, L2, and L4 compression fractures)    Scheduled Providers: Shiv Boykin MD Responsible Provider: Shiv Boykin MD    Anesthesia Type: general ASA Status: 3            Anesthesia Type: No value filed.    Eddy Phase I: Eddy Score: 8    Eddy Phase II:      Anesthesia Post Evaluation    Patient location during evaluation: PACU  Patient participation: complete - patient participated  Level of consciousness: awake  Airway patency: patent  Nausea & Vomiting: no vomiting  Cardiovascular status: hemodynamically stable  Respiratory status: acceptable  Hydration status: euvolemic    No notable events documented.

## 2025-07-11 LAB
GLUCOSE BLD STRIP.AUTO-MCNC: 132 MG/DL (ref 65–117)
SERVICE CMNT-IMP: ABNORMAL
THYROGLOB AB SERPL-ACNC: 3.4 IU/ML (ref 0–0.9)
THYROPEROXIDASE AB SERPL-ACNC: <9 IU/ML (ref 0–34)

## 2025-07-11 PROCEDURE — 6370000000 HC RX 637 (ALT 250 FOR IP): Performed by: INTERNAL MEDICINE

## 2025-07-11 PROCEDURE — 82962 GLUCOSE BLOOD TEST: CPT

## 2025-07-11 PROCEDURE — 2500000003 HC RX 250 WO HCPCS: Performed by: INTERNAL MEDICINE

## 2025-07-11 PROCEDURE — 97530 THERAPEUTIC ACTIVITIES: CPT

## 2025-07-11 PROCEDURE — 1100000000 HC RM PRIVATE

## 2025-07-11 PROCEDURE — 94761 N-INVAS EAR/PLS OXIMETRY MLT: CPT

## 2025-07-11 PROCEDURE — 2700000000 HC OXYGEN THERAPY PER DAY

## 2025-07-11 PROCEDURE — 6370000000 HC RX 637 (ALT 250 FOR IP)

## 2025-07-11 PROCEDURE — 97535 SELF CARE MNGMENT TRAINING: CPT

## 2025-07-11 PROCEDURE — 6360000002 HC RX W HCPCS: Performed by: INTERNAL MEDICINE

## 2025-07-11 RX ORDER — HYDRALAZINE HYDROCHLORIDE 20 MG/ML
5 INJECTION INTRAMUSCULAR; INTRAVENOUS EVERY 6 HOURS PRN
Status: DISCONTINUED | OUTPATIENT
Start: 2025-07-11 | End: 2025-07-15

## 2025-07-11 RX ADMIN — ACETAMINOPHEN 650 MG: 325 TABLET ORAL at 21:40

## 2025-07-11 RX ADMIN — ATENOLOL 25 MG: 25 TABLET ORAL at 09:01

## 2025-07-11 RX ADMIN — CITALOPRAM HYDROBROMIDE 40 MG: 20 TABLET ORAL at 09:01

## 2025-07-11 RX ADMIN — SIMETHICONE 80 MG: 80 TABLET, CHEWABLE ORAL at 10:43

## 2025-07-11 RX ADMIN — SODIUM CHLORIDE, PRESERVATIVE FREE 10 ML: 5 INJECTION INTRAVENOUS at 21:44

## 2025-07-11 RX ADMIN — ACETAMINOPHEN 650 MG: 325 TABLET ORAL at 05:10

## 2025-07-11 RX ADMIN — CYANOCOBALAMIN TAB 500 MCG 1000 MCG: 500 TAB at 09:01

## 2025-07-11 RX ADMIN — SODIUM CHLORIDE, PRESERVATIVE FREE 10 ML: 5 INJECTION INTRAVENOUS at 09:02

## 2025-07-11 RX ADMIN — METHIMAZOLE 5 MG: 5 TABLET ORAL at 09:01

## 2025-07-11 RX ADMIN — ALPRAZOLAM 0.25 MG: 0.5 TABLET ORAL at 03:51

## 2025-07-11 RX ADMIN — KETOROLAC TROMETHAMINE 15 MG: 30 INJECTION, SOLUTION INTRAMUSCULAR at 17:39

## 2025-07-11 RX ADMIN — PREDNISONE 20 MG: 20 TABLET ORAL at 09:01

## 2025-07-11 RX ADMIN — KETOROLAC TROMETHAMINE 15 MG: 30 INJECTION, SOLUTION INTRAMUSCULAR at 05:01

## 2025-07-11 RX ADMIN — OXYCODONE 2.5 MG: 5 TABLET ORAL at 10:40

## 2025-07-11 RX ADMIN — ACETAMINOPHEN 650 MG: 325 TABLET ORAL at 14:48

## 2025-07-11 ASSESSMENT — PAIN SCALES - GENERAL
PAINLEVEL_OUTOF10: 0
PAINLEVEL_OUTOF10: 2
PAINLEVEL_OUTOF10: 8
PAINLEVEL_OUTOF10: 6
PAINLEVEL_OUTOF10: 0
PAINLEVEL_OUTOF10: 4
PAINLEVEL_OUTOF10: 2
PAINLEVEL_OUTOF10: 7

## 2025-07-11 ASSESSMENT — PAIN DESCRIPTION - DESCRIPTORS
DESCRIPTORS: ACHING
DESCRIPTORS: DISCOMFORT
DESCRIPTORS: DISCOMFORT

## 2025-07-11 ASSESSMENT — PAIN DESCRIPTION - ORIENTATION
ORIENTATION: RIGHT
ORIENTATION: ANTERIOR
ORIENTATION: MID

## 2025-07-11 ASSESSMENT — PAIN DESCRIPTION - LOCATION
LOCATION: ABDOMEN
LOCATION: BACK;ABDOMEN
LOCATION: HIP
LOCATION: BACK

## 2025-07-11 ASSESSMENT — PAIN - FUNCTIONAL ASSESSMENT
PAIN_FUNCTIONAL_ASSESSMENT: PREVENTS OR INTERFERES SOME ACTIVE ACTIVITIES AND ADLS
PAIN_FUNCTIONAL_ASSESSMENT: PREVENTS OR INTERFERES SOME ACTIVE ACTIVITIES AND ADLS

## 2025-07-11 NOTE — PLAN OF CARE
Problem: Respiratory - Adult  Goal: Achieves optimal ventilation and oxygenation  7/11/2025 0016 by Skyler Khanna RN  Outcome: Progressing  7/10/2025 2338 by Maggi Cervantes RCP  Outcome: Progressing  7/10/2025 1427 by Bobbi Dodson RN  Outcome: Progressing     Problem: Pain  Goal: Verbalizes/displays adequate comfort level or baseline comfort level  7/11/2025 0016 by Skyler Khanna RN  Outcome: Progressing  7/10/2025 1427 by Bobbi Dodson RN  Outcome: Progressing     Problem: Neurosensory - Adult  Goal: Achieves stable or improved neurological status  7/11/2025 0016 by Skyler Khanna RN  Outcome: Progressing  7/10/2025 1427 by Bobbi Dodson RN  Outcome: Progressing  Goal: Achieves maximal functionality and self care  7/11/2025 0016 by Skyler Khanna RN  Outcome: Progressing  7/10/2025 1427 by Bobbi Dodson RN  Outcome: Progressing     Problem: Cardiovascular - Adult  Goal: Absence of cardiac dysrhythmias or at baseline  7/11/2025 0016 by Skyler Khanna RN  Outcome: Progressing  7/10/2025 1427 by Bobbi Dodson RN  Outcome: Progressing     Problem: Skin/Tissue Integrity - Adult  Goal: Skin integrity remains intact  Description: 1.  Monitor for areas of redness and/or skin breakdown  2.  Assess vascular access sites hourly  3.  Every 4-6 hours minimum:  Change oxygen saturation probe site  4.  Every 4-6 hours:  If on nasal continuous positive airway pressure, respiratory therapy assess nares and determine need for appliance change or resting period  7/11/2025 0016 by Skyler Khanna RN  Outcome: Progressing  7/10/2025 1427 by Bobbi Dodson RN  Outcome: Progressing  Goal: Incisions, wounds, or drain sites healing without S/S of infection  7/11/2025 0016 by Skyler Khanna RN  Outcome: Progressing  7/10/2025 1427 by Bobbi Dodson RN  Outcome: Progressing     Problem: Musculoskeletal - Adult  Goal: Return mobility to safest level of

## 2025-07-11 NOTE — CARE COORDINATION
Referrals pending to the following SNFs for skilled (if days remaining) to LTC: [facility], [facility], [facility], [facility], [facility], [facility], [facility], and [facility].    Inna Mata, [facility]  Care Management  [phone]

## 2025-07-11 NOTE — PROGRESS NOTES
End of Shift Note    Bedside shift change report given to FIDEL Mo (oncoming nurse) by Jessie Shen RN (offgoing nurse).  Report included the following information SBAR    Shift worked:  Day     Shift summary and any significant changes:    Patient up to chair during shift for about 1 hour. Pain well managed.      Concerns for physician to address:       Zone phone for oncoming shift:          Activity:  Level of Assistance: Maximum assist, patient does 25-49%  Number times ambulated in hallways past shift: 0  Number of times OOB to chair past shift: 1    Cardiac:   Cardiac Monitoring: No      Cardiac Rhythm: Sinus rhythm    Access:  Current line(s): PIV     Genitourinary:   Urinary Status: Voiding, External catheter    Respiratory:   O2 Device: Nasal cannula  Chronic home O2 use?: NO  Incentive spirometer at bedside: YES    GI:  Last BM (including prior to admit): 07/08/25  Current diet:  ADULT DIET; Regular  Passing flatus: YES    Pain Management:   Patient states pain is manageable on current regimen: YES    Skin:  Rafa Scale Score: 17  Interventions: Wound Offloading (Prevention Methods): Bed, pressure reduction mattress, Pillows    Patient Safety:  Fall Risk: Nursing Judgement-Fall Risk High(Add Comments): Yes  Fall Risk Interventions  Nursing Judgement-Fall Risk High(Add Comments): Yes  Toilet Every 2 Hours-In Advance of Need: Yes  Hourly Visual Checks: Awake, In bed, Quiet  Fall Visual Posted: Armband, Fall sign posted, Socks  Room Door Open: Yes  Alarm On: Bed  Patient Moved Closer to Nursing Station: No    Active Consults:   IP CONSULT TO INTERVENTIONAL RADIOLOGY  IP CONSULT TO ENDOCRINOLOGY  IP CONSULT TO PULMONOLOGY    Length of Stay:  Expected LOS: 8  Actual LOS: 5    Jessie Shen RN

## 2025-07-11 NOTE — PLAN OF CARE
Problem: Occupational Therapy - Adult  Goal: By Discharge: Performs self-care activities at highest level of function for planned discharge setting.  See evaluation for individualized goals.  Description: FUNCTIONAL STATUS PRIOR TO ADMISSION:  Patient questionable historian secondary to confusion. Per chart review, she was admitted from SNF rehab. She reported she was independent in ADLs and mobility prior to admission.    ,  ,  ,  ,  ,  ,  ,  ,  ,  ,       HOME SUPPORT: Patient lived with daughter and son in law.    Occupational Therapy Goals:  Initiated 7/7/2025  1.  Patient will perform grooming standing at sink with Supervision within 7 day(s).  2.  Patient will perform upper body dressing with Modified Phil Campbell within 7 day(s).  3.  Patient will perform lower body dressing with Supervision within 7 day(s).  4.  Patient will perform toilet transfers with Supervision  within 7 day(s).  5.  Patient will perform all aspects of toileting with Supervision within 7 day(s).  6.  Patient will participate in upper extremity therapeutic exercise/activities with Supervision for 5 minutes within 7 day(s).    Outcome: Progressing   OCCUPATIONAL THERAPY TREATMENT  Patient: Nicole Galeano (79 y.o. female)  Date: 7/11/2025  Primary Diagnosis: Acute encephalopathy [G93.40]  Acute midline low back pain without sciatica [M54.50]  Acute metabolic encephalopathy [G93.41]       Precautions: Bed Alarm, Fall Risk                Chart, occupational therapy assessment, plan of care, and goals were reviewed.    ASSESSMENT  Patient continues to benefit from skilled OT services and is progressing towards goals. Patient received semisupine in bed on 2L O2 and cleared for therapy by nursing. She was agreeable for session with MAX encouragement and reported having bad experience with PT in the past. She completed supine > sit with stand-by assist and completed LB dressing using tailor sitting technique. Patient completed sit >

## 2025-07-11 NOTE — PROGRESS NOTES
Spiritual Care Partner Volunteer visited patient at [facility] in [facility] on 7/11/2025   Documented by:  Chaplain Larry Iverson M.Div., [facility].   Paging Service: [phone])

## 2025-07-11 NOTE — PROGRESS NOTES
Patient became confused and agitated and states,\" get out of here, I'm going to call the police\". Provider notified about confusion and agitation.

## 2025-07-11 NOTE — PROGRESS NOTES
End of Shift Note    Bedside shift change report given to FIDEL Roman (oncoming nurse) by Skyler Khanna RN (offgoing nurse).  Report included the following information SBAR and MAR    Shift worked:  Night     Shift summary and any significant changes:    Vital signs stable, getting toradol for pain control during the shift, voiding with purewick, no acute distress noticed.     Concerns for physician to address:  None     Zone phone for oncoming shift:          Activity:  Level of Assistance: Maximum assist, patient does 25-49%  Number times ambulated in hallways past shift: 0  Number of times OOB to chair past shift: 0    Cardiac:   Cardiac Monitoring: Yes      Cardiac Rhythm: Sinus rhythm    Access:  Current line(s): PIV    Genitourinary:   Urinary Status: Voiding, External catheter    Respiratory:   O2 Device: Nasal cannula  Chronic home O2 use?: NO  Incentive spirometer at bedside: YES    GI:  Last BM (including prior to admit): 07/08/25  Current diet:  ADULT DIET; Regular  Passing flatus: YES    Pain Management:   Patient states pain is manageable on current regimen: YES    Skin:  Rafa Scale Score: 17  Interventions: Wound Offloading (Prevention Methods): Bed, pressure reduction mattress, Turning, Repositioning, Elevate heels    Patient Safety:  Fall Risk: Nursing Judgement-Fall Risk High(Add Comments): Yes  Fall Risk Interventions  Nursing Judgement-Fall Risk High(Add Comments): Yes  Toilet Every 2 Hours-In Advance of Need: Yes  Hourly Visual Checks: Awake, In bed  Fall Visual Posted: Armband, Fall sign posted, Socks  Room Door Open: Deferred to promote rest  Alarm On: Bed  Patient Moved Closer to Nursing Station: No    Active Consults:   IP CONSULT TO INTERVENTIONAL RADIOLOGY  IP CONSULT TO ENDOCRINOLOGY  IP CONSULT TO PULMONOLOGY    Length of Stay:  Expected LOS: 5  Actual LOS: 5    Skyler Khanna RN

## 2025-07-11 NOTE — PROGRESS NOTES
Hospitalist Progress Note    NAME:   Nicole Galeano   : 1945   MRN: [id number]     Date/Time: 2025 9:16 AM  Patient PCP: Tessie Martinez FNP    Estimated discharge date: ?-14  Barriers: Placement arranged by CM      Assessment / Plan:    Acute metabolic encephalopathy POA - resolved  likely related to medications including prednisone, tramadol  Recently was diagnosed to have lumbar compression fracture   started on multiple medications leading to confusion, Most likely related to medication  Exam is nonfocal.    CT head shows no acute process  Urinalysis 0-4 WBCs, 0-5 RBCs, negative bacteria, moderate epithelial   Does not matter if it is a clean-catch,  it is completely normal  Held all medications that can cause encephalopathy including prednisone, Celexa and also tramadol  Currently alert and following commands, oriented to birthday, current place, current year, current present  Pain still uncontrolled, will Cont low-dose oxycodone and see how she tolerates it  S/p T6-7, L2, L4 kyphoplasty by IR yesterday (7/10)  Will monitor closely with PT/OT today - recommends SNF/LTC? In future , CM working on it    B12 deficiency POA level 178  We will discuss with patient if this is a new diagnosis  IM B12 shots and then oral B12 at discharge  Follow-up with PCP to recheck levels      Multiple thoracic and lumbar compression fractures POA- sp Kyphoplasty by IR T6-7, L2, L4 (7/10)  MRI at [facility], T6, T7, L2, and L4 Compression fracture without retropulsion.    Refused kyphoplasty over there, was discharged to SNF returned back as meds were making her mentation worse   S/p kyphoplasty now-- Rehab placement underway  Use Tylenol and Toradol for pain control, low-dose oxycodone      Presyncope/vasovagal,   Volume depletion, received IVF bolus earlier  S/p IVF, NS - now off  it  Blood pressures improved     COPD not in exacerbation  Chronic respiratory failure due to COPD on 1 L  Rheumatoid

## 2025-07-11 NOTE — PLAN OF CARE
Problem: Physical Therapy - Adult  Goal: By Discharge: Performs mobility at highest level of function for planned discharge setting.  See evaluation for individualized goals.  Description: FUNCTIONAL STATUS PRIOR TO ADMISSION: Questionable historian. Per chart pt was at [facility] for rehab.    HOME SUPPORT PRIOR TO ADMISSION: Pt was at [facility] for rehab.    Physical Therapy Goals  Initiated 7/7/2025  1.  Patient will move from supine to sit and sit to supine in bed with independence within 7 day(s).    2.  Patient will perform sit to stand with modified independence within 7 day(s).  3.  Patient will transfer from bed to chair and chair to bed with modified independence using the least restrictive device within 7 day(s).  4.  Patient will ambulate with modified independence for 100 feet with the least restrictive device within 7 day(s).       Outcome: Progressing    PHYSICAL THERAPY TREATMENT    Patient: Nicole Galeano (79 y.o. female)  Date: 7/11/2025  Diagnosis: Acute encephalopathy [G93.40]  Acute midline low back pain without sciatica [M54.50]  Acute metabolic encephalopathy [G93.41] Acute metabolic encephalopathy      Precautions: Restrictions/Precautions  Restrictions/Precautions: Bed Alarm, Fall Risk            ASSESSMENT:  Patient continues to benefit from skilled PT services and is slowly progressing towards goals. Pt is POD1 T6, T7, L2, L4 kyphoplasty and reports improvements in pain. She is mobilizing grossly with SBA in the room using a RW. Pt currently on 3.5L/min O2 and desaturates to 89% with short bouts of ambulation. Pt continues to presents with deficits in strength and endurance. She remains below her baseline of independence and will benefit from ongoing skilled facility based therapies at discharge.         PLAN:  Patient continues to benefit from skilled intervention to address the above impairments.  Continue treatment per established plan of care.        Recommendation for

## 2025-07-11 NOTE — PLAN OF CARE
Problem: Respiratory - Adult  Goal: Achieves optimal ventilation and oxygenation  7/11/2025 1053 by Jessie Shen RN  Outcome: Progressing  7/11/2025 0016 by Skyler Khanna RN  Outcome: Progressing  7/10/2025 2338 by Maggi Cervantes RCP  Outcome: Progressing     Problem: Pain  Goal: Verbalizes/displays adequate comfort level or baseline comfort level  7/11/2025 0016 by Skyler Khanna RN  Outcome: Progressing     Problem: Neurosensory - Adult  Goal: Achieves stable or improved neurological status  7/11/2025 1053 by Jessie Shen RN  Outcome: Progressing  7/11/2025 0016 by Skyler Khanna RN  Outcome: Progressing  Goal: Achieves maximal functionality and self care  7/11/2025 0016 by Skyler Khanna RN  Outcome: Progressing     Problem: Cardiovascular - Adult  Goal: Absence of cardiac dysrhythmias or at baseline  7/11/2025 1053 by Jessie Shen RN  Outcome: Progressing  7/11/2025 0016 by Skyler Khanna RN  Outcome: Progressing     Problem: Skin/Tissue Integrity - Adult  Goal: Skin integrity remains intact  Description: 1.  Monitor for areas of redness and/or skin breakdown  2.  Assess vascular access sites hourly  3.  Every 4-6 hours minimum:  Change oxygen saturation probe site  4.  Every 4-6 hours:  If on nasal continuous positive airway pressure, respiratory therapy assess nares and determine need for appliance change or resting period  7/11/2025 1053 by Jessie Shen RN  Outcome: Progressing  Flowsheets (Taken 7/11/2025 0828)  Skin Integrity Remains Intact: Monitor for areas of redness and/or skin breakdown  7/11/2025 0016 by Skyler Khanna RN  Outcome: Progressing  Goal: Incisions, wounds, or drain sites healing without S/S of infection  Recent Flowsheet Documentation  Taken 7/11/2025 0828 by Jessie Shen RN  Incisions, Wounds, or Drain Sites Healing Without Sign and Symptoms of Infection: ADMISSION and DAILY: Assess and document risk factors for pressure ulcer

## 2025-07-12 LAB
ANION GAP SERPL CALC-SCNC: 6 MMOL/L (ref 2–12)
BASOPHILS # BLD: 0.02 K/UL (ref 0–0.1)
BASOPHILS NFR BLD: 0.2 % (ref 0–1)
BUN SERPL-MCNC: 20 MG/DL (ref 6–20)
BUN/CREAT SERPL: 38 (ref 12–20)
CALCIUM SERPL-MCNC: 9.1 MG/DL (ref 8.5–10.1)
CHLORIDE SERPL-SCNC: 102 MMOL/L (ref 97–108)
CO2 SERPL-SCNC: 30 MMOL/L (ref 21–32)
CREAT SERPL-MCNC: 0.52 MG/DL (ref 0.55–1.02)
DIFFERENTIAL METHOD BLD: ABNORMAL
EOSINOPHIL # BLD: 0.01 K/UL (ref 0–0.4)
EOSINOPHIL NFR BLD: 0.1 % (ref 0–7)
ERYTHROCYTE [DISTWIDTH] IN BLOOD BY AUTOMATED COUNT: 17.4 % (ref 11.5–14.5)
GLUCOSE SERPL-MCNC: 101 MG/DL (ref 65–100)
HCT VFR BLD AUTO: 38.9 % (ref 35–47)
HGB BLD-MCNC: 11.9 G/DL (ref 11.5–16)
IMM GRANULOCYTES # BLD AUTO: 0.09 K/UL (ref 0–0.04)
IMM GRANULOCYTES NFR BLD AUTO: 0.7 % (ref 0–0.5)
LYMPHOCYTES # BLD: 1.11 K/UL (ref 0.8–3.5)
LYMPHOCYTES NFR BLD: 8.9 % (ref 12–49)
MCH RBC QN AUTO: 30.3 PG (ref 26–34)
MCHC RBC AUTO-ENTMCNC: 30.6 G/DL (ref 30–36.5)
MCV RBC AUTO: 99 FL (ref 80–99)
MONOCYTES # BLD: 1.19 K/UL (ref 0–1)
MONOCYTES NFR BLD: 9.5 % (ref 5–13)
NEUTS SEG # BLD: 10.11 K/UL (ref 1.8–8)
NEUTS SEG NFR BLD: 80.6 % (ref 32–75)
NRBC # BLD: 0 K/UL (ref 0–0.01)
NRBC BLD-RTO: 0 PER 100 WBC
PLATELET # BLD AUTO: 258 K/UL (ref 150–400)
PMV BLD AUTO: 10.4 FL (ref 8.9–12.9)
POTASSIUM SERPL-SCNC: 4.5 MMOL/L (ref 3.5–5.1)
RBC # BLD AUTO: 3.93 M/UL (ref 3.8–5.2)
SODIUM SERPL-SCNC: 138 MMOL/L (ref 136–145)
WBC # BLD AUTO: 12.5 K/UL (ref 3.6–11)

## 2025-07-12 PROCEDURE — 85025 COMPLETE CBC W/AUTO DIFF WBC: CPT

## 2025-07-12 PROCEDURE — 6370000000 HC RX 637 (ALT 250 FOR IP): Performed by: INTERNAL MEDICINE

## 2025-07-12 PROCEDURE — 2500000003 HC RX 250 WO HCPCS: Performed by: INTERNAL MEDICINE

## 2025-07-12 PROCEDURE — 1100000000 HC RM PRIVATE

## 2025-07-12 PROCEDURE — 36415 COLL VENOUS BLD VENIPUNCTURE: CPT

## 2025-07-12 PROCEDURE — 6360000002 HC RX W HCPCS: Performed by: INTERNAL MEDICINE

## 2025-07-12 PROCEDURE — 51798 US URINE CAPACITY MEASURE: CPT

## 2025-07-12 PROCEDURE — 2700000000 HC OXYGEN THERAPY PER DAY

## 2025-07-12 PROCEDURE — 94760 N-INVAS EAR/PLS OXIMETRY 1: CPT

## 2025-07-12 PROCEDURE — 80048 BASIC METABOLIC PNL TOTAL CA: CPT

## 2025-07-12 RX ORDER — DOCUSATE SODIUM 100 MG/1
100 CAPSULE, LIQUID FILLED ORAL DAILY
Status: DISCONTINUED | OUTPATIENT
Start: 2025-07-12 | End: 2025-07-15

## 2025-07-12 RX ORDER — PROCHLORPERAZINE EDISYLATE 5 MG/ML
5 INJECTION INTRAMUSCULAR; INTRAVENOUS EVERY 6 HOURS PRN
Status: DISCONTINUED | OUTPATIENT
Start: 2025-07-12 | End: 2025-07-24

## 2025-07-12 RX ORDER — OXYCODONE HYDROCHLORIDE 5 MG/1
5 TABLET ORAL EVERY 4 HOURS PRN
Status: DISCONTINUED | OUTPATIENT
Start: 2025-07-12 | End: 2025-07-15

## 2025-07-12 RX ADMIN — OXYCODONE 2.5 MG: 5 TABLET ORAL at 02:38

## 2025-07-12 RX ADMIN — ACETAMINOPHEN 650 MG: 325 TABLET ORAL at 06:06

## 2025-07-12 RX ADMIN — ACETAMINOPHEN 650 MG: 325 TABLET ORAL at 22:11

## 2025-07-12 RX ADMIN — POLYETHYLENE GLYCOL 3350 17 G: 17 POWDER, FOR SOLUTION ORAL at 16:30

## 2025-07-12 RX ADMIN — ACETAMINOPHEN 650 MG: 325 TABLET ORAL at 15:20

## 2025-07-12 RX ADMIN — SIMETHICONE 80 MG: 80 TABLET, CHEWABLE ORAL at 16:30

## 2025-07-12 RX ADMIN — ALPRAZOLAM 0.25 MG: 0.5 TABLET ORAL at 16:29

## 2025-07-12 RX ADMIN — PROCHLORPERAZINE EDISYLATE 5 MG: 5 INJECTION INTRAMUSCULAR; INTRAVENOUS at 15:47

## 2025-07-12 RX ADMIN — DOCUSATE SODIUM 100 MG: 100 CAPSULE, LIQUID FILLED ORAL at 19:52

## 2025-07-12 RX ADMIN — ATENOLOL 25 MG: 25 TABLET ORAL at 08:58

## 2025-07-12 RX ADMIN — OXYCODONE 5 MG: 5 TABLET ORAL at 15:47

## 2025-07-12 RX ADMIN — ONDANSETRON 4 MG: 4 TABLET, ORALLY DISINTEGRATING ORAL at 11:15

## 2025-07-12 RX ADMIN — ENOXAPARIN SODIUM 40 MG: 100 INJECTION SUBCUTANEOUS at 08:58

## 2025-07-12 RX ADMIN — CITALOPRAM HYDROBROMIDE 40 MG: 20 TABLET ORAL at 08:58

## 2025-07-12 RX ADMIN — PREDNISONE 20 MG: 20 TABLET ORAL at 08:58

## 2025-07-12 RX ADMIN — ONDANSETRON 4 MG: 4 TABLET, ORALLY DISINTEGRATING ORAL at 02:11

## 2025-07-12 RX ADMIN — OXYCODONE 5 MG: 5 TABLET ORAL at 22:10

## 2025-07-12 RX ADMIN — OXYCODONE 2.5 MG: 5 TABLET ORAL at 11:14

## 2025-07-12 RX ADMIN — SODIUM CHLORIDE, PRESERVATIVE FREE 10 ML: 5 INJECTION INTRAVENOUS at 08:59

## 2025-07-12 RX ADMIN — CYANOCOBALAMIN TAB 500 MCG 1000 MCG: 500 TAB at 08:58

## 2025-07-12 RX ADMIN — SODIUM CHLORIDE, PRESERVATIVE FREE 10 ML: 5 INJECTION INTRAVENOUS at 22:04

## 2025-07-12 ASSESSMENT — PAIN SCALES - GENERAL
PAINLEVEL_OUTOF10: 8
PAINLEVEL_OUTOF10: 7
PAINLEVEL_OUTOF10: 0
PAINLEVEL_OUTOF10: 7
PAINLEVEL_OUTOF10: 5

## 2025-07-12 ASSESSMENT — PAIN DESCRIPTION - DESCRIPTORS
DESCRIPTORS: THROBBING
DESCRIPTORS: THROBBING
DESCRIPTORS: ACHING;SORE

## 2025-07-12 ASSESSMENT — PAIN DESCRIPTION - PAIN TYPE
TYPE: ACUTE PAIN
TYPE: ACUTE PAIN

## 2025-07-12 ASSESSMENT — PAIN DESCRIPTION - LOCATION
LOCATION: BACK

## 2025-07-12 ASSESSMENT — PAIN DESCRIPTION - ONSET
ONSET: GRADUAL
ONSET: GRADUAL

## 2025-07-12 ASSESSMENT — PAIN DESCRIPTION - ORIENTATION
ORIENTATION: MID;UPPER
ORIENTATION: MID;UPPER
ORIENTATION: UPPER;LOWER

## 2025-07-12 ASSESSMENT — PAIN - FUNCTIONAL ASSESSMENT
PAIN_FUNCTIONAL_ASSESSMENT: ACTIVITIES ARE NOT PREVENTED
PAIN_FUNCTIONAL_ASSESSMENT: ACTIVITIES ARE NOT PREVENTED

## 2025-07-12 ASSESSMENT — PAIN DESCRIPTION - FREQUENCY
FREQUENCY: INTERMITTENT
FREQUENCY: INTERMITTENT

## 2025-07-12 NOTE — PLAN OF CARE
Problem: Respiratory - Adult  Goal: Achieves optimal ventilation and oxygenation  7/12/2025 0904 by Gaby Means RN  Outcome: Progressing  7/12/2025 0156 by Chely Lynn RN  Outcome: Progressing     Problem: Pain  Goal: Verbalizes/displays adequate comfort level or baseline comfort level  Outcome: Progressing  Flowsheets (Taken 7/12/2025 0900)  Verbalizes/displays adequate comfort level or baseline comfort level: Encourage patient to monitor pain and request assistance     Problem: Neurosensory - Adult  Goal: Achieves stable or improved neurological status  7/12/2025 0904 by Gaby Means RN  Outcome: Progressing  7/12/2025 0156 by Chely Lynn RN  Outcome: Progressing  Goal: Achieves maximal functionality and self care  Outcome: Progressing     Problem: Cardiovascular - Adult  Goal: Absence of cardiac dysrhythmias or at baseline  Outcome: Progressing     Problem: Skin/Tissue Integrity - Adult  Goal: Skin integrity remains intact  Description: 1.  Monitor for areas of redness and/or skin breakdown  2.  Assess vascular access sites hourly  3.  Every 4-6 hours minimum:  Change oxygen saturation probe site  4.  Every 4-6 hours:  If on nasal continuous positive airway pressure, respiratory therapy assess nares and determine need for appliance change or resting period  7/12/2025 0904 by Gaby Means RN  Outcome: Progressing  7/12/2025 0156 by Chely Lynn RN  Outcome: Progressing  Flowsheets (Taken 7/11/2025 1912)  Skin Integrity Remains Intact: Monitor for areas of redness and/or skin breakdown  Goal: Incisions, wounds, or drain sites healing without S/S of infection  Outcome: Progressing     Problem: Musculoskeletal - Adult  Goal: Return mobility to safest level of function  7/12/2025 0904 by Gaby Means RN  Outcome: Progressing  7/12/2025 0156 by Chely Lynn RN  Outcome: Progressing  Goal: Return ADL status to a safe level of function  Outcome: Progressing     Problem: Gastrointestinal -

## 2025-07-12 NOTE — PLAN OF CARE
Problem: Respiratory - Adult  Goal: Achieves optimal ventilation and oxygenation  Outcome: Progressing     Problem: Neurosensory - Adult  Goal: Achieves stable or improved neurological status  Outcome: Progressing     Problem: Skin/Tissue Integrity - Adult  Goal: Skin integrity remains intact  Description: 1.  Monitor for areas of redness and/or skin breakdown  2.  Assess vascular access sites hourly  3.  Every 4-6 hours minimum:  Change oxygen saturation probe site  4.  Every 4-6 hours:  If on nasal continuous positive airway pressure, respiratory therapy assess nares and determine need for appliance change or resting period  Outcome: Progressing  Flowsheets (Taken 7/11/2025 1912)  Skin Integrity Remains Intact: Monitor for areas of redness and/or skin breakdown     Problem: Musculoskeletal - Adult  Goal: Return mobility to safest level of function  Outcome: Progressing     Problem: Skin/Tissue Integrity  Goal: Skin integrity remains intact  Description: 1.  Monitor for areas of redness and/or skin breakdown  2.  Assess vascular access sites hourly  3.  Every 4-6 hours minimum:  Change oxygen saturation probe site  4.  Every 4-6 hours:  If on nasal continuous positive airway pressure, respiratory therapy assess nares and determine need for appliance change or resting period  Outcome: Progressing  Flowsheets (Taken 7/11/2025 1912)  Skin Integrity Remains Intact: Monitor for areas of redness and/or skin breakdown

## 2025-07-12 NOTE — PROGRESS NOTES
End of Shift Note    Bedside shift change report given to FIDEL Perez (oncoming nurse) by Gaby Russ RN (offgoing nurse).  Report included the following information SBAR    Shift worked:  1740-8367     Shift summary and any significant changes:    Per Pt, no BM since kyphoplasty procedure performed on 7/10. Nausea and dull pain in RUQ of abdomin remains. Provider notified. By end of shift, both the pain and the nausea decreased to the point where the Pt was able to sleep.     The decrease this shift correlated with new medications changes by Dr. Petersen for pain, nausea and bowel movement encouragement. No bowel movement occurred this shift. POC to continue with PRN's for pain, nausea and bowel movements passed on to oncoming shift.         Concerns for physician to address: Thank you for your help today. Bowel movement still pending.       Zone phone for oncoming shift:  [phone]       Activity:  Level of Assistance: Maximum assist, patient does 25-49%  Number times ambulated in hallways past shift: 0  Number of times OOB to chair past shift: 3    Cardiac:   Cardiac Monitoring: Yes      Cardiac Rhythm: Sinus rhythm    Access:  Current line(s): PIV    Genitourinary:   Urinary Status: Voiding    Respiratory:   O2 Device: Nasal cannula  Chronic home O2 use?: YES  Incentive spirometer at bedside: YES    GI:  Last BM (including prior to admit): 07/08/25  Current diet:  ADULT DIET; Regular  Passing flatus: YES    Pain Management:   Patient states pain is manageable on current regimen: YES    Skin:  Rafa Scale Score: 17  Interventions: Wound Offloading (Prevention Methods): Turning, Repositioning, Pillows    Patient Safety:  Fall Risk: Nursing Judgement-Fall Risk High(Add Comments): Yes  Fall Risk Interventions  Nursing Judgement-Fall Risk High(Add Comments): Yes  Toilet Every 2 Hours-In Advance of Need: Yes  Hourly Visual Checks: Awake, In bed, Quiet  Fall Visual Posted: Armband, Fall sign posted, Socks  Room Door Open:

## 2025-07-12 NOTE — PROGRESS NOTES
Hospitalist Progress Note    NAME:   Nicole Galeano   : 1945   MRN: [id number]     Date/Time: 2025 11:59 AM  Patient PCP: Tessie Martinez FNP    Estimated discharge date: ?-14  Barriers: Placement arranged by CM      Assessment / Plan:    Acute metabolic encephalopathy POA - resolved  likely related to medications including prednisone, tramadol  Recently was diagnosed to have lumbar compression fracture   started on multiple medications leading to confusion, Most likely related to medication  Exam is nonfocal.    CT head shows no acute process  Urinalysis 0-4 WBCs, 0-5 RBCs, negative bacteria, moderate epithelial   Does not matter if it is a clean-catch,  it is completely normal  Held all medications that can cause encephalopathy including prednisone, Celexa and also tramadol  Currently alert and following commands, oriented to birthday, current place, current year, current present  Pain still uncontrolled, will Cont low-dose oxycodone and see how she tolerates it  S/p T6-7, L2, L4 kyphoplasty by IR yesterday (7/10)  Will monitor closely with PT/OT - recommends SNF/LTC? In future , CM working on it    B12 deficiency POA level 178  We will discuss with patient if this is a new diagnosis  IM B12 shots and then oral B12 at discharge  Follow-up with PCP to recheck levels      Multiple thoracic and lumbar compression fractures POA- sp Kyphoplasty by IR T6-7, L2, L4 (7/10)  MRI at [facility], T6, T7, L2, and L4 Compression fracture without retropulsion.    Refused kyphoplasty over there, was discharged to SNF returned back as meds were making her mentation worse   S/p kyphoplasty now-- Rehab placement underway  Use Tylenol and Toradol for pain control, low-dose oxycodone      Presyncope/vasovagal,   Volume depletion, received IVF bolus earlier  S/p IVF, NS - now off  it  Blood pressures improved     COPD not in exacerbation  Chronic respiratory failure due to COPD on 1 L  Rheumatoid

## 2025-07-12 NOTE — PROGRESS NOTES
End of Shift Note    Bedside shift change report given to Gaby PARRA (oncoming nurse) by Chely Lynn RN (offgoing nurse).  Report included the following information SBAR, Kardex, MAR, and Recent Results    Shift worked: 1900-0730   Shift summary and any significant changes:    Pt remained stable throughout shift. Scheduled meds given-PRN oxycodone given for pain. Q4 vitals performed. Ordered labs drawn and sent. Pt up to bathroom x1 assist-pt tolerated well. Caring rounds completed.        Chely Lynn RN

## 2025-07-13 ENCOUNTER — APPOINTMENT (OUTPATIENT)
Facility: HOSPITAL | Age: 80
End: 2025-07-13
Payer: MEDICARE

## 2025-07-13 PROCEDURE — 6360000002 HC RX W HCPCS: Performed by: INTERNAL MEDICINE

## 2025-07-13 PROCEDURE — 6370000000 HC RX 637 (ALT 250 FOR IP): Performed by: NURSE PRACTITIONER

## 2025-07-13 PROCEDURE — 1100000000 HC RM PRIVATE

## 2025-07-13 PROCEDURE — 94760 N-INVAS EAR/PLS OXIMETRY 1: CPT

## 2025-07-13 PROCEDURE — 6370000000 HC RX 637 (ALT 250 FOR IP): Performed by: INTERNAL MEDICINE

## 2025-07-13 PROCEDURE — 2500000003 HC RX 250 WO HCPCS: Performed by: INTERNAL MEDICINE

## 2025-07-13 PROCEDURE — 6360000002 HC RX W HCPCS: Performed by: NURSE PRACTITIONER

## 2025-07-13 PROCEDURE — 74018 RADEX ABDOMEN 1 VIEW: CPT

## 2025-07-13 RX ORDER — KETOROLAC TROMETHAMINE 30 MG/ML
30 INJECTION, SOLUTION INTRAMUSCULAR; INTRAVENOUS EVERY 6 HOURS PRN
Status: DISCONTINUED | OUTPATIENT
Start: 2025-07-13 | End: 2025-07-15

## 2025-07-13 RX ORDER — LACTULOSE 10 G/15ML
30 SOLUTION ORAL 2 TIMES DAILY
Status: DISCONTINUED | OUTPATIENT
Start: 2025-07-13 | End: 2025-07-15

## 2025-07-13 RX ORDER — SODIUM PHOSPHATE, DIBASIC AND SODIUM PHOSPHATE, MONOBASIC 7; 19 G/230ML; G/230ML
1 ENEMA RECTAL ONCE
Status: COMPLETED | OUTPATIENT
Start: 2025-07-13 | End: 2025-07-13

## 2025-07-13 RX ORDER — POLYETHYLENE GLYCOL 3350 17 G/17G
17 POWDER, FOR SOLUTION ORAL 2 TIMES DAILY
Status: DISCONTINUED | OUTPATIENT
Start: 2025-07-13 | End: 2025-07-15

## 2025-07-13 RX ORDER — OXYCODONE HYDROCHLORIDE 5 MG/1
2.5 TABLET ORAL
Refills: 0 | Status: DISCONTINUED | OUTPATIENT
Start: 2025-07-13 | End: 2025-07-15

## 2025-07-13 RX ORDER — LIDOCAINE 4 G/G
3 PATCH TOPICAL DAILY
Status: DISCONTINUED | OUTPATIENT
Start: 2025-07-13 | End: 2025-07-31

## 2025-07-13 RX ORDER — KETOROLAC TROMETHAMINE 30 MG/ML
15 INJECTION, SOLUTION INTRAMUSCULAR; INTRAVENOUS ONCE
Status: COMPLETED | OUTPATIENT
Start: 2025-07-13 | End: 2025-07-13

## 2025-07-13 RX ORDER — TRAZODONE HYDROCHLORIDE 50 MG/1
50 TABLET ORAL NIGHTLY
Status: DISCONTINUED | OUTPATIENT
Start: 2025-07-13 | End: 2025-07-18

## 2025-07-13 RX ADMIN — POLYETHYLENE GLYCOL 3350 17 G: 17 POWDER, FOR SOLUTION ORAL at 22:48

## 2025-07-13 RX ADMIN — KETOROLAC TROMETHAMINE 30 MG: 30 INJECTION, SOLUTION INTRAMUSCULAR at 23:01

## 2025-07-13 RX ADMIN — SODIUM PHOSPHATE, DIBASIC AND SODIUM PHOSPHATE, MONOBASIC 1 ENEMA: 7; 19 ENEMA RECTAL at 11:01

## 2025-07-13 RX ADMIN — KETOROLAC TROMETHAMINE 30 MG: 30 INJECTION, SOLUTION INTRAMUSCULAR at 16:29

## 2025-07-13 RX ADMIN — LACTULOSE 30 G: 10 SOLUTION ORAL at 22:49

## 2025-07-13 RX ADMIN — ALPRAZOLAM 0.25 MG: 0.5 TABLET ORAL at 00:18

## 2025-07-13 RX ADMIN — SODIUM CHLORIDE, PRESERVATIVE FREE 10 ML: 5 INJECTION INTRAVENOUS at 20:30

## 2025-07-13 RX ADMIN — ENOXAPARIN SODIUM 40 MG: 100 INJECTION SUBCUTANEOUS at 08:50

## 2025-07-13 RX ADMIN — ACETAMINOPHEN 650 MG: 325 TABLET ORAL at 11:00

## 2025-07-13 RX ADMIN — SODIUM CHLORIDE, PRESERVATIVE FREE 5 ML: 5 INJECTION INTRAVENOUS at 08:54

## 2025-07-13 RX ADMIN — PREDNISONE 20 MG: 20 TABLET ORAL at 08:52

## 2025-07-13 RX ADMIN — ACETAMINOPHEN 650 MG: 325 TABLET ORAL at 20:29

## 2025-07-13 RX ADMIN — KETOROLAC TROMETHAMINE 15 MG: 30 INJECTION, SOLUTION INTRAMUSCULAR at 00:40

## 2025-07-13 RX ADMIN — OXYCODONE 5 MG: 5 TABLET ORAL at 13:24

## 2025-07-13 RX ADMIN — ACETAMINOPHEN 650 MG: 325 TABLET ORAL at 04:33

## 2025-07-13 RX ADMIN — CITALOPRAM HYDROBROMIDE 40 MG: 20 TABLET ORAL at 08:52

## 2025-07-13 ASSESSMENT — PAIN DESCRIPTION - ORIENTATION
ORIENTATION: ANTERIOR;LEFT;RIGHT;MID
ORIENTATION: RIGHT;ANTERIOR
ORIENTATION: ANTERIOR;RIGHT
ORIENTATION: RIGHT;LEFT;ANTERIOR;MID
ORIENTATION: ANTERIOR;RIGHT;MID
ORIENTATION: RIGHT
ORIENTATION: ANTERIOR;RIGHT

## 2025-07-13 ASSESSMENT — PAIN SCALES - GENERAL
PAINLEVEL_OUTOF10: 7
PAINLEVEL_OUTOF10: 6
PAINLEVEL_OUTOF10: 7
PAINLEVEL_OUTOF10: 0
PAINLEVEL_OUTOF10: 3
PAINLEVEL_OUTOF10: 0
PAINLEVEL_OUTOF10: 5
PAINLEVEL_OUTOF10: 0
PAINLEVEL_OUTOF10: 7
PAINLEVEL_OUTOF10: 0
PAINLEVEL_OUTOF10: 10
PAINLEVEL_OUTOF10: 5
PAINLEVEL_OUTOF10: 0
PAINLEVEL_OUTOF10: 7
PAINLEVEL_OUTOF10: 5
PAINLEVEL_OUTOF10: 8
PAINLEVEL_OUTOF10: 0

## 2025-07-13 ASSESSMENT — PAIN DESCRIPTION - PAIN TYPE
TYPE: ACUTE PAIN

## 2025-07-13 ASSESSMENT — PAIN DESCRIPTION - DESCRIPTORS
DESCRIPTORS: ACHING
DESCRIPTORS: ACHING
DESCRIPTORS: ACHING;DISCOMFORT
DESCRIPTORS: ACHING;DISCOMFORT
DESCRIPTORS: SHARP
DESCRIPTORS: ACHING
DESCRIPTORS: ACHING;DISCOMFORT
DESCRIPTORS: SHARP
DESCRIPTORS: SHARP

## 2025-07-13 ASSESSMENT — PAIN DESCRIPTION - ONSET
ONSET: ON-GOING

## 2025-07-13 ASSESSMENT — PAIN DESCRIPTION - FREQUENCY
FREQUENCY: INTERMITTENT

## 2025-07-13 ASSESSMENT — PAIN DESCRIPTION - LOCATION
LOCATION: ABDOMEN

## 2025-07-13 NOTE — PROGRESS NOTES
End of Shift Note    Bedside shift change report given to Skyler PARRA (oncoming nurse) by Chely Gibbons RN (offgoing nurse).  Report included the following information SBAR, Kardex, ED Summary, Procedure Summary, Intake/Output, MAR, Accordion, Recent Results, and Med Rec Status    Shift worked:  8983-6894     Shift summary and any significant changes:     Lots of medication education needed (Miralax, colace, oxy...)     Concerns for physician to address:  Constipation     Zone phone for oncoming shift:   [phone]       Activity:  Level of Assistance: Moderate assist, patient does 50-74%  Number times ambulated in hallways past shift: 0  Number of times OOB to chair past shift: 0    Cardiac:   Cardiac Monitoring: Yes      Cardiac Rhythm: Sinus rhythm    Access:  Current line(s): PIV     Genitourinary:   Urinary Status: Voiding, Bathroom privileges    Respiratory:   O2 Device: None (Room air)  Chronic home O2 use?: YES  Incentive spirometer at bedside: YES    GI:  Last BM (including prior to admit): 07/08/25  Current diet:  ADULT DIET; Regular; Pune juice with every meal  Passing flatus: YES    Pain Management:   Patient states pain is manageable on current regimen: YES    Skin:  Rafa Scale Score: 18  Interventions: Wound Offloading (Prevention Methods): Repositioning, Turning, Elevate heels    Patient Safety:  Fall Risk: Nursing Judgement-Fall Risk High(Add Comments): Yes  Fall Risk Interventions  Nursing Judgement-Fall Risk High(Add Comments): Yes  Toilet Every 2 Hours-In Advance of Need: Yes  Hourly Visual Checks: In bed, Awake, Quiet  Fall Visual Posted: Armband, Fall sign posted, Socks  Room Door Open: Yes  Alarm On: Bed  Patient Moved Closer to Nursing Station: No    Active Consults:   IP CONSULT TO INTERVENTIONAL RADIOLOGY  IP CONSULT TO ENDOCRINOLOGY  IP CONSULT TO PULMONOLOGY  IP CONSULT TO PSYCHIATRY    Length of Stay:  Expected LOS: 8  Actual LOS: 7    Chely Gibbons, RN

## 2025-07-13 NOTE — PROGRESS NOTES
Hospitalist Progress Note    NAME:   Nicole Galeano   : 1945   MRN: [id number]     Date/Time: 2025 10:20 AM  Patient PCP: Tessie Martinez FNP    Estimated discharge date: ?, NH  Barriers: NH Placement arranged by CM      Assessment / Plan:    Acute metabolic encephalopathy POA - resolved  likely related to medications including prednisone, tramadol  Recently was diagnosed to have lumbar compression fracture   started on multiple medications leading to confusion, Most likely related to medication  Exam is nonfocal.    CT head shows no acute process  Urinalysis 0-4 WBCs, 0-5 RBCs, negative bacteria, moderate epithelial   Does not matter if it is a clean-catch,  it is completely normal  Held all medications that can cause encephalopathy including prednisone, Celexa and also tramadol  Currently alert and following commands, oriented to birthday, current place, current year, current present  Pain still uncontrolled, will Cont low-dose oxycodone and see how she tolerates it  Add aggressive Bowel regimen as pt complaining of Constipation  S/p T6-7, L2, L4 kyphoplasty by IR (7/10)  Will monitor closely with PT/OT - recommends SNF/LTC? In future , CM working on it        B12 deficiency POA level 178  We will discuss with patient if this is a new diagnosis  IM B12 shots and then oral B12 at discharge  Follow-up with PCP to recheck levels      Multiple thoracic and lumbar compression fractures POA- sp Kyphoplasty by IR T6-7, L2, L4 (7/10)  MRI at [facility], T6, T7, L2, and L4 Compression fracture without retropulsion.    Refused kyphoplasty over there, was discharged to SNF returned back as meds were making her mentation worse   S/p kyphoplasty now-- Rehab placement underway  Use Tylenol and Toradol for pain control, low-dose oxycodone      Presyncope/vasovagal,   Volume depletion, received IVF bolus earlier  S/p IVF, NS - now off  it  Blood pressures improved     COPD not in  Most recent are:  Patient Vitals for the past 24 hrs:   BP Temp Temp src Pulse Resp SpO2   07/13/25 0433 121/66 97.9 °F (36.6 °C) Oral 94 20 90 %   07/13/25 0023 (!) 140/81 97.7 °F (36.5 °C) Oral 67 20 90 %   07/12/25 2240 -- -- -- -- 20 --   07/12/25 2210 -- -- -- -- 20 --   07/12/25 2105 (!) 145/89 98.2 °F (36.8 °C) Oral 62 20 98 %   07/12/25 1530 (!) 140/85 98.2 °F (36.8 °C) Oral 64 16 96 %   07/12/25 1103 131/89 98.1 °F (36.7 °C) -- 66 16 95 %         Intake/Output Summary (Last 24 hours) at 7/13/2025 1020  Last data filed at 7/12/2025 2240  Gross per 24 hour   Intake 250 ml   Output 0 ml   Net 250 ml          I had a face to face encounter and independently examined this patient on 7/13/2025, as outlined below:  PHYSICAL EXAM:  General: Alert, cooperative  EENT:  EOMI. Anicteric sclerae.  Resp:  CTA bilaterally, no wheezing or rales.  No accessory muscle use  CV:  Regular  rhythm,  No edema  GI:  Soft, Non distended, Non tender.  +Bowel sounds  Neurologic:  Alert and oriented X 3, normal speech,   Psych:   Good insight. Not anxious nor agitated  Skin:  No rashes.  No jaundice    Reviewed most current lab test results and cultures  YES  Reviewed most current radiology test results   YES  Review and summation of old records today    NO  Reviewed patient's current orders and MAR    YES  PMH/ reviewed - no change compared to H&P  ________________________________________________________________________  Care Plan discussed with:    Comments   Patient x    Family      RN x    Care Manager     Consultant                       x Multidiciplinary team rounds were held today with , nursing, pharmacist and clinical coordinator.  Patient's plan of care was discussed; medications were reviewed and discharge planning was addressed.     ________________________________________________________________________  Total NON critical care TIME:  30 Minutes    Total CRITICAL CARE TIME Spent:   Minutes non procedure

## 2025-07-13 NOTE — PROGRESS NOTES
.End of Shift Note    Bedside shift change report given to FIDEL ZEPEDA (oncoming nurse) by Catalina Garcia RN (offgoing nurse).  Report included the following information SBAR, Kardex, and MAR    Shift worked:  3036-2833     Shift summary and any significant changes:    Medications given per MAR and education provided. PRN Tramadol given once for pain in the abdomen/ side area. PRN Xanax given x1 for anxiety. Provider notified of immense pain patient is having despite pain medications, KUB ordered and done. Still no bowel movement. Patient denies the urge to go but continues to have sharp pains. Heat pack and lidocaine patch applied for some relief, patient had periods of relief before starting back up again. Patient is up x1 with a walker to the bathroom. Other than pain, no symptoms noted. Rounding and hourly care complete.         Concerns for physician to address: Patient does not want anymore miralax or stool softner, seems to make worse. She may need something stronger for pain, NP on call overnight told me not to give her any narcotics.          Catalina Garcia RN

## 2025-07-13 NOTE — PLAN OF CARE
Problem: Respiratory - Adult  Goal: Achieves optimal ventilation and oxygenation  Outcome: Progressing  Flowsheets (Taken 7/13/2025 0023)  Achieves optimal ventilation and oxygenation: Assess for changes in respiratory status     Problem: Skin/Tissue Integrity - Adult  Goal: Skin integrity remains intact  Description: 1.  Monitor for areas of redness and/or skin breakdown  2.  Assess vascular access sites hourly  3.  Every 4-6 hours minimum:  Change oxygen saturation probe site  4.  Every 4-6 hours:  If on nasal continuous positive airway pressure, respiratory therapy assess nares and determine need for appliance change or resting period  Outcome: Progressing  Flowsheets  Taken 7/13/2025 0023 by Catalina Garcia RN  Skin Integrity Remains Intact: Monitor for areas of redness and/or skin breakdown  Taken 7/12/2025 2105 by Ana Malone RN  Skin Integrity Remains Intact: Monitor for areas of redness and/or skin breakdown     Problem: Genitourinary - Adult  Goal: Absence of urinary retention  Outcome: Progressing  Flowsheets  Taken 7/13/2025 0023 by Catalina Garcia RN  Absence of urinary retention: Monitor intake/output and perform bladder scan as needed  Taken 7/12/2025 2105 by Ana Malone RN  Absence of urinary retention: Monitor intake/output and perform bladder scan as needed     Problem: Safety - Adult  Goal: Free from fall injury  Outcome: Progressing  Flowsheets  Taken 7/13/2025 0023 by Catalina Garcia RN  Free From Fall Injury: Instruct family/caregiver on patient safety  Taken 7/12/2025 2105 by Ana Malone RN  Free From Fall Injury: Instruct family/caregiver on patient safety     Problem: Skin/Tissue Integrity  Goal: Skin integrity remains intact  Description: 1.  Monitor for areas of redness and/or skin breakdown  2.  Assess vascular access sites hourly  3.  Every 4-6 hours minimum:  Change oxygen saturation probe site  4.  Every 4-6 hours:  If on nasal continuous positive airway pressure,

## 2025-07-13 NOTE — PROGRESS NOTES
End of Shift Note    Bedside shift change report given to FIDEL Arnold (oncoming nurse) by Ana Malone RN (offgoing nurse).  Report included the following information SBAR, Kardex, Intake/Output, and MAR    Shift worked:  1900 - 2330     Shift summary and any significant changes:     Medications administered per MAR, education provided.  Oxycodone X1 for back pain with good effect.  Patient repositions self, no s/s of distress.  Caring rounds completed.     Concerns for physician to address:       Zone phone for oncoming shift:          Activity:  Level of Assistance: Maximum assist, patient does 25-49%    Cardiac:   Cardiac Monitoring:  yes - SR    Access:  Current line(s): PIV     Genitourinary:   Urinary Status: Voiding    Respiratory:   O2 Device: None (Room air)    GI:  Current diet: ADULT DIET; Regular    Pain Management:   Patient states pain is manageable on current regimen: YES    Skin:  Rafa Scale Score: 17  Interventions: Wound Offloading (Prevention Methods): Turning, Repositioning, Pillows  Pressure injury: no    Patient Safety:  Fall Score: Fish Total Score: 85  Fall Risk Interventions  Nursing Judgement-Fall Risk High(Add Comments): Yes  Toilet Every 2 Hours-In Advance of Need: Yes  Hourly Visual Checks: Awake, In bed, Quiet  Fall Visual Posted: Armband, Fall sign posted, Socks  Room Door Open: Yes  Alarm On: Bed  Patient Moved Closer to Nursing Station: No    Active Consults:  IP CONSULT TO INTERVENTIONAL RADIOLOGY  IP CONSULT TO ENDOCRINOLOGY  IP CONSULT TO PULMONOLOGY    Length of Stay:  Expected LOS: 8  Actual LOS: 7      Ana Malone RN

## 2025-07-14 ENCOUNTER — APPOINTMENT (OUTPATIENT)
Facility: HOSPITAL | Age: 80
End: 2025-07-14
Payer: MEDICARE

## 2025-07-14 LAB
ALBUMIN SERPL-MCNC: 1.9 G/DL (ref 3.5–5)
ALBUMIN/GLOB SERPL: 0.4 (ref 1.1–2.2)
ALP SERPL-CCNC: 307 U/L (ref 45–117)
ALT SERPL-CCNC: 19 U/L (ref 12–78)
ANION GAP SERPL CALC-SCNC: 13 MMOL/L (ref 2–12)
APPEARANCE UR: CLEAR
AST SERPL-CCNC: 34 U/L (ref 15–37)
BACTERIA URNS QL MICRO: NEGATIVE /HPF
BASOPHILS # BLD: 0 K/UL (ref 0–0.1)
BASOPHILS NFR BLD: 0 % (ref 0–1)
BILIRUB SERPL-MCNC: 0.9 MG/DL (ref 0.2–1)
BILIRUB UR QL: NEGATIVE
BUN SERPL-MCNC: 47 MG/DL (ref 6–20)
BUN/CREAT SERPL: 19 (ref 12–20)
CALCIUM SERPL-MCNC: 10 MG/DL (ref 8.5–10.1)
CHLORIDE SERPL-SCNC: 93 MMOL/L (ref 97–108)
CO2 SERPL-SCNC: 27 MMOL/L (ref 21–32)
COLOR UR: ABNORMAL
COMMENT:: NORMAL
CREAT SERPL-MCNC: 2.53 MG/DL (ref 0.55–1.02)
DIFFERENTIAL METHOD BLD: ABNORMAL
EOSINOPHIL # BLD: 0 K/UL (ref 0–0.4)
EOSINOPHIL NFR BLD: 0 % (ref 0–7)
EPITH CASTS URNS QL MICRO: ABNORMAL /LPF
ERYTHROCYTE [DISTWIDTH] IN BLOOD BY AUTOMATED COUNT: 18.4 % (ref 11.5–14.5)
GLOBULIN SER CALC-MCNC: 4.8 G/DL (ref 2–4)
GLUCOSE BLD STRIP.AUTO-MCNC: 122 MG/DL (ref 65–117)
GLUCOSE BLD STRIP.AUTO-MCNC: 161 MG/DL (ref 65–117)
GLUCOSE SERPL-MCNC: 122 MG/DL (ref 65–100)
GLUCOSE UR STRIP.AUTO-MCNC: NEGATIVE MG/DL
GRAN CASTS URNS QL MICRO: ABNORMAL /LPF
HCT VFR BLD AUTO: 47.5 % (ref 35–47)
HGB BLD-MCNC: 14.7 G/DL (ref 11.5–16)
HGB UR QL STRIP: NEGATIVE
IMM GRANULOCYTES # BLD AUTO: 0 K/UL (ref 0–0.04)
IMM GRANULOCYTES NFR BLD AUTO: 0 % (ref 0–0.5)
KETONES UR QL STRIP.AUTO: ABNORMAL MG/DL
LACTATE SERPL-SCNC: 5 MMOL/L (ref 0.4–2)
LEUKOCYTE ESTERASE UR QL STRIP.AUTO: ABNORMAL
LYMPHOCYTES # BLD: 1.28 K/UL (ref 0.8–3.5)
LYMPHOCYTES NFR BLD: 8 % (ref 12–49)
MAGNESIUM SERPL-MCNC: 2.1 MG/DL (ref 1.6–2.4)
MCH RBC QN AUTO: 30.2 PG (ref 26–34)
MCHC RBC AUTO-ENTMCNC: 30.9 G/DL (ref 30–36.5)
MCV RBC AUTO: 97.5 FL (ref 80–99)
MONOCYTES # BLD: 0.32 K/UL (ref 0–1)
MONOCYTES NFR BLD: 2 % (ref 5–13)
NEUTS BAND NFR BLD MANUAL: 2 %
NEUTS SEG # BLD: 14.4 K/UL (ref 1.8–8)
NEUTS SEG NFR BLD: 88 % (ref 32–75)
NITRITE UR QL STRIP.AUTO: NEGATIVE
NRBC # BLD: 0.04 K/UL (ref 0–0.01)
NRBC BLD-RTO: 0.3 PER 100 WBC
OTHER: ABNORMAL
PH UR STRIP: 5.5 (ref 5–8)
PHOSPHATE SERPL-MCNC: 6.6 MG/DL (ref 2.6–4.7)
PLATELET # BLD AUTO: 355 K/UL (ref 150–400)
PMV BLD AUTO: 11.3 FL (ref 8.9–12.9)
POTASSIUM SERPL-SCNC: 4.9 MMOL/L (ref 3.5–5.1)
PROCALCITONIN SERPL-MCNC: 43.08 NG/ML
PROT SERPL-MCNC: 6.7 G/DL (ref 6.4–8.2)
PROT UR STRIP-MCNC: ABNORMAL MG/DL
RBC # BLD AUTO: 4.87 M/UL (ref 3.8–5.2)
RBC #/AREA URNS HPF: ABNORMAL /HPF (ref 0–5)
RBC MORPH BLD: ABNORMAL
SERVICE CMNT-IMP: ABNORMAL
SERVICE CMNT-IMP: ABNORMAL
SODIUM SERPL-SCNC: 133 MMOL/L (ref 136–145)
SP GR UR REFRACTOMETRY: 1.02
SPECIMEN HOLD: NORMAL
URINE CULTURE IF INDICATED: ABNORMAL
UROBILINOGEN UR QL STRIP.AUTO: 1 EU/DL (ref 0.2–1)
WBC # BLD AUTO: 16 K/UL (ref 3.6–11)
WBC URNS QL MICRO: ABNORMAL /HPF (ref 0–4)

## 2025-07-14 PROCEDURE — 94760 N-INVAS EAR/PLS OXIMETRY 1: CPT

## 2025-07-14 PROCEDURE — 82962 GLUCOSE BLOOD TEST: CPT

## 2025-07-14 PROCEDURE — 2580000003 HC RX 258

## 2025-07-14 PROCEDURE — 80053 COMPREHEN METABOLIC PANEL: CPT

## 2025-07-14 PROCEDURE — 6370000000 HC RX 637 (ALT 250 FOR IP): Performed by: INTERNAL MEDICINE

## 2025-07-14 PROCEDURE — 6370000000 HC RX 637 (ALT 250 FOR IP): Performed by: PHYSICIAN ASSISTANT

## 2025-07-14 PROCEDURE — 84145 PROCALCITONIN (PCT): CPT

## 2025-07-14 PROCEDURE — 36415 COLL VENOUS BLD VENIPUNCTURE: CPT

## 2025-07-14 PROCEDURE — 6360000002 HC RX W HCPCS: Performed by: INTERNAL MEDICINE

## 2025-07-14 PROCEDURE — 2500000003 HC RX 250 WO HCPCS: Performed by: INTERNAL MEDICINE

## 2025-07-14 PROCEDURE — 83605 ASSAY OF LACTIC ACID: CPT

## 2025-07-14 PROCEDURE — 74177 CT ABD & PELVIS W/CONTRAST: CPT

## 2025-07-14 PROCEDURE — 6360000004 HC RX CONTRAST MEDICATION

## 2025-07-14 PROCEDURE — 6360000002 HC RX W HCPCS

## 2025-07-14 PROCEDURE — 81001 URINALYSIS AUTO W/SCOPE: CPT

## 2025-07-14 PROCEDURE — 85025 COMPLETE CBC W/AUTO DIFF WBC: CPT

## 2025-07-14 PROCEDURE — 2580000003 HC RX 258: Performed by: INTERNAL MEDICINE

## 2025-07-14 PROCEDURE — 2060000000 HC ICU INTERMEDIATE R&B

## 2025-07-14 PROCEDURE — 84100 ASSAY OF PHOSPHORUS: CPT

## 2025-07-14 PROCEDURE — 83735 ASSAY OF MAGNESIUM: CPT

## 2025-07-14 PROCEDURE — 87040 BLOOD CULTURE FOR BACTERIA: CPT

## 2025-07-14 RX ORDER — SODIUM CHLORIDE 0.9 % (FLUSH) 0.9 %
5-40 SYRINGE (ML) INJECTION PRN
Status: DISCONTINUED | OUTPATIENT
Start: 2025-07-14 | End: 2025-07-15 | Stop reason: SDUPTHER

## 2025-07-14 RX ORDER — VANCOMYCIN 1.25 G/250ML
25 INJECTION, SOLUTION INTRAVENOUS ONCE
Status: COMPLETED | OUTPATIENT
Start: 2025-07-14 | End: 2025-07-15

## 2025-07-14 RX ORDER — SODIUM CHLORIDE, SODIUM LACTATE, POTASSIUM CHLORIDE, AND CALCIUM CHLORIDE .6; .31; .03; .02 G/100ML; G/100ML; G/100ML; G/100ML
500 INJECTION, SOLUTION INTRAVENOUS ONCE
Status: COMPLETED | OUTPATIENT
Start: 2025-07-14 | End: 2025-07-14

## 2025-07-14 RX ORDER — SODIUM CHLORIDE 9 MG/ML
INJECTION, SOLUTION INTRAVENOUS PRN
Status: DISCONTINUED | OUTPATIENT
Start: 2025-07-14 | End: 2025-07-15

## 2025-07-14 RX ORDER — IOPAMIDOL 755 MG/ML
100 INJECTION, SOLUTION INTRAVASCULAR
Status: COMPLETED | OUTPATIENT
Start: 2025-07-14 | End: 2025-07-14

## 2025-07-14 RX ORDER — SODIUM CHLORIDE 0.9 % (FLUSH) 0.9 %
5-40 SYRINGE (ML) INJECTION EVERY 12 HOURS SCHEDULED
Status: DISCONTINUED | OUTPATIENT
Start: 2025-07-14 | End: 2025-07-15

## 2025-07-14 RX ORDER — SODIUM CHLORIDE, SODIUM LACTATE, POTASSIUM CHLORIDE, AND CALCIUM CHLORIDE .6; .31; .03; .02 G/100ML; G/100ML; G/100ML; G/100ML
1000 INJECTION, SOLUTION INTRAVENOUS ONCE
Status: COMPLETED | OUTPATIENT
Start: 2025-07-14 | End: 2025-07-15

## 2025-07-14 RX ADMIN — CITALOPRAM HYDROBROMIDE 40 MG: 20 TABLET ORAL at 08:38

## 2025-07-14 RX ADMIN — PREDNISONE 20 MG: 20 TABLET ORAL at 08:37

## 2025-07-14 RX ADMIN — SODIUM CHLORIDE: 0.9 INJECTION, SOLUTION INTRAVENOUS at 23:10

## 2025-07-14 RX ADMIN — SODIUM CHLORIDE, SODIUM LACTATE, POTASSIUM CHLORIDE, AND CALCIUM CHLORIDE 500 ML: 600; 310; 30; 20 INJECTION, SOLUTION INTRAVENOUS at 21:27

## 2025-07-14 RX ADMIN — SODIUM CHLORIDE, PRESERVATIVE FREE 5 ML: 5 INJECTION INTRAVENOUS at 08:41

## 2025-07-14 RX ADMIN — MELATONIN 3 MG: at 02:18

## 2025-07-14 RX ADMIN — OXYCODONE 5 MG: 5 TABLET ORAL at 08:38

## 2025-07-14 RX ADMIN — SODIUM CHLORIDE, SODIUM LACTATE, POTASSIUM CHLORIDE, AND CALCIUM CHLORIDE 1000 ML: .6; .31; .03; .02 INJECTION, SOLUTION INTRAVENOUS at 22:56

## 2025-07-14 RX ADMIN — SODIUM CHLORIDE, PRESERVATIVE FREE 10 ML: 5 INJECTION INTRAVENOUS at 23:12

## 2025-07-14 RX ADMIN — SODIUM CHLORIDE: 0.9 INJECTION, SOLUTION INTRAVENOUS at 23:01

## 2025-07-14 RX ADMIN — PIPERACILLIN AND TAZOBACTAM 4500 MG: 4; .5 INJECTION, POWDER, LYOPHILIZED, FOR SOLUTION INTRAVENOUS at 23:11

## 2025-07-14 RX ADMIN — ACETAMINOPHEN 650 MG: 325 TABLET ORAL at 05:27

## 2025-07-14 RX ADMIN — OXYCODONE 5 MG: 5 TABLET ORAL at 01:20

## 2025-07-14 RX ADMIN — IOPAMIDOL 100 ML: 755 INJECTION, SOLUTION INTRAVENOUS at 22:27

## 2025-07-14 RX ADMIN — ENOXAPARIN SODIUM 40 MG: 100 INJECTION SUBCUTANEOUS at 08:41

## 2025-07-14 RX ADMIN — SODIUM CHLORIDE, SODIUM LACTATE, POTASSIUM CHLORIDE, AND CALCIUM CHLORIDE 500 ML: .6; .31; .03; .02 INJECTION, SOLUTION INTRAVENOUS at 21:03

## 2025-07-14 RX ADMIN — KETOROLAC TROMETHAMINE 30 MG: 30 INJECTION, SOLUTION INTRAMUSCULAR at 05:26

## 2025-07-14 ASSESSMENT — PAIN DESCRIPTION - LOCATION
LOCATION: ABDOMEN
LOCATION: ABDOMEN
LOCATION: ABDOMEN;BACK

## 2025-07-14 ASSESSMENT — PAIN DESCRIPTION - ORIENTATION
ORIENTATION: RIGHT;LEFT;ANTERIOR;MID
ORIENTATION: RIGHT;LEFT;MID;ANTERIOR

## 2025-07-14 ASSESSMENT — PAIN SCALES - GENERAL
PAINLEVEL_OUTOF10: 0
PAINLEVEL_OUTOF10: 5
PAINLEVEL_OUTOF10: 0
PAINLEVEL_OUTOF10: 0
PAINLEVEL_OUTOF10: 5
PAINLEVEL_OUTOF10: 8
PAINLEVEL_OUTOF10: 9
PAINLEVEL_OUTOF10: 8
PAINLEVEL_OUTOF10: 0

## 2025-07-14 ASSESSMENT — PAIN DESCRIPTION - DESCRIPTORS
DESCRIPTORS: ACHING;DISCOMFORT
DESCRIPTORS: ACHING
DESCRIPTORS: ACHING;DISCOMFORT

## 2025-07-14 ASSESSMENT — PAIN DESCRIPTION - FREQUENCY: FREQUENCY: CONTINUOUS

## 2025-07-14 ASSESSMENT — PAIN DESCRIPTION - PAIN TYPE: TYPE: ACUTE PAIN

## 2025-07-14 ASSESSMENT — PAIN DESCRIPTION - ONSET: ONSET: PROGRESSIVE

## 2025-07-14 ASSESSMENT — PAIN - FUNCTIONAL ASSESSMENT: PAIN_FUNCTIONAL_ASSESSMENT: PREVENTS OR INTERFERES SOME ACTIVE ACTIVITIES AND ADLS

## 2025-07-14 NOTE — BSMART NOTE
Initial BSMART Liaison Assessment Form     Section I - Integrated Summary    Reason for consult is: depression.    LOS:  8 days     Presenting problem/Summary:  Liaison met f/f with pt in her room on the medical floor of [facility]. Pt resting in bed at time of assessment interview. Pt appears alert, calm, lethargic, with flat affect, slow, monotone  speech and dysphoric mood. Pt appears oriented x person, place (hospital), and her situation. She reports severe and chronic lower back pain. She explains that it radiates to her abdomen. Pt says that she sustained an injury while in the hospital for a broken ankle. Despite her flat presentation, pt says that she is feeling, \"pretty good,\" or \"ok.\" Pt tends to seem irritated when she misunderstands questions posed to her. She denies current anxiety or panic.  She denies SI/HI/AH/VH. She denies hx of suicide attempts. Pt does report her sleep as \"not good.\" She describes her sleep as interrupted by pain. She says that she has been taking the Triazolam for sleep for the past 15 years. She describes her appetite as decreased d/t her pain. Pt describes the event of her back injury as \"traumatic\" for her. Liaison provided emotional support, reassurance, and validation of her feelings and concerns.     Precipitant Factors are: chronic back pain; recently reported encephalopathy; lethargy.    The information is given by the patient and past medical records.  Current Psychiatrist and/or  is her PCP.  Previous Hospitalizations/Treatment: Pt reports distant hx of panic attacks 30 years ago. Pt reports hx of outpatient tx with psychiatric provider Dr. Waldrop, and psychologist, Dr. Milton. Pt reports being prescribed Clexa currently by her PCP. She reports being prescribed Triazolam for 15 years. Pt explains that she started drinking ETOH to help with her panic symptoms, and that is why she sought tx with Dr. Waldrop.     Plan: Defer to medical and case management team. Liaison team

## 2025-07-14 NOTE — PROGRESS NOTES
Hospitalist Progress Note    NAME:   Nicole Galeano   : 1945   MRN: [id number]     Date/Time: 2025 8:47 AM  Patient PCP: Tessie Martinez FNP    Estimated discharge date: 7/15?, NH  Barriers: NH Placement arranged by CM, Psych Consult per daughter, Constipation relieved      Assessment / Plan:    Acute metabolic encephalopathy POA - resolved, fluctuating at times as per RN  likely related to medications including prednisone, tramadol  Constipation ()- KUB unremarkable  Recently was diagnosed to have lumbar compression fracture   started on multiple medications leading to confusion, Most likely related to medication  Exam is nonfocal.    CT head shows no acute process  Urinalysis 0-4 WBCs, 0-5 RBCs, negative bacteria, moderate epithelial   Does not matter if it is a clean-catch,  it is completely normal  Held all medications that can cause encephalopathy including prednisone, Celexa and also tramadol  Currently alert and following commands, oriented to birthday, current place, current year, current present    S/p T6-7, L2, L4 kyphoplasty by IR (7/10)- should improve pain and help decreased use of mind altering meds- opioid meds  Pain still uncontrolled, Cont Oxycodone - increased to 5mg Q 4 hrs prn on  as pt cont to complain of lower back pain, added breakthrough pain regimen 2.5mg Q 3 hrs prn + Toradol IV prn ()  Added aggressive Bowel regimen as pt continues to complain of Constipation- Lactulose added overnight to Miralax BID and Colace (pt refusing- red dye allergy), s/p Fleet enema x 1 yesterday with no response, Soap chela enema today as ordered overnight if no response to other aggressive regimens    Will monitor closely with PT/OT - recommends SNF/LTC? In future , CM working on it- referrals sent to multiple places- awaiting response/acceptance        B12 deficiency POA level 178  We will discuss with patient if this is a new diagnosis  IM B12 shots and then oral B12

## 2025-07-14 NOTE — PLAN OF CARE
Problem: Neurosensory - Adult  Goal: Achieves maximal functionality and self care  7/14/2025 0954 by Chely Gibbons, RN  Outcome: Not Progressing  7/14/2025 0333 by Skyler Khanna RN  Outcome: Progressing     Problem: Musculoskeletal - Adult  Goal: Return mobility to safest level of function  7/14/2025 0954 by Chely Gibbons RN  Outcome: Not Progressing  7/14/2025 0333 by Skyler Khanna RN  Outcome: Progressing  Goal: Return ADL status to a safe level of function  7/14/2025 0954 by Chely Gibbons, RN  Outcome: Not Progressing  7/14/2025 0333 by Skyler Khanna RN  Outcome: Progressing     Problem: Gastrointestinal - Adult  Goal: Maintains or returns to baseline bowel function  7/14/2025 0954 by Chely Gibbons, RN  Outcome: Not Progressing  7/14/2025 0333 by Skyler Khanna RN  Outcome: Progressing

## 2025-07-14 NOTE — CARE COORDINATION
2249 - Went to pt's room to provide SNF list, pt resting quietly with eyes closed and did not respond to name being called, list left on bedside table. RN updated.    Initial note - CM providing list of accepting facilities for pt to review and provide choice. [facility], [facility], [facility], [facility], [facility], and [facility] have accepted; [facility] interested, would like to receive [facility] before accepting.    Inna Mata, [facility] Management  [phone]

## 2025-07-14 NOTE — PROGRESS NOTES
Patient continues to refuse stool softeners and laxatives despite thorough education of benefits, complications and risks factors.

## 2025-07-14 NOTE — SIGNIFICANT EVENT
Cross coverage note   Constipation     Provider alerted pt adamantly noting she is constipated. Reviewed rounding notes, it noted bowel regimen optimized.   Will obtain KUB  -change miralax to BID   -add lactulose   -continue colace  -soap suds enema if the above without success     Richelle Whitaker DNP, AGACNP-BC

## 2025-07-14 NOTE — PROGRESS NOTES
End of Shift Note    Bedside shift change report given to FIDEL Mo (oncoming nurse) by Skyler Khanna RN (offgoing nurse).  Report included the following information SBAR and MAR    Shift worked:  Night     Shift summary and any significant changes:    Patient recorded stable vital signs during the shift, on telemetry monitoring( Normal sinus to sinus tachy), managed pain with prn oxycodone and toradol injection, voiding with purewick. Patient was given miralax, lactulose to aid in bowel movement.   Concerns for physician to address:    Constipation   Zone phone for oncoming shift:   [phone]       Activity:  Level of Assistance: Moderate assist, patient does 50-74%  Number times ambulated in hallways past shift: 0  Number of times OOB to chair past shift: 0    Cardiac:   Cardiac Monitoring: Yes      Cardiac Rhythm: Sinus rhythm    Access:  Current line(s): PIV    Genitourinary:   Urinary Status: Voiding, External catheter    Respiratory:   O2 Device: Nasal cannula  Chronic home O2 use?: YES  Incentive spirometer at bedside: YES    GI:  Last BM (including prior to admit): 07/08/25  Current diet:  ADULT DIET; Regular; Pune juice with every meal  Passing flatus: YES    Pain Management:   Patient states pain is manageable on current regimen: YES    Skin:  Rafa Scale Score: 18  Interventions: Wound Offloading (Prevention Methods): Bed, pressure reduction mattress, Elevate heels, Repositioning, Pillows, Turning    Patient Safety:  Fall Risk: Nursing Judgement-Fall Risk High(Add Comments): Yes  Fall Risk Interventions  Nursing Judgement-Fall Risk High(Add Comments): Yes  Toilet Every 2 Hours-In Advance of Need: Yes  Hourly Visual Checks: In bed, Awake, Quiet  Fall Visual Posted: Armband, Fall sign posted, Socks  Room Door Open: Yes  Alarm On: Bed  Patient Moved Closer to Nursing Station: No    Active Consults:   IP CONSULT TO INTERVENTIONAL RADIOLOGY  IP CONSULT TO ENDOCRINOLOGY  IP CONSULT TO PULMONOLOGY  IP CONSULT TO

## 2025-07-14 NOTE — PROGRESS NOTES
Spiritual Health History and Assessment/Progress Note  [facility]    Initial Encounter,  ,  ,      Name: Nicole Galeano MRN: [id number]    Age: 79 y.o.     Sex: female   Language: English   Mandaeism: Hinduism   Acute metabolic encephalopathy     Date: 7/14/2025            Total Time Calculated: 12 min              Spiritual Assessment began in [facility]        Referral/Consult From: Rounding   Encounter Overview/Reason: Initial Encounter  Service Provided For: Patient    Kirsten, Belief, Meaning:   Patient identifies as spiritual, is connected with a kirsten tradition or spiritual practice, and has beliefs or practices that help with coping during difficult times  Family/Friends No family/friends present      Importance and Influence:  Patient has no beliefs influential to healthcare decision-making identified during this visit  Family/Friends No family/friends present    Community:  Patient feels well-supported. Support system includes: Children  Family/Friends No family/friends present    Assessment and Plan of Care:     Patient Interventions include: Facilitated expression of thoughts and feelings, Explored spiritual coping/struggle/distress, and Affirmed coping skills/support systems  Family/Friends Interventions include: No family/friends present    Patient Plan of Care: No spiritual needs identified for follow-up  Family/Friends Plan of Care: No family/friends present    Electronically signed by Chaplain GABRIEL [facility] on 7/14/2025 at 11:01 AM

## 2025-07-14 NOTE — PLAN OF CARE
Problem: Respiratory - Adult  Goal: Achieves optimal ventilation and oxygenation  Outcome: Progressing     Problem: Pain  Goal: Verbalizes/displays adequate comfort level or baseline comfort level  Outcome: Progressing     Problem: Neurosensory - Adult  Goal: Achieves stable or improved neurological status  Outcome: Progressing  Goal: Achieves maximal functionality and self care  Outcome: Progressing     Problem: Cardiovascular - Adult  Goal: Absence of cardiac dysrhythmias or at baseline  Outcome: Progressing     Problem: Skin/Tissue Integrity - Adult  Goal: Skin integrity remains intact  Description: 1.  Monitor for areas of redness and/or skin breakdown  2.  Assess vascular access sites hourly  3.  Every 4-6 hours minimum:  Change oxygen saturation probe site  4.  Every 4-6 hours:  If on nasal continuous positive airway pressure, respiratory therapy assess nares and determine need for appliance change or resting period  Outcome: Progressing  Goal: Incisions, wounds, or drain sites healing without S/S of infection  Outcome: Progressing     Problem: Musculoskeletal - Adult  Goal: Return mobility to safest level of function  Outcome: Progressing  Goal: Return ADL status to a safe level of function  Outcome: Progressing     Problem: Gastrointestinal - Adult  Goal: Maintains or returns to baseline bowel function  Outcome: Progressing     Problem: Genitourinary - Adult  Goal: Absence of urinary retention  Outcome: Progressing     Problem: Infection - Adult  Goal: Absence of infection during hospitalization  Outcome: Progressing     Problem: Metabolic/Fluid and Electrolytes - Adult  Goal: Electrolytes maintained within normal limits  Outcome: Progressing     Problem: Safety - Adult  Goal: Free from fall injury  Outcome: Progressing     Problem: Confusion  Goal: Confusion, delirium, dementia, or psychosis is improved or at baseline  Description: INTERVENTIONS:  1. Assess for possible contributors to thought

## 2025-07-14 NOTE — CASE COMMUNICATION
Called unit and spoke with RN regarding patient. She has had difficulty sleeping and finally fell asleep a short while ago. At this time, no problematic behaviors.    Please re-consult for tomorrow when pt awake and alert.

## 2025-07-14 NOTE — PROGRESS NOTES
TO services deferred- patient sleeping and did not awaken to voice upon attempt. Visualized rise/fall of chest. OT POC unchanged  Vandana Schmitz, OTR/L

## 2025-07-15 ENCOUNTER — APPOINTMENT (OUTPATIENT)
Facility: HOSPITAL | Age: 80
End: 2025-07-15
Payer: MEDICARE

## 2025-07-15 ENCOUNTER — ANESTHESIA EVENT (OUTPATIENT)
Facility: HOSPITAL | Age: 80
End: 2025-07-15
Payer: MEDICARE

## 2025-07-15 ENCOUNTER — ANESTHESIA (OUTPATIENT)
Facility: HOSPITAL | Age: 80
End: 2025-07-15
Payer: MEDICARE

## 2025-07-15 PROBLEM — E44.0 MODERATE PROTEIN-CALORIE MALNUTRITION: Status: ACTIVE | Noted: 2025-03-12

## 2025-07-15 PROBLEM — G93.40 ACUTE ENCEPHALOPATHY: Status: ACTIVE | Noted: 2025-07-15

## 2025-07-15 LAB
ALBUMIN SERPL-MCNC: 1.5 G/DL (ref 3.5–5)
ALBUMIN/GLOB SERPL: 0.4 (ref 1.1–2.2)
ALP SERPL-CCNC: 224 U/L (ref 45–117)
ALT SERPL-CCNC: 18 U/L (ref 12–78)
ANION GAP SERPL CALC-SCNC: 11 MMOL/L (ref 2–12)
ANION GAP SERPL CALC-SCNC: 9 MMOL/L (ref 2–12)
ARTERIAL PATENCY WRIST A: ABNORMAL
AST SERPL-CCNC: 40 U/L (ref 15–37)
BASE DEFICIT BLDA-SCNC: 4 MMOL/L
BASOPHILS # BLD: 0 K/UL (ref 0–0.1)
BASOPHILS NFR BLD: 0 % (ref 0–1)
BDY SITE: ABNORMAL
BILIRUB DIRECT SERPL-MCNC: 0.6 MG/DL (ref 0–0.2)
BILIRUB SERPL-MCNC: 1.1 MG/DL (ref 0.2–1)
BUN SERPL-MCNC: 46 MG/DL (ref 6–20)
BUN SERPL-MCNC: 51 MG/DL (ref 6–20)
BUN/CREAT SERPL: 20 (ref 12–20)
BUN/CREAT SERPL: 20 (ref 12–20)
CALCIUM SERPL-MCNC: 8.2 MG/DL (ref 8.5–10.1)
CALCIUM SERPL-MCNC: 8.3 MG/DL (ref 8.5–10.1)
CHLORIDE SERPL-SCNC: 100 MMOL/L (ref 97–108)
CHLORIDE SERPL-SCNC: 96 MMOL/L (ref 97–108)
CO2 SERPL-SCNC: 23 MMOL/L (ref 21–32)
CO2 SERPL-SCNC: 24 MMOL/L (ref 21–32)
CREAT SERPL-MCNC: 2.25 MG/DL (ref 0.55–1.02)
CREAT SERPL-MCNC: 2.52 MG/DL (ref 0.55–1.02)
DIFFERENTIAL METHOD BLD: ABNORMAL
EOSINOPHIL # BLD: 0 K/UL (ref 0–0.4)
EOSINOPHIL NFR BLD: 0 % (ref 0–7)
ERYTHROCYTE [DISTWIDTH] IN BLOOD BY AUTOMATED COUNT: 18 % (ref 11.5–14.5)
FIO2 ON VENT: 60 %
GAS FLOW.O2 SETTING OXYMISER: 16
GLOBULIN SER CALC-MCNC: 3.8 G/DL (ref 2–4)
GLUCOSE BLD STRIP.AUTO-MCNC: 81 MG/DL (ref 65–117)
GLUCOSE BLD STRIP.AUTO-MCNC: 88 MG/DL (ref 65–117)
GLUCOSE SERPL-MCNC: 117 MG/DL (ref 65–100)
GLUCOSE SERPL-MCNC: 86 MG/DL (ref 65–100)
HCO3 BLDA-SCNC: 23 MMOL/L (ref 22–26)
HCT VFR BLD AUTO: 38.4 % (ref 35–47)
HGB BLD-MCNC: 11.9 G/DL (ref 11.5–16)
IMM GRANULOCYTES # BLD AUTO: 0 K/UL (ref 0–0.04)
IMM GRANULOCYTES NFR BLD AUTO: 0 % (ref 0–0.5)
LACTATE SERPL-SCNC: 2.8 MMOL/L (ref 0.4–2)
LYMPHOCYTES # BLD: 0.25 K/UL (ref 0.8–3.5)
LYMPHOCYTES NFR BLD: 2 % (ref 12–49)
MAGNESIUM SERPL-MCNC: 1.7 MG/DL (ref 1.6–2.4)
MCH RBC QN AUTO: 30.2 PG (ref 26–34)
MCHC RBC AUTO-ENTMCNC: 31 G/DL (ref 30–36.5)
MCV RBC AUTO: 97.5 FL (ref 80–99)
MONOCYTES # BLD: 0.63 K/UL (ref 0–1)
MONOCYTES NFR BLD: 5 % (ref 5–13)
NEUTS BAND NFR BLD MANUAL: 8 %
NEUTS SEG # BLD: 11.72 K/UL (ref 1.8–8)
NEUTS SEG NFR BLD: 85 % (ref 32–75)
NRBC # BLD: 0.04 K/UL (ref 0–0.01)
NRBC BLD-RTO: 0.3 PER 100 WBC
PCO2 BLDA: 47 MMHG (ref 35–45)
PEEP RESPIRATORY: 5
PH BLDA: 7.3 (ref 7.35–7.45)
PHOSPHATE SERPL-MCNC: 6.4 MG/DL (ref 2.6–4.7)
PLATELET # BLD AUTO: 309 K/UL (ref 150–400)
PMV BLD AUTO: 11.1 FL (ref 8.9–12.9)
PO2 BLDA: 126 MMHG (ref 80–100)
POTASSIUM SERPL-SCNC: 5 MMOL/L (ref 3.5–5.1)
POTASSIUM SERPL-SCNC: 5.3 MMOL/L (ref 3.5–5.1)
PROT SERPL-MCNC: 5.3 G/DL (ref 6.4–8.2)
RBC # BLD AUTO: 3.94 M/UL (ref 3.8–5.2)
RBC MORPH BLD: ABNORMAL
RBC MORPH BLD: ABNORMAL
SAO2 % BLD: 98 % (ref 92–97)
SAO2% DEVICE SAO2% SENSOR NAME: ABNORMAL
SERVICE CMNT-IMP: NORMAL
SERVICE CMNT-IMP: NORMAL
SODIUM SERPL-SCNC: 131 MMOL/L (ref 136–145)
SODIUM SERPL-SCNC: 132 MMOL/L (ref 136–145)
SPECIMEN SITE: ABNORMAL
VENTILATION MODE VENT: ABNORMAL
VT SETTING VENT: 350
WBC # BLD AUTO: 12.6 K/UL (ref 3.6–11)
WBC MORPH BLD: ABNORMAL

## 2025-07-15 PROCEDURE — 2500000003 HC RX 250 WO HCPCS: Performed by: NURSE PRACTITIONER

## 2025-07-15 PROCEDURE — 36415 COLL VENOUS BLD VENIPUNCTURE: CPT

## 2025-07-15 PROCEDURE — 2500000003 HC RX 250 WO HCPCS: Performed by: SURGERY

## 2025-07-15 PROCEDURE — 37799 UNLISTED PX VASCULAR SURGERY: CPT

## 2025-07-15 PROCEDURE — 83735 ASSAY OF MAGNESIUM: CPT

## 2025-07-15 PROCEDURE — 6360000002 HC RX W HCPCS: Performed by: SURGERY

## 2025-07-15 PROCEDURE — 2580000003 HC RX 258: Performed by: NURSE ANESTHETIST, CERTIFIED REGISTERED

## 2025-07-15 PROCEDURE — 2580000003 HC RX 258: Performed by: NURSE PRACTITIONER

## 2025-07-15 PROCEDURE — 2000000000 HC ICU R&B

## 2025-07-15 PROCEDURE — 0DB98ZX EXCISION OF DUODENUM, VIA NATURAL OR ARTIFICIAL OPENING ENDOSCOPIC, DIAGNOSTIC: ICD-10-PCS | Performed by: SURGERY

## 2025-07-15 PROCEDURE — 49905 OMENTAL FLAP INTRA-ABDOM: CPT | Performed by: SURGERY

## 2025-07-15 PROCEDURE — 2500000003 HC RX 250 WO HCPCS

## 2025-07-15 PROCEDURE — 74018 RADEX ABDOMEN 1 VIEW: CPT

## 2025-07-15 PROCEDURE — 2580000003 HC RX 258

## 2025-07-15 PROCEDURE — 3E0336Z INTRODUCTION OF NUTRITIONAL SUBSTANCE INTO PERIPHERAL VEIN, PERCUTANEOUS APPROACH: ICD-10-PCS | Performed by: INTERNAL MEDICINE

## 2025-07-15 PROCEDURE — 6370000000 HC RX 637 (ALT 250 FOR IP): Performed by: SURGERY

## 2025-07-15 PROCEDURE — 85025 COMPLETE CBC W/AUTO DIFF WBC: CPT

## 2025-07-15 PROCEDURE — 3700000001 HC ADD 15 MINUTES (ANESTHESIA): Performed by: SURGERY

## 2025-07-15 PROCEDURE — 0DQ94ZZ REPAIR DUODENUM, PERCUTANEOUS ENDOSCOPIC APPROACH: ICD-10-PCS | Performed by: SURGERY

## 2025-07-15 PROCEDURE — 2709999900 HC NON-CHARGEABLE SUPPLY: Performed by: SURGERY

## 2025-07-15 PROCEDURE — 87077 CULTURE AEROBIC IDENTIFY: CPT

## 2025-07-15 PROCEDURE — 88305 TISSUE EXAM BY PATHOLOGIST: CPT

## 2025-07-15 PROCEDURE — 3600000004 HC SURGERY LEVEL 4 BASE: Performed by: SURGERY

## 2025-07-15 PROCEDURE — 99221 1ST HOSP IP/OBS SF/LOW 40: CPT | Performed by: SURGERY

## 2025-07-15 PROCEDURE — 3600000014 HC SURGERY LEVEL 4 ADDTL 15MIN: Performed by: SURGERY

## 2025-07-15 PROCEDURE — 82803 BLOOD GASES ANY COMBINATION: CPT

## 2025-07-15 PROCEDURE — 43840 GSTRRPHY SUTR DUOL/GSTR ULCR: CPT | Performed by: SURGERY

## 2025-07-15 PROCEDURE — 2580000003 HC RX 258: Performed by: INTERNAL MEDICINE

## 2025-07-15 PROCEDURE — 2580000003 HC RX 258: Performed by: SURGERY

## 2025-07-15 PROCEDURE — 43659 UNLISTED LAPS PX STOMACH: CPT | Performed by: SURGERY

## 2025-07-15 PROCEDURE — 5A1945Z RESPIRATORY VENTILATION, 24-96 CONSECUTIVE HOURS: ICD-10-PCS | Performed by: NURSE PRACTITIONER

## 2025-07-15 PROCEDURE — 2500000003 HC RX 250 WO HCPCS: Performed by: NURSE ANESTHETIST, CERTIFIED REGISTERED

## 2025-07-15 PROCEDURE — 6360000002 HC RX W HCPCS: Performed by: INTERNAL MEDICINE

## 2025-07-15 PROCEDURE — 6360000002 HC RX W HCPCS: Performed by: NURSE ANESTHETIST, CERTIFIED REGISTERED

## 2025-07-15 PROCEDURE — 88342 IMHCHEM/IMCYTCHM 1ST ANTB: CPT

## 2025-07-15 PROCEDURE — 0BH17EZ INSERTION OF ENDOTRACHEAL AIRWAY INTO TRACHEA, VIA NATURAL OR ARTIFICIAL OPENING: ICD-10-PCS | Performed by: NURSE PRACTITIONER

## 2025-07-15 PROCEDURE — 87075 CULTR BACTERIA EXCEPT BLOOD: CPT

## 2025-07-15 PROCEDURE — 6360000002 HC RX W HCPCS

## 2025-07-15 PROCEDURE — 83605 ASSAY OF LACTIC ACID: CPT

## 2025-07-15 PROCEDURE — 87205 SMEAR GRAM STAIN: CPT

## 2025-07-15 PROCEDURE — 80076 HEPATIC FUNCTION PANEL: CPT

## 2025-07-15 PROCEDURE — 93005 ELECTROCARDIOGRAM TRACING: CPT | Performed by: NURSE PRACTITIONER

## 2025-07-15 PROCEDURE — 94002 VENT MGMT INPAT INIT DAY: CPT

## 2025-07-15 PROCEDURE — 3700000000 HC ANESTHESIA ATTENDED CARE: Performed by: SURGERY

## 2025-07-15 PROCEDURE — 80048 BASIC METABOLIC PNL TOTAL CA: CPT

## 2025-07-15 PROCEDURE — 87070 CULTURE OTHR SPECIMN AEROBIC: CPT

## 2025-07-15 PROCEDURE — 6360000002 HC RX W HCPCS: Performed by: NURSE PRACTITIONER

## 2025-07-15 PROCEDURE — 82962 GLUCOSE BLOOD TEST: CPT

## 2025-07-15 PROCEDURE — 3E033XZ INTRODUCTION OF VASOPRESSOR INTO PERIPHERAL VEIN, PERCUTANEOUS APPROACH: ICD-10-PCS | Performed by: NURSE PRACTITIONER

## 2025-07-15 PROCEDURE — 84100 ASSAY OF PHOSPHORUS: CPT

## 2025-07-15 PROCEDURE — 71045 X-RAY EXAM CHEST 1 VIEW: CPT

## 2025-07-15 PROCEDURE — 87185 SC STD ENZYME DETCJ PER NZM: CPT

## 2025-07-15 RX ORDER — DEXTROSE MONOHYDRATE 100 MG/ML
INJECTION, SOLUTION INTRAVENOUS CONTINUOUS PRN
Status: DISCONTINUED | OUTPATIENT
Start: 2025-07-15 | End: 2025-07-15

## 2025-07-15 RX ORDER — FENTANYL CITRATE 50 UG/ML
25 INJECTION, SOLUTION INTRAMUSCULAR; INTRAVENOUS EVERY 5 MIN PRN
Status: DISCONTINUED | OUTPATIENT
Start: 2025-07-15 | End: 2025-07-15

## 2025-07-15 RX ORDER — FLUDROCORTISONE ACETATE 0.1 MG/1
0.1 TABLET ORAL DAILY
Status: DISCONTINUED | OUTPATIENT
Start: 2025-07-15 | End: 2025-07-15

## 2025-07-15 RX ORDER — DEXMEDETOMIDINE HYDROCHLORIDE 4 UG/ML
.1-1.5 INJECTION, SOLUTION INTRAVENOUS CONTINUOUS
Status: DISCONTINUED | OUTPATIENT
Start: 2025-07-15 | End: 2025-07-16

## 2025-07-15 RX ORDER — PROPOFOL 10 MG/ML
INJECTION, EMULSION INTRAVENOUS
Status: DISCONTINUED | OUTPATIENT
Start: 2025-07-15 | End: 2025-07-15 | Stop reason: SDUPTHER

## 2025-07-15 RX ORDER — FLUCONAZOLE 2 MG/ML
200 INJECTION, SOLUTION INTRAVENOUS EVERY 24 HOURS
Status: DISCONTINUED | OUTPATIENT
Start: 2025-07-16 | End: 2025-07-28

## 2025-07-15 RX ORDER — GLUCAGON 1 MG/ML
1 KIT INJECTION PRN
Status: DISCONTINUED | OUTPATIENT
Start: 2025-07-15 | End: 2025-07-15

## 2025-07-15 RX ORDER — SODIUM CHLORIDE 0.9 % (FLUSH) 0.9 %
5-40 SYRINGE (ML) INJECTION PRN
Status: DISCONTINUED | OUTPATIENT
Start: 2025-07-15 | End: 2025-07-15

## 2025-07-15 RX ORDER — SUCCINYLCHOLINE CHLORIDE 20 MG/ML
INJECTION INTRAMUSCULAR; INTRAVENOUS
Status: DISCONTINUED | OUTPATIENT
Start: 2025-07-15 | End: 2025-07-15 | Stop reason: SDUPTHER

## 2025-07-15 RX ORDER — VASOPRESSIN 20 [USP'U]/ML
INJECTION, SOLUTION INTRAVENOUS
Status: DISCONTINUED | OUTPATIENT
Start: 2025-07-15 | End: 2025-07-15 | Stop reason: SDUPTHER

## 2025-07-15 RX ORDER — PROCHLORPERAZINE EDISYLATE 5 MG/ML
5 INJECTION INTRAMUSCULAR; INTRAVENOUS
Status: DISCONTINUED | OUTPATIENT
Start: 2025-07-15 | End: 2025-07-15

## 2025-07-15 RX ORDER — SODIUM CHLORIDE, SODIUM LACTATE, POTASSIUM CHLORIDE, AND CALCIUM CHLORIDE .6; .31; .03; .02 G/100ML; G/100ML; G/100ML; G/100ML
500 INJECTION, SOLUTION INTRAVENOUS ONCE
Status: COMPLETED | OUTPATIENT
Start: 2025-07-15 | End: 2025-07-15

## 2025-07-15 RX ORDER — ROCURONIUM BROMIDE 10 MG/ML
INJECTION, SOLUTION INTRAVENOUS
Status: DISCONTINUED | OUTPATIENT
Start: 2025-07-15 | End: 2025-07-15 | Stop reason: SDUPTHER

## 2025-07-15 RX ORDER — HYDROMORPHONE HYDROCHLORIDE 1 MG/ML
0.5 INJECTION, SOLUTION INTRAMUSCULAR; INTRAVENOUS; SUBCUTANEOUS EVERY 5 MIN PRN
Status: DISCONTINUED | OUTPATIENT
Start: 2025-07-15 | End: 2025-07-15

## 2025-07-15 RX ORDER — SODIUM CHLORIDE, SODIUM LACTATE, POTASSIUM CHLORIDE, CALCIUM CHLORIDE 600; 310; 30; 20 MG/100ML; MG/100ML; MG/100ML; MG/100ML
INJECTION, SOLUTION INTRAVENOUS
Status: DISCONTINUED | OUTPATIENT
Start: 2025-07-15 | End: 2025-07-15 | Stop reason: SDUPTHER

## 2025-07-15 RX ORDER — IPRATROPIUM BROMIDE AND ALBUTEROL SULFATE 2.5; .5 MG/3ML; MG/3ML
1 SOLUTION RESPIRATORY (INHALATION)
Status: DISCONTINUED | OUTPATIENT
Start: 2025-07-15 | End: 2025-07-15

## 2025-07-15 RX ORDER — ONDANSETRON 2 MG/ML
4 INJECTION INTRAMUSCULAR; INTRAVENOUS
Status: DISCONTINUED | OUTPATIENT
Start: 2025-07-15 | End: 2025-07-15

## 2025-07-15 RX ORDER — SODIUM CHLORIDE, SODIUM LACTATE, POTASSIUM CHLORIDE, CALCIUM CHLORIDE 600; 310; 30; 20 MG/100ML; MG/100ML; MG/100ML; MG/100ML
INJECTION, SOLUTION INTRAVENOUS CONTINUOUS
Status: DISCONTINUED | OUTPATIENT
Start: 2025-07-15 | End: 2025-07-16

## 2025-07-15 RX ORDER — FENTANYL CITRATE 50 UG/ML
25 INJECTION, SOLUTION INTRAMUSCULAR; INTRAVENOUS
Status: DISCONTINUED | OUTPATIENT
Start: 2025-07-15 | End: 2025-07-16

## 2025-07-15 RX ORDER — FENTANYL CITRATE 50 UG/ML
INJECTION, SOLUTION INTRAMUSCULAR; INTRAVENOUS
Status: DISCONTINUED | OUTPATIENT
Start: 2025-07-15 | End: 2025-07-15 | Stop reason: SDUPTHER

## 2025-07-15 RX ORDER — BUPIVACAINE HYDROCHLORIDE 2.5 MG/ML
INJECTION, SOLUTION EPIDURAL; INFILTRATION; INTRACAUDAL; PERINEURAL PRN
Status: DISCONTINUED | OUTPATIENT
Start: 2025-07-15 | End: 2025-07-15 | Stop reason: ALTCHOICE

## 2025-07-15 RX ORDER — LIDOCAINE HYDROCHLORIDE 20 MG/ML
INJECTION, SOLUTION EPIDURAL; INFILTRATION; INTRACAUDAL; PERINEURAL
Status: DISCONTINUED | OUTPATIENT
Start: 2025-07-15 | End: 2025-07-15 | Stop reason: SDUPTHER

## 2025-07-15 RX ORDER — ENOXAPARIN SODIUM 100 MG/ML
30 INJECTION SUBCUTANEOUS DAILY
Status: DISCONTINUED | OUTPATIENT
Start: 2025-07-16 | End: 2025-07-23

## 2025-07-15 RX ORDER — SODIUM CHLORIDE 9 MG/ML
INJECTION, SOLUTION INTRAVENOUS PRN
Status: DISCONTINUED | OUTPATIENT
Start: 2025-07-15 | End: 2025-07-15

## 2025-07-15 RX ORDER — FENTANYL CITRATE 50 UG/ML
25 INJECTION, SOLUTION INTRAMUSCULAR; INTRAVENOUS
Refills: 0 | Status: DISCONTINUED | OUTPATIENT
Start: 2025-07-15 | End: 2025-07-16

## 2025-07-15 RX ORDER — FLUCONAZOLE 2 MG/ML
400 INJECTION, SOLUTION INTRAVENOUS
Status: DISCONTINUED | OUTPATIENT
Start: 2025-07-15 | End: 2025-07-15

## 2025-07-15 RX ORDER — SODIUM CHLORIDE 0.9 % (FLUSH) 0.9 %
5-40 SYRINGE (ML) INJECTION EVERY 12 HOURS SCHEDULED
Status: DISCONTINUED | OUTPATIENT
Start: 2025-07-15 | End: 2025-07-15

## 2025-07-15 RX ADMIN — SODIUM CHLORIDE, SODIUM LACTATE, POTASSIUM CHLORIDE, AND CALCIUM CHLORIDE: .6; .31; .03; .02 INJECTION, SOLUTION INTRAVENOUS at 11:37

## 2025-07-15 RX ADMIN — LIDOCAINE HYDROCHLORIDE 100 MG: 20 INJECTION, SOLUTION EPIDURAL; INFILTRATION; INTRACAUDAL; PERINEURAL at 01:18

## 2025-07-15 RX ADMIN — NOREPINEPHRINE BITARTRATE 10 MCG/MIN: 1 INJECTION INTRAVENOUS at 03:43

## 2025-07-15 RX ADMIN — PIPERACILLIN AND TAZOBACTAM 3375 MG: 3; .375 INJECTION, POWDER, LYOPHILIZED, FOR SOLUTION INTRAVENOUS at 08:41

## 2025-07-15 RX ADMIN — DEXMEDETOMIDINE HYDROCHLORIDE 0.4 MCG/KG/HR: 400 INJECTION, SOLUTION INTRAVENOUS at 13:29

## 2025-07-15 RX ADMIN — FENTANYL CITRATE 25 MCG: 50 INJECTION INTRAMUSCULAR; INTRAVENOUS at 08:03

## 2025-07-15 RX ADMIN — PHENYLEPHRINE HYDROCHLORIDE 40 MCG/MIN: 10 INJECTION INTRAVENOUS at 01:18

## 2025-07-15 RX ADMIN — HYDROCORTISONE SODIUM SUCCINATE 100 MG: 250 INJECTION, POWDER, FOR SOLUTION INTRAMUSCULAR; INTRAVENOUS at 01:22

## 2025-07-15 RX ADMIN — ROCURONIUM BROMIDE 50 MG: 10 INJECTION INTRAVENOUS at 01:44

## 2025-07-15 RX ADMIN — FENTANYL CITRATE 50 MCG: 50 INJECTION, SOLUTION INTRAMUSCULAR; INTRAVENOUS at 03:15

## 2025-07-15 RX ADMIN — SODIUM CHLORIDE, PRESERVATIVE FREE 10 ML: 5 INJECTION INTRAVENOUS at 08:25

## 2025-07-15 RX ADMIN — SODIUM CHLORIDE, SODIUM LACTATE, POTASSIUM CHLORIDE, AND CALCIUM CHLORIDE 500 ML: .6; .31; .03; .02 INJECTION, SOLUTION INTRAVENOUS at 18:46

## 2025-07-15 RX ADMIN — VANCOMYCIN 1750 MG: 1.25 INJECTION, SOLUTION INTRAVENOUS at 00:37

## 2025-07-15 RX ADMIN — FENTANYL CITRATE 50 MCG: 50 INJECTION, SOLUTION INTRAMUSCULAR; INTRAVENOUS at 01:18

## 2025-07-15 RX ADMIN — DEXMEDETOMIDINE HYDROCHLORIDE 0.2 MCG/KG/HR: 400 INJECTION, SOLUTION INTRAVENOUS at 04:21

## 2025-07-15 RX ADMIN — SODIUM CHLORIDE: 0.9 INJECTION, SOLUTION INTRAVENOUS at 05:17

## 2025-07-15 RX ADMIN — VASOPRESSIN 1 UNITS: 20 INJECTION INTRAVENOUS at 01:26

## 2025-07-15 RX ADMIN — SODIUM CHLORIDE, SODIUM LACTATE, POTASSIUM CHLORIDE, AND CALCIUM CHLORIDE 500 ML: .6; .31; .03; .02 INJECTION, SOLUTION INTRAVENOUS at 04:40

## 2025-07-15 RX ADMIN — FENTANYL CITRATE 25 MCG: 50 INJECTION INTRAMUSCULAR; INTRAVENOUS at 23:52

## 2025-07-15 RX ADMIN — FENTANYL CITRATE 25 MCG: 50 INJECTION INTRAMUSCULAR; INTRAVENOUS at 05:28

## 2025-07-15 RX ADMIN — SODIUM CHLORIDE: 0.9 INJECTION, SOLUTION INTRAVENOUS at 03:53

## 2025-07-15 RX ADMIN — SODIUM CHLORIDE, PRESERVATIVE FREE 10 ML: 5 INJECTION INTRAVENOUS at 00:37

## 2025-07-15 RX ADMIN — PANTOPRAZOLE SODIUM 40 MG: 40 INJECTION, POWDER, FOR SOLUTION INTRAVENOUS at 04:17

## 2025-07-15 RX ADMIN — SODIUM CHLORIDE, PRESERVATIVE FREE 10 ML: 5 INJECTION INTRAVENOUS at 08:26

## 2025-07-15 RX ADMIN — FENTANYL CITRATE 25 MCG: 50 INJECTION INTRAMUSCULAR; INTRAVENOUS at 04:28

## 2025-07-15 RX ADMIN — PROPOFOL 50 MCG/KG/MIN: 10 INJECTION, EMULSION INTRAVENOUS at 03:07

## 2025-07-15 RX ADMIN — PANTOPRAZOLE SODIUM 40 MG: 40 INJECTION, POWDER, FOR SOLUTION INTRAVENOUS at 15:04

## 2025-07-15 RX ADMIN — PIPERACILLIN AND TAZOBACTAM 3375 MG: 3; .375 INJECTION, POWDER, LYOPHILIZED, FOR SOLUTION INTRAVENOUS at 20:58

## 2025-07-15 RX ADMIN — FLUCONAZOLE 400 MG: 2 INJECTION, SOLUTION INTRAVENOUS at 05:18

## 2025-07-15 RX ADMIN — FENTANYL CITRATE 25 MCG: 50 INJECTION INTRAMUSCULAR; INTRAVENOUS at 21:52

## 2025-07-15 RX ADMIN — SUCCINYLCHOLINE CHLORIDE 100 MG: 20 INJECTION, SOLUTION INTRAMUSCULAR; INTRAVENOUS at 01:20

## 2025-07-15 RX ADMIN — METHYLPREDNISOLONE SODIUM SUCCINATE 20 MG: 40 INJECTION, POWDER, LYOPHILIZED, FOR SOLUTION INTRAMUSCULAR; INTRAVENOUS at 05:07

## 2025-07-15 RX ADMIN — ENOXAPARIN SODIUM 40 MG: 100 INJECTION SUBCUTANEOUS at 08:39

## 2025-07-15 RX ADMIN — METHYLPREDNISOLONE SODIUM SUCCINATE 20 MG: 40 INJECTION, POWDER, LYOPHILIZED, FOR SOLUTION INTRAMUSCULAR; INTRAVENOUS at 15:04

## 2025-07-15 RX ADMIN — Medication 20 MG: at 02:49

## 2025-07-15 RX ADMIN — VANCOMYCIN 1750 MG: 1.25 INJECTION, SOLUTION INTRAVENOUS at 01:12

## 2025-07-15 RX ADMIN — HYDROMORPHONE HYDROCHLORIDE 0.5 MG: 1 INJECTION, SOLUTION INTRAMUSCULAR; INTRAVENOUS; SUBCUTANEOUS at 13:22

## 2025-07-15 RX ADMIN — SODIUM CHLORIDE, PRESERVATIVE FREE 10 ML: 5 INJECTION INTRAVENOUS at 20:32

## 2025-07-15 RX ADMIN — SODIUM CHLORIDE, POTASSIUM CHLORIDE, SODIUM LACTATE AND CALCIUM CHLORIDE: 600; 310; 30; 20 INJECTION, SOLUTION INTRAVENOUS at 01:12

## 2025-07-15 RX ADMIN — SODIUM CHLORIDE, SODIUM LACTATE, POTASSIUM CHLORIDE, AND CALCIUM CHLORIDE: .6; .31; .03; .02 INJECTION, SOLUTION INTRAVENOUS at 05:13

## 2025-07-15 RX ADMIN — Medication 1 AMPULE: at 20:32

## 2025-07-15 RX ADMIN — NOREPINEPHRINE BITARTRATE 7 MCG/MIN: 1 INJECTION INTRAVENOUS at 20:31

## 2025-07-15 RX ADMIN — Medication 1 AMPULE: at 08:25

## 2025-07-15 RX ADMIN — VASOPRESSIN 1 UNITS: 20 INJECTION INTRAVENOUS at 01:53

## 2025-07-15 RX ADMIN — HYDROMORPHONE HYDROCHLORIDE 0.5 MG: 1 INJECTION, SOLUTION INTRAMUSCULAR; INTRAVENOUS; SUBCUTANEOUS at 18:23

## 2025-07-15 RX ADMIN — Medication 30 MG: at 01:48

## 2025-07-15 RX ADMIN — PROPOFOL 20 MG: 10 INJECTION, EMULSION INTRAVENOUS at 01:20

## 2025-07-15 ASSESSMENT — PULMONARY FUNCTION TESTS
PIF_VALUE: 11
PIF_VALUE: 22
PIF_VALUE: 24
PIF_VALUE: 21
PIF_VALUE: 16
PIF_VALUE: 23

## 2025-07-15 ASSESSMENT — PAIN SCALES - GENERAL
PAINLEVEL_OUTOF10: 0
PAINLEVEL_OUTOF10: 0
PAINLEVEL_OUTOF10: 8
PAINLEVEL_OUTOF10: 0
PAINLEVEL_OUTOF10: 0
PAINLEVEL_OUTOF10: 7
PAINLEVEL_OUTOF10: 7
PAINLEVEL_OUTOF10: 0
PAINLEVEL_OUTOF10: 7
PAINLEVEL_OUTOF10: 7
PAINLEVEL_OUTOF10: 8
PAINLEVEL_OUTOF10: 0

## 2025-07-15 ASSESSMENT — ENCOUNTER SYMPTOMS: SHORTNESS OF BREATH: 1

## 2025-07-15 ASSESSMENT — PAIN DESCRIPTION - PAIN TYPE
TYPE: SURGICAL PAIN
TYPE: SURGICAL PAIN

## 2025-07-15 ASSESSMENT — COPD QUESTIONNAIRES: CAT_SEVERITY: MODERATE

## 2025-07-15 ASSESSMENT — PAIN DESCRIPTION - LOCATION
LOCATION: GENERALIZED
LOCATION: GENERALIZED

## 2025-07-15 ASSESSMENT — PAIN DESCRIPTION - DESCRIPTORS
DESCRIPTORS: PATIENT UNABLE TO DESCRIBE
DESCRIPTORS: PATIENT UNABLE TO DESCRIBE

## 2025-07-15 NOTE — OP NOTE
[facility]              [address] [place] [address]                            OPERATIVE REPORT      PATIENT NAME: STANISLAV FISHMAN        : 1945  MED REC NO: [id number]                       ROOM: 2503  ACCOUNT NO: [id number]                       ADMIT DATE: 2025  PROVIDER: Migue Han MD    DATE OF SERVICE:  07/15/2025    PREOPERATIVE DIAGNOSES:       1. Pneumoperitoneum.     2. Encephalopathy.     3. Sepsis.    POSTOPERATIVE DIAGNOSES:       1. Pneumoperitoneum.     2. Encephalopathy.     3. Sepsis.     4. Perforated duodenal ulcer.    PROCEDURES PERFORMED:  Laparoscopic repair of duodenal ulcer with [place] patch.    SURGEON:  Migue Han MD    ASSISTANT:  Samuel Jack.    ANESTHESIA:  General endotracheal.    ESTIMATED BLOOD LOSS:  Minimal.    SPECIMENS REMOVED:       1. Anaerobic and aerobic culture of peritoneal fluid.     2. Mucosal biopsy of duodenum for H pylori.    INTRAOPERATIVE FINDINGS:       1. Infection present at the time of surgery in the deep organ space as evident by fluid consistent with infection.     2. Abdomen filled with cloudy bile stained purulent fluid over 800 mL of fluid removed with suction irrigation.  There were visible food particles as well spilled throughout the abdomen.     3. There is an oval shaped obliquely oriented perforation on the anterior surface of the 1st portion of the duodenum.  This ulcer measured 1.5 cm along its long axis and 1 cm transversely.  The mucosa was visible herniating out through this perforation and it was actively draining bilious fluid.     COMPLICATIONS:  None.    IMPLANTS:  None.    INDICATIONS:  The patient is an 79-year-old female admitted 1 week ago with metabolic encephalopathy and acute thoracic and lumbar spine fractures.  The patient takes chronic steroids.  Her encephalopathy acutely worsened earlier today and she became progressively more hypotensive, complaining of

## 2025-07-15 NOTE — ANESTHESIA PRE PROCEDURE
Department of Anesthesiology  Preprocedure Note       Name:  Nicole Galeano   Age:  79 y.o.  :  1945                                          MRN:  [id number]         Date:  7/15/2025      Surgeon: Surgeon(s):  Migue Han MD    Procedure: Procedure(s):  LAPAROSCOPY DIAGNOSTIC    Medications prior to admission:   Prior to Admission medications    Medication Sig Start Date End Date Taking? Authorizing Provider   triazolam (HALCION) 0.25 MG tablet Take 1 tablet by mouth nightly as needed. Max Daily Amount: 250 mcg    ProviderJesús MD   Multiple Vitamins-Minerals (PRESERVISION AREDS 2 PO) Take 1 tablet by mouth 2 times daily    Jesús Valdivia MD   predniSONE (DELTASONE) 10 MG tablet Take 1 tablet by mouth daily    ProviderJesús MD   OXYGEN E clarify this medication. Outside name: Oxygen    Automatic Reconciliation, Ar   atorvastatin (LIPITOR) 40 MG tablet Take 1 tablet by mouth daily 23   Automatic Reconciliation, Ar   citalopram (CELEXA) 40 MG tablet Take 1 tablet by mouth daily 23   Automatic Reconciliation, Ar   lisinopril (PRINIVIL;ZESTRIL) 5 MG tablet Take 1 tablet by mouth daily 4/15/23   Automatic Reconciliation, Ar   tiotropium (SPIRIVA RESPIMAT) 2.5 MCG/ACT AERS inhaler ceived the following from [facility]: Outside name: Spiriva Respimat 2.5 mcg/actuation inhaler 22   Automatic Reconciliation, Ar       Current medications:    Current Facility-Administered Medications   Medication Dose Route Frequency Provider Last Rate Last Admin   • sodium chloride flush 0.9 % injection 5-40 mL  5-40 mL IntraVENous 2 times per day Zo Magallanes APRN - NP   10 mL at 07/15/25 0037   • sodium chloride flush 0.9 % injection 5-40 mL  5-40 mL IntraVENous PRN Zo Magallanes APRN - NP       • 0.9 % sodium chloride infusion   IntraVENous PRN Zo Magallanes APRN - NP       • lactated ringers bolus 1,000 mL  1,000 mL IntraVENous Once Zo Magallanes

## 2025-07-15 NOTE — BRIEF OP NOTE
Brief Postoperative Note      Patient: Nicole Galeano  YOB: 1945  MRN: [id number]    Date of Procedure: 7/15/2025    Pre-Op Diagnosis Codes:      * Intra-abdominal free air of unknown etiology [K66.8]    Post-Op Diagnosis: perforated duodenal ulcer        Procedure(s):  LAPAROSCOPY DIAGNOSTIC    Surgeon(s):  Migue Han MD    Assistant:  Surgical Assistant: Samuel Jack    Anesthesia: General    Estimated Blood Loss (mL): Minimal    Complications: None    Specimens:   ID Type Source Tests Collected by Time Destination   1 : stomach mucosa rule out H. Pylori Tissue Stomach SURGICAL PATHOLOGY Migue Han MD 7/15/2025 0202    A :  Body Fluid PERITONEAL CULTURE, ANAEROBIC (Canceled), CULTURE, BODY FLUID (WITH GRAM STAIN) (Canceled) Migue Han MD 7/15/2025 0157        Implants:  * No implants in log *      Drains:   Closed/Suction Drain Lateral RLQ Bulb (Active)       Urinary Catheter 07/15/25 2 Way (Active)       Findings:  Present At Time Of Surgery (PATOS) (choose all levels that have infection present):  - Organ Space infection (below fascia) present as evidenced by fluid consistent with infection  Other Findings: abdomen filled with cloudy bile stained purulent fluid and food particles, duodenal ulcer perforation     Electronically signed by Migue Han MD on 7/15/2025 at 3:27 AM

## 2025-07-15 NOTE — INTERDISCIPLINARY ROUNDS
Interdisciplinary team rounds were held 7/15/2025 with the following team members: Physician, Nursing, Dietitian, Pharmacist, , and the CCU Charge RN.    Plan of care discussed. See clinical pathway and/or care plan for interventions and desired outcomes.    Goals of the Day: Continue with ibanez catheter for strict I/Os.

## 2025-07-15 NOTE — PROGRESS NOTES
Patient recorded low blood pressures(systolic in the 80s to low 100s), during the initial assessment of patient after taking up. Extremities of patient were cool to touch with axillary temperature recording of 98.4f. Peripheral pulses were barely palpable. Charge RN was notified about the current state of the patient, subsequently notified the [facility] nurse to assess the patient.    2222 - TRANSFER - OUT REPORT:    Verbal report given to FIDEL Ramirez on Nicole Galeano  being transferred to [facility] for change in patient condition (Concern with vital signs and to be closely monitored)       Report consisted of patient's Situation, Background, Assessment and   Recommendations(SBAR).     Information from the following report(s) Nurse Handoff Report, Surgery Report, Intake/Output, MAR, and Cardiac Rhythm Sinus Rhythm was reviewed with the receiving nurse.           Lines:   Peripheral IV 07/13/25 Right Antecubital (Active)   Site Assessment Clean, dry & intact 07/14/25 0830   Line Status Capped;Flushed 07/14/25 0830   Line Care Connections checked and tightened;Chlorhexidine wipes 07/14/25 0830   Phlebitis Assessment No symptoms 07/14/25 0830   Infiltration Assessment 0 07/14/25 0830   Alcohol Cap Used Yes 07/14/25 0830   Dressing Status Clean, dry & intact 07/14/25 0830   Dressing Type Transparent 07/14/25 0830       Peripheral IV 07/14/25 Left Wrist (Active)        Opportunity for questions and clarification was provided.      Patient transported with:  Monitor, O2 @ 3lpm, Patient-specific medications from Pharmacy, and Registered Nurse

## 2025-07-15 NOTE — PROGRESS NOTES
07/15/25 0420   B: Both Spontaneous Awakening and Breathing Trials   Was Patient Receiving Mechanical Ventilation Yes   Safety Screening Spontaneous Breathing Trial (SBT) Lack of inspiratory effort  (pt recently received back from OR, Heart rate 140-158 & FiO2 50%)

## 2025-07-15 NOTE — PLAN OF CARE
Problem: Respiratory - Adult  Goal: Achieves optimal ventilation and oxygenation  Outcome: Progressing     Problem: Pain  Goal: Verbalizes/displays adequate comfort level or baseline comfort level  Outcome: Progressing     Problem: Neurosensory - Adult  Goal: Achieves stable or improved neurological status  Outcome: Progressing  Goal: Achieves maximal functionality and self care  Outcome: Progressing     Problem: Cardiovascular - Adult  Goal: Absence of cardiac dysrhythmias or at baseline  Outcome: Progressing  Flowsheets (Taken 7/14/2025 1739 by Chely Gibbons RN)  Absence of cardiac dysrhythmias or at baseline: Monitor cardiac rate and rhythm     Problem: Skin/Tissue Integrity - Adult  Goal: Skin integrity remains intact  Description: 1.  Monitor for areas of redness and/or skin breakdown  2.  Assess vascular access sites hourly  3.  Every 4-6 hours minimum:  Change oxygen saturation probe site  4.  Every 4-6 hours:  If on nasal continuous positive airway pressure, respiratory therapy assess nares and determine need for appliance change or resting period  Outcome: Progressing  Goal: Incisions, wounds, or drain sites healing without S/S of infection  Outcome: Progressing     Problem: Musculoskeletal - Adult  Goal: Return mobility to safest level of function  Outcome: Progressing  Goal: Return ADL status to a safe level of function  Outcome: Progressing     Problem: Gastrointestinal - Adult  Goal: Maintains or returns to baseline bowel function  Outcome: Progressing     Problem: Genitourinary - Adult  Goal: Absence of urinary retention  Outcome: Progressing     Problem: Infection - Adult  Goal: Absence of infection during hospitalization  Outcome: Progressing     Problem: Metabolic/Fluid and Electrolytes - Adult  Goal: Electrolytes maintained within normal limits  Outcome: Progressing     Problem: Safety - Adult  Goal: Free from fall injury  Outcome: Progressing     Problem: Confusion  Goal: Confusion, delirium,

## 2025-07-15 NOTE — ANESTHESIA PROCEDURE NOTES
Arterial Line:    An arterial line was placed using ultrasound guidance, in the OR for the following indication(s): continuous blood pressure monitoring and blood sampling needed.    A 20 gauge (size), 1 and 3/4 inch (length), Arrow (type) catheter was placed, Seldinger technique not used, into the right radial artery, secured by tape and Tegaderm.  Anesthesia type: General    Events:  patient tolerated procedure well with no complications.7/15/2025 1:25 AM7/15/2025 1:35 AM  Anesthesiologist: Alexander Thompson MD  Performed: Anesthesiologist   Preanesthetic Checklist  Completed: patient identified, IV checked, site marked, risks and benefits discussed, surgical/procedural consents, equipment checked, pre-op evaluation, timeout performed, anesthesia consent given, oxygen available, monitors applied/VS acknowledged, fire risk safety assessment completed and verbalized and blood product R/B/A discussed and consented

## 2025-07-15 NOTE — PROGRESS NOTES
Pt was discussed MDR.   Pt presented with constipation, course complicated duodenal perforation. S/p Ex lap and duodenal ulcer repair. Kept intubated and transferred to ICU for vent management.       PE:   Intubated and sedated.   CTA/BL   S1,s2, No murmurs or gallops   Soft, NT,ND,VERONICA drain seen one exam       A/P:   Duodenal perforation in the setting of duodenal ulcer s/p repair   Cont broad spectrum antibiotics   Steroids   NPO per surgery   SAT/SBT and possible extubation

## 2025-07-15 NOTE — PROGRESS NOTES
Comprehensive Nutrition Assessment    Type and Reason for Visit:  Initial, LOS    Nutrition Recommendations/Plan:   Consider PPN/TPN initiation given moderate malnutrition and prolonged poor intake   Please consult RD if this is pursured for recommendations (will depend on IV access)      Malnutrition Assessment:  Malnutrition Status:  Moderate malnutrition (07/15/25 1450)    Context:  Chronic Illness     Findings of the 6 clinical characteristics of malnutrition:  Energy Intake:  75% or less estimated energy requirements for 1 month or longer  Weight Loss:  7.5% over 3 months     Body Fat Loss:  No body fat loss     Muscle Mass Loss:  No muscle mass loss    Fluid Accumulation:  No fluid accumulation     Strength:  Not Performed    Nutrition Assessment:  Pt admitted with acute metabolic encephalopathy.  PMH: HTN, COPD, breast CA (remission).  Chart reviewed for LOS, case discussed during CCU rounds.  Pt intubated as she is s/p perforated duodenal ulcer with sung patch repair yesterday.  Plan for UGI Friday to assess for leak.  She had a kyphoplasty on 7/10 and has been struggling with constipation since then.  Dtr at the bedside reports she has been in and out of the hospital and went from 160lb down to 140lb (currently 148lb per bedscale but this could be effected by fluid).  She was eating less than normal due to pain for weeks.  Levo at 3 currently.  She only has peripheral lines.  Discussed option on TPN/PPN, however we could likely need access (although could offer up PPN given how low kcal needs are).  VICKIE noted, K 5.3 and phos 6.4.  Last BM noted on 7/8.  No overt fat/muscle wasting noted but family has noted a decline in functional status.  Will discuss option of nutrition support with IDR team tomorrow.  It does not sound like she has been eating much this admission, unfortunately no PO intake has been recorded during this hospital stay.     Wt Readings from Last 3 Encounters:   07/15/25 67.4 kg (148  to determine     Keshia Malone RD, [facility]  Contact: ext [phone]

## 2025-07-15 NOTE — PROGRESS NOTES
PT note:     Chart reviewed and noted patient transferred into CCU overnight. Noted events and spoke with nursing. Patient is currently intubated, on sedation, and not appropriate for PT re-evaluation this date. RN requesting to hold and follow up tomorrow.     Flakita Finley, PT, DPT

## 2025-07-15 NOTE — PROGRESS NOTES
0716: Report received from FIDEL Foreman. Pt. Agitated at this time and Reaching for her ETT. Precedex titrated to 0.8 mcgs.     0800: Pt. Assessed. Pt. Responds to any touch. Pt. Moving all extremities and is easily agitated by any movement, resulting in attempting to remove her ETT. Pt. Currently in BL wrist restraints. Precedex currently at 0.8 mcgs, Levophed at 10 mcgs, and LR infusing at 75 ml/hr. 3 PIV's present. Pupils equal and sluggish, size 1. Gag and cough present. Pt. SR on the monitor with Frequent PVC's.  Pt. Currently has an ETT, A-line, NGT, Cornelius, and VERONICA drain. Pulses all palpable. Skin intact. 2 lap sites on ABD, and dark area to left foot (Chronic). Pt. Also has several covered incisions to back covered with Bandaids.    Per Anabell Stanford, ok to Extubate, Keep patient strict NPO, and OK to give lovenox.     0803: PRN Fentanyl given for a CPOT of 7.     0900: SBT attempted. Pt. Too sedated. Will wean precedex as tolerated. Levophed weaning in progress. VSS.     0945: IDR's held with Dr. Noland. Md updated on SBT results and low UOP. No new orders received.     1000: Pt. Turned and mouth care completed. Pt. Attempted to bite suction toothbrush and scratch nurse while turning. Levophed weaned to 6 mcgs, Precedex weaned to 0.6 mcgs.     1115: Pt. Placed on PS of 10 on the ventilator, Pt. Turned supine. Precedex currently at 0.4 mcgs. Critical Peritoneal fluid culture resulted with Gram positive cocci in pairs and rare gram positive rods. Dr. Noland and Anabell Stanford made aware.     1200: Pt. Reassessed. No real changes.     1300: Pt. Bathed. Pt. Restless, but settled down afterwards.     1308: Levophed titrated back up to 4 mcgs for hypotension post bath/turning. BMP sent per Dr. Noland.     1322: PRN Dilaudid given for a CPOT of 7.    1553: Precedex now stopped per Dr. Noland. Pt.  Now on Spontaneous Breathing trial 5/5.     1600: Pt. Reassessed. No real changes. Levophed at 4 mcgs to keep a MAP of 65. D/C

## 2025-07-15 NOTE — PLAN OF CARE
Problem: Respiratory - Adult  Goal: Achieves optimal ventilation and oxygenation  7/15/2025 0733 by Kellen Aburto RN  Outcome: Progressing     Problem: Neurosensory - Adult  Goal: Achieves stable or improved neurological status  7/15/2025 0733 by Kellen Aburto RN  Outcome: Progressing  Flowsheets (Taken 7/15/2025 0400)  Achieves stable or improved neurological status:   Assess for and report changes in neurological status   Monitor temperature, glucose, and sodium. Initiate appropriate interventions as ordered  Goal: Achieves maximal functionality and self care  7/15/2025 0733 by Kellen Aburto RN  Outcome: Progressing  Flowsheets (Taken 7/15/2025 0400)  Achieves maximal functionality and self care: Monitor swallowing and airway patency with patient fatigue and changes in neurological status     Problem: Safety - Medical Restraint  Goal: Remains free of injury from restraints (Restraint for Interference with Medical Device)  Description: INTERVENTIONS:  1. Determine that other, less restrictive measures have been tried or would not be effective before applying the restraint  2. Evaluate the patient's condition at the time of restraint application  3. Inform patient/family regarding the reason for restraint  4. Q2H: Monitor safety, psychosocial status, comfort, nutrition and hydration  7/15/2025 1907 by Doris Bobby RN  Outcome: Progressing  7/15/2025 0733 by Kellen Aburto RN  Outcome: Progressing  Flowsheets (Taken 7/15/2025 0701)  Remains free of injury from restraints (restraint for interference with medical device):   Determine that other, less restrictive measures have been tried or would not be effective before applying the restraint   Evaluate the patient's condition at the time of restraint application   Inform patient/family regarding the reason for restraint   Every 2 hours: Monitor safety, psychosocial status, comfort, nutrition and hydration

## 2025-07-15 NOTE — CONSULTS
Subjective:      Nicole Galeano is a 79 y.o. female who is for evaluation of pneumoperitoneum.  Patient was admitted a week ago with encephalopathy and lumbar compression fractures.  She had a kyphoplasty 4 days ago.   She has been having constipations issues during this admission.  Around 8:30 pm she developed hypotension and abdominal pain.  A CT was done that showed moderated pneumoperitoneum.       Patient is somnolent.  She states she has abdomnal pain but, can not no more meaningful history.  Her daughter Nancy is at the bedside.      Past Medical History:   Diagnosis Date    Arthritis     Breast cancer (HCC) 10/2020    Breast cancer, left breast (HCC) ,     lumpectomy/chemo/XRT, then mastectomy and anastrozole in     Cancer (HCC)     SEVERAL SKIN - MELANOMA    COPD (chronic obstructive pulmonary disease) (HCC)     Hypertension     Ill-defined condition     MACULAR DEGENERATION     Past Surgical History:   Procedure Laterality Date    ANKLE FRACTURE SURGERY Left 3/7/2025    LEFT ANKLE OPEN REDUCTION INTERNAL FIXATION performed by Arvind Kirby MD at [facility] MAIN OR    APPENDECTOMY      BREAST LUMPECTOMY Left         BREAST LUMPECTOMY Right 4/15/2021    RIGHT BREAST LUMPECTOMY WITH ULTRASOUND performed by Andres Cortes Jr., MD at [facility] AMBULATORY OR     SECTION      COLONOSCOPY      IR KYPHOPLASTY THORACIC 1 VERTEBRAL BODY  7/10/2025    IR KYPHOPLASTY THORACIC 1 VERTEBRAL BODY 7/10/2025 Honey Mccarthy MD [facility] IR    MASTECTOMY Left         US BREAST BIOPSY W LOC DEVICE 1ST LESION RIGHT Right 10/29/2020    US BREAST NEEDLE BIOPSY RIGHT 10/29/2020 [facility] RAD [facility]MO     Family History   Problem Relation Age of Onset    Anesth Problems Neg Hx     Abdominal aortic aneurysm Brother     Cancer Sister         MELANOMA, LUNG    Cancer Father         SKIN    Heart Disease Father     Thyroid Disease Mother      Social History     Socioeconomic History    Marital status: Single

## 2025-07-15 NOTE — PLAN OF CARE
Problem: Respiratory - Adult  Goal: Achieves optimal ventilation and oxygenation  7/15/2025 0733 by Kellen Aburto RN  Outcome: Progressing  7/15/2025 0416 by Basilio Lynn RCP  Outcome: Progressing  Flowsheets (Taken 7/15/2025 0400 by Kellen Aburto RN)  Achieves optimal ventilation and oxygenation:   Assess for changes in mentation and behavior   Assess for changes in respiratory status   Position to facilitate oxygenation and minimize respiratory effort   Oxygen supplementation based on oxygen saturation or arterial blood gases   Encourage broncho-pulmonary hygiene including cough, deep breathe, incentive spirometry   Assess the need for suctioning and aspirate as needed   Assess and instruct to report shortness of breath or any respiratory difficulty   Respiratory therapy support as indicated  7/15/2025 0053 by Skyler Khanna RN  Outcome: Progressing  7/15/2025 0010 by Ashley Swan RN  Outcome: Progressing     Problem: Pain  Goal: Verbalizes/displays adequate comfort level or baseline comfort level  7/15/2025 0733 by Kellen Aburto RN  Outcome: Progressing  Flowsheets (Taken 7/15/2025 0400)  Verbalizes/displays adequate comfort level or baseline comfort level:   Assess pain using appropriate pain scale   Implement non-pharmacological measures as appropriate and evaluate response   Administer analgesics based on type and severity of pain and evaluate response  7/15/2025 0053 by Skyler Khanna RN  Outcome: Progressing  7/15/2025 0010 by Ashley Swan RN  Outcome: Progressing     Problem: Neurosensory - Adult  Goal: Achieves stable or improved neurological status  7/15/2025 0733 by Kellen Aburto RN  Outcome: Progressing  Flowsheets (Taken 7/15/2025 0400)  Achieves stable or improved neurological status:   Assess for and report changes in neurological status   Monitor temperature, glucose, and sodium. Initiate appropriate interventions as ordered  7/15/2025 0053 by Skyler Khanna RN  Outcome:  Progressing  Flowsheets (Taken 7/15/2025 0400)  Free From Fall Injury:   Instruct family/caregiver on patient safety   Based on caregiver fall risk screen, instruct family/caregiver to ask for assistance with transferring infant if caregiver noted to have fall risk factors  7/15/2025 0053 by Skyler Khanna RN  Outcome: Progressing  7/15/2025 0010 by Ashley Swan RN  Outcome: Progressing     Problem: Confusion  Goal: Confusion, delirium, dementia, or psychosis is improved or at baseline  Description: INTERVENTIONS:  1. Assess for possible contributors to thought disturbance, including medications, impaired vision or hearing, underlying metabolic abnormalities, dehydration, psychiatric diagnoses, and notify attending LIP  2. [place] high risk fall precautions, as indicated  3. Provide frequent short contacts to provide reality reorientation, refocusing and direction  4. Decrease environmental stimuli, including noise as appropriate  5. Monitor and intervene to maintain adequate nutrition, hydration, elimination, sleep and activity  6. If unable to ensure safety without constant attention obtain sitter and review sitter guidelines with assigned personnel  7. Initiate Psychosocial CNS and Spiritual Care consult, as indicated  7/15/2025 0733 by Kellen Aburto, RN  Outcome: Progressing  Flowsheets (Taken 7/15/2025 0400)  Effect of thought disturbance (confusion, delirium, dementia, or psychosis) are managed with adequate functional status:   [place] high risk fall precautions, as indicated   Provide frequent short contacts to provide reality reorientation, refocusing and direction   Decrease environmental stimuli, including noise as appropriate   Monitor and intervene to maintain adequate nutrition, hydration, elimination, sleep and activity  7/15/2025 0053 by Skyler Khanna RN  Outcome: Progressing  7/15/2025 0010 by Ashley Swan RN  Outcome: Progressing     Problem: Skin/Tissue Integrity  Goal: Skin

## 2025-07-15 NOTE — SIGNIFICANT EVENT
While rounding, charge RN voiced concerns about patient stating that manual blood pressures were giving them readings that were varied (from 80s systolic to 110s systolic). Patient is ill-appearing; however is able to respond appropriately and follow commands. Patient states that she is having some abdominal pain and denies any chest pain, headaches, or dizziness. Patient's extremities are cool to touch and  mottled with palpable pulses. This RN able to get doppler pressure of 80/palp, HR-90s, RR-18, oxygen-95% on 3LNC, and rectal temp of 96.6f.   SUZY Magallanes notified of situation and at bedside to assess patient, fluid bolus ordered and additional labs placed. Nursing supervisor aware of need to transfer patient for closer observation of blood pressures and assigned bed 2134. Patient transported to CT and then to room 2134.     ~ELIZABETH Mcleod RN  #[id number]

## 2025-07-15 NOTE — PROGRESS NOTES
[facility] coverage 7p-7a:    Nursing contacted Nocturnist/cross cover provider and notified that patient is hypotensive and mottled with temp 96.6 rectal. Patient was admitted 8 days ago for encephalopathy thought to be medication related, s/p kypho 7/10.     Evaluated patient at bedside, SBP 70s, improved to 87/63 with ~400 cc LR bolus so far. She is drowsy but arouses to voice, A/Ox4. Skin is cool with mottling to BLE. Poor PO intake, minimal urine output today. Abd is distended, not firm, tender with palpation. She is on 3L NC, in no acute resp distress.    DD include:  -adrenal crisis, appears she has been filling prednisone since 6/17/24, although less likely since she is currently taking prednisone, consider stress dose steroids if she does not respond to fluid  -dehydration- poor po intake, dark urine  -sepsis- UTI? Awaiting labs and urine  -thyroid dysfunction?  -intraabdominal process? Generalized abd pain x 1 week, check CT abd    Plan:  -bg 122  -labs, urine, blood cultures pending  -1L LR bolus  -hold antihypertensives and sedating meds  -warm blankets, atul hugger if no improvement  -monitor on tele, recheck temp  -transfer to stepdown, consider ICU if bp does not continue to respond to fluid    Daughter Nancy Frances called with update on the above concerns/plan/transfer, she states patient has been taking prednisone for nearly a year. Her daughter is also concerned about the abd pain, states she doesn't have frequent Bms at baseline and the abd pain is persistent for a week. States she will come to the hospital and inform her brother.    BP improved to 102/73, will proceed cautiously with CT given patient presentation, abd tenderness and concerns of daughter.      ADDENDUM  Lactic 5, procal 43, broad spectrum abx for sepsis of unknown origin and additional 1L LR given. Repeat lactic.     ADDENDUM  CT abd: Moderate pneumoperitoneum and ascites suspicious for  gastrointestinal perforation

## 2025-07-15 NOTE — PROGRESS NOTES
SURGERY PROGRESS NOTE      Admit Date: 2025    POD * Day of Surgery *    Procedure: Procedure(s):  LAPAROSCOPY DIAGNOSTIC      Subjective:     Patient remains intubated ands sedated on levophed     Objective:     /89   Pulse 100   Temp 98.9 °F (37.2 °C)   Resp 18   Ht 1.575 m (5' 2\")   Wt 67.4 kg (148 lb 9.4 oz)   SpO2 97%   BMI 27.18 kg/m²      Temp (24hrs), Av.7 °F (36.5 °C), Min:96.5 °F (35.8 °C), Max:98.9 °F (37.2 °C)      07/15 0701 - 07/15 1900  In: 293 [I.V.:252.1]  Out: 95 [Urine:25; Drains:70]  1901 - 07/15 0700  In: 3787.4 [I.V.:719.8]  Out: 655 [Urine:555; Drains:90]    Physical Exam:    General:  Withdraws to pain   Abdomen: Soft, distended, tender    VERONICA -  90cc tan cloudy fluid    Incision:   C/D/I         CBC:   Lab Results   Component Value Date/Time    WBC 12.6 07/15/2025 03:58 AM    RBC 3.94 07/15/2025 03:58 AM    HGB 11.9 07/15/2025 03:58 AM    HCT 38.4 07/15/2025 03:58 AM    MCV 97.5 07/15/2025 03:58 AM    MCH 30.2 07/15/2025 03:58 AM    MCHC 31.0 07/15/2025 03:58 AM    RDW 18.0 07/15/2025 03:58 AM     07/15/2025 03:58 AM    MPV 11.1 07/15/2025 03:58 AM     BMP:    Lab Results   Component Value Date/Time     07/15/2025 03:58 AM    K 5.3 07/15/2025 03:58 AM    CL 96 07/15/2025 03:58 AM    CO2 24 07/15/2025 03:58 AM    BUN 46 07/15/2025 03:58 AM    CREATININE 2.25 07/15/2025 03:58 AM    CALCIUM 8.2 07/15/2025 03:58 AM    GFRAA >60 2021 01:10 PM    LABGLOM 21 07/15/2025 03:58 AM    GLUCOSE 117 07/15/2025 03:58 AM       Assessment:     Principal Problem:    Acute metabolic encephalopathy  Active Problems:    Acute encephalopathy  Resolved Problems:    * No resolved hospital problems. *    6 hours status post perforated duodenal ulcer repair recovering as expected     Plan:        Continue supportive care  Wean to extubate   Strict NPO with NG decompressing  If extubated and able to travel, UGI on Friday to assess repair

## 2025-07-15 NOTE — PROGRESS NOTES
Occupational Therapy    Chart reviewed and spoke with nurse in preparation for OT reeval. Nurse requests defer at this time due to intubated and sedated s/p surgical procedure.     Radha Joyner, OTR/L

## 2025-07-15 NOTE — PLAN OF CARE
Problem: Respiratory - Adult  Goal: Achieves optimal ventilation and oxygenation  7/15/2025 0053 by Skyler Khanna RN  Outcome: Progressing  7/15/2025 0010 by Ashley Swan RN  Outcome: Progressing     Problem: Pain  Goal: Verbalizes/displays adequate comfort level or baseline comfort level  7/15/2025 0053 by Skyler Khanna RN  Outcome: Progressing  7/15/2025 0010 by Ashley Swan RN  Outcome: Progressing     Problem: Neurosensory - Adult  Goal: Achieves stable or improved neurological status  7/15/2025 0053 by Skyler Khanna RN  Outcome: Progressing  7/15/2025 0010 by Ashley Swan RN  Outcome: Progressing  Goal: Achieves maximal functionality and self care  7/15/2025 0053 by Skyler Khanna RN  Outcome: Progressing  7/15/2025 0010 by Ashley Swan RN  Outcome: Progressing     Problem: Cardiovascular - Adult  Goal: Absence of cardiac dysrhythmias or at baseline  7/15/2025 0053 by Skyler Khanna RN  Outcome: Progressing  7/15/2025 0010 by Ashley Swan RN  Outcome: Progressing  Flowsheets (Taken 7/14/2025 173 by Chely Gibbons RN)  Absence of cardiac dysrhythmias or at baseline: Monitor cardiac rate and rhythm     Problem: Skin/Tissue Integrity - Adult  Goal: Skin integrity remains intact  Description: 1.  Monitor for areas of redness and/or skin breakdown  2.  Assess vascular access sites hourly  3.  Every 4-6 hours minimum:  Change oxygen saturation probe site  4.  Every 4-6 hours:  If on nasal continuous positive airway pressure, respiratory therapy assess nares and determine need for appliance change or resting period  7/15/2025 0053 by Skyler Khanna RN  Outcome: Progressing  7/15/2025 0010 by Ashley Swan RN  Outcome: Progressing  Goal: Incisions, wounds, or drain sites healing without S/S of infection  7/15/2025 0053 by Skyler Khanna RN  Outcome: Progressing  7/15/2025 0010 by Ashley Swan RN  Outcome: Progressing     Problem: Musculoskeletal - Adult  Goal: Return mobility

## 2025-07-15 NOTE — CONSULTS
CRITICAL CARE NOTE      Name: Nicole Galeano   : 1945   MRN: [id number]   Date: 7/15/2025          PRINCIPAL ICU DIAGNOSIS   Septic shock  Pneumoperitoneum 2/2 to perforation duodenal ulcer  Acute metabolic encephalopathy  Multiple thoracic and lumbar compression fractures s/p kyphoplasty    BRIEF PATIENT SUMMARY   78 y/o female PMHx of COPD, rheumatoid arthritis, and remote hx breast cancer admitted  ()  by hospital medicine for altered mental status. Recent dx of multiple vertebral compression fractures, placed on multiple medications, with concern these were contributing to confusion. Underwent kyphoplasty by IR on (7/10). Aggressive bowel regimen added d/t constipation. Evening (7/15) noted to be mottled with hypotension. CT performed demonstrating pneumoperitoneum. Surgery consulted taken to the OR, now s/p ex- lap/repair for perforated duodenal ulcer. Transferred to the ICU post op on mechanical ventilation.     Initial ICU exam + hypotensive with SBP 60s, with cool mottled extremities. Administer IV crystalloid bolus, and started on levophed. CXR/KUB and labs- pending    COMPREHENSIVE ASSESSMENT & PLAN:SYSTEM BASED     Post op mechanical ventilation  Hx of COPD on 1 L/min  CXR-pending  Lung protective ventilation with goal plateau pressure < 30, driving pressure < 15  As needed bronchodilators  SAT/SBT as indicated    Septic shock 2/2 perforated duodenal ulcer s/p ex- lap and repair (7/15)  CT A/P: Moderate pneumoperitoneum and ascites  S/p ex- lap and repair for perforated duodenal ulcer  Vasopressors to keep MAP 65 and above for adequate tissue perfusion.  Start Stress dose steroids in setting of shock(was receiving prednisone PTA, which was being tapered this admission)  Monitor lactate as the surrogate for tissue perfusion. Cont. Zosyn/Vancomycin/Fluconazole  Follow cultures.    Acute metabolic encephalopathy: resolved /fluctuating  P/w confusion thought to be related to

## 2025-07-16 LAB
ALBUMIN SERPL-MCNC: 1.3 G/DL (ref 3.5–5)
ALBUMIN/GLOB SERPL: 0.4 (ref 1.1–2.2)
ALP SERPL-CCNC: 189 U/L (ref 45–117)
ALT SERPL-CCNC: 14 U/L (ref 12–78)
ANION GAP SERPL CALC-SCNC: 11 MMOL/L (ref 2–12)
AST SERPL-CCNC: 36 U/L (ref 15–37)
BACTERIA SPEC CULT: NORMAL
BACTERIA SPEC CULT: NORMAL
BILIRUB DIRECT SERPL-MCNC: 0.4 MG/DL (ref 0–0.2)
BILIRUB SERPL-MCNC: 0.7 MG/DL (ref 0.2–1)
BUN SERPL-MCNC: 55 MG/DL (ref 6–20)
BUN/CREAT SERPL: 21 (ref 12–20)
CALCIUM SERPL-MCNC: 8.4 MG/DL (ref 8.5–10.1)
CHLORIDE SERPL-SCNC: 102 MMOL/L (ref 97–108)
CO2 SERPL-SCNC: 21 MMOL/L (ref 21–32)
CREAT SERPL-MCNC: 2.66 MG/DL (ref 0.55–1.02)
EKG ATRIAL RATE: 92 BPM
EKG DIAGNOSIS: NORMAL
EKG P AXIS: -66 DEGREES
EKG P-R INTERVAL: 160 MS
EKG Q-T INTERVAL: 340 MS
EKG QRS DURATION: 72 MS
EKG QTC CALCULATION (BAZETT): 420 MS
EKG R AXIS: -38 DEGREES
EKG T AXIS: 77 DEGREES
EKG VENTRICULAR RATE: 92 BPM
ERYTHROCYTE [DISTWIDTH] IN BLOOD BY AUTOMATED COUNT: 18.1 % (ref 11.5–14.5)
GLOBULIN SER CALC-MCNC: 3.7 G/DL (ref 2–4)
GLUCOSE BLD STRIP.AUTO-MCNC: 109 MG/DL (ref 65–117)
GLUCOSE BLD STRIP.AUTO-MCNC: 170 MG/DL (ref 65–117)
GLUCOSE BLD STRIP.AUTO-MCNC: 84 MG/DL (ref 65–117)
GLUCOSE BLD STRIP.AUTO-MCNC: 90 MG/DL (ref 65–117)
GLUCOSE SERPL-MCNC: 90 MG/DL (ref 65–100)
HCT VFR BLD AUTO: 33.1 % (ref 35–47)
HGB BLD-MCNC: 10.7 G/DL (ref 11.5–16)
LACTATE SERPL-SCNC: 1.3 MMOL/L (ref 0.4–2)
MAGNESIUM SERPL-MCNC: 1.8 MG/DL (ref 1.6–2.4)
MCH RBC QN AUTO: 30.5 PG (ref 26–34)
MCHC RBC AUTO-ENTMCNC: 32.3 G/DL (ref 30–36.5)
MCV RBC AUTO: 94.3 FL (ref 80–99)
NRBC # BLD: 0.07 K/UL (ref 0–0.01)
NRBC BLD-RTO: 0.6 PER 100 WBC
PHOSPHATE SERPL-MCNC: 6.5 MG/DL (ref 2.6–4.7)
PLATELET # BLD AUTO: 261 K/UL (ref 150–400)
PMV BLD AUTO: 10.4 FL (ref 8.9–12.9)
POTASSIUM SERPL-SCNC: 4.7 MMOL/L (ref 3.5–5.1)
PROT SERPL-MCNC: 5 G/DL (ref 6.4–8.2)
RBC # BLD AUTO: 3.51 M/UL (ref 3.8–5.2)
SERVICE CMNT-IMP: ABNORMAL
SERVICE CMNT-IMP: NORMAL
SODIUM SERPL-SCNC: 134 MMOL/L (ref 136–145)
VANCOMYCIN SERPL-MCNC: 21.4 UG/ML
WBC # BLD AUTO: 11.7 K/UL (ref 3.6–11)

## 2025-07-16 PROCEDURE — 36415 COLL VENOUS BLD VENIPUNCTURE: CPT

## 2025-07-16 PROCEDURE — 80076 HEPATIC FUNCTION PANEL: CPT

## 2025-07-16 PROCEDURE — 2500000003 HC RX 250 WO HCPCS: Performed by: INTERNAL MEDICINE

## 2025-07-16 PROCEDURE — 2580000003 HC RX 258: Performed by: INTERNAL MEDICINE

## 2025-07-16 PROCEDURE — 6360000002 HC RX W HCPCS: Performed by: SURGERY

## 2025-07-16 PROCEDURE — 83735 ASSAY OF MAGNESIUM: CPT

## 2025-07-16 PROCEDURE — 80202 ASSAY OF VANCOMYCIN: CPT

## 2025-07-16 PROCEDURE — 2500000003 HC RX 250 WO HCPCS: Performed by: NURSE PRACTITIONER

## 2025-07-16 PROCEDURE — 6360000002 HC RX W HCPCS: Performed by: INTERNAL MEDICINE

## 2025-07-16 PROCEDURE — 82962 GLUCOSE BLOOD TEST: CPT

## 2025-07-16 PROCEDURE — 2500000003 HC RX 250 WO HCPCS: Performed by: SURGERY

## 2025-07-16 PROCEDURE — 99024 POSTOP FOLLOW-UP VISIT: CPT | Performed by: NURSE PRACTITIONER

## 2025-07-16 PROCEDURE — 80048 BASIC METABOLIC PNL TOTAL CA: CPT

## 2025-07-16 PROCEDURE — 83605 ASSAY OF LACTIC ACID: CPT

## 2025-07-16 PROCEDURE — 2580000003 HC RX 258: Performed by: SURGERY

## 2025-07-16 PROCEDURE — 6370000000 HC RX 637 (ALT 250 FOR IP): Performed by: SURGERY

## 2025-07-16 PROCEDURE — 84100 ASSAY OF PHOSPHORUS: CPT

## 2025-07-16 PROCEDURE — 94003 VENT MGMT INPAT SUBQ DAY: CPT

## 2025-07-16 PROCEDURE — 6360000002 HC RX W HCPCS: Performed by: NURSE PRACTITIONER

## 2025-07-16 PROCEDURE — 85027 COMPLETE CBC AUTOMATED: CPT

## 2025-07-16 PROCEDURE — 2000000000 HC ICU R&B

## 2025-07-16 RX ADMIN — METHYLPREDNISOLONE SODIUM SUCCINATE 20 MG: 40 INJECTION, POWDER, LYOPHILIZED, FOR SOLUTION INTRAMUSCULAR; INTRAVENOUS at 15:40

## 2025-07-16 RX ADMIN — SODIUM BICARBONATE: 84 INJECTION, SOLUTION INTRAVENOUS at 11:08

## 2025-07-16 RX ADMIN — FLUCONAZOLE 200 MG: 2 INJECTION, SOLUTION INTRAVENOUS at 03:58

## 2025-07-16 RX ADMIN — SODIUM CHLORIDE, PRESERVATIVE FREE 10 ML: 5 INJECTION INTRAVENOUS at 21:00

## 2025-07-16 RX ADMIN — Medication 1 AMPULE: at 08:24

## 2025-07-16 RX ADMIN — CALCIUM GLUCONATE: 98 INJECTION, SOLUTION INTRAVENOUS at 17:44

## 2025-07-16 RX ADMIN — HYDROMORPHONE HYDROCHLORIDE 0.5 MG: 1 INJECTION, SOLUTION INTRAMUSCULAR; INTRAVENOUS; SUBCUTANEOUS at 20:40

## 2025-07-16 RX ADMIN — Medication 1 AMPULE: at 21:00

## 2025-07-16 RX ADMIN — ENOXAPARIN SODIUM 30 MG: 100 INJECTION SUBCUTANEOUS at 08:38

## 2025-07-16 RX ADMIN — METHYLPREDNISOLONE SODIUM SUCCINATE 20 MG: 40 INJECTION, POWDER, LYOPHILIZED, FOR SOLUTION INTRAMUSCULAR; INTRAVENOUS at 03:59

## 2025-07-16 RX ADMIN — PANTOPRAZOLE SODIUM 40 MG: 40 INJECTION, POWDER, FOR SOLUTION INTRAVENOUS at 04:00

## 2025-07-16 RX ADMIN — PANTOPRAZOLE SODIUM 40 MG: 40 INJECTION, POWDER, FOR SOLUTION INTRAVENOUS at 15:40

## 2025-07-16 RX ADMIN — PIPERACILLIN AND TAZOBACTAM 3375 MG: 3; .375 INJECTION, POWDER, LYOPHILIZED, FOR SOLUTION INTRAVENOUS at 08:40

## 2025-07-16 RX ADMIN — SODIUM CHLORIDE, PRESERVATIVE FREE 10 ML: 5 INJECTION INTRAVENOUS at 08:24

## 2025-07-16 RX ADMIN — PIPERACILLIN AND TAZOBACTAM 3375 MG: 3; .375 INJECTION, POWDER, LYOPHILIZED, FOR SOLUTION INTRAVENOUS at 21:23

## 2025-07-16 ASSESSMENT — PAIN SCALES - GENERAL
PAINLEVEL_OUTOF10: 0
PAINLEVEL_OUTOF10: 7
PAINLEVEL_OUTOF10: 6
PAINLEVEL_OUTOF10: 0
PAINLEVEL_OUTOF10: 3

## 2025-07-16 ASSESSMENT — PULMONARY FUNCTION TESTS: PIF_VALUE: 10

## 2025-07-16 ASSESSMENT — PAIN DESCRIPTION - LOCATION
LOCATION: ABDOMEN
LOCATION: ABDOMEN

## 2025-07-16 ASSESSMENT — PAIN DESCRIPTION - DESCRIPTORS
DESCRIPTORS: DISCOMFORT
DESCRIPTORS: ACHING

## 2025-07-16 ASSESSMENT — PAIN DESCRIPTION - ORIENTATION
ORIENTATION: MID
ORIENTATION: MID

## 2025-07-16 ASSESSMENT — PAIN DESCRIPTION - ONSET: ONSET: ON-GOING

## 2025-07-16 ASSESSMENT — PAIN DESCRIPTION - FREQUENCY: FREQUENCY: INTERMITTENT

## 2025-07-16 ASSESSMENT — PAIN DESCRIPTION - PAIN TYPE: TYPE: SURGICAL PAIN

## 2025-07-16 NOTE — PROGRESS NOTES
Occupational Therapy    Chart reviewed and spoke with nurse in preparation for OT session. Per nurse patient intubated and sedated, not following commands/not appropriate for skilled therapy services at this time. Will defer and continue to follow as medically appropriate and available.    Radha Joyner, OTR/L

## 2025-07-16 NOTE — PROGRESS NOTES
Pharmacy TPN Management Note    TPN Indication: s/p Ex lap and duodenal perforation repair     TPN Route: Peripheral    Home TPN? No    Macronutrients:  Protein: 80g  Dextrose:50g  Lipids: none     Rate: 62 mL/hr    Labs:  Recent Labs     Units 256 07/15/25  1241 07/15/25  0358 07/15/25  0351 256   NA mmol/L 134* 132* 131*  --  133*   K mmol/L 4.7 5.0 5.3*  --  4.9   CL mmol/L 102 100 96*  --  93*   CO2 mmol/L 21 23 24  --  27   MG mg/dL 1.8  --   --  1.7 2.1   PHOS MG/DL 6.5*  --   --  6.4* 6.6*   BUN MG/DL 55* 51* 46*  --  47*     Recent Labs     Units 256 07/15/25  0351   AST U/L 36 40*   ALT U/L 14 18     Recent Labs     Units 25   WBC K/uL 11.7*   HGB g/dL 10.7*   HCT % 33.1*     Electrolytes (dosed per LITER):  Na: 40 meq/L; K: 0 meq/L; Phos:0 mmol/L; M meq/L; Ca 4.5 meq/L    Other additives in TPN: multivitamins  and trace elements    Impression/Plan:   TPN macronutrient/rate changes: new start PPN  TPN electrolyte changes: new start, omit K and phos   TPN insulin changes: NA      Pharmacy will continue to monitor enteral nutrition plan, electrolytes, renal function, and dietician recommendations and adjust parenteral nutrition as needed.    Thank you,  Hayley Eid formerly [facility]

## 2025-07-16 NOTE — PLAN OF CARE
Problem: Respiratory - Adult  Goal: Achieves optimal ventilation and oxygenation  7/16/2025 1003 by Nicole Link RN  Outcome: Progressing  Flowsheets (Taken 7/16/2025 0800)  Achieves optimal ventilation and oxygenation:   Assess for changes in respiratory status   Position to facilitate oxygenation and minimize respiratory effort   Oxygen supplementation based on oxygen saturation or arterial blood gases   Encourage broncho-pulmonary hygiene including cough, deep breathe, incentive spirometry   Assess the need for suctioning and aspirate as needed  7/16/2025 0147 by Basilio Lynn RCP  Outcome: Progressing  7/16/2025 0111 by Mirna Richmond RN  Outcome: Progressing     Problem: Pain  Goal: Verbalizes/displays adequate comfort level or baseline comfort level  7/16/2025 1003 by Nicole Link RN  Outcome: Progressing  Flowsheets (Taken 7/16/2025 0800)  Verbalizes/displays adequate comfort level or baseline comfort level: Assess pain using appropriate pain scale  7/16/2025 0111 by Mirna Richmond RN  Outcome: Progressing     Problem: Neurosensory - Adult  Goal: Achieves stable or improved neurological status  7/16/2025 1003 by Nicole Link RN  Outcome: Progressing  Flowsheets (Taken 7/16/2025 0800)  Achieves stable or improved neurological status:   Assess for and report changes in neurological status   Maintain blood pressure and fluid volume within ordered parameters to optimize cerebral perfusion and minimize risk of hemorrhage  7/16/2025 0111 by Mirna Richmond RN  Outcome: Progressing  Goal: Achieves maximal functionality and self care  7/16/2025 1003 by Nicole Link RN  Outcome: Progressing  Flowsheets (Taken 7/16/2025 0800)  Achieves maximal functionality and self care: Monitor swallowing and airway patency with patient fatigue and changes in neurological status  7/16/2025 0111 by Mirna Richmond RN  Outcome: Progressing     Problem: Cardiovascular - Adult  Goal: Absence of cardiac dysrhythmias  Function:   Assist with transfers and ambulation using safe patient handling equipment as needed   Assess patient stability and activity tolerance for standing, transferring and ambulating with or without assistive devices  7/16/2025 0111 by Mirna Richmond RN  Outcome: Progressing  Goal: Return ADL status to a safe level of function  7/16/2025 1003 by Nicole Link RN  Outcome: Progressing  Flowsheets (Taken 7/16/2025 0800)  Return ADL Status to a Safe Level of Function:   Administer medication as ordered   Assess activities of daily living deficits and provide assistive devices as needed  7/16/2025 0111 by Mirna Richmond RN  Outcome: Progressing     Problem: Gastrointestinal - Adult  Goal: Maintains or returns to baseline bowel function  7/16/2025 1003 by Nicole Link RN  Outcome: Progressing  Flowsheets (Taken 7/16/2025 0800)  Maintains or returns to baseline bowel function: Assess bowel function  7/16/2025 0111 by Mirna Richmond RN  Outcome: Progressing     Problem: Genitourinary - Adult  Goal: Absence of urinary retention  7/16/2025 1003 by Nicole Link RN  Outcome: Progressing  Flowsheets (Taken 7/16/2025 0800)  Absence of urinary retention:   Assess patient’s ability to void and empty bladder   Monitor intake/output and perform bladder scan as needed  7/16/2025 0111 by Mirna Richmond RN  Outcome: Progressing     Problem: Infection - Adult  Goal: Absence of infection during hospitalization  7/16/2025 1003 by Nicole Link RN  Outcome: Progressing  Flowsheets (Taken 7/16/2025 0800)  Absence of infection during hospitalization:   Assess and monitor for signs and symptoms of infection   Monitor lab/diagnostic results   Monitor all insertion sites i.e., indwelling lines, tubes and drains  7/16/2025 0111 by Mirna Richmond RN  Outcome: Progressing     Problem: Metabolic/Fluid and Electrolytes - Adult  Goal: Electrolytes maintained within normal limits  7/16/2025 1003 by Nicole Link

## 2025-07-16 NOTE — PROGRESS NOTES
Surgery NP Progress Note    Nicole Galeano  [id number]  female  79 y.o.  1945    s/p LAPAROSCOPY DIAGNOSTIC on 2025 - 7/15/2025    Pt seen with Dr. Han    Assessment:   Principal Problem:    Acute metabolic encephalopathy  Active Problems:    Moderate protein-calorie malnutrition    Acute encephalopathy  Resolved Problems:    * No resolved hospital problems. *      Expected post-op progress.     Plan/Recommendations/Medical Decision Making:     - Mobilize with nursing and therapy when safe to do so.   - Strict NPO with NGT to suction until Friday   - UGI series Friday  - Pain management- Continue current pain control methods.   - VTE Prophylaxis: Lovenox   - Care per primary team    Subjective:     Patient more awake today. Answers yes/no questions. Denies pain. No nausea.     Objective:     Blood pressure 109/60, pulse 62, temperature 97.8 °F (36.6 °C), resp. rate 16, height 1.575 m (5' 2\"), weight 67.4 kg (148 lb 9.4 oz), SpO2 96%.    Temp (24hrs), Av.8 °F (36.6 °C), Min:97 °F (36.1 °C), Max:99.3 °F (37.4 °C)      Pt resting in bed NAD   Incisions CDI.   Drain  in place - bilious staining from time of surgery but clearing.   SCDs for mechanical DVT proph while in bed     Body mass index is 27.18 kg/m².     Reference: BMI greater than 30 is classified as obesity and greater than 40 is classified as morbid obesity.       ISAIAH Corona - NP   MSN, APRN, FNP-C, CWOCN-AP, RNAS-C    25

## 2025-07-16 NOTE — PROGRESS NOTES
[facility] PROGRESS NOTE.      Name: Nicole Galeano   : 1945   MRN: [id number]   Date: 2025      Chief Complaint   Patient presents with    Back Pain     BIBEMS from [facility] c/o back pain pt reports she had back fx 3 weeks ago. 10/10 pain. Family concerned that pain medications  (prednisone and oxycodone)that were prescribed were causing hallucinations so she is no longer taking those. A&Ox4        Reason for ICU admission: Septic shock, Duodenal perforation     Hospital Course:     78 y/o female PMHx of COPD, rheumatoid arthritis, and remote hx breast cancer admitted  ()  by [facility] medicine for altered mental status. Recent dx of multiple vertebral compression fractures, placed on multiple medications, with concern these were contributing to confusion. Underwent kyphoplasty by IR on (7/10). Aggressive bowel regimen added d/t constipation. Evening (7/15) noted to be mottled with hypotension. CT performed demonstrating pneumoperitoneum. Surgery consulted taken to the OR, now s/p ex- lap/repair for perforated duodenal ulcer. Transferred to the ICU post op on mechanical ventilation.      Initial ICU exam + hypotensive with SBP 60s, with cool mottled extremities. Administer IV crystalloid bolus, and started on levophed. CXR/KUB and labs- pending     : Pt is s/p Ex lap and duodenal perforation repair. Failed SBT due to apnea. Placed on rate overnight. Passed SAT/SBT in the am., extubated.     Assessment & Plan     80 yo female admitted with lumbar compression fractures s/p kyphoplasty,floor course complicated by pneumoperitoneum 2/2 duodenal perforation, s/p ex lap and duodenal ulcer repair and transfer to ICU for vent management.     # Septic shock in the setting of bowel perforation:   Cont vanc, Zosyn and Fluconazole   Follow up cultures   Levophed for MAP <65   Stress dose of steroids ( Pt is chronically  on steroids for rheumatoid)       # Pneumoperitoneum 2/2 duodenal

## 2025-07-16 NOTE — ANESTHESIA POSTPROCEDURE EVALUATION
Department of Anesthesiology  Postprocedure Note    Patient: Nicole Galeano  MRN: [id number]  YOB: 1945  Date of evaluation: 7/16/2025    Procedure Summary       Date: 07/15/25 Room / Location: [facility] MAIN OR 8 / [facility] MAIN OR    Anesthesia Start: 0112 Anesthesia Stop: 0325    Procedure: LAPAROSCOPY DIAGNOSTIC Diagnosis:       Intra-abdominal free air of unknown etiology      (Intra-abdominal free air of unknown etiology [K66.8])    Providers: Migue Han MD Responsible Provider: Alexander Thompson MD    Anesthesia Type: general ASA Status: 4 - Emergent            Anesthesia Type: No value filed.    Eddy Phase I: Eddy Score: 9    Eddy Phase II:      Anesthesia Post Evaluation    Patient location during evaluation: ICU  Patient participation: complete - patient cannot participate  Level of consciousness: sedated and ventilated  Airway patency: patent  Nausea & Vomiting: no vomiting  Cardiovascular status: hemodynamically stable  Respiratory status: ventilator  Hydration status: euvolemic  There was medical reason for not using a multimodal analgesia pain management approach.      No notable events documented.

## 2025-07-16 NOTE — PROGRESS NOTES
1900: Bedside shift change report given to Mirna RN (oncoming nurse) by Nicole RN (offgoing nurse). Report included the following information Nurse Handoff Report and Adult Overview.     2000: Assessment completed, see flowsheets.    2100: [place] NP made aware of dark green and thick output from VERONICA drain    0000: Reassessment completed, see flowsheets.    0100: NP made aware of elevated SBP to 150s-160s    0400: Reassessment completed, see flowsheets.    0700: Bedside shift change report given to Nicole RN (oncoming nurse) by Mirna RN (offgoing nurse). Report included the following information Nurse Handoff Report and Adult Overview.

## 2025-07-16 NOTE — PROGRESS NOTES
Pharmacy Antimicrobial Kinetic Dosing    Indication for Antimicrobials: sepsis /IAI    Current Regimen of Each Antimicrobial:  Zosyn 3.375 gm iv every 12 hr; Start Date ; Day # 3  Vancomycin 1750 mg iv once then dose by level Start Date ; Day # 3    Previous Antimicrobial Therapy:     Goal Level: Vancomycin trough 15-20    Date Dose & Interval Measured (mcg/mL) Predicted AUC 24-48 Predicted AUC 24,ss     1750mg x1  21.4                     Significant Cultures:    blood - ng  7/15 MRSA - negative  7/15 peritoneal fluid - few GPC in pairs, prelim    Labs:  Recent Labs     Units 25  0226 07/15/25  1241 07/15/25  0358 25  2056   CREATININE MG/DL 2.66* 2.52* 2.25* 2.53*   BUN MG/DL 55* 51* 46* 47*   PROCAL ng/mL  --   --   --  43.08   WBC K/uL 11.7*  --  12.6* 16.0*   BANDS %  --   --  8 2     Temp (24hrs), Av.8 °F (36.6 °C), Min:97 °F (36.1 °C), Max:99.2 °F (37.3 °C)      Conditions for Dosing Consideration:     Creatinine Clearance (mL/min): Estimated Creatinine Clearance: 15 mL/min (A) (based on SCr of 2.66 mg/dL (H)).       Impression/Plan:   Vancomycin dose by level  Will not dose vancomycin today due to supratherapeutic level of 21.4   Random level with am labs tomorrow   Predicted [id number] = , Predicted AUC24,ss =   Antimicrobial stop date 7 days     Pharmacy will follow daily and adjust medications as appropriate for renal function and/or serum levels.    Thank you,  Hayley Eid [facility]

## 2025-07-16 NOTE — PROGRESS NOTES
1900: Bedside shift change report given to Mirna RN (oncoming nurse) by Doris RN (offgoing nurse). Report included the following information Nurse Handoff Report and Adult Overview.     2000: Assessment completed, see flowsheets.    0000: Reassessment completed, see flowsheets. Unable to check blood pressures via cuff due to Levophed gtt running in left arm and a right arm arterial line.    0400: Reassessment completed, see flowsheets.    0700: Bedside shift change report given to Nicole PARRA (oncoming nurse) by Mirna RN (offgoing nurse). Report included the following information Nurse Handoff Report and Adult Overview.     Pillow support provided to offload heels, but patient would move off pillows.

## 2025-07-16 NOTE — PROGRESS NOTES
Nutrition Note    Chart reviewed and case discussed during CCU rounds. Recommend initiation of PPN given malnutrition status on admission. Pt is strictly NPO with NGT to suction pending UGI series on Friday 7/18.     Would initiate 80gAA and 50gDextrose for total 490kcals in 1.5L with osmolarity <900 per ASPEN guidelines. No added Phos.     Tomorrow, if electrolytes are WNL and BG is <200, would advance to 80gAA and 100gDextrose for total 660 kcals.     Further recommendations to follow clinical progress.     Electronically signed by Doris Ramírez RD on 7/16/25 at 1:39 PM EDT    Contact: [phone]

## 2025-07-16 NOTE — PROGRESS NOTES
07/16/25 0412 07/16/25 0545   B: Both Spontaneous Awakening and Breathing Trials   Was Patient Receiving Mechanical Ventilation Yes  --    Safety Screening Spontaneous Breathing Trial (SBT) Proceed with SBT - no exclusion criteria met  --    Spontaneous  mL  --    RSBI Calculated 52.15  --    Spontaneous Breathing Trial (SBT) Outcome SBT Passed  --    Patient Meet Extubation Criteria  --  No   Reasons Failed Extubation Criteria  --  Other (comment)  (Pt not following commands att, No extubation per NP Saumya)

## 2025-07-16 NOTE — PROGRESS NOTES
0710: Bedside shift report given by FIDEL [facility] (off going nurse). Patient's family present at bedside. Currently in SAT/SBT since about 5 am per report.     0800: Shift assessment completed, see flowsheets. Lactic Acid drawn per order - 1.3.     0847: BG checked - result of 64 made in error / rechecked and result of 90 is accurate.     1130: Art line removed per order.     1133: Patient extubated to 4L NC by RT per Dr. Noland order.     1200: Reassessment preformed, see flowsheets. Patient remains on 4L NC and is AOx2.    1600: Reassessment completed, see flowsheets. Patient remains on 4L NC and is AOx2.      1900: Bedside shift report given to FIDEL [facility] (on coming nurse).

## 2025-07-17 LAB
ANION GAP SERPL CALC-SCNC: 4 MMOL/L (ref 2–12)
ANION GAP SERPL CALC-SCNC: 6 MMOL/L (ref 2–12)
ANION GAP SERPL CALC-SCNC: 8 MMOL/L (ref 2–12)
BACTERIA SPEC CULT: ABNORMAL
BACTERIA SPEC CULT: NORMAL
BUN SERPL-MCNC: 42 MG/DL (ref 6–20)
BUN SERPL-MCNC: 55 MG/DL (ref 6–20)
BUN SERPL-MCNC: 62 MG/DL (ref 6–20)
BUN/CREAT SERPL: 28 (ref 12–20)
BUN/CREAT SERPL: 41 (ref 12–20)
BUN/CREAT SERPL: 46 (ref 12–20)
CALCIUM SERPL-MCNC: 8.2 MG/DL (ref 8.5–10.1)
CALCIUM SERPL-MCNC: 8.5 MG/DL (ref 8.5–10.1)
CALCIUM SERPL-MCNC: 8.8 MG/DL (ref 8.5–10.1)
CHLORIDE SERPL-SCNC: 101 MMOL/L (ref 97–108)
CHLORIDE SERPL-SCNC: 101 MMOL/L (ref 97–108)
CHLORIDE SERPL-SCNC: 103 MMOL/L (ref 97–108)
CO2 SERPL-SCNC: 29 MMOL/L (ref 21–32)
CO2 SERPL-SCNC: 31 MMOL/L (ref 21–32)
CO2 SERPL-SCNC: 31 MMOL/L (ref 21–32)
CREAT SERPL-MCNC: 0.91 MG/DL (ref 0.55–1.02)
CREAT SERPL-MCNC: 1.33 MG/DL (ref 0.55–1.02)
CREAT SERPL-MCNC: 2.23 MG/DL (ref 0.55–1.02)
EKG DIAGNOSIS: NORMAL
EKG Q-T INTERVAL: 260 MS
EKG QRS DURATION: 68 MS
EKG QTC CALCULATION (BAZETT): 372 MS
EKG R AXIS: -18 DEGREES
EKG T AXIS: -63 DEGREES
EKG VENTRICULAR RATE: 123 BPM
ERYTHROCYTE [DISTWIDTH] IN BLOOD BY AUTOMATED COUNT: 18.1 % (ref 11.5–14.5)
GLUCOSE BLD STRIP.AUTO-MCNC: 179 MG/DL (ref 65–117)
GLUCOSE BLD STRIP.AUTO-MCNC: 204 MG/DL (ref 65–117)
GLUCOSE BLD STRIP.AUTO-MCNC: 209 MG/DL (ref 65–117)
GLUCOSE BLD STRIP.AUTO-MCNC: NORMAL MG/DL (ref 65–117)
GLUCOSE SERPL-MCNC: 174 MG/DL (ref 65–100)
GLUCOSE SERPL-MCNC: 180 MG/DL (ref 65–100)
GLUCOSE SERPL-MCNC: 192 MG/DL (ref 65–100)
GRAM STN SPEC: ABNORMAL
HCT VFR BLD AUTO: 31.9 % (ref 35–47)
HGB BLD-MCNC: 10.2 G/DL (ref 11.5–16)
MAGNESIUM SERPL-MCNC: 2.3 MG/DL (ref 1.6–2.4)
MAGNESIUM SERPL-MCNC: 2.3 MG/DL (ref 1.6–2.4)
MAGNESIUM SERPL-MCNC: 2.4 MG/DL (ref 1.6–2.4)
MCH RBC QN AUTO: 30.6 PG (ref 26–34)
MCHC RBC AUTO-ENTMCNC: 32 G/DL (ref 30–36.5)
MCV RBC AUTO: 95.8 FL (ref 80–99)
NRBC # BLD: 0.06 K/UL (ref 0–0.01)
NRBC BLD-RTO: 0.4 PER 100 WBC
PHOSPHATE SERPL-MCNC: 5.6 MG/DL (ref 2.6–4.7)
PLATELET # BLD AUTO: 201 K/UL (ref 150–400)
PMV BLD AUTO: 10.5 FL (ref 8.9–12.9)
POTASSIUM SERPL-SCNC: 3.3 MMOL/L (ref 3.5–5.1)
POTASSIUM SERPL-SCNC: 3.8 MMOL/L (ref 3.5–5.1)
POTASSIUM SERPL-SCNC: 4 MMOL/L (ref 3.5–5.1)
RBC # BLD AUTO: 3.33 M/UL (ref 3.8–5.2)
SERVICE CMNT-IMP: ABNORMAL
SERVICE CMNT-IMP: NORMAL
SERVICE CMNT-IMP: NORMAL
SODIUM SERPL-SCNC: 138 MMOL/L (ref 136–145)
VANCOMYCIN SERPL-MCNC: 11.3 UG/ML
WBC # BLD AUTO: 15.4 K/UL (ref 3.6–11)

## 2025-07-17 PROCEDURE — 2580000003 HC RX 258: Performed by: INTERNAL MEDICINE

## 2025-07-17 PROCEDURE — 97530 THERAPEUTIC ACTIVITIES: CPT

## 2025-07-17 PROCEDURE — 84100 ASSAY OF PHOSPHORUS: CPT

## 2025-07-17 PROCEDURE — 97168 OT RE-EVAL EST PLAN CARE: CPT

## 2025-07-17 PROCEDURE — 6360000002 HC RX W HCPCS: Performed by: NURSE PRACTITIONER

## 2025-07-17 PROCEDURE — 2500000003 HC RX 250 WO HCPCS: Performed by: INTERNAL MEDICINE

## 2025-07-17 PROCEDURE — 2500000003 HC RX 250 WO HCPCS: Performed by: SURGERY

## 2025-07-17 PROCEDURE — 97164 PT RE-EVAL EST PLAN CARE: CPT

## 2025-07-17 PROCEDURE — 6370000000 HC RX 637 (ALT 250 FOR IP): Performed by: INTERNAL MEDICINE

## 2025-07-17 PROCEDURE — 2580000003 HC RX 258: Performed by: SURGERY

## 2025-07-17 PROCEDURE — 6360000002 HC RX W HCPCS: Performed by: SURGERY

## 2025-07-17 PROCEDURE — 85027 COMPLETE CBC AUTOMATED: CPT

## 2025-07-17 PROCEDURE — 6370000000 HC RX 637 (ALT 250 FOR IP): Performed by: SURGERY

## 2025-07-17 PROCEDURE — 82962 GLUCOSE BLOOD TEST: CPT

## 2025-07-17 PROCEDURE — 2000000000 HC ICU R&B

## 2025-07-17 PROCEDURE — 83735 ASSAY OF MAGNESIUM: CPT

## 2025-07-17 PROCEDURE — 6360000002 HC RX W HCPCS: Performed by: INTERNAL MEDICINE

## 2025-07-17 PROCEDURE — 80202 ASSAY OF VANCOMYCIN: CPT

## 2025-07-17 PROCEDURE — 2500000003 HC RX 250 WO HCPCS: Performed by: NURSE PRACTITIONER

## 2025-07-17 PROCEDURE — 36415 COLL VENOUS BLD VENIPUNCTURE: CPT

## 2025-07-17 PROCEDURE — 80048 BASIC METABOLIC PNL TOTAL CA: CPT

## 2025-07-17 PROCEDURE — 2700000000 HC OXYGEN THERAPY PER DAY

## 2025-07-17 RX ORDER — DIAZEPAM 10 MG/2ML
2 INJECTION, SOLUTION INTRAMUSCULAR; INTRAVENOUS NIGHTLY PRN
Status: DISCONTINUED | OUTPATIENT
Start: 2025-07-17 | End: 2025-07-31 | Stop reason: HOSPADM

## 2025-07-17 RX ORDER — HYDRALAZINE HYDROCHLORIDE 20 MG/ML
10 INJECTION INTRAMUSCULAR; INTRAVENOUS EVERY 6 HOURS PRN
Status: DISCONTINUED | OUTPATIENT
Start: 2025-07-17 | End: 2025-07-28

## 2025-07-17 RX ORDER — METOPROLOL TARTRATE 1 MG/ML
5 INJECTION, SOLUTION INTRAVENOUS ONCE
Status: COMPLETED | OUTPATIENT
Start: 2025-07-17 | End: 2025-07-17

## 2025-07-17 RX ORDER — DEXTROSE MONOHYDRATE 100 MG/ML
INJECTION, SOLUTION INTRAVENOUS CONTINUOUS PRN
Status: DISCONTINUED | OUTPATIENT
Start: 2025-07-17 | End: 2025-07-28

## 2025-07-17 RX ORDER — POTASSIUM CHLORIDE 7.45 MG/ML
10 INJECTION INTRAVENOUS
Status: COMPLETED | OUTPATIENT
Start: 2025-07-17 | End: 2025-07-17

## 2025-07-17 RX ORDER — GLUCAGON 1 MG/ML
1 KIT INJECTION PRN
Status: DISCONTINUED | OUTPATIENT
Start: 2025-07-17 | End: 2025-07-28

## 2025-07-17 RX ADMIN — ENOXAPARIN SODIUM 30 MG: 100 INJECTION SUBCUTANEOUS at 08:00

## 2025-07-17 RX ADMIN — PIPERACILLIN AND TAZOBACTAM 3375 MG: 3; .375 INJECTION, POWDER, LYOPHILIZED, FOR SOLUTION INTRAVENOUS at 20:08

## 2025-07-17 RX ADMIN — SODIUM BICARBONATE: 84 INJECTION, SOLUTION INTRAVENOUS at 02:13

## 2025-07-17 RX ADMIN — POTASSIUM CHLORIDE 10 MEQ: 7.46 INJECTION, SOLUTION INTRAVENOUS at 16:54

## 2025-07-17 RX ADMIN — SODIUM CHLORIDE, PRESERVATIVE FREE 10 ML: 5 INJECTION INTRAVENOUS at 08:00

## 2025-07-17 RX ADMIN — PIPERACILLIN AND TAZOBACTAM 3375 MG: 3; .375 INJECTION, POWDER, LYOPHILIZED, FOR SOLUTION INTRAVENOUS at 08:58

## 2025-07-17 RX ADMIN — HYDROMORPHONE HYDROCHLORIDE 0.25 MG: 1 INJECTION, SOLUTION INTRAMUSCULAR; INTRAVENOUS; SUBCUTANEOUS at 17:02

## 2025-07-17 RX ADMIN — HYDROMORPHONE HYDROCHLORIDE 0.25 MG: 1 INJECTION, SOLUTION INTRAMUSCULAR; INTRAVENOUS; SUBCUTANEOUS at 11:08

## 2025-07-17 RX ADMIN — METHYLPREDNISOLONE SODIUM SUCCINATE 20 MG: 40 INJECTION, POWDER, LYOPHILIZED, FOR SOLUTION INTRAMUSCULAR; INTRAVENOUS at 15:46

## 2025-07-17 RX ADMIN — METHYLPREDNISOLONE SODIUM SUCCINATE 20 MG: 40 INJECTION, POWDER, LYOPHILIZED, FOR SOLUTION INTRAMUSCULAR; INTRAVENOUS at 03:39

## 2025-07-17 RX ADMIN — CALCIUM GLUCONATE: 98 INJECTION, SOLUTION INTRAVENOUS at 18:55

## 2025-07-17 RX ADMIN — VANCOMYCIN HYDROCHLORIDE 500 MG: 500 INJECTION, POWDER, LYOPHILIZED, FOR SOLUTION INTRAVENOUS at 15:54

## 2025-07-17 RX ADMIN — DIAZEPAM 2 MG: 5 INJECTION, SOLUTION INTRAMUSCULAR; INTRAVENOUS at 22:44

## 2025-07-17 RX ADMIN — POTASSIUM CHLORIDE 10 MEQ: 7.46 INJECTION, SOLUTION INTRAVENOUS at 15:55

## 2025-07-17 RX ADMIN — PANTOPRAZOLE SODIUM 40 MG: 40 INJECTION, POWDER, FOR SOLUTION INTRAVENOUS at 15:46

## 2025-07-17 RX ADMIN — METOPROLOL TARTRATE 5 MG: 1 INJECTION, SOLUTION INTRAVENOUS at 22:32

## 2025-07-17 RX ADMIN — POTASSIUM CHLORIDE 10 MEQ: 7.46 INJECTION, SOLUTION INTRAVENOUS at 17:57

## 2025-07-17 RX ADMIN — FLUCONAZOLE 200 MG: 2 INJECTION, SOLUTION INTRAVENOUS at 03:47

## 2025-07-17 RX ADMIN — HYDROMORPHONE HYDROCHLORIDE 0.25 MG: 1 INJECTION, SOLUTION INTRAMUSCULAR; INTRAVENOUS; SUBCUTANEOUS at 05:06

## 2025-07-17 RX ADMIN — Medication 1 AMPULE: at 08:00

## 2025-07-17 RX ADMIN — SODIUM CHLORIDE, PRESERVATIVE FREE 10 ML: 5 INJECTION INTRAVENOUS at 20:08

## 2025-07-17 RX ADMIN — PANTOPRAZOLE SODIUM 40 MG: 40 INJECTION, POWDER, FOR SOLUTION INTRAVENOUS at 03:40

## 2025-07-17 RX ADMIN — Medication 1 AMPULE: at 20:09

## 2025-07-17 RX ADMIN — HYDRALAZINE HYDROCHLORIDE 10 MG: 20 INJECTION INTRAMUSCULAR; INTRAVENOUS at 10:15

## 2025-07-17 ASSESSMENT — PAIN DESCRIPTION - ORIENTATION
ORIENTATION: RIGHT;LEFT;MID;LOWER;UPPER
ORIENTATION: MID
ORIENTATION: RIGHT;LEFT

## 2025-07-17 ASSESSMENT — PAIN DESCRIPTION - FREQUENCY
FREQUENCY: INTERMITTENT
FREQUENCY: INTERMITTENT
FREQUENCY: CONTINUOUS
FREQUENCY: INTERMITTENT

## 2025-07-17 ASSESSMENT — PAIN DESCRIPTION - DESCRIPTORS
DESCRIPTORS: ACHING;DISCOMFORT

## 2025-07-17 ASSESSMENT — PAIN SCALES - GENERAL
PAINLEVEL_OUTOF10: 0
PAINLEVEL_OUTOF10: 0
PAINLEVEL_OUTOF10: 9
PAINLEVEL_OUTOF10: 8
PAINLEVEL_OUTOF10: 2
PAINLEVEL_OUTOF10: 0
PAINLEVEL_OUTOF10: 3
PAINLEVEL_OUTOF10: 0
PAINLEVEL_OUTOF10: 7
PAINLEVEL_OUTOF10: 0

## 2025-07-17 ASSESSMENT — PAIN DESCRIPTION - LOCATION
LOCATION: ABDOMEN
LOCATION: ABDOMEN
LOCATION: GENERALIZED
LOCATION: ABDOMEN

## 2025-07-17 ASSESSMENT — PAIN DESCRIPTION - ONSET
ONSET: ON-GOING
ONSET: ON-GOING
ONSET: PROGRESSIVE

## 2025-07-17 ASSESSMENT — PAIN - FUNCTIONAL ASSESSMENT
PAIN_FUNCTIONAL_ASSESSMENT: ACTIVITIES ARE NOT PREVENTED

## 2025-07-17 ASSESSMENT — PAIN DESCRIPTION - PAIN TYPE
TYPE: SURGICAL PAIN
TYPE: ACUTE PAIN
TYPE: SURGICAL PAIN
TYPE: SURGICAL PAIN

## 2025-07-17 NOTE — PROGRESS NOTES
0700: Bedside shift report given by FIDEL [facility] (offgoing nurse). Patient remains on 5L NC,Sodium Bicarbonate gtt at 75ml/hr and PPN at 62.5 ml/hr.     0800: Shift assessment completed, see flowsheets.     0844: General Surgery present at bedside. Informed RN to keep patient a STRICT NPO and NGT to suction. Per surgery, plan to monitor till Monday with potential for UGI then.     0949: Interdisciplinary rounds - plan to add insulin to PPN, PRN Hydralazine ordered for SBP < 160 mmHg and bicarb gtt discontinued per Dr. Lainez .     1015: Patient's son present at bedside and update provided.     1017: PRN Hydralazine given for BP at 179/94 to maintain goal of Systolic BP < 160.    1020: PT and OT present at bedside. Increased NC to 7L d/t de-sating and increased work of breathing post ambulation.     1108: PRN Dilaudid given for abdominal pain.     1200: Reassessment completed, see flowsheets. Patient O2 decreased to 6L NC.     1438: Informed Dr. Lainez that patient has been having a lot of ectopy today and is currently having ventricular bigeminy - verbal order given for a BMP and Mag.     1535: Informed Dr. Lainez that patient's potassium is 3.3 and Magnesium is 2.4. Verbal order given for 30 mEq for potassium given via PIV. Also, son expressed patient has been anxious at night and would like an anti-anxiety medication for his mom - informed Dr. Lainez who gave a verbal order for nighty PRN of 2mg diazepam.    1600: Reassessment completed, see flowsheets.     1640: Right AC PIV infiltrated - removed by RN. Multiple attempts made to place another PIV by FIDEL Rivera and FIDEL Bang. Will ask ultrasound trained RN to attempt placement.     1702: PRN Dilaudid given for generalized pain.     1900: Bedside shift report given to FIDEL Lopez (oncoming nurse).    Unknown

## 2025-07-17 NOTE — PROGRESS NOTES
Nutrition Note    Chart reviewed, case discussed during CCU rounds.  Pt was on D5 bicarb drip overnight at 75mL/h, but this was discontinued.  BG in the 200's (but dextrose was contributing an additional 300 kcals from drip).  Would increase to 100g Dextrose, 80g AA tonight (provides 660kcals).  No lipids as she is on antifungals with suspected leak.  BUN/creat elevated with GFR of 22.  Only peripheral lines.  Will need to consider central line in the next day or so to provide sufficient kcals/protein without fluid overloading her as we cannot give lipids as a form of dense calories and is already requiring 1800mL PPN to keep under 900mOsm for peripheral line.  Will ask for central line placement tomorrow in order to convert to eventual goal TPN of 300g Dextrose, 80g AA (provides 1340kcals, ~20kcals/kg)     Electronically signed by Keshia Malone RD, [facility] on 7/17/25 at 2:45 PM EDT    Contact: ext [phone]

## 2025-07-17 NOTE — PROGRESS NOTES
Pharmacy TPN Management Note    TPN Indication: s/p Ex lap and duodenal perforation repair     TPN Route: Peripheral    Home TPN? No    Macronutrients:  Protein: 80g  Dextrose: 100g  Lipids: none     Rate: 75 mL/hr    Labs:  Recent Labs     Units 253 25  0226 07/15/25  1241 07/15/25  0358 07/15/25  0351   NA mmol/L 138 134* 132*   < >  --    K mmol/L 4.0 4.7 5.0   < >  --    CL mmol/L 101 102 100   < >  --    CO2 mmol/L 29 21 23   < >  --    MG mg/dL 2.3 1.8  --   --  1.7   PHOS MG/DL 5.6* 6.5*  --   --  6.4*   BUN MG/DL 62* 55* 51*   < >  --     < > = values in this interval not displayed.     Recent Labs     Units 07/16/25  0226 07/15/25  0351   AST U/L 36 40*   ALT U/L 14 18     Recent Labs     Units 25   WBC K/uL 15.4*   HGB g/dL 10.2*   HCT % 31.9*     Electrolytes (dosed per LITER):  Na: 40 meq/L; K: 0 meq/L; Phos:0 mmol/L; M meq/L; Ca 4.5 meq/L    Other additives in TPN: multivitamins  and trace elements    Impression/Plan:   TPN macronutrient/rate changes: advance to 80g AA/100g dextrose per dietary recccs  TPN electrolyte changes: none - consider adding K tomorrow   TPN insulin changes: add 10 units insulin to TPN for glucose >200      Pharmacy will continue to monitor enteral nutrition plan, electrolytes, renal function, and dietician recommendations and adjust parenteral nutrition as needed.    Thank you,  Hayley Eid [facility]

## 2025-07-17 NOTE — PROGRESS NOTES
ICU Progress Note        Subjective:      - lying comfortably on the bed. Extubated .    HPI: The patient is a 78 y/o female PMHx of COPD, rheumatoid arthritis, and remote hx breast cancer admitted () by hospital medicine for altered mental status. Recent dx of multiple vertebral compression fractures, placed on multiple medications, with concern these were contributing to confusion. Underwent kyphoplasty by IR on (7/10). Aggressive bowel regimen added d/t constipation. Evening (7/15) noted to be mottled with hypotension. CT performed demonstrating pneumoperitoneum. Surgery consulted taken to the OR, now s/p ex- lap/repair for perforated duodenal ulcer. Transferred to the ICU post op on mechanical ventilation.     Assessment and Plan:    Septic shock - in the setting of duodenal perforation. S/p laparotomy on 7/15 with sung patch placement. Cont. Antibiotics and anti-fungals. Off levophed gtt since , now hypertensive.     Duodenal ulcer - perforation. S/p sung patch on 7/15 by Dr. Han. Still leak present. Recommended NPO until Monday, . Cont. PPN.     VICKIE - due to ATN. Improving. Good urine output. Creatinine coming down.     Hypertension - start IV hydralazine prn.     Control pain.     Full code.     Critical care time - 40 minutes.      Vital Signs:    BP (!) 178/86   Pulse 66   Temp 97.5 °F (36.4 °C) (Oral)   Resp 16   Ht 1.575 m (5' 2\")   Wt 67.4 kg (148 lb 9.4 oz)   SpO2 96%   BMI 27.18 kg/m²             Temp (24hrs), Av.9 °F (36.6 °C), Min:97.5 °F (36.4 °C), Max:98.5 °F (36.9 °C)       Intake/Output:   Last shift:       07 -  1900  In: 137.7 [I.V.:75.1]  Out: 240 [Urine:160; Drains:10]  Last 3 shifts: 07/15 1901 -  0700  In: 4261.1 [I.V.:3107.7]  Out: 1565 [Urine:1295; Drains:140]    Intake/Output Summary (Last 24 hours) at 2025 0922  Last data filed at 2025 0800  Gross per 24 hour   Intake 2726.42 ml   Output 1400 ml   Net 1326.42 ml

## 2025-07-17 NOTE — PLAN OF CARE
Problem: Occupational Therapy - Adult  Goal: By Discharge: Performs self-care activities at highest level of function for planned discharge setting.  See evaluation for individualized goals.  Description: FUNCTIONAL STATUS PRIOR TO ADMISSION:  Patient questionable historian secondary to confusion. Per chart review, she was admitted from SNF rehab. She reported she was independent in ADLs and mobility prior to admission.    ,  ,  ,  ,  ,  ,  ,  ,  ,  ,       HOME SUPPORT: Patient lived with daughter and son in law.    Occupational Therapy Goals:    Reeval 7/17/2025:  1. Patient will perform grooming seated in chair with minimal assist within 7 days  2. Patient will perform UB bathing with min a within 7 days  3. Patient will maintain stand with min a to aide with LB self care within 7 days.  4. Patient will perform toilet or BSC transfer with mod a within 7 days.  5. Patient will participate in UE therapeutic exercise/activity with supervision for 5 minutes within 7 days.    Initiated 7/7/2025  1.  Patient will perform grooming standing at sink with Supervision within 7 day(s).  2.  Patient will perform upper body dressing with Modified Davidson within 7 day(s).  3.  Patient will perform lower body dressing with Supervision within 7 day(s).  4.  Patient will perform toilet transfers with Supervision  within 7 day(s).  5.  Patient will perform all aspects of toileting with Supervision within 7 day(s).  6.  Patient will participate in upper extremity therapeutic exercise/activities with Supervision for 5 minutes within 7 day(s).    Outcome: Progressing   OCCUPATIONAL THERAPY RE-EVALUATION  Patient: Nicole Galeano (79 y.o. female)  Date: 7/17/2025  Primary Diagnosis: Acute encephalopathy [G93.40]  Acute midline low back pain without sciatica [M54.50]  Acute metabolic encephalopathy [G93.41]  Procedure(s) (LRB):  LAPAROSCOPY DIAGNOSTIC (N/A) 2 Days Post-Op     Precautions: Bed Alarm, Fall Risk

## 2025-07-17 NOTE — INTERDISCIPLINARY ROUNDS
Critical care interdisciplinary rounds today.  Following members present: Case Management, Nursing, Nutrition, Pharmacy, and Physician. Adryan noted for I/O- strict. Remains on TPN.  Plan of care discussed.  See clinical pathway for plan of care and interventions and desired outcomes.

## 2025-07-17 NOTE — PROGRESS NOTES
Pharmacy Antimicrobial Kinetic Dosing    Indication for Antimicrobials: sepsis /IAI    Current Regimen of Each Antimicrobial:  Zosyn 3.375 gm iv every 12 hr; Start Date /; Day # 4  Vancomycin 1750 mg iv once then dose by level Start Date ; Day # 4    Previous Antimicrobial Therapy:     Goal Level: Vancomycin trough 15-20    Date Dose & Interval Measured (mcg/mL) Predicted AUC 24-48 Predicted AUC 24,ss     1750mg x1  21.4        11.3              Significant Cultures:    blood - ng  7/15 MRSA - negative  7/15 peritoneal fluid - few GPC in pairs, prelim    Labs:  Recent Labs     Units 25  0223 25  0226 07/15/25  1241 07/15/25  0358 25  2056   CREATININE MG/DL 2.23* 2.66* 2.52* 2.25* 2.53*   BUN MG/DL 62* 55* 51* 46* 47*   PROCAL ng/mL  --   --   --   --  43.08   WBC K/uL 15.4* 11.7*  --  12.6* 16.0*   BANDS %  --   --   --  8 2     Temp (24hrs), Av.7 °F (36.5 °C), Min:96.6 °F (35.9 °C), Max:98.5 °F (36.9 °C)      Conditions for Dosing Consideration:     Creatinine Clearance (mL/min): Estimated Creatinine Clearance: 18 mL/min (A) (based on SCr of 2.23 mg/dL (H)).       Impression/Plan:   Vancomycin dose by level  Vancomycin level = 11.3. Redose with 500mg x 1   Random level with am labs tomorrow   Predicted [id number] = , Predicted AUC24,ss =   Surgery recommends iv abx to continue through Monday   Antimicrobial stop date 7 days     Pharmacy will follow daily and adjust medications as appropriate for renal function and/or serum levels.    Thank you,  Hayley Eid [facility]

## 2025-07-17 NOTE — PLAN OF CARE
Problem: Physical Therapy - Adult  Goal: By Discharge: Performs mobility at highest level of function for planned discharge setting.  See evaluation for individualized goals.  Description: FUNCTIONAL STATUS PRIOR TO ADMISSION: Questionable historian. Per chart pt was at [facility] for rehab.    HOME SUPPORT PRIOR TO ADMISSION: Pt was at [facility] for rehab.    Physical Therapy Goals  Re-evaluation performed 7/17/2025 after transition to ICU care and complex medical course. New goals listed below:  1. Pt will perform bed mobility with min A within 7 day(s).   2.Pt will perform transfers with LRAD and Mod A within 7 day(s).  3.Pt will tolerate initial gait assessment with LRAD and assist within 7 day(s).  4.Pt will tolerate sitting EOB >5min without support within 7 day(s) for improved tolerance to upright.    Initiated 7/7/2025.   1.  Patient will move from supine to sit and sit to supine in bed with independence within 7 day(s).    2.  Patient will perform sit to stand with modified independence within 7 day(s).  3.  Patient will transfer from bed to chair and chair to bed with modified independence using the least restrictive device within 7 day(s).  4.  Patient will ambulate with modified independence for 100 feet with the least restrictive device within 7 day(s).       Outcome: Progressing   PHYSICAL THERAPY RE-EVALUATION    Patient: Nicole Galeano (79 y.o. female)  Date: 7/17/2025  Primary Diagnosis: Acute encephalopathy [G93.40]  Acute midline low back pain without sciatica [M54.50]  Acute metabolic encephalopathy [G93.41]  Procedure(s) (LRB):  LAPAROSCOPY DIAGNOSTIC (N/A) 2 Days Post-Op   Precautions: Restrictions/Precautions  Restrictions/Precautions: Bed Alarm, Fall Risk            ASSESSMENT :  DEFICITS/IMPAIRMENTS:   The patient is limited by decreased functional mobility, strength, activity tolerance, endurance, safety awareness, cognition, command following, balance, increased pain

## 2025-07-17 NOTE — PLAN OF CARE
Problem: Respiratory - Adult  Goal: Achieves optimal ventilation and oxygenation  7/17/2025 0740 by Nicole Link RN  Outcome: Progressing  7/17/2025 0029 by Mirna Richmond RN  Outcome: Progressing     Problem: Pain  Goal: Verbalizes/displays adequate comfort level or baseline comfort level  7/17/2025 0740 by Nicole Link RN  Outcome: Progressing  7/17/2025 0029 by Mirna Richmond RN  Outcome: Progressing     Problem: Neurosensory - Adult  Goal: Achieves stable or improved neurological status  7/17/2025 0740 by Nicole Link RN  Outcome: Progressing  7/17/2025 0029 by Mirna Richmond RN  Outcome: Progressing  Goal: Achieves maximal functionality and self care  7/17/2025 0740 by Nicole Link RN  Outcome: Progressing  7/17/2025 0029 by Mirna Richmond RN  Outcome: Progressing     Problem: Cardiovascular - Adult  Goal: Absence of cardiac dysrhythmias or at baseline  7/17/2025 0740 by Nicole Link RN  Outcome: Progressing  7/17/2025 0029 by Mirna Richmond RN  Outcome: Progressing     Problem: Skin/Tissue Integrity - Adult  Goal: Skin integrity remains intact  Description: 1.  Monitor for areas of redness and/or skin breakdown  2.  Assess vascular access sites hourly  3.  Every 4-6 hours minimum:  Change oxygen saturation probe site  4.  Every 4-6 hours:  If on nasal continuous positive airway pressure, respiratory therapy assess nares and determine need for appliance change or resting period  7/17/2025 0740 by Nicole Link RN  Outcome: Progressing  Flowsheets (Taken 7/17/2025 0730)  Skin Integrity Remains Intact:   Monitor for areas of redness and/or skin breakdown   Every 4-6 hours:  If on nasal continuous positive airway pressure, assess nares and determine need for appliance change or resting period   Every 4-6 hours minimum:  Change oxygen saturation probe site   Turn and reposition as indicated   Positioning devices   Check visual cues for pain   Monitor skin under medical  Check visual cues for pain   Monitor skin under medical devices  7/17/2025 0029 by Mirna Richmond, RN  Outcome: Progressing     Problem: Discharge Planning  Goal: Discharge to home or other facility with appropriate resources  7/17/2025 0740 by Nicole Link, RN  Outcome: Progressing  7/17/2025 0029 by Mirna Richmond, RN  Outcome: Progressing

## 2025-07-17 NOTE — PROGRESS NOTES
SURGERY PROGRESS NOTE      Admit Date: 2025    POD 2 Days Post-Op    Procedure: Procedure(s):  LAPAROSCOPY DIAGNOSTIC      Subjective:     Patient denies pain and nausea      Objective:     BP (!) 178/86   Pulse 66   Temp 97.5 °F (36.4 °C) (Oral)   Resp 16   Ht 1.575 m (5' 2\")   Wt 67.4 kg (148 lb 9.4 oz)   SpO2 96%   BMI 27.18 kg/m²      Temp (24hrs), Av.8 °F (36.6 °C), Min:97.5 °F (36.4 °C), Max:98.5 °F (36.9 °C)      701 -  1900  In: 137.7 [I.V.:75.1]  Out: 240 [Urine:160; Drains:10]  07/15 1901 -  0700  In: 4261.1 [I.V.:3107.7]  Out: 1565 [Urine:1295; Drains:140]    Physical Exam:    General:  alert, appears stated age, cooperative, and no distress.  Oriented to person   Abdomen: soft, bowel sounds active, non-tender    VERONICA  - 100cc bilious    Incision:   healing well, no drainage, no erythema, no hernia, no seroma, no swelling, no dehiscence, incision well approximated         CBC:   Lab Results   Component Value Date/Time    WBC 15.4 2025 02:23 AM    RBC 3.33 2025 02:23 AM    HGB 10.2 2025 02:23 AM    HCT 31.9 2025 02:23 AM    MCV 95.8 2025 02:23 AM    MCH 30.6 2025 02:23 AM    MCHC 32.0 2025 02:23 AM    RDW 18.1 2025 02:23 AM     2025 02:23 AM    MPV 10.5 2025 02:23 AM     BMP:    Lab Results   Component Value Date/Time     2025 02:23 AM    K 4.0 2025 02:23 AM     2025 02:23 AM    CO2 29 2025 02:23 AM    BUN 62 2025 02:23 AM    CREATININE 2.23 2025 02:23 AM    CALCIUM 8.5 2025 02:23 AM    GFRAA >60 2021 01:10 PM    LABGLOM 22 2025 02:23 AM    GLUCOSE 192 2025 02:23 AM       Assessment/plan:     Principal Problem:    Acute metabolic encephalopathy  Active Problems:    Moderate protein-calorie malnutrition    Acute encephalopathy  Resolved Problems:    * No resolved hospital problems. *    Clinically improving but, VERONICA output suggests persistent

## 2025-07-18 ENCOUNTER — APPOINTMENT (OUTPATIENT)
Facility: HOSPITAL | Age: 80
End: 2025-07-18
Payer: MEDICARE

## 2025-07-18 LAB
ANION GAP SERPL CALC-SCNC: 6 MMOL/L (ref 2–12)
BUN SERPL-MCNC: 41 MG/DL (ref 6–20)
BUN/CREAT SERPL: 47 (ref 12–20)
CALCIUM SERPL-MCNC: 8.8 MG/DL (ref 8.5–10.1)
CHLORIDE SERPL-SCNC: 99 MMOL/L (ref 97–108)
CO2 SERPL-SCNC: 31 MMOL/L (ref 21–32)
CREAT SERPL-MCNC: 0.88 MG/DL (ref 0.55–1.02)
ERYTHROCYTE [DISTWIDTH] IN BLOOD BY AUTOMATED COUNT: 18 % (ref 11.5–14.5)
GLUCOSE BLD STRIP.AUTO-MCNC: 156 MG/DL (ref 65–117)
GLUCOSE BLD STRIP.AUTO-MCNC: 164 MG/DL (ref 65–117)
GLUCOSE BLD STRIP.AUTO-MCNC: 176 MG/DL (ref 65–117)
GLUCOSE SERPL-MCNC: 198 MG/DL (ref 65–100)
HCT VFR BLD AUTO: 35.5 % (ref 35–47)
HGB BLD-MCNC: 11.3 G/DL (ref 11.5–16)
MAGNESIUM SERPL-MCNC: 2.3 MG/DL (ref 1.6–2.4)
MCH RBC QN AUTO: 30.1 PG (ref 26–34)
MCHC RBC AUTO-ENTMCNC: 31.8 G/DL (ref 30–36.5)
MCV RBC AUTO: 94.7 FL (ref 80–99)
NRBC # BLD: 0.11 K/UL (ref 0–0.01)
NRBC BLD-RTO: 0.8 PER 100 WBC
PHOSPHATE SERPL-MCNC: 2.3 MG/DL (ref 2.6–4.7)
PLATELET # BLD AUTO: 197 K/UL (ref 150–400)
PMV BLD AUTO: 10.4 FL (ref 8.9–12.9)
POTASSIUM SERPL-SCNC: 3.7 MMOL/L (ref 3.5–5.1)
RBC # BLD AUTO: 3.75 M/UL (ref 3.8–5.2)
SERVICE CMNT-IMP: ABNORMAL
SODIUM SERPL-SCNC: 136 MMOL/L (ref 136–145)
VANCOMYCIN SERPL-MCNC: 11.5 UG/ML
WBC # BLD AUTO: 13.8 K/UL (ref 3.6–11)

## 2025-07-18 PROCEDURE — 36569 INSJ PICC 5 YR+ W/O IMAGING: CPT

## 2025-07-18 PROCEDURE — 2700000000 HC OXYGEN THERAPY PER DAY

## 2025-07-18 PROCEDURE — 80202 ASSAY OF VANCOMYCIN: CPT

## 2025-07-18 PROCEDURE — 02H633Z INSERTION OF INFUSION DEVICE INTO RIGHT ATRIUM, PERCUTANEOUS APPROACH: ICD-10-PCS | Performed by: INTERNAL MEDICINE

## 2025-07-18 PROCEDURE — 6360000002 HC RX W HCPCS: Performed by: SURGERY

## 2025-07-18 PROCEDURE — 6370000000 HC RX 637 (ALT 250 FOR IP): Performed by: SURGERY

## 2025-07-18 PROCEDURE — 2500000003 HC RX 250 WO HCPCS: Performed by: SURGERY

## 2025-07-18 PROCEDURE — 6360000002 HC RX W HCPCS: Performed by: INTERNAL MEDICINE

## 2025-07-18 PROCEDURE — 2580000003 HC RX 258: Performed by: SURGERY

## 2025-07-18 PROCEDURE — 84100 ASSAY OF PHOSPHORUS: CPT

## 2025-07-18 PROCEDURE — 83735 ASSAY OF MAGNESIUM: CPT

## 2025-07-18 PROCEDURE — 85027 COMPLETE CBC AUTOMATED: CPT

## 2025-07-18 PROCEDURE — 2580000003 HC RX 258: Performed by: NURSE PRACTITIONER

## 2025-07-18 PROCEDURE — 71045 X-RAY EXAM CHEST 1 VIEW: CPT

## 2025-07-18 PROCEDURE — 2580000003 HC RX 258: Performed by: INTERNAL MEDICINE

## 2025-07-18 PROCEDURE — 2500000003 HC RX 250 WO HCPCS

## 2025-07-18 PROCEDURE — 82962 GLUCOSE BLOOD TEST: CPT

## 2025-07-18 PROCEDURE — 80048 BASIC METABOLIC PNL TOTAL CA: CPT

## 2025-07-18 PROCEDURE — 6370000000 HC RX 637 (ALT 250 FOR IP): Performed by: INTERNAL MEDICINE

## 2025-07-18 PROCEDURE — 6360000002 HC RX W HCPCS

## 2025-07-18 PROCEDURE — 6360000002 HC RX W HCPCS: Performed by: NURSE PRACTITIONER

## 2025-07-18 PROCEDURE — 36415 COLL VENOUS BLD VENIPUNCTURE: CPT

## 2025-07-18 PROCEDURE — 2500000003 HC RX 250 WO HCPCS: Performed by: NURSE PRACTITIONER

## 2025-07-18 PROCEDURE — 2500000003 HC RX 250 WO HCPCS: Performed by: INTERNAL MEDICINE

## 2025-07-18 PROCEDURE — 2000000000 HC ICU R&B

## 2025-07-18 RX ORDER — FUROSEMIDE 10 MG/ML
40 INJECTION INTRAMUSCULAR; INTRAVENOUS ONCE
Status: COMPLETED | OUTPATIENT
Start: 2025-07-18 | End: 2025-07-18

## 2025-07-18 RX ORDER — METOPROLOL TARTRATE 1 MG/ML
INJECTION, SOLUTION INTRAVENOUS
Status: COMPLETED
Start: 2025-07-18 | End: 2025-07-18

## 2025-07-18 RX ORDER — METOPROLOL TARTRATE 1 MG/ML
5 INJECTION, SOLUTION INTRAVENOUS ONCE
Status: DISCONTINUED | OUTPATIENT
Start: 2025-07-18 | End: 2025-07-24

## 2025-07-18 RX ORDER — AMIODARONE HYDROCHLORIDE 50 MG/ML
INJECTION, SOLUTION INTRAVENOUS
Status: DISCONTINUED
Start: 2025-07-18 | End: 2025-07-18

## 2025-07-18 RX ADMIN — HYDRALAZINE HYDROCHLORIDE 10 MG: 20 INJECTION INTRAMUSCULAR; INTRAVENOUS at 15:53

## 2025-07-18 RX ADMIN — PANTOPRAZOLE SODIUM 40 MG: 40 INJECTION, POWDER, FOR SOLUTION INTRAVENOUS at 02:59

## 2025-07-18 RX ADMIN — FLUCONAZOLE 200 MG: 2 INJECTION, SOLUTION INTRAVENOUS at 03:08

## 2025-07-18 RX ADMIN — METHYLPREDNISOLONE SODIUM SUCCINATE 20 MG: 40 INJECTION, POWDER, LYOPHILIZED, FOR SOLUTION INTRAMUSCULAR; INTRAVENOUS at 03:09

## 2025-07-18 RX ADMIN — HYDRALAZINE HYDROCHLORIDE 10 MG: 20 INJECTION INTRAMUSCULAR; INTRAVENOUS at 00:08

## 2025-07-18 RX ADMIN — Medication 1 AMPULE: at 21:44

## 2025-07-18 RX ADMIN — ONDANSETRON HYDROCHLORIDE 4 MG: 2 INJECTION, SOLUTION INTRAMUSCULAR; INTRAVENOUS at 15:21

## 2025-07-18 RX ADMIN — FUROSEMIDE 40 MG: 10 INJECTION, SOLUTION INTRAMUSCULAR; INTRAVENOUS at 03:01

## 2025-07-18 RX ADMIN — Medication 1 AMPULE: at 09:00

## 2025-07-18 RX ADMIN — CALCIUM GLUCONATE: 98 INJECTION, SOLUTION INTRAVENOUS at 18:32

## 2025-07-18 RX ADMIN — AMIODARONE HYDROCHLORIDE 150 MG: 1.5 INJECTION, SOLUTION INTRAVENOUS at 02:14

## 2025-07-18 RX ADMIN — ENOXAPARIN SODIUM 30 MG: 100 INJECTION SUBCUTANEOUS at 09:52

## 2025-07-18 RX ADMIN — PANTOPRAZOLE SODIUM 40 MG: 40 INJECTION, POWDER, FOR SOLUTION INTRAVENOUS at 15:20

## 2025-07-18 RX ADMIN — SODIUM CHLORIDE, PRESERVATIVE FREE 10 ML: 5 INJECTION INTRAVENOUS at 09:00

## 2025-07-18 RX ADMIN — PIPERACILLIN AND TAZOBACTAM 3375 MG: 3; .375 INJECTION, POWDER, LYOPHILIZED, FOR SOLUTION INTRAVENOUS at 09:52

## 2025-07-18 RX ADMIN — METOPROLOL TARTRATE 5 MG: 1 INJECTION, SOLUTION INTRAVENOUS at 01:13

## 2025-07-18 RX ADMIN — AMIODARONE HYDROCHLORIDE 1 MG/MIN: 50 INJECTION, SOLUTION INTRAVENOUS at 02:42

## 2025-07-18 RX ADMIN — SODIUM CHLORIDE, PRESERVATIVE FREE 10 ML: 5 INJECTION INTRAVENOUS at 21:44

## 2025-07-18 RX ADMIN — PIPERACILLIN AND TAZOBACTAM 3375 MG: 3; .375 INJECTION, POWDER, LYOPHILIZED, FOR SOLUTION INTRAVENOUS at 21:47

## 2025-07-18 ASSESSMENT — PAIN SCALES - GENERAL
PAINLEVEL_OUTOF10: 0

## 2025-07-18 NOTE — PROGRESS NOTES
Pharmacy TPN Management Note    TPN Indication: s/p Ex lap and duodenal perforation repair     TPN Route: Central     Home TPN? No    Macronutrients:  Protein: 80g  Dextrose: 150g  Lipids: none     Rate: 75 mL/hr    Labs:  Recent Labs     Units 25  2220 25  1453 25  0223 25   NA mmol/L 136 138 138 138 134*   K mmol/L 3.7 3.8 3.3* 4.0 4.7   CL mmol/L 99 103 101 101 102   CO2 mmol/L 31 31 31 29 21   MG mg/dL 2.3 2.3 2.4 2.3 1.8   PHOS MG/DL 2.3*  --   --  5.6* 6.5*   BUN MG/DL 41* 42* 55* 62* 55*     Recent Labs     Units 25   AST U/L 36   ALT U/L 14     Recent Labs     Units 25   WBC K/uL 13.8*   HGB g/dL 11.3*   HCT % 35.5     Electrolytes (dosed per LITER):  Na: 45 meq/L; K: 30 meq/L; Phos:15  mmol/L; M meq/L; Ca 4.5 meq/L    Other additives in TPN: multivitamins  and trace elements    Impression/Plan:   TPN macronutrient/rate changes: PICC line placed - advance to 80g AA + 150g dextrose per dietary recc   TPN electrolyte changes: add K and phos back to TPN   TPN insulin changes: increase to 15 units insulin for increased dextrose      Pharmacy will continue to monitor enteral nutrition plan, electrolytes, renal function, and dietician recommendations and adjust parenteral nutrition as needed.    Thank you,  Hayley Eid [facility]

## 2025-07-18 NOTE — PROGRESS NOTES
Admit Date: 2025    POD 3 Days Post-Op    Procedure:  Perforated ulcer repair    Subjective:     Patient remains in ICU with delirium, intermittent A-fib.     Objective:     Blood pressure 134/68, pulse 58, temperature 97.6 °F (36.4 °C), temperature source Axillary, resp. rate 15, height 1.575 m (5' 2\"), weight 73 kg (160 lb 15 oz), SpO2 95%.    Temp (24hrs), Av.4 °F (36.3 °C), Min:96.6 °F (35.9 °C), Max:98.4 °F (36.9 °C)      Physical Exam:  GENERAL: appears stated age, LUNG: clear to auscultation bilaterally, HEART: regular rate and rhythm, ABDOMEN: soft, ND, wounds c/d/I, VERONICA o/p remains bilious, EXTREMITIES:  extremities normal, atraumatic, no cyanosis or edema    Labs:   Recent Results (from the past 24 hours)   Vancomycin Level, Random    Collection Time: 25 12:07 PM   Result Value Ref Range    Vancomycin Rm 11.3 UG/ML   Basic Metabolic Panel    Collection Time: 25  2:53 PM   Result Value Ref Range    Sodium 138 136 - 145 mmol/L    Potassium 3.3 (L) 3.5 - 5.1 mmol/L    Chloride 101 97 - 108 mmol/L    CO2 31 21 - 32 mmol/L    Anion Gap 6 2 - 12 mmol/L    Glucose 180 (H) 65 - 100 mg/dL    BUN 55 (H) 6 - 20 MG/DL    Creatinine 1.33 (H) 0.55 - 1.02 MG/DL    BUN/Creatinine Ratio 41 (H) 12 - 20      Est, Glom Filt Rate 40 (L) >60 ml/min/1.73m2    Calcium 8.8 8.5 - 10.1 MG/DL   Magnesium    Collection Time: 25  2:53 PM   Result Value Ref Range    Magnesium 2.4 1.6 - 2.4 mg/dL   POCT Glucose    Collection Time: 25  8:11 PM   Result Value Ref Range    POC Glucose 179 (H) 65 - 117 mg/dL    Performed by: Yesenia Garcia (TRV RN)    EKG 12 Lead    Collection Time: 25 10:10 PM   Result Value Ref Range    Ventricular Rate 123 BPM    QRS Duration 68 ms    Q-T Interval 260 ms    QTc Calculation (Bazett) 372 ms    R Axis -18 degrees    T Axis -63 degrees    Diagnosis       Atrial fibrillation with rapid ventricular response with premature   ventricular or aberrantly conducted

## 2025-07-18 NOTE — PROGRESS NOTES
1910 Bedside and Verbal shift change report given to Jessica (oncoming nurse) by Nicole (offgoing nurse). Report included the following information Nurse Handoff Report, Adult Overview, Cardiac Rhythm  , and Neuro Assessment.      2000 Initial assessment complete    2200 Pt cardiac rhythm changed to A Fib, EKG ordered, BMP sent per NP order    2230 Pt more agitated and restless, prn given    0000 Reassessment completed    0100 Pt heart rate in 150s, called NP received orders to give 5mg IV metoprolol, pt heart rate back to low 100s    0200 Pt heart rate back in 150s. Amio gtt order received by NP, gtt started . Pt also desatting, pt on hfnc    0400 Reassessment complete.    0600 VERONICA drain output 50cc and NGT low suction output only 50cc out     0710 Bedside and Verbal shift change report given to Alfredo (oncoming nurse) by Jessica (offgoing nurse). Report included the following information Nurse Handoff Report, Adult Overview, Cardiac Rhythm  , and Neuro Assessment.

## 2025-07-18 NOTE — PROGRESS NOTES
Bedside and Verbal shift change report given to Shilpi (oncoming nurse) by Jessica (offgoing nurse). Report included the following information Nurse Handoff Report, MAR, Recent Results, and Cardiac Rhythm atriall fib with bradycardic rate.     0726- patient desatting on 6L NC with sats holding in the mid-80s.  HFNC replaced at 10L; weaned down to 7L)    7:47 AM sats now 96%    0755:  Discussed with intensivist: patient is on PPN with consistently poor/fragile IV access.  Will continue to need Parenteral nutrition at least through the weekend.  We will plan to contact [facility] for PICC line insertion.    0830 Surgery team at bedside; no new orders    0855 Patient daughter Nancy provided with updates on patient condition (telephone)    0927- [facility] paged by unit secretary    0933 O2 weaned down to 6L    1100 [facility] returned page--will be here soon to see the patient    11:53 AM [facility] at bedside to insert PICC    2:37 PM online request placed to [facility] re cxr results    3:42 PM PICC line OK to use per MD  1553 hydralazine PRN given IVP    Bedside and Verbal shift change report given to Jessica (oncoming nurse) by Shilpi (offgoing nurse). Report included the following information Nurse Handoff Report, MAR, Recent Results, and Cardiac Rhythm atrial fib  bady cardic to rate controlled.

## 2025-07-18 NOTE — PROGRESS NOTES
PICC Line Insertion Procedure Note    Procedure: Insertion of #5fr double PICC    Indications:  TPN    Procedure Details   Informed consent was obtained for the procedure.  Risks of hemorrhage, thrombus and adverse drug reaction were discussed. Vessel assessment revealed compressible vessels with no evidence of clot.     UE PICC placed without complication for patient.  2ml of Lidocaine 1% administered via infiltration prior to PICC insertion.  Time-Out performed at bedside with FIDEL Gonzalez.      #5fr PICC inserted to the R Basilic vein per hospital protocol.   Blood return:  yes    Catheter length 38 cm, with 0 cm exposed. Mid upper arm circumference is 26 cm.   Catheter was flushed with 20 cc NS. Patient did tolerate procedure well.    Unable to confirm PICC tip location due to intermittent A-Fib- irreg with Sherlock 3CG. STAT CXR ordered to confirm PICC placement.      Upon completion of procedure, all PICC kit components accounted for and intact.  Post procedure hand-off given to Nurse.  PICC Brochure given to patient with teaching instruction. Bed returned to low locked position with call bell in reach.

## 2025-07-18 NOTE — PROGRESS NOTES
Comprehensive Nutrition Assessment    Type and Reason for Visit:  Initial, LOS    Nutrition Recommendations/Plan:   Tonight, increase to 150g Dextrose, 80g AA (830kcals) and add K, phos back in TPN  Tomorrow if BG <200mg/dL and lytes WNL, would increase to 200g Dextrose and 80g AA  Sunday if BG <200mg/dL and lytes WNL, would increase to Goal of 300g Dextrose, 80g AA (provides 1320kcals)  No lipids while on antifungals   Bowel regimen deferred to the surgical team      Malnutrition Assessment:  Malnutrition Status:  Moderate malnutrition (07/15/25 1450)    Context:  Chronic Illness     Findings of the 6 clinical characteristics of malnutrition:  Energy Intake:  75% or less estimated energy requirements for 1 month or longer  Weight Loss:  7.5% over 3 months     Body Fat Loss:  No body fat loss     Muscle Mass Loss:  No muscle mass loss    Fluid Accumulation:  No fluid accumulation     Strength:  Not Performed    Nutrition Assessment:  Chart reviewed, case discussed during CCU rounds.  Pt extubated, needs re-estimated.  Pt with NGT to LCS.  Disoriented to situation.  Minimal NGT OP however there is concern for leak from sung patch, plan is to assess imaging on Monday and continue TPN through the weekend while keeping her strict NPO.  VICKIE resolving, 4L urine OP yesterday and improving BUN/creat and lytes.  Phos 2.3 today (but none in PPN) so will resume K and phos in TPN and monitor.  , will slightly increase dextrose content today and adjust insulin.  No BM since 7/8, will defer to surgical team.  Should be able to advance towards goal rate TPN by Sunday.        Nutrition Related Findings:    Meds: diflucan, protonix, zosyn, vancomycin, lasix LR@75mL/h;   BM: 7/8   Edema: trace-all extremities  Wound Type: Surgical Incision       Current Nutrition Intake & Therapies:    Average Meal Intake: NPO     Current Parenteral Nutrition Orders:  Type and Formula: 2-in-1 Custom   Lipids: None  Duration:

## 2025-07-18 NOTE — PROGRESS NOTES
ICU Progress Note        Subjective:      - lying comfortably on the bed. Extubated .   - no change.     HPI: The patient is a 80 y/o female PMHx of COPD, rheumatoid arthritis, and remote hx breast cancer admitted () by hospital medicine for altered mental status. Recent dx of multiple vertebral compression fractures, placed on multiple medications, with concern these were contributing to confusion. Underwent kyphoplasty by IR on (7/10). Aggressive bowel regimen added d/t constipation. Evening (7/15) noted to be mottled with hypotension. CT performed demonstrating pneumoperitoneum. Surgery consulted taken to the OR, now s/p ex- lap/repair for perforated duodenal ulcer. Transferred to the ICU post op on mechanical ventilation.     Assessment and Plan:    Septic shock - in the setting of duodenal perforation. S/p laparotomy on 7/15 with sung patch placement. Cont. Antibiotics and anti-fungals. Off levophed gtt since .    Duodenal ulcer - perforation. S/p sung patch on 7/15 by Dr. Han. Still leak present. Recommended NPO until Monday, . Cont. PPN.     VICKIE - due to ATN. Improving. Good urine output. Creatinine coming down.     A.fib - overnight required amiodarone x 1. Now HR is in 60's. Hold off on anticoagulation due to recent surgery.     Hypertension - start IV hydralazine prn.     Control pain.     Full code.     Critical care time - 40 minutes.      Vital Signs:    /68   Pulse 58   Temp 97.6 °F (36.4 °C) (Axillary)   Resp 15   Ht 1.575 m (5' 2\")   Wt 73 kg (160 lb 15 oz)   SpO2 95%   BMI 29.44 kg/m²             Temp (24hrs), Av.4 °F (36.3 °C), Min:96.6 °F (35.9 °C), Max:98.4 °F (36.9 °C)       Intake/Output:   Last shift:      No intake/output data recorded.  Last 3 shifts:  1901 -  0700  In: 4185.9 [I.V.:1231.1]  Out: 5075 [Urine:4785; Drains:170]    Intake/Output Summary (Last 24 hours) at 2025 0904  Last data filed at 2025 0700  Gross per 24

## 2025-07-18 NOTE — INTERDISCIPLINARY ROUNDS
Interdisciplinary team rounds were held 7/18/2025 with the following team members: Physician, Nursing, Dietitian, Pharmacist, and the CCU Charge RN.    Plan of care discussed. See clinical pathway and/or care plan for interventions and desired outcomes.    Goals of the Day: Continue with ibanez catheter for strict I/Os and consult VAT for PICC line for TPN administration.

## 2025-07-19 LAB
ANION GAP SERPL CALC-SCNC: 4 MMOL/L (ref 2–12)
BUN SERPL-MCNC: 36 MG/DL (ref 6–20)
BUN/CREAT SERPL: 61 (ref 12–20)
CALCIUM SERPL-MCNC: 8.7 MG/DL (ref 8.5–10.1)
CHLORIDE SERPL-SCNC: 99 MMOL/L (ref 97–108)
CO2 SERPL-SCNC: 34 MMOL/L (ref 21–32)
CREAT SERPL-MCNC: 0.59 MG/DL (ref 0.55–1.02)
ERYTHROCYTE [DISTWIDTH] IN BLOOD BY AUTOMATED COUNT: 17.6 % (ref 11.5–14.5)
GLUCOSE BLD STRIP.AUTO-MCNC: 112 MG/DL (ref 65–117)
GLUCOSE BLD STRIP.AUTO-MCNC: 115 MG/DL (ref 65–117)
GLUCOSE BLD STRIP.AUTO-MCNC: 130 MG/DL (ref 65–117)
GLUCOSE SERPL-MCNC: 148 MG/DL (ref 65–100)
HCT VFR BLD AUTO: 31.1 % (ref 35–47)
HGB BLD-MCNC: 9.8 G/DL (ref 11.5–16)
MAGNESIUM SERPL-MCNC: 2.2 MG/DL (ref 1.6–2.4)
MCH RBC QN AUTO: 30.2 PG (ref 26–34)
MCHC RBC AUTO-ENTMCNC: 31.5 G/DL (ref 30–36.5)
MCV RBC AUTO: 95.7 FL (ref 80–99)
NRBC # BLD: 0.05 K/UL (ref 0–0.01)
NRBC BLD-RTO: 0.4 PER 100 WBC
PHOSPHATE SERPL-MCNC: 2.3 MG/DL (ref 2.6–4.7)
PLATELET # BLD AUTO: 191 K/UL (ref 150–400)
PMV BLD AUTO: 10.5 FL (ref 8.9–12.9)
POTASSIUM SERPL-SCNC: 3.2 MMOL/L (ref 3.5–5.1)
RBC # BLD AUTO: 3.25 M/UL (ref 3.8–5.2)
SERVICE CMNT-IMP: ABNORMAL
SERVICE CMNT-IMP: NORMAL
SERVICE CMNT-IMP: NORMAL
SODIUM SERPL-SCNC: 137 MMOL/L (ref 136–145)
WBC # BLD AUTO: 13.6 K/UL (ref 3.6–11)

## 2025-07-19 PROCEDURE — 85027 COMPLETE CBC AUTOMATED: CPT

## 2025-07-19 PROCEDURE — 82962 GLUCOSE BLOOD TEST: CPT

## 2025-07-19 PROCEDURE — 6370000000 HC RX 637 (ALT 250 FOR IP): Performed by: SURGERY

## 2025-07-19 PROCEDURE — 36415 COLL VENOUS BLD VENIPUNCTURE: CPT

## 2025-07-19 PROCEDURE — 6360000002 HC RX W HCPCS: Performed by: SURGERY

## 2025-07-19 PROCEDURE — 2700000000 HC OXYGEN THERAPY PER DAY

## 2025-07-19 PROCEDURE — 84100 ASSAY OF PHOSPHORUS: CPT

## 2025-07-19 PROCEDURE — 2580000003 HC RX 258: Performed by: SURGERY

## 2025-07-19 PROCEDURE — 6360000002 HC RX W HCPCS: Performed by: INTERNAL MEDICINE

## 2025-07-19 PROCEDURE — 2500000003 HC RX 250 WO HCPCS: Performed by: SURGERY

## 2025-07-19 PROCEDURE — 6360000002 HC RX W HCPCS: Performed by: NURSE PRACTITIONER

## 2025-07-19 PROCEDURE — 2500000003 HC RX 250 WO HCPCS: Performed by: INTERNAL MEDICINE

## 2025-07-19 PROCEDURE — 83735 ASSAY OF MAGNESIUM: CPT

## 2025-07-19 PROCEDURE — 6370000000 HC RX 637 (ALT 250 FOR IP): Performed by: INTERNAL MEDICINE

## 2025-07-19 PROCEDURE — 80048 BASIC METABOLIC PNL TOTAL CA: CPT

## 2025-07-19 PROCEDURE — 2580000003 HC RX 258: Performed by: INTERNAL MEDICINE

## 2025-07-19 PROCEDURE — 2000000000 HC ICU R&B

## 2025-07-19 RX ORDER — CASTOR OIL AND BALSAM, PERU 788; 87 MG/G; MG/G
OINTMENT TOPICAL 2 TIMES DAILY
Status: DISCONTINUED | OUTPATIENT
Start: 2025-07-19 | End: 2025-07-31 | Stop reason: HOSPADM

## 2025-07-19 RX ORDER — POTASSIUM CHLORIDE 29.8 MG/ML
20 INJECTION INTRAVENOUS
Status: COMPLETED | OUTPATIENT
Start: 2025-07-19 | End: 2025-07-19

## 2025-07-19 RX ADMIN — CALCIUM GLUCONATE: 98 INJECTION, SOLUTION INTRAVENOUS at 19:35

## 2025-07-19 RX ADMIN — SODIUM CHLORIDE, PRESERVATIVE FREE 10 ML: 5 INJECTION INTRAVENOUS at 21:00

## 2025-07-19 RX ADMIN — Medication: at 12:38

## 2025-07-19 RX ADMIN — POTASSIUM CHLORIDE 20 MEQ: 29.8 INJECTION, SOLUTION INTRAVENOUS at 05:29

## 2025-07-19 RX ADMIN — PANTOPRAZOLE SODIUM 40 MG: 40 INJECTION, POWDER, FOR SOLUTION INTRAVENOUS at 04:30

## 2025-07-19 RX ADMIN — SODIUM CHLORIDE, PRESERVATIVE FREE 10 ML: 5 INJECTION INTRAVENOUS at 09:03

## 2025-07-19 RX ADMIN — HYDRALAZINE HYDROCHLORIDE 10 MG: 20 INJECTION INTRAMUSCULAR; INTRAVENOUS at 01:12

## 2025-07-19 RX ADMIN — POTASSIUM CHLORIDE 20 MEQ: 29.8 INJECTION, SOLUTION INTRAVENOUS at 04:44

## 2025-07-19 RX ADMIN — PANTOPRAZOLE SODIUM 40 MG: 40 INJECTION, POWDER, FOR SOLUTION INTRAVENOUS at 14:58

## 2025-07-19 RX ADMIN — Medication: at 20:59

## 2025-07-19 RX ADMIN — PIPERACILLIN AND TAZOBACTAM 3375 MG: 3; .375 INJECTION, POWDER, LYOPHILIZED, FOR SOLUTION INTRAVENOUS at 21:04

## 2025-07-19 RX ADMIN — HYDROMORPHONE HYDROCHLORIDE 0.25 MG: 1 INJECTION, SOLUTION INTRAMUSCULAR; INTRAVENOUS; SUBCUTANEOUS at 00:18

## 2025-07-19 RX ADMIN — ONDANSETRON HYDROCHLORIDE 4 MG: 2 INJECTION, SOLUTION INTRAMUSCULAR; INTRAVENOUS at 12:36

## 2025-07-19 RX ADMIN — ENOXAPARIN SODIUM 30 MG: 100 INJECTION SUBCUTANEOUS at 09:03

## 2025-07-19 RX ADMIN — DIAZEPAM 2 MG: 5 INJECTION, SOLUTION INTRAMUSCULAR; INTRAVENOUS at 20:58

## 2025-07-19 RX ADMIN — Medication 1 AMPULE: at 20:58

## 2025-07-19 RX ADMIN — Medication 1 AMPULE: at 09:03

## 2025-07-19 RX ADMIN — FLUCONAZOLE 200 MG: 2 INJECTION, SOLUTION INTRAVENOUS at 04:29

## 2025-07-19 RX ADMIN — PIPERACILLIN AND TAZOBACTAM 3375 MG: 3; .375 INJECTION, POWDER, LYOPHILIZED, FOR SOLUTION INTRAVENOUS at 09:07

## 2025-07-19 RX ADMIN — ONDANSETRON HYDROCHLORIDE 4 MG: 2 INJECTION, SOLUTION INTRAMUSCULAR; INTRAVENOUS at 00:18

## 2025-07-19 ASSESSMENT — PAIN DESCRIPTION - LOCATION
LOCATION: GENERALIZED
LOCATION: GENERALIZED

## 2025-07-19 ASSESSMENT — PAIN SCALES - GENERAL
PAINLEVEL_OUTOF10: 0
PAINLEVEL_OUTOF10: 7
PAINLEVEL_OUTOF10: 0
PAINLEVEL_OUTOF10: 0

## 2025-07-19 NOTE — PROGRESS NOTES
07:51 AM Bedside and Verbal shift change report given to Shilpi (oncoming nurse) by Jessica (offgoing nurse). Report included the following information Nurse Handoff Report, MAR, Recent Results, and Cardiac Rhythm atrial fibrillation with rates less than 100, occasionally into the 50s.     10:43 AM Ordered venelex for left elbow dk red blanching.    1236 PRN zofran given for nausea.    2:57 PM turned on left side dtr at bedside    1800 patient declines to turn.  Reinforced with patient the importance of turning to prevent pressure injuries. Patient verbalized understanding.   Told the patient that we will plan to turn her at shift change.    Bedside and Verbal shift change report given to Sheila (oncoming nurse) by Shilpi (offgoing nurse). Report included the following information Nurse Handoff Report.

## 2025-07-19 NOTE — PROGRESS NOTES
1915 Bedside and Verbal shift change report given to Jessica (oncoming nurse) by Yennifer (offgoing nurse). Report included the following information Nurse Handoff Report, Adult Overview, Cardiac Rhythm  , and Neuro Assessment.      2000 Initial assessment complete    0000 Reassessment completed    0400 Reassessment complete    0710 Bedside and Verbal shift change report given to Alfredo (oncoming nurse) by Jessica (offgoing nurse). Report included the following information Nurse Handoff Report, Adult Overview, Cardiac Rhythm  , and Neuro Assessment.

## 2025-07-19 NOTE — PROGRESS NOTES
ICU Progress Note        Subjective:      - lying comfortably on the bed. Extubated .   - no change.    - lying comfortably on the bed, denies any complaints.     HPI: The patient is a 78 y/o female PMHx of COPD, rheumatoid arthritis, and remote hx breast cancer admitted () by [facility] medicine for altered mental status. Recent dx of multiple vertebral compression fractures, placed on multiple medications, with concern these were contributing to confusion. Underwent kyphoplasty by IR on (7/10). Aggressive bowel regimen added d/t constipation. Evening (7/15) noted to be mottled with hypotension. CT performed demonstrating pneumoperitoneum. Surgery consulted taken to the OR, now s/p ex- lap/repair for perforated duodenal ulcer. Transferred to the ICU post op on mechanical ventilation.     Assessment and Plan:    Septic shock - in the setting of duodenal perforation. S/p laparotomy on 7/15 with sung patch placement. Cont. Zosyn and Fluconazole. Off levophed gtt since .    Duodenal ulcer - perforation. S/p sung patch on 7/15 by Dr. Han. Still leak present. Recommended NPO until Monday, . Cont. PPN.     VICKIE - due to ATN. Improving. Good urine output. Creatinine coming down.     A.fib - HR well controlled. Hold off on anticoagulation due to recent surgery.     Hypertension - start IV hydralazine prn.     Control pain.     Full code.     Critical care time - 40 minutes.      Vital Signs:    BP (!) 117/55   Pulse 67   Temp (!) 96.4 °F (35.8 °C) (Axillary)   Resp 14   Ht 1.575 m (5' 2\")   Wt 72.8 kg (160 lb 7.9 oz)   SpO2 96%   BMI 29.35 kg/m²             Temp (24hrs), Av.7 °F (36.5 °C), Min:96.4 °F (35.8 °C), Max:98.3 °F (36.8 °C)       Intake/Output:   Last shift:      701 - 1900  In: -   Out: 335 [Urine:335]  Last 3 shifts: 1901 - 700  In: 2987.8 [I.V.:115.4]  Out: 5170 [Urine:4850; Drains:100]    Intake/Output Summary (Last 24 hours) at 2025  [facility]

## 2025-07-19 NOTE — PROGRESS NOTES
Admit Date: 2025    POD 4 Days Post-Op    Procedure:  Perforated ulcer repair    Subjective:     Patient remains in ICU with delirium, intermittent A-fib.     Objective:     Blood pressure 115/60, pulse 63, temperature (!) 96.4 °F (35.8 °C), temperature source Axillary, resp. rate 13, height 1.575 m (5' 2\"), weight 72.8 kg (160 lb 7.9 oz), SpO2 96%.    Temp (24hrs), Av.7 °F (36.5 °C), Min:96.4 °F (35.8 °C), Max:98.3 °F (36.8 °C)      Physical Exam:  GENERAL: appears stated age, LUNG: clear to auscultation bilaterally, HEART: regular rate and rhythm, ABDOMEN: soft, ND, wounds c/d/I, VERONICA o/p less bilious appearing today, EXTREMITIES:  extremities normal, atraumatic, no cyanosis or edema    Labs:   Recent Results (from the past 24 hours)   POCT Glucose    Collection Time: 25 12:26 PM   Result Value Ref Range    POC Glucose 176 (H) 65 - 117 mg/dL    Performed by: Alex Dobbins RN    POCT Glucose    Collection Time: 25  6:08 PM   Result Value Ref Range    POC Glucose 156 (H) 65 - 117 mg/dL    Performed by: Alex Dobbins RN    POCT Glucose    Collection Time: 25  9:51 PM   Result Value Ref Range    POC Glucose 164 (H) 65 - 117 mg/dL    Performed by: Yesenia Garcia (TRDIOMEDES RN)    CBC    Collection Time: 25  3:29 AM   Result Value Ref Range    WBC 13.6 (H) 3.6 - 11.0 K/uL    RBC 3.25 (L) 3.80 - 5.20 M/uL    Hemoglobin 9.8 (L) 11.5 - 16.0 g/dL    Hematocrit 31.1 (L) 35.0 - 47.0 %    MCV 95.7 80.0 - 99.0 FL    MCH 30.2 26.0 - 34.0 PG    MCHC 31.5 30.0 - 36.5 g/dL    RDW 17.6 (H) 11.5 - 14.5 %    Platelets 191 150 - 400 K/uL    MPV 10.5 8.9 - 12.9 FL    Nucleated RBCs 0.4 (H) 0  WBC    nRBC 0.05 (H) 0.00 - 0.01 K/uL   Basic Metabolic Panel    Collection Time: 25  3:29 AM   Result Value Ref Range    Sodium 137 136 - 145 mmol/L    Potassium 3.2 (L) 3.5 - 5.1 mmol/L    Chloride 99 97 - 108 mmol/L    CO2 34 (H) 21 - 32 mmol/L    Anion Gap 4 2 - 12 mmol/L    Glucose 148 (H) 65 - 100

## 2025-07-20 LAB
ANION GAP SERPL CALC-SCNC: 1 MMOL/L (ref 2–12)
BACTERIA SPEC CULT: NORMAL
BACTERIA SPEC CULT: NORMAL
BUN SERPL-MCNC: 26 MG/DL (ref 6–20)
BUN/CREAT SERPL: 63 (ref 12–20)
CALCIUM SERPL-MCNC: 8.2 MG/DL (ref 8.5–10.1)
CHLORIDE SERPL-SCNC: 103 MMOL/L (ref 97–108)
CO2 SERPL-SCNC: 33 MMOL/L (ref 21–32)
CREAT SERPL-MCNC: 0.41 MG/DL (ref 0.55–1.02)
ERYTHROCYTE [DISTWIDTH] IN BLOOD BY AUTOMATED COUNT: 18.1 % (ref 11.5–14.5)
GLUCOSE BLD STRIP.AUTO-MCNC: 104 MG/DL (ref 65–117)
GLUCOSE BLD STRIP.AUTO-MCNC: 104 MG/DL (ref 65–117)
GLUCOSE BLD STRIP.AUTO-MCNC: 86 MG/DL (ref 65–117)
GLUCOSE SERPL-MCNC: 92 MG/DL (ref 65–100)
HCT VFR BLD AUTO: 33 % (ref 35–47)
HGB BLD-MCNC: 10.2 G/DL (ref 11.5–16)
MAGNESIUM SERPL-MCNC: 2.1 MG/DL (ref 1.6–2.4)
MCH RBC QN AUTO: 30.4 PG (ref 26–34)
MCHC RBC AUTO-ENTMCNC: 30.9 G/DL (ref 30–36.5)
MCV RBC AUTO: 98.2 FL (ref 80–99)
NRBC # BLD: 0 K/UL (ref 0–0.01)
NRBC BLD-RTO: 0 PER 100 WBC
PHOSPHATE SERPL-MCNC: 2.3 MG/DL (ref 2.6–4.7)
PLATELET # BLD AUTO: 201 K/UL (ref 150–400)
PMV BLD AUTO: 11.4 FL (ref 8.9–12.9)
POTASSIUM SERPL-SCNC: 4.2 MMOL/L (ref 3.5–5.1)
RBC # BLD AUTO: 3.36 M/UL (ref 3.8–5.2)
SERVICE CMNT-IMP: NORMAL
SODIUM SERPL-SCNC: 137 MMOL/L (ref 136–145)
WBC # BLD AUTO: 19.1 K/UL (ref 3.6–11)

## 2025-07-20 PROCEDURE — 83735 ASSAY OF MAGNESIUM: CPT

## 2025-07-20 PROCEDURE — 2500000003 HC RX 250 WO HCPCS: Performed by: INTERNAL MEDICINE

## 2025-07-20 PROCEDURE — 6360000002 HC RX W HCPCS: Performed by: INTERNAL MEDICINE

## 2025-07-20 PROCEDURE — 2700000000 HC OXYGEN THERAPY PER DAY

## 2025-07-20 PROCEDURE — 6370000000 HC RX 637 (ALT 250 FOR IP): Performed by: INTERNAL MEDICINE

## 2025-07-20 PROCEDURE — 36415 COLL VENOUS BLD VENIPUNCTURE: CPT

## 2025-07-20 PROCEDURE — 6360000002 HC RX W HCPCS: Performed by: NURSE PRACTITIONER

## 2025-07-20 PROCEDURE — 84100 ASSAY OF PHOSPHORUS: CPT

## 2025-07-20 PROCEDURE — 85027 COMPLETE CBC AUTOMATED: CPT

## 2025-07-20 PROCEDURE — 2000000000 HC ICU R&B

## 2025-07-20 PROCEDURE — 6370000000 HC RX 637 (ALT 250 FOR IP): Performed by: SURGERY

## 2025-07-20 PROCEDURE — 80048 BASIC METABOLIC PNL TOTAL CA: CPT

## 2025-07-20 PROCEDURE — 2580000003 HC RX 258: Performed by: INTERNAL MEDICINE

## 2025-07-20 PROCEDURE — 82962 GLUCOSE BLOOD TEST: CPT

## 2025-07-20 PROCEDURE — 2500000003 HC RX 250 WO HCPCS: Performed by: SURGERY

## 2025-07-20 PROCEDURE — 6360000002 HC RX W HCPCS: Performed by: SURGERY

## 2025-07-20 RX ADMIN — Medication: at 09:46

## 2025-07-20 RX ADMIN — ONDANSETRON HYDROCHLORIDE 4 MG: 2 INJECTION, SOLUTION INTRAMUSCULAR; INTRAVENOUS at 00:04

## 2025-07-20 RX ADMIN — POTASSIUM PHOSPHATE, MONOBASIC AND POTASSIUM PHOSPHATE, DIBASIC 20 MMOL: 224; 236 INJECTION, SOLUTION, CONCENTRATE INTRAVENOUS at 16:00

## 2025-07-20 RX ADMIN — DIAZEPAM 2 MG: 5 INJECTION, SOLUTION INTRAMUSCULAR; INTRAVENOUS at 21:33

## 2025-07-20 RX ADMIN — PANTOPRAZOLE SODIUM 40 MG: 40 INJECTION, POWDER, FOR SOLUTION INTRAVENOUS at 14:56

## 2025-07-20 RX ADMIN — CALCIUM GLUCONATE: 98 INJECTION, SOLUTION INTRAVENOUS at 19:53

## 2025-07-20 RX ADMIN — Medication 1 AMPULE: at 21:33

## 2025-07-20 RX ADMIN — ENOXAPARIN SODIUM 30 MG: 100 INJECTION SUBCUTANEOUS at 09:45

## 2025-07-20 RX ADMIN — Medication 1 AMPULE: at 09:46

## 2025-07-20 RX ADMIN — HYDROMORPHONE HYDROCHLORIDE 0.25 MG: 1 INJECTION, SOLUTION INTRAMUSCULAR; INTRAVENOUS; SUBCUTANEOUS at 00:03

## 2025-07-20 RX ADMIN — FLUCONAZOLE 200 MG: 2 INJECTION, SOLUTION INTRAVENOUS at 04:15

## 2025-07-20 RX ADMIN — SODIUM CHLORIDE, PRESERVATIVE FREE 10 ML: 5 INJECTION INTRAVENOUS at 09:53

## 2025-07-20 RX ADMIN — Medication: at 21:33

## 2025-07-20 RX ADMIN — SODIUM CHLORIDE, PRESERVATIVE FREE 10 ML: 5 INJECTION INTRAVENOUS at 21:34

## 2025-07-20 RX ADMIN — PIPERACILLIN AND TAZOBACTAM 3375 MG: 3; .375 INJECTION, POWDER, LYOPHILIZED, FOR SOLUTION INTRAVENOUS at 21:35

## 2025-07-20 RX ADMIN — PIPERACILLIN AND TAZOBACTAM 3375 MG: 3; .375 INJECTION, POWDER, LYOPHILIZED, FOR SOLUTION INTRAVENOUS at 09:50

## 2025-07-20 RX ADMIN — HYDROMORPHONE HYDROCHLORIDE 0.25 MG: 1 INJECTION, SOLUTION INTRAMUSCULAR; INTRAVENOUS; SUBCUTANEOUS at 12:22

## 2025-07-20 RX ADMIN — ONDANSETRON HYDROCHLORIDE 4 MG: 2 INJECTION, SOLUTION INTRAMUSCULAR; INTRAVENOUS at 11:50

## 2025-07-20 RX ADMIN — PANTOPRAZOLE SODIUM 40 MG: 40 INJECTION, POWDER, FOR SOLUTION INTRAVENOUS at 04:13

## 2025-07-20 ASSESSMENT — PAIN SCALES - GENERAL
PAINLEVEL_OUTOF10: 8
PAINLEVEL_OUTOF10: 7
PAINLEVEL_OUTOF10: 0
PAINLEVEL_OUTOF10: 0

## 2025-07-20 ASSESSMENT — PAIN DESCRIPTION - LOCATION
LOCATION: ABDOMEN
LOCATION: ABDOMEN

## 2025-07-20 NOTE — PROGRESS NOTES
Admit Date: 2025    POD 5 Days Post-Op    Procedure:  Perforated ulcer repair    Subjective:     Patient remains in ICU with delirium, intermittent A-fib.     Objective:     Blood pressure 129/78, pulse 86, temperature 98.4 °F (36.9 °C), temperature source Axillary, resp. rate 18, height 1.575 m (5' 2\"), weight 72.8 kg (160 lb 7.9 oz), SpO2 97%.    Temp (24hrs), Av.2 °F (36.8 °C), Min:97.4 °F (36.3 °C), Max:98.6 °F (37 °C)      Physical Exam:  GENERAL: appears stated age, LUNG: clear to auscultation bilaterally, HEART: regular rate and rhythm, ABDOMEN: soft, ND, wounds c/d/I, VERONICA o/p less bilious appearing today, only 20 cc out, EXTREMITIES:  extremities normal, atraumatic, no cyanosis or edema    Labs:   Recent Results (from the past 24 hours)   POCT Glucose    Collection Time: 25  1:00 PM   Result Value Ref Range    POC Glucose 130 (H) 65 - 117 mg/dL    Performed by: Bhumika Layne RN    POCT Glucose    Collection Time: 25  6:32 PM   Result Value Ref Range    POC Glucose 58 (L) 65 - 117 mg/dL    Performed by: Alex Dobbins RN    POCT Glucose    Collection Time: 25  6:33 PM   Result Value Ref Range    POC Glucose 112 65 - 117 mg/dL    Performed by: Alex Dobbins RN    POCT Glucose    Collection Time: 25  6:34 PM   Result Value Ref Range    POC Glucose 115 65 - 117 mg/dL    Performed by: Alex Dobbins RN    POCT Glucose    Collection Time: 25  1:03 AM   Result Value Ref Range    POC Glucose 104 65 - 117 mg/dL    Performed by: Yuri Jones \"Angelique\" RN    CBC    Collection Time: 25  6:13 AM   Result Value Ref Range    WBC 19.1 (H) 3.6 - 11.0 K/uL    RBC 3.36 (L) 3.80 - 5.20 M/uL    Hemoglobin 10.2 (L) 11.5 - 16.0 g/dL    Hematocrit 33.0 (L) 35.0 - 47.0 %    MCV 98.2 80.0 - 99.0 FL    MCH 30.4 26.0 - 34.0 PG    MCHC 30.9 30.0 - 36.5 g/dL    RDW 18.1 (H) 11.5 - 14.5 %    Platelets 201 150 - 400 K/uL    MPV 11.4 8.9 - 12.9 FL    Nucleated RBCs 0.0 0  WBC

## 2025-07-20 NOTE — PLAN OF CARE
Problem: Neurosensory - Adult  Goal: Achieves maximal functionality and self care  7/20/2025 0351 by Karen Welch RN  Outcome: Progressing  7/19/2025 1427 by Shilpi Johnson RN  Outcome: Progressing     Problem: Neurosensory - Adult  Goal: Achieves stable or improved neurological status  7/20/2025 0351 by Karen Welch RN  Outcome: Progressing  7/19/2025 1427 by Shilpi Johnson RN  Outcome: Progressing     Problem: Cardiovascular - Adult  Goal: Absence of cardiac dysrhythmias or at baseline  7/20/2025 0351 by Karen Welch RN  Outcome: Progressing  7/19/2025 1427 by Shilpi Johnson RN  Outcome: Progressing

## 2025-07-20 NOTE — PROGRESS NOTES
ICU Progress Note        Subjective:      - lying comfortably on the bed. Extubated .   - no change.    - lying comfortably on the bed, denies any complaints.    - appears comfortable but WBC has increased. Afebrile.     HPI: The patient is a 80 y/o female PMHx of COPD, rheumatoid arthritis, and remote hx breast cancer admitted () by hospital medicine for altered mental status. Recent dx of multiple vertebral compression fractures, placed on multiple medications, with concern these were contributing to confusion. Underwent kyphoplasty by IR on (7/10). Aggressive bowel regimen added d/t constipation. Evening (7/15) noted to be mottled with hypotension. CT performed demonstrating pneumoperitoneum. Surgery consulted taken to the OR, now s/p ex- lap/repair for perforated duodenal ulcer. Transferred to the ICU post op on mechanical ventilation.     Assessment and Plan:    Septic shock - in the setting of duodenal perforation. S/p laparotomy on 7/15 with sung patch placement. Cont. Zosyn and Fluconazole. Off levophed gtt since . WBC increased,  but afebrile, monitor closely.     Duodenal ulcer - perforation. S/p sung patch on 7/15 by Dr. Han. Still leak present. Recommended NPO until Monday, . Cont. PPN.     VICKIE - due to ATN. Improving. Good urine output. Creatinine coming down.     A.fib - HR well controlled. Hold off on anticoagulation due to recent surgery.     Hypertension - start IV hydralazine prn.     Control pain.     Full code.     Critical care time - 40 minutes.      Vital Signs:    /78   Pulse 86   Temp 98.4 °F (36.9 °C) (Axillary)   Resp 18   Ht 1.575 m (5' 2\")   Wt 72.8 kg (160 lb 7.9 oz)   SpO2 97%   BMI 29.35 kg/m²             Temp (24hrs), Av.2 °F (36.8 °C), Min:97.4 °F (36.3 °C), Max:98.6 °F (37 °C)       Intake/Output:   Last shift:      701 - 1900  In: 187.1   Out: 50   Last 3 shifts: 1901  07  In: 2842   Out: 1794

## 2025-07-21 ENCOUNTER — APPOINTMENT (OUTPATIENT)
Facility: HOSPITAL | Age: 80
End: 2025-07-21
Payer: MEDICARE

## 2025-07-21 LAB
ANION GAP SERPL CALC-SCNC: 2 MMOL/L (ref 2–12)
BUN SERPL-MCNC: 21 MG/DL (ref 6–20)
BUN/CREAT SERPL: 50 (ref 12–20)
CALCIUM SERPL-MCNC: 7.9 MG/DL (ref 8.5–10.1)
CHLORIDE SERPL-SCNC: 105 MMOL/L (ref 97–108)
CO2 SERPL-SCNC: 30 MMOL/L (ref 21–32)
CREAT SERPL-MCNC: 0.42 MG/DL (ref 0.55–1.02)
ERYTHROCYTE [DISTWIDTH] IN BLOOD BY AUTOMATED COUNT: 18.1 % (ref 11.5–14.5)
GLUCOSE BLD STRIP.AUTO-MCNC: 105 MG/DL (ref 65–117)
GLUCOSE BLD STRIP.AUTO-MCNC: 106 MG/DL (ref 65–117)
GLUCOSE BLD STRIP.AUTO-MCNC: 130 MG/DL (ref 65–117)
GLUCOSE BLD STRIP.AUTO-MCNC: NORMAL MG/DL (ref 65–117)
GLUCOSE SERPL-MCNC: 133 MG/DL (ref 65–100)
HCT VFR BLD AUTO: 31.4 % (ref 35–47)
HGB BLD-MCNC: 9.8 G/DL (ref 11.5–16)
MAGNESIUM SERPL-MCNC: 2.1 MG/DL (ref 1.6–2.4)
MCH RBC QN AUTO: 30.8 PG (ref 26–34)
MCHC RBC AUTO-ENTMCNC: 31.2 G/DL (ref 30–36.5)
MCV RBC AUTO: 98.7 FL (ref 80–99)
NRBC # BLD: 0 K/UL (ref 0–0.01)
NRBC BLD-RTO: 0 PER 100 WBC
PHOSPHATE SERPL-MCNC: 2.9 MG/DL (ref 2.6–4.7)
PLATELET # BLD AUTO: 218 K/UL (ref 150–400)
PMV BLD AUTO: 11.1 FL (ref 8.9–12.9)
POTASSIUM SERPL-SCNC: 4.6 MMOL/L (ref 3.5–5.1)
RBC # BLD AUTO: 3.18 M/UL (ref 3.8–5.2)
SERVICE CMNT-IMP: ABNORMAL
SERVICE CMNT-IMP: NORMAL
SODIUM SERPL-SCNC: 137 MMOL/L (ref 136–145)
WBC # BLD AUTO: 21.1 K/UL (ref 3.6–11)

## 2025-07-21 PROCEDURE — 74018 RADEX ABDOMEN 1 VIEW: CPT

## 2025-07-21 PROCEDURE — 6360000002 HC RX W HCPCS: Performed by: SURGERY

## 2025-07-21 PROCEDURE — 6370000000 HC RX 637 (ALT 250 FOR IP): Performed by: INTERNAL MEDICINE

## 2025-07-21 PROCEDURE — 85027 COMPLETE CBC AUTOMATED: CPT

## 2025-07-21 PROCEDURE — 1100000000 HC RM PRIVATE

## 2025-07-21 PROCEDURE — 2500000003 HC RX 250 WO HCPCS: Performed by: SURGERY

## 2025-07-21 PROCEDURE — 6360000002 HC RX W HCPCS: Performed by: INTERNAL MEDICINE

## 2025-07-21 PROCEDURE — 6360000002 HC RX W HCPCS: Performed by: NURSE PRACTITIONER

## 2025-07-21 PROCEDURE — 6360000004 HC RX CONTRAST MEDICATION: Performed by: SURGERY

## 2025-07-21 PROCEDURE — 36415 COLL VENOUS BLD VENIPUNCTURE: CPT

## 2025-07-21 PROCEDURE — 82962 GLUCOSE BLOOD TEST: CPT

## 2025-07-21 PROCEDURE — 2580000003 HC RX 258: Performed by: INTERNAL MEDICINE

## 2025-07-21 PROCEDURE — 74177 CT ABD & PELVIS W/CONTRAST: CPT

## 2025-07-21 PROCEDURE — 2500000003 HC RX 250 WO HCPCS: Performed by: INTERNAL MEDICINE

## 2025-07-21 PROCEDURE — 2700000000 HC OXYGEN THERAPY PER DAY

## 2025-07-21 PROCEDURE — 6370000000 HC RX 637 (ALT 250 FOR IP): Performed by: SURGERY

## 2025-07-21 PROCEDURE — 84100 ASSAY OF PHOSPHORUS: CPT

## 2025-07-21 PROCEDURE — 80048 BASIC METABOLIC PNL TOTAL CA: CPT

## 2025-07-21 PROCEDURE — 94761 N-INVAS EAR/PLS OXIMETRY MLT: CPT

## 2025-07-21 PROCEDURE — 83735 ASSAY OF MAGNESIUM: CPT

## 2025-07-21 RX ORDER — IOPAMIDOL 755 MG/ML
100 INJECTION, SOLUTION INTRAVASCULAR
Status: COMPLETED | OUTPATIENT
Start: 2025-07-21 | End: 2025-07-21

## 2025-07-21 RX ORDER — INSULIN LISPRO 100 [IU]/ML
0-4 INJECTION, SOLUTION INTRAVENOUS; SUBCUTANEOUS EVERY 6 HOURS
Status: DISCONTINUED | OUTPATIENT
Start: 2025-07-22 | End: 2025-07-28

## 2025-07-21 RX ORDER — DIATRIZOATE MEGLUMINE AND DIATRIZOATE SODIUM 660; 100 MG/ML; MG/ML
30 SOLUTION ORAL; RECTAL
Status: DISCONTINUED | OUTPATIENT
Start: 2025-07-21 | End: 2025-07-23

## 2025-07-21 RX ORDER — CITALOPRAM HYDROBROMIDE 20 MG/1
20 TABLET ORAL DAILY
Status: DISCONTINUED | OUTPATIENT
Start: 2025-07-21 | End: 2025-07-31

## 2025-07-21 RX ORDER — METHIMAZOLE 5 MG/1
5 TABLET ORAL DAILY
Status: DISCONTINUED | OUTPATIENT
Start: 2025-07-21 | End: 2025-07-24

## 2025-07-21 RX ADMIN — PANTOPRAZOLE SODIUM 40 MG: 40 INJECTION, POWDER, FOR SOLUTION INTRAVENOUS at 03:33

## 2025-07-21 RX ADMIN — HYDROMORPHONE HYDROCHLORIDE 0.25 MG: 1 INJECTION, SOLUTION INTRAMUSCULAR; INTRAVENOUS; SUBCUTANEOUS at 22:45

## 2025-07-21 RX ADMIN — Medication: at 10:22

## 2025-07-21 RX ADMIN — HYDROMORPHONE HYDROCHLORIDE 0.25 MG: 1 INJECTION, SOLUTION INTRAMUSCULAR; INTRAVENOUS; SUBCUTANEOUS at 18:28

## 2025-07-21 RX ADMIN — IOPAMIDOL 100 ML: 755 INJECTION, SOLUTION INTRAVENOUS at 13:53

## 2025-07-21 RX ADMIN — Medication: at 23:07

## 2025-07-21 RX ADMIN — CITALOPRAM HYDROBROMIDE 20 MG: 20 TABLET ORAL at 17:43

## 2025-07-21 RX ADMIN — ONDANSETRON HYDROCHLORIDE 4 MG: 2 INJECTION, SOLUTION INTRAMUSCULAR; INTRAVENOUS at 10:31

## 2025-07-21 RX ADMIN — IPRATROPIUM BROMIDE 0.5 MG: 0.5 SOLUTION RESPIRATORY (INHALATION) at 19:34

## 2025-07-21 RX ADMIN — FLUCONAZOLE 200 MG: 2 INJECTION, SOLUTION INTRAVENOUS at 03:39

## 2025-07-21 RX ADMIN — Medication 1 AMPULE: at 10:22

## 2025-07-21 RX ADMIN — SODIUM CHLORIDE, PRESERVATIVE FREE 10 ML: 5 INJECTION INTRAVENOUS at 10:23

## 2025-07-21 RX ADMIN — SODIUM CHLORIDE, PRESERVATIVE FREE 10 ML: 5 INJECTION INTRAVENOUS at 23:08

## 2025-07-21 RX ADMIN — ONDANSETRON HYDROCHLORIDE 4 MG: 2 INJECTION, SOLUTION INTRAMUSCULAR; INTRAVENOUS at 01:46

## 2025-07-21 RX ADMIN — HYDROMORPHONE HYDROCHLORIDE 0.25 MG: 1 INJECTION, SOLUTION INTRAMUSCULAR; INTRAVENOUS; SUBCUTANEOUS at 03:33

## 2025-07-21 RX ADMIN — PIPERACILLIN AND TAZOBACTAM 3375 MG: 3; .375 INJECTION, POWDER, LYOPHILIZED, FOR SOLUTION INTRAVENOUS at 10:45

## 2025-07-21 RX ADMIN — ENOXAPARIN SODIUM 30 MG: 100 INJECTION SUBCUTANEOUS at 08:27

## 2025-07-21 RX ADMIN — DIATRIZOATE MEGLUMINE AND DIATRIZOATE SODIUM 30 ML: 660; 100 LIQUID ORAL; RECTAL at 10:49

## 2025-07-21 RX ADMIN — CALCIUM GLUCONATE: 98 INJECTION, SOLUTION INTRAVENOUS at 23:06

## 2025-07-21 RX ADMIN — METHIMAZOLE 5 MG: 5 TABLET ORAL at 17:43

## 2025-07-21 RX ADMIN — PIPERACILLIN AND TAZOBACTAM 3375 MG: 3; .375 INJECTION, POWDER, LYOPHILIZED, FOR SOLUTION INTRAVENOUS at 22:51

## 2025-07-21 RX ADMIN — PANTOPRAZOLE SODIUM 40 MG: 40 INJECTION, POWDER, FOR SOLUTION INTRAVENOUS at 17:10

## 2025-07-21 ASSESSMENT — PAIN - FUNCTIONAL ASSESSMENT: PAIN_FUNCTIONAL_ASSESSMENT: PREVENTS OR INTERFERES WITH ALL ACTIVE AND SOME PASSIVE ACTIVITIES

## 2025-07-21 ASSESSMENT — PAIN DESCRIPTION - ORIENTATION: ORIENTATION: RIGHT;LEFT

## 2025-07-21 ASSESSMENT — PAIN DESCRIPTION - LOCATION
LOCATION: BACK
LOCATION: ABDOMEN

## 2025-07-21 ASSESSMENT — PAIN SCALES - GENERAL
PAINLEVEL_OUTOF10: 7
PAINLEVEL_OUTOF10: 0
PAINLEVEL_OUTOF10: 6
PAINLEVEL_OUTOF10: 0
PAINLEVEL_OUTOF10: 0
PAINLEVEL_OUTOF10: 3

## 2025-07-21 ASSESSMENT — PAIN DESCRIPTION - DESCRIPTORS
DESCRIPTORS: ACHING
DESCRIPTORS: ACHING

## 2025-07-21 NOTE — PROGRESS NOTES
Left message at mobile number for patient to return call to Dori GARCIA w/ [facility] to complete pre admission interview for her upcoming surgery on 01/03/2025.  Message also sent to patient via [employer] to return call to Dori rivera/ [facility].      Physical Therapy     Chart reviewed up to date. Attempted to see pt for PT session. Pt ZABRINA for CT, not available. Will defer and follow up as able.     Teressa Owens PT, DPT,NCS

## 2025-07-21 NOTE — BSMART NOTE
BSMART Liaison Team Note     LOS:  15     Patient goal(s) for today: take medications as prescribed, make needs known in an appropriate manner, engage in supportive counseling when possible  BSMART Liaison team focus/goals: assess needs, provide support and education, coordinate care, provide supportive counseling when possible    Progress note: Liaison attempted to meet with pt 2x, but her curtains were drawn, and she was engaged with medical staff. Upon third attempt, pt resting in bed calmly and in no current observed distress. Pt appears alert, calm, cooperative, polite, and easily engaged. She smiles at times appropriately. She says that she is overall feeling, \"not bad.\" Pt expresses gratitude toward the positive attention she has received from medical staff. She reports awaiting a procedure tomorrow. Pt denies feeling anxious or sad about her present situation or about her pending test procedure. Pt denies SI/HI/AH/VH at this time. She has an NG tube. Pt reports awakening in her sleep d/t RA. Liaison provided emotional support, reassurance, encouragement, and validation of her feeling and challenges at this time.     Admission Status: Voluntary    Barriers to Discharge: medical clearance  Insurance info/prescription coverage:  MEDICARE PART A AND B,   [facility] MEDICAID [facility], [place]ID [place]   Outpatient provider(s):  Psychiatric provider, Dr. Waldrop, and psychologist, Dr. Milton.     Diagnosis:   Past Medical History:   Diagnosis Date    Arthritis     Breast cancer (HCC) 10/2020    Breast cancer, left breast (HCC) 2003, 2012    lumpectomy/chemo/XRT, then mastectomy and anastrozole in 2012    Cancer (HCC)     SEVERAL SKIN - MELANOMA    COPD (chronic obstructive pulmonary disease) (HCC)     Hypertension     Ill-defined condition     MACULAR DEGENERATION        Plan: Defer to medical and case management team notes. Liaison team to monitor and to support. Please refer to psychiatrc consult note for

## 2025-07-21 NOTE — PROGRESS NOTES
ICU Progress Note        Subjective:      - lying comfortably on the bed. Extubated .   - no change.    - lying comfortably on the bed, denies any complaints.    - appears comfortable but WBC has increased. Afebrile.    - awaiting contrast study. VERONICA drain is still cloudy but not much output.     HPI: The patient is a 78 y/o female PMHx of COPD, rheumatoid arthritis, and remote hx breast cancer admitted () by hospital medicine for altered mental status. Recent dx of multiple vertebral compression fractures, placed on multiple medications, with concern these were contributing to confusion. Underwent kyphoplasty by IR on (7/10). Aggressive bowel regimen added d/t constipation. Evening (7/15) noted to be mottled with hypotension. CT performed demonstrating pneumoperitoneum. Surgery consulted taken to the OR, now s/p ex- lap/repair for perforated duodenal ulcer. Transferred to the ICU post op on mechanical ventilation.     Assessment and Plan:    Septic shock - in the setting of duodenal perforation. S/p laparotomy on 7/15 with sung patch placement. Cont. Zosyn and Fluconazole. Off levophed gtt since . WBC increased,  but afebrile, monitor closely. HEMOPHILUS PARAINFLUENZA noted in fluid culture - sensitivity not back yet. Pharmacy to check if Zosyn will cover.     Duodenal ulcer - perforation. S/p sung patch on 7/15 by Dr. Han. Still leak present. Recommended NPO until oral contrast study is done. Cont. PPN.     VICKIE - due to ATN. Improving. Good urine output. Creatinine coming down.     A.fib - HR well controlled. Hold off on anticoagulation due to recent surgery.     Hypertension - start IV hydralazine prn.     Control pain.     Full code.     Transfer to floor.     Vital Signs:    /69   Pulse 85   Temp 98.1 °F (36.7 °C) (Oral)   Resp 19   Ht 1.575 m (5' 2\")   Wt 72.8 kg (160 lb 7.9 oz)   SpO2 97%   BMI 29.35 kg/m²             Temp (24hrs), Av.3 °F (36.8 °C),

## 2025-07-21 NOTE — PROGRESS NOTES
Pt pulled NGT out at aprox 2200. Dr wilson notified. Nursing to replace NGT for contrast study in am. Cam NP also notified    0545 16 fr NGT placed in right nare at 60cm, stat KUB ordered    0645 KUB done, placement verified by Cam, NP

## 2025-07-21 NOTE — PLAN OF CARE
Problem: Pain  Goal: Verbalizes/displays adequate comfort level or baseline comfort level  7/21/2025 0624 by Karen Welch RN  Outcome: Progressing  7/20/2025 1853 by Shilpi Johnson RN  Outcome: Progressing     Problem: Neurosensory - Adult  Goal: Achieves stable or improved neurological status  7/21/2025 0624 by Karen Welch RN  Outcome: Progressing  7/20/2025 1853 by Shilpi Johnson RN  Outcome: Progressing     Problem: Neurosensory - Adult  Goal: Achieves maximal functionality and self care  7/21/2025 0624 by Karen Welch RN  Outcome: Progressing  7/20/2025 1853 by Shilpi Johnson RN  Outcome: Progressing     Problem: Cardiovascular - Adult  Goal: Absence of cardiac dysrhythmias or at baseline  7/21/2025 0624 by Karen Welch RN  Outcome: Progressing  7/20/2025 1853 by Shilpi Johnson RN  Outcome: Progressing

## 2025-07-21 NOTE — PROGRESS NOTES
Dayshift events for 07/20/25 :  Patient is lethargic, but easily aroused. Intermittent confusion; particular difficulty with time. Continues to ask for ice chips/something to drink. Clarified with surgeon that we are to be strict NPO.  Reminded the patient of this and discussed the plan for CT tomorrow to evaluate for leak.   Leaky trochanter site discussed with surgeon; also noted that VERONICA output has lessened.  No new interventions ordered.  Patient received potassium phosphate IV infusion based on BMP results from this afternoon.  VERONICA drain 15; lighter green in color  NGT out 110, still dk green  Patient appears to have converted to sinus rhythm   PRN zofran and dilaudid given x 1 this shift    Bedside and Verbal shift change report given to Angeilque (oncoming nurse) by Shilpi (offgoing nurse). Report included the following information Nurse Handoff Report, MAR, Recent Results, and Cardiac Rhythm sinus arrhythmia.

## 2025-07-21 NOTE — PROGRESS NOTES
Admit Date: 2025    POD 6 Days Post-Op    Procedure:  Perforated ulcer repair    Subjective:     Patient remains in ICU with delirium, intermittent A-fib.     Objective:     Blood pressure 128/69, pulse 85, temperature 98.1 °F (36.7 °C), temperature source Oral, resp. rate 19, height 1.575 m (5' 2\"), weight 72.8 kg (160 lb 7.9 oz), SpO2 97%.    Temp (24hrs), Av.3 °F (36.8 °C), Min:98.1 °F (36.7 °C), Max:98.9 °F (37.2 °C)      Physical Exam:  GENERAL: appears stated age, LUNG: clear to auscultation bilaterally, HEART: regular rate and rhythm, ABDOMEN: soft, ND, wounds c/d/I, VERONICA o/p less bilious appearing today, only 20 cc out, EXTREMITIES:  extremities normal, atraumatic, no cyanosis or edema    Labs:   Recent Results (from the past 24 hours)   Basic Metabolic Panel    Collection Time: 25 12:40 PM   Result Value Ref Range    Sodium 137 136 - 145 mmol/L    Potassium 4.2 3.5 - 5.1 mmol/L    Chloride 103 97 - 108 mmol/L    CO2 33 (H) 21 - 32 mmol/L    Anion Gap 1 (L) 2 - 12 mmol/L    Glucose 92 65 - 100 mg/dL    BUN 26 (H) 6 - 20 MG/DL    Creatinine 0.41 (L) 0.55 - 1.02 MG/DL    BUN/Creatinine Ratio 63 (H) 12 - 20      Est, Glom Filt Rate >90 >60 ml/min/1.73m2    Calcium 8.2 (L) 8.5 - 10.1 MG/DL   Magnesium    Collection Time: 25 12:40 PM   Result Value Ref Range    Magnesium 2.1 1.6 - 2.4 mg/dL   Phosphorus    Collection Time: 25 12:40 PM   Result Value Ref Range    Phosphorus 2.3 (L) 2.6 - 4.7 MG/DL   POCT Glucose    Collection Time: 25 12:41 PM   Result Value Ref Range    POC Glucose 86 65 - 117 mg/dL    Performed by: Alex Dobbins RN    POCT Glucose    Collection Time: 25  6:16 PM   Result Value Ref Range    POC Glucose 104 65 - 117 mg/dL    Performed by: Alex Dobbins RN    Basic Metabolic Panel    Collection Time: 25  5:04 AM   Result Value Ref Range    Sodium 137 136 - 145 mmol/L    Potassium 4.6 3.5 - 5.1 mmol/L    Chloride 105 97 - 108 mmol/L    CO2 30 21 - 32

## 2025-07-21 NOTE — PROGRESS NOTES
Hospitalist Progress Note    NAME:   Nicole Galeano   : 1945   MRN: [id number]     Date/Time: 2025 2:36 PM  Patient PCP: Tessie Martinez FNP    Estimated discharge date:  Barriers: Clinical improvement      Assessment / Plan:  HPI: The patient is a 80 y/o female PMHx of COPD, rheumatoid arthritis, and remote hx breast cancer admitted () by hospital medicine for altered mental status. Recent dx of multiple vertebral compression fractures at OSH, and was Discharged with Narocotics leading to Confusion so admitted for kyphoplasty.Underwent kyphoplasty by IR on (7/10). Aggressive bowel regimen added d/t constipation. Evening (7/15) noted to be mottled with hypotension. CT performed demonstrating pneumoperitoneum. Surgery consulted taken to the OR, now s/p ex- lap/repair for perforated duodenal ulcer. Transferred to the ICU post op on mechanical ventilation.  Patient extubated on .       Septic shock - in the setting of duodenal perforation. S/p laparotomy on 7/15 with snug patch placement.   -Off levophed gtt since   -CT abdomen -no leak of enteric contrast, improved loculated ascites and pneumoperitoneum, few small collection of intraperitoneal fluid remains  -HEMOPHILUS PARAINFLUENZA noted in fluid culture - sensitivity not back yet. Pharmacy to check if Zosyn will cover.   -Continue Zosyn and fluconazole, WBC increased today though afebrile, monitor for now    Duodenal ulcer - perforation. S/p sung patch on 7/15 by Dr. Han.  -Surgery team managing  -N.p.o.   -On TPN     VICKIE, resolved- due to ATN.  Good urine output. Creatinine coming down.      A.fib new onset, likely due to sepsis and hyperthyroidism  - HR well controlled. Hold off on anticoagulation due to recent surgery.   -Methimazole  -Add low-dose atenolol as able     Acute metabolic encephalopathy POA -multifactorial due to meds  - Presenting complaint, improved during hospital stay after narcotics

## 2025-07-21 NOTE — PROGRESS NOTES
Pharmacy TPN Management Note    TPN Indication: s/p Ex lap and duodenal perforation repair     TPN Route: Central     Home TPN? No    Macronutrients:  Protein: 80g  Dextrose: 300 g  Lipids: none while on antifungal    Rate: 75 mL/hr    Labs:  Recent Labs     Units 07/21/25  0504 07/20/25  1240 07/19/25  0329   NA mmol/L 137 137 137   K mmol/L 4.6 4.2 3.2*   CL mmol/L 105 103 99   CO2 mmol/L 30 33* 34*   MG mg/dL 2.1 2.1 2.2   PHOS MG/DL 2.9 2.3* 2.3*   BUN MG/DL 21* 26* 36*     No results for input(s): \"AST\", \"ALT\" in the last 72 hours.    Invalid input(s): \"BILIRUBIN\", \"AP\"    Recent Labs     Units 07/21/25  0504   WBC K/uL 21.1*   HGB g/dL 9.8*   HCT % 31.4*     Electrolytes per LITER:  Electrolytes per LITER:    Sodium 45mEq, Potasium 48mEq, Phosphate 20mM, Magnesium 5mEq, Calcium 4.5mEq    Other additives in TPN: multivitamins , trace elements, and insulin 15 units    Impression/Plan:   TPN macronutrient/rate changes: none  TPN electrolyte changes: none  TPN insulin changes: decrease to 13 units per bag/day; added SSI q6h       Pharmacy will continue to monitor enteral nutrition plan, electrolytes, renal function, and dietician recommendations and adjust parenteral nutrition as needed.    Thank you,  CIARA LYLES [facility]

## 2025-07-21 NOTE — PLAN OF CARE
Problem: Infection - Adult  Goal: Absence of infection during hospitalization  7/21/2025 1241 by Haylee Adames RN  Outcome: Progressing  Flowsheets (Taken 7/21/2025 0800)  Absence of infection during hospitalization: Assess and monitor for signs and symptoms of infection  7/21/2025 0624 by Karen Welch RN  Outcome: Progressing     Problem: Metabolic/Fluid and Electrolytes - Adult  Goal: Electrolytes maintained within normal limits  7/21/2025 1241 by Haylee Adames RN  Outcome: Progressing  Flowsheets (Taken 7/21/2025 0800)  Electrolytes maintained within normal limits: Monitor labs and assess patient for signs and symptoms of electrolyte imbalances  7/21/2025 0624 by Karen Welch RN  Outcome: Progressing     Problem: Pain  Goal: Verbalizes/displays adequate comfort level or baseline comfort level  Recent Flowsheet Documentation  Taken 7/21/2025 0817 by Haylee Adames RN  Verbalizes/displays adequate comfort level or baseline comfort level: Assess pain using appropriate pain scale  7/21/2025 0624 by Karen Welch RN  Outcome: Progressing

## 2025-07-21 NOTE — PROGRESS NOTES
0750- Bedside and Verbal shift change report given to FIDEL Leach (oncoming nurse) by FIDEL Jones (offgoing nurse). Report included the following information Nurse Handoff Report, MAR, Recent Results, and Cardiac Rhythm NSr.     0800- Shift Assessment completed see flowsheet.     1200- Reassessment competed,see flowsheet.     1207- Cornelius removed.     1350- Patient taken down to CT    1600-Reassessment completed, see flowsheet.    1630- Patient had two mediums back to back, black in color, Hospitalist and Gen Surg notified, no new orders that this time.     1709- NG tube removed.    1845- Verbal shift change report given to FIDEL Bang by FIDEL Leach . Report included the following information Nurse Handoff Report, Surgery Report, MAR, Recent Results, and Cardiac Rhythm NSR.      1910- Patient transferred.

## 2025-07-21 NOTE — INTERDISCIPLINARY ROUNDS
Interdisciplinary team rounds were held 7/21/2025 with the following team members: Physician, Nursing, Dietitian, Pharmacist, and the CCU Charge RN.    Plan of care discussed. See clinical pathway and/or care plan for interventions and desired outcomes.    Goals of the Day: Continue with PICC line for TPN administration, remove ibanez catheter, and transfer out of CCU.

## 2025-07-22 ENCOUNTER — APPOINTMENT (OUTPATIENT)
Facility: HOSPITAL | Age: 80
End: 2025-07-22
Payer: MEDICARE

## 2025-07-22 LAB
ANION GAP SERPL CALC-SCNC: 3 MMOL/L (ref 2–12)
APPEARANCE UR: ABNORMAL
BACTERIA URNS QL MICRO: ABNORMAL /HPF
BILIRUB UR QL: NEGATIVE
BUN SERPL-MCNC: 25 MG/DL (ref 6–20)
BUN/CREAT SERPL: 42 (ref 12–20)
CALCIUM SERPL-MCNC: 8.2 MG/DL (ref 8.5–10.1)
CHLORIDE SERPL-SCNC: 104 MMOL/L (ref 97–108)
CO2 SERPL-SCNC: 27 MMOL/L (ref 21–32)
COLOR UR: ABNORMAL
CREAT SERPL-MCNC: 0.6 MG/DL (ref 0.55–1.02)
EPITH CASTS URNS QL MICRO: ABNORMAL /LPF
ERYTHROCYTE [DISTWIDTH] IN BLOOD BY AUTOMATED COUNT: 17.8 % (ref 11.5–14.5)
GLUCOSE BLD STRIP.AUTO-MCNC: 118 MG/DL (ref 65–117)
GLUCOSE BLD STRIP.AUTO-MCNC: 131 MG/DL (ref 65–117)
GLUCOSE BLD STRIP.AUTO-MCNC: 156 MG/DL (ref 65–117)
GLUCOSE BLD STRIP.AUTO-MCNC: 160 MG/DL (ref 65–117)
GLUCOSE BLD STRIP.AUTO-MCNC: 201 MG/DL (ref 65–117)
GLUCOSE SERPL-MCNC: 143 MG/DL (ref 65–100)
GLUCOSE UR STRIP.AUTO-MCNC: 250 MG/DL
HCT VFR BLD AUTO: 32.4 % (ref 35–47)
HGB BLD-MCNC: 10 G/DL (ref 11.5–16)
HGB UR QL STRIP: ABNORMAL
HYALINE CASTS URNS QL MICRO: ABNORMAL /LPF (ref 0–2)
KETONES UR QL STRIP.AUTO: NEGATIVE MG/DL
LEUKOCYTE ESTERASE UR QL STRIP.AUTO: ABNORMAL
MAGNESIUM SERPL-MCNC: 2 MG/DL (ref 1.6–2.4)
MCH RBC QN AUTO: 30.5 PG (ref 26–34)
MCHC RBC AUTO-ENTMCNC: 30.9 G/DL (ref 30–36.5)
MCV RBC AUTO: 98.8 FL (ref 80–99)
NITRITE UR QL STRIP.AUTO: NEGATIVE
NRBC # BLD: 0 K/UL (ref 0–0.01)
NRBC BLD-RTO: 0 PER 100 WBC
PH UR STRIP: 5 (ref 5–8)
PHOSPHATE SERPL-MCNC: 2.8 MG/DL (ref 2.6–4.7)
PLATELET # BLD AUTO: 245 K/UL (ref 150–400)
PMV BLD AUTO: 11.2 FL (ref 8.9–12.9)
POTASSIUM SERPL-SCNC: 4.6 MMOL/L (ref 3.5–5.1)
PROT UR STRIP-MCNC: ABNORMAL MG/DL
RBC # BLD AUTO: 3.28 M/UL (ref 3.8–5.2)
RBC #/AREA URNS HPF: ABNORMAL /HPF (ref 0–5)
SERVICE CMNT-IMP: ABNORMAL
SODIUM SERPL-SCNC: 134 MMOL/L (ref 136–145)
SP GR UR REFRACTOMETRY: 1.03
URINE CULTURE IF INDICATED: ABNORMAL
UROBILINOGEN UR QL STRIP.AUTO: 0.2 EU/DL (ref 0.2–1)
WBC # BLD AUTO: 23.3 K/UL (ref 3.6–11)
WBC URNS QL MICRO: ABNORMAL /HPF (ref 0–4)

## 2025-07-22 PROCEDURE — 6360000002 HC RX W HCPCS: Performed by: INTERNAL MEDICINE

## 2025-07-22 PROCEDURE — 94640 AIRWAY INHALATION TREATMENT: CPT

## 2025-07-22 PROCEDURE — 6360000002 HC RX W HCPCS: Performed by: STUDENT IN AN ORGANIZED HEALTH CARE EDUCATION/TRAINING PROGRAM

## 2025-07-22 PROCEDURE — 6370000000 HC RX 637 (ALT 250 FOR IP): Performed by: NURSE PRACTITIONER

## 2025-07-22 PROCEDURE — 97535 SELF CARE MNGMENT TRAINING: CPT

## 2025-07-22 PROCEDURE — 6370000000 HC RX 637 (ALT 250 FOR IP): Performed by: INTERNAL MEDICINE

## 2025-07-22 PROCEDURE — 1100000000 HC RM PRIVATE

## 2025-07-22 PROCEDURE — 83735 ASSAY OF MAGNESIUM: CPT

## 2025-07-22 PROCEDURE — 82962 GLUCOSE BLOOD TEST: CPT

## 2025-07-22 PROCEDURE — 71045 X-RAY EXAM CHEST 1 VIEW: CPT

## 2025-07-22 PROCEDURE — APPNB30 APP NON BILLABLE TIME 0-30 MINS: Performed by: NURSE PRACTITIONER

## 2025-07-22 PROCEDURE — 6370000000 HC RX 637 (ALT 250 FOR IP): Performed by: SURGERY

## 2025-07-22 PROCEDURE — 2500000003 HC RX 250 WO HCPCS: Performed by: SURGERY

## 2025-07-22 PROCEDURE — 2580000003 HC RX 258: Performed by: INTERNAL MEDICINE

## 2025-07-22 PROCEDURE — 36415 COLL VENOUS BLD VENIPUNCTURE: CPT

## 2025-07-22 PROCEDURE — 84100 ASSAY OF PHOSPHORUS: CPT

## 2025-07-22 PROCEDURE — 2700000000 HC OXYGEN THERAPY PER DAY

## 2025-07-22 PROCEDURE — 94761 N-INVAS EAR/PLS OXIMETRY MLT: CPT

## 2025-07-22 PROCEDURE — 2580000003 HC RX 258: Performed by: STUDENT IN AN ORGANIZED HEALTH CARE EDUCATION/TRAINING PROGRAM

## 2025-07-22 PROCEDURE — 97530 THERAPEUTIC ACTIVITIES: CPT | Performed by: PHYSICAL THERAPIST

## 2025-07-22 PROCEDURE — 6360000002 HC RX W HCPCS: Performed by: SURGERY

## 2025-07-22 PROCEDURE — 81001 URINALYSIS AUTO W/SCOPE: CPT

## 2025-07-22 PROCEDURE — 85027 COMPLETE CBC AUTOMATED: CPT

## 2025-07-22 PROCEDURE — 6370000000 HC RX 637 (ALT 250 FOR IP): Performed by: STUDENT IN AN ORGANIZED HEALTH CARE EDUCATION/TRAINING PROGRAM

## 2025-07-22 PROCEDURE — 2500000003 HC RX 250 WO HCPCS: Performed by: STUDENT IN AN ORGANIZED HEALTH CARE EDUCATION/TRAINING PROGRAM

## 2025-07-22 PROCEDURE — 80048 BASIC METABOLIC PNL TOTAL CA: CPT

## 2025-07-22 RX ORDER — OXYCODONE HYDROCHLORIDE 5 MG/1
5 TABLET ORAL EVERY 4 HOURS PRN
Refills: 0 | Status: DISCONTINUED | OUTPATIENT
Start: 2025-07-22 | End: 2025-07-28

## 2025-07-22 RX ORDER — ATORVASTATIN CALCIUM 20 MG/1
20 TABLET, FILM COATED ORAL NIGHTLY
Status: CANCELLED | OUTPATIENT
Start: 2025-07-22

## 2025-07-22 RX ORDER — ACETAMINOPHEN 325 MG/1
650 TABLET ORAL EVERY 6 HOURS SCHEDULED
Status: DISCONTINUED | OUTPATIENT
Start: 2025-07-22 | End: 2025-07-28

## 2025-07-22 RX ADMIN — FLUCONAZOLE 200 MG: 2 INJECTION, SOLUTION INTRAVENOUS at 04:45

## 2025-07-22 RX ADMIN — INSULIN LISPRO 1 UNITS: 100 INJECTION, SOLUTION INTRAVENOUS; SUBCUTANEOUS at 01:40

## 2025-07-22 RX ADMIN — ENOXAPARIN SODIUM 30 MG: 100 INJECTION SUBCUTANEOUS at 11:08

## 2025-07-22 RX ADMIN — PANTOPRAZOLE SODIUM 40 MG: 40 INJECTION, POWDER, FOR SOLUTION INTRAVENOUS at 16:49

## 2025-07-22 RX ADMIN — PIPERACILLIN AND TAZOBACTAM 3375 MG: 3; .375 INJECTION, POWDER, LYOPHILIZED, FOR SOLUTION INTRAVENOUS at 11:08

## 2025-07-22 RX ADMIN — Medication 1 AMPULE: at 11:08

## 2025-07-22 RX ADMIN — SODIUM CHLORIDE, PRESERVATIVE FREE 10 ML: 5 INJECTION INTRAVENOUS at 11:09

## 2025-07-22 RX ADMIN — IPRATROPIUM BROMIDE 0.5 MG: 0.5 SOLUTION RESPIRATORY (INHALATION) at 21:48

## 2025-07-22 RX ADMIN — IPRATROPIUM BROMIDE 0.5 MG: 0.5 SOLUTION RESPIRATORY (INHALATION) at 08:22

## 2025-07-22 RX ADMIN — PANTOPRAZOLE SODIUM 40 MG: 40 INJECTION, POWDER, FOR SOLUTION INTRAVENOUS at 04:43

## 2025-07-22 RX ADMIN — Medication 1 AMPULE: at 01:40

## 2025-07-22 RX ADMIN — Medication: at 11:08

## 2025-07-22 RX ADMIN — CALCIUM GLUCONATE: 98 INJECTION, SOLUTION INTRAVENOUS at 18:08

## 2025-07-22 RX ADMIN — ACETAMINOPHEN 650 MG: 325 TABLET ORAL at 16:44

## 2025-07-22 RX ADMIN — IPRATROPIUM BROMIDE 0.5 MG: 0.5 SOLUTION RESPIRATORY (INHALATION) at 16:26

## 2025-07-22 RX ADMIN — PIPERACILLIN AND TAZOBACTAM 3375 MG: 3; .375 INJECTION, POWDER, LYOPHILIZED, FOR SOLUTION INTRAVENOUS at 21:00

## 2025-07-22 NOTE — PROGRESS NOTES
Contacted pharmacy.  Cannot find TPN to administer to patient.  They are aware and are providing a substitute when available.

## 2025-07-22 NOTE — PROGRESS NOTES
End of Shift Note    Bedside shift change report given to FIDEL Méndez (oncoming nurse) by Karen Hinkle RN (offgoing nurse).  Report included the following information SBAR    Shift worked:  7P-7A     Shift summary and any significant changes:    Patient transferred from CCU last night, she is disoriented and confused, having periodic hallucinations.  She is on 4L O2 via NC  She is on TPN  Q2 turns and incontinence care completed.     Concerns for physician to address:       Zone phone for oncoming shift:          Activity:  Level of Assistance: Moderate assist, patient does 50-74%  Number times ambulated in hallways past shift: 0  Number of times OOB to chair past shift: 0    Cardiac:   Cardiac Monitoring: Yes      Cardiac Rhythm: Sinus rhythm    Access:  Current line(s): PICC PIV    Genitourinary:   Urinary Status: Voiding, Incontinent briefs    Respiratory:   O2 Device: Nasal cannula  Chronic home O2 use?: YES  Incentive spirometer at bedside: NO    GI:  Last BM (including prior to admit): 07/21/25  Current diet:  PN-Adult 2-in-1 Central Line (Standard)  Diet NPO  Passing flatus: YES    Pain Management:   Patient states pain is manageable on current regimen: YES    Skin:  Rafa Scale Score: 12  Interventions: Wound Offloading (Prevention Methods): Turning, Pillows, Repositioning    Patient Safety:  Fall Risk: Nursing Judgement-Fall Risk High(Add Comments): Yes  Fall Risk Interventions  Nursing Judgement-Fall Risk High(Add Comments): Yes  Toilet Every 2 Hours-In Advance of Need: Yes  Hourly Visual Checks: In bed  Fall Visual Posted: Fall sign posted, Socks  Room Door Open: Yes  Alarm On: Bed  Patient Moved Closer to Nursing Station: No    Active Consults:   IP CONSULT TO INTERVENTIONAL RADIOLOGY  IP CONSULT TO ENDOCRINOLOGY  IP CONSULT TO PULMONOLOGY  IP CONSULT TO PSYCHIATRY  IP CONSULT TO PHARMACY  IP CONSULT TO PHARMACY  IP CONSULT TO VASCULAR ACCESS TEAM  IP CONSULT TO CASE MANAGEMENT    Length of

## 2025-07-22 NOTE — PROGRESS NOTES
Occupational Therapy    Patient chart reviewed up to date. RN requesting therapy defer at this time 2/2 unable to follow commands. Will re-attempt as able and appropriate.     Thank you.  Natali Miranda OTR/L

## 2025-07-22 NOTE — PLAN OF CARE
Problem: Respiratory - Adult  Goal: Achieves optimal ventilation and oxygenation  Outcome: Progressing  Flowsheets (Taken 7/21/2025 0800 by Haylee Adames RN)  Achieves optimal ventilation and oxygenation: Assess for changes in respiratory status     Problem: Neurosensory - Adult  Goal: Achieves stable or improved neurological status  Outcome: Progressing  Flowsheets (Taken 7/21/2025 0800 by Haylee Adames RN)  Achieves stable or improved neurological status:   Assess for and report changes in neurological status   Maintain blood pressure and fluid volume within ordered parameters to optimize cerebral perfusion and minimize risk of hemorrhage   Monitor temperature, glucose, and sodium. Initiate appropriate interventions as ordered  Goal: Achieves maximal functionality and self care  Outcome: Progressing  Flowsheets (Taken 7/21/2025 0800 by Haylee Adames RN)  Achieves maximal functionality and self care: Monitor swallowing and airway patency with patient fatigue and changes in neurological status     Problem: Cardiovascular - Adult  Goal: Absence of cardiac dysrhythmias or at baseline  Outcome: Progressing  Flowsheets (Taken 7/21/2025 0800 by Haylee Adames RN)  Absence of cardiac dysrhythmias or at baseline: Monitor cardiac rate and rhythm     Problem: Skin/Tissue Integrity - Adult  Goal: Skin integrity remains intact  Description: 1.  Monitor for areas of redness and/or skin breakdown  2.  Assess vascular access sites hourly  3.  Every 4-6 hours minimum:  Change oxygen saturation probe site  4.  Every 4-6 hours:  If on nasal continuous positive airway pressure, respiratory therapy assess nares and determine need for appliance change or resting period  Outcome: Progressing  Flowsheets (Taken 7/21/2025 0800 by Haylee Adames RN)  Skin Integrity Remains Intact:   Monitor for areas of redness and/or skin breakdown   Check visual cues for pain   Turn and reposition as indicated  Goal: Incisions, wounds, or drain

## 2025-07-22 NOTE — PROGRESS NOTES
Hospitalist Progress Note    NAME:   Nicole Galeano   : 1945   MRN: [id number]     Date/Time: 2025 3:48 PM  Patient PCP: Tessie Martinez FNP    Estimated discharge date:  Barriers: Clinical improvement      Assessment / Plan:  HPI: The patient is a 78 y/o female PMHx of COPD, rheumatoid arthritis, and remote hx breast cancer admitted () by hospital medicine for altered mental status. Recent dx of multiple vertebral compression fractures at OSH, and was Discharged with Narocotics leading to Confusion so admitted for kyphoplasty.Underwent kyphoplasty by IR on (7/10). Aggressive bowel regimen added d/t constipation. Evening (7/15) noted to be mottled with hypotension. CT performed demonstrating pneumoperitoneum. Surgery consulted taken to the OR, now s/p ex- lap/repair for perforated duodenal ulcer. Transferred to the ICU post op on mechanical ventilation.  Patient extubated on .     Acute encephalopathy  Hospital-acquired delirium  Septic shock - in the setting of duodenal perforation. S/p laparotomy on 7/15 with sung patch placement.   -Off levophed gtt since   -CT abdomen -no leak of enteric contrast, improved loculated ascites and pneumoperitoneum, few small collection of intraperitoneal fluid remains  -HEMOPHILUS PARAINFLUENZA noted in fluid culture - sensitivity not back yet. Pharmacy to check if Zosyn will cover.   -Continue Zosyn and fluconazole, WBC increased today though afebrile, monitor for now     : Patient is more confused today, ordered infection workup including 2 sets of blood cultures, chest x-ray, urinalysis, infectious disease consulted because of 2 small ascites fluid collection present that could be source of infection.  Reviewed general surgery not planning to talk to the IR for possible drainage.  Discussed with ID: Possible antibiotic duration will be 2 weeks, with conversion to Augmentin on discharge        Duodenal ulcer - perforation.

## 2025-07-22 NOTE — PROGRESS NOTES
Admit Date: 2025    POD 7 Days Post-Op    Procedure:  Perforated ulcer repair    Subjective:     Patient on gen surg now, still with some delirium.   WBC up to 23 today.  CT yesterday shows no leak, NGT out.  2 small ascites collections remaining which may be foci of infection.    Objective:     Blood pressure 120/85, pulse 88, temperature 97.7 °F (36.5 °C), resp. rate 16, height 1.575 m (5' 2\"), weight 72.8 kg (160 lb 7.9 oz), SpO2 100%.    Temp (24hrs), Av °F (36.7 °C), Min:97.5 °F (36.4 °C), Max:98.4 °F (36.9 °C)      Physical Exam:  GENERAL: appears stated age, LUNG: clear to auscultation bilaterally, HEART: regular rate and rhythm, ABDOMEN: soft, ND, wounds c/d/I, VERONICA o/p cloudy serous not bilious, EXTREMITIES:  extremities normal, atraumatic, no cyanosis or edema    Labs:   Recent Results (from the past 24 hours)   POCT Glucose    Collection Time: 25 12:33 PM   Result Value Ref Range    POC Glucose 130 (H) 65 - 117 mg/dL    Performed by: Peter Sharma PCT    POCT Glucose    Collection Time: 25  5:36 PM   Result Value Ref Range    POC Glucose 106 65 - 117 mg/dL    Performed by: Kishor Mathew RN    POCT Glucose    Collection Time: 25  9:38 PM   Result Value Ref Range    POC Glucose 105 65 - 117 mg/dL    Performed by: Lindsey Flower PCT    POCT Glucose    Collection Time: 25  1:11 AM   Result Value Ref Range    POC Glucose 201 (H) 65 - 117 mg/dL    Performed by: Lindsey Flower PCT    Basic Metabolic Panel    Collection Time: 25  4:58 AM   Result Value Ref Range    Sodium 134 (L) 136 - 145 mmol/L    Potassium 4.6 3.5 - 5.1 mmol/L    Chloride 104 97 - 108 mmol/L    CO2 27 21 - 32 mmol/L    Anion Gap 3 2 - 12 mmol/L    Glucose 143 (H) 65 - 100 mg/dL    BUN 25 (H) 6 - 20 MG/DL    Creatinine 0.60 0.55 - 1.02 MG/DL    BUN/Creatinine Ratio 42 (H) 12 - 20      Est, Glom Filt Rate >90 >60 ml/min/1.73m2    Calcium 8.2 (L) 8.5 - 10.1 MG/DL   CBC    Collection Time: 25  4:58 AM

## 2025-07-22 NOTE — PLAN OF CARE
Problem: Occupational Therapy - Adult  Goal: By Discharge: Performs self-care activities at highest level of function for planned discharge setting.  See evaluation for individualized goals.  Description: FUNCTIONAL STATUS PRIOR TO ADMISSION:  Patient questionable historian secondary to confusion. Per chart review, she was admitted from SNF rehab. She reported she was independent in ADLs and mobility prior to admission.    ,  ,  ,  ,  ,  ,  ,  ,  ,  ,       HOME SUPPORT: Patient lived with daughter and son in law.    Occupational Therapy Goals:    Reeval 7/17/2025:  1. Patient will perform grooming seated in chair with minimal assist within 7 days  2. Patient will perform UB bathing with min a within 7 days  3. Patient will maintain stand with min a to aide with LB self care within 7 days.  4. Patient will perform toilet or BSC transfer with mod a within 7 days.  5. Patient will participate in UE therapeutic exercise/activity with supervision for 5 minutes within 7 days.    Initiated 7/7/2025  1.  Patient will perform grooming standing at sink with Supervision within 7 day(s).  2.  Patient will perform upper body dressing with Modified [place] within 7 day(s).  3.  Patient will perform lower body dressing with Supervision within 7 day(s).  4.  Patient will perform toilet transfers with Supervision  within 7 day(s).  5.  Patient will perform all aspects of toileting with Supervision within 7 day(s).  6.  Patient will participate in upper extremity therapeutic exercise/activities with Supervision for 5 minutes within 7 day(s).    Outcome: Progressing   OCCUPATIONAL THERAPY TREATMENT  Patient: Nicole Galeano (79 y.o. female)  Date: 7/22/2025  Primary Diagnosis: Acute encephalopathy [G93.40]  Acute midline low back pain without sciatica [M54.50]  Acute metabolic encephalopathy [G93.41]  Procedure(s) (LRB):  LAPAROSCOPY DIAGNOSTIC (N/A) 7 Days Post-Op   Precautions: Bed Alarm, Fall Risk

## 2025-07-22 NOTE — PROGRESS NOTES
ID  Consult received  D/w Dr Tobar  Continue Zosyn, fluconazole IV  If abscess is to be drained by IR please request cultures  Be sent-aerobic, anaerobic bacterial, fungal  ID service to see.

## 2025-07-22 NOTE — PLAN OF CARE
Problem: Physical Therapy - Adult  Goal: By Discharge: Performs mobility at highest level of function for planned discharge setting.  See evaluation for individualized goals.  Description: FUNCTIONAL STATUS PRIOR TO ADMISSION: Questionable historian. Per chart pt was at [facility] for rehab.    HOME SUPPORT PRIOR TO ADMISSION: Pt was at [facility] for rehab.    Physical Therapy Goals  Re-evaluation performed 7/17/2025 after transition to ICU care and complex medical course. New goals listed below:  1. Pt will perform bed mobility with min A within 7 day(s).   2.Pt will perform transfers with LRAD and Mod A within 7 day(s).  3.Pt will tolerate initial gait assessment with LRAD and assist within 7 day(s).  4.Pt will tolerate sitting EOB >5min without support within 7 day(s) for improved tolerance to upright.    Initiated 7/7/2025.   1.  Patient will move from supine to sit and sit to supine in bed with independence within 7 day(s).    2.  Patient will perform sit to stand with modified independence within 7 day(s).  3.  Patient will transfer from bed to chair and chair to bed with modified independence using the least restrictive device within 7 day(s).  4.  Patient will ambulate with modified independence for 100 feet with the least restrictive device within 7 day(s).       Outcome: Not Progressing     PHYSICAL THERAPY TREATMENT    Patient: Nicole Galeano (79 y.o. female)  Date: 7/22/2025  Diagnosis: Acute encephalopathy [G93.40]  Acute midline low back pain without sciatica [M54.50]  Acute metabolic encephalopathy [G93.41] Acute metabolic encephalopathy  Procedure(s) (LRB):  LAPAROSCOPY DIAGNOSTIC (N/A) 7 Days Post-Op  Precautions: Restrictions/Precautions  Restrictions/Precautions: Bed Alarm, Fall Risk            ASSESSMENT:  Patient continues to benefit from skilled PT services and is not progressing towards goals. Patient in bed upon arrival and not agreeable to therapy but requesting to be

## 2025-07-22 NOTE — PROGRESS NOTES
End of Shift Note    Bedside shift change report given to FIDEL Thao (oncoming nurse) by Honey Ellsworth RN (offgoing nurse).  Report included the following information SBAR, Intake/Output, MAR, and Recent Results    Shift worked:  [id number]     Shift summary and any significant changes:     Pt A+O x1, confused on where she is, follows some commands, intermittently refused care. Refused blood cultures, MD notified, Q2 turns completed as patient allowed. PRN Tylenol x1 for generalized pain. TPN infusing, poor appetite, BM x3 today, brown green. VERONICA drain had 25 mLs milky tan output.      Concerns for physician to address:   Goals of care?      Zone phone for oncoming shift:   None       Activity:  Level of Assistance: Moderate assist, patient does 50-74%  Number times ambulated in hallways past shift: 0  Number of times OOB to chair past shift: 0    Cardiac:   Cardiac Monitoring: Yes      Cardiac Rhythm: Sinus rhythm    Access:  Current line(s): PICC    Genitourinary:   Urinary Status: External catheter    Respiratory:   O2 Device: None (Room air)  Chronic home O2 use?: NO  Incentive spirometer at bedside: YES    GI:  Last BM (including prior to admit): 07/22/25  Current diet:  DIET ONE TIME MESSAGE;  ADULT DIET; Full Liquid  ADULT ORAL NUTRITION SUPPLEMENT; Breakfast, Lunch, Dinner; Standard High Calorie/High Protein Oral Supplement  PN-Adult 2-in-1 Central Line (Standard)  Passing flatus: NO    Pain Management:   Patient states pain is manageable on current regimen: YES    Skin:  Rafa Scale Score: 12  Interventions: Wound Offloading (Prevention Methods): Pillows, Repositioning, Turning    Patient Safety:  Fall Risk: Nursing Judgement-Fall Risk High(Add Comments): Yes  Fall Risk Interventions  Nursing Judgement-Fall Risk High(Add Comments): Yes  Toilet Every 2 Hours-In Advance of Need: Yes  Hourly Visual Checks: In bed  Fall Visual Posted: Fall sign posted, Socks  Room Door Open: Yes  Alarm On: Bed  Patient Moved

## 2025-07-22 NOTE — PROGRESS NOTES
Comprehensive Nutrition Assessment    Type and Reason for Visit:  Reassess    Nutrition Recommendations/Plan:   Diet per surgery  Rec continue TPN until PO nutrition improved (300g dextrose/80g pro)  Monitor and record PO intakes, supplement acceptance, and Bms in I/Os     Malnutrition Assessment:  Malnutrition Status:  Moderate malnutrition (07/15/25 1450)    Context:  Chronic Illness     Findings of the 6 clinical characteristics of malnutrition:  Energy Intake:  75% or less estimated energy requirements for 1 month or longer  Weight Loss:  7.5% over 3 months     Body Fat Loss:  No body fat loss     Muscle Mass Loss:  No muscle mass loss    Fluid Accumulation:  No fluid accumulation     Strength:  Not Performed    Nutrition Assessment:    Follow up. Noted abdominal CT yesterday \"Improved loculated ascites and pneumoperitoneum. A few small collections of  intraperitoneal fluid remain, for which infection is not entirely excluded.\" NGT removed, diet upgraded to full liquids this AM. Plan to continue TPN.    Nutrition Related Findings:    Labs: Na 134, K 4.6, BUN 25, Creat 0.6, Gluc 160, Mag 2.0, Phos 2.8. Meds: insulin lispro, pantoprazole, vitamin B-12. BM 7/21. 1+ b/l UE, trace b/l LE edema. Wound Type: Surgical Incision       Current Nutrition Intake & Therapies:    Average Meal Intake: Unable to assess  Average Supplements Intake: Unable to assess  PN-Adult 2-in-1 Central Line (Standard)  DIET ONE TIME MESSAGE;  ADULT DIET; Full Liquid  ADULT ORAL NUTRITION SUPPLEMENT; Breakfast, Lunch, Dinner; Standard High Calorie/High Protein Oral Supplement    Anthropometric Measures:  Height: 157.5 cm (5' 2.01\")  Ideal Body Weight (IBW): 110 lbs (50 kg)    Current Body Weight: 72.8 kg (160 lb 7.9 oz), 145.9 % IBW. Weight Source: Bed scale  Current BMI (kg/m2): 29.3  Usual Body Weight: 72.6 kg (160 lb) (4 months ago)  % Weight Change (Calculated): -7.1  BMI Categories: Overweight (BMI 25.0-29.9)    Estimated Daily

## 2025-07-22 NOTE — PROGRESS NOTES
Pharmacy TPN Management Note    TPN Indication: s/p Ex lap and duodenal perforation repair     TPN Route: Central     Home TPN? No    Macronutrients:  Protein: 80g  Dextrose: 300 g  Lipids: none while on antifungal    Rate: 75 mL/hr    Labs:  Recent Labs     Units 07/22/25  0458 07/21/25  0504 07/20/25  1240   NA mmol/L 134* 137 137   K mmol/L 4.6 4.6 4.2   CL mmol/L 104 105 103   CO2 mmol/L 27 30 33*   MG mg/dL 2.0 2.1 2.1   PHOS MG/DL 2.8 2.9 2.3*   BUN MG/DL 25* 21* 26*     No results for input(s): \"AST\", \"ALT\" in the last 72 hours.    Invalid input(s): \"BILIRUBIN\", \"AP\"    Recent Labs     Units 07/22/25  0458   WBC K/uL 23.3*   HGB g/dL 10.0*   HCT % 32.4*     Electrolytes per LITER:  Electrolytes per LITER:    Sodium 55mEq, Potasium 48mEq, Phosphate 20mM, Magnesium 5mEq, Calcium 4.5mEq    Other additives in TPN: multivitamins , trace elements, and insulin 15 units    Impression/Plan:   TPN macronutrient/rate changes: none  TPN electrolyte changes: inc sodium  TPN insulin changes: none       Pharmacy will continue to monitor enteral nutrition plan, electrolytes, renal function, and dietician recommendations and adjust parenteral nutrition as needed.    Thank you,  Ricky Wisdom [facility]

## 2025-07-23 LAB
ANION GAP SERPL CALC-SCNC: 1 MMOL/L (ref 2–12)
BUN SERPL-MCNC: 24 MG/DL (ref 6–20)
BUN/CREAT SERPL: 57 (ref 12–20)
CALCIUM SERPL-MCNC: 8.2 MG/DL (ref 8.5–10.1)
CHLORIDE SERPL-SCNC: 107 MMOL/L (ref 97–108)
CO2 SERPL-SCNC: 29 MMOL/L (ref 21–32)
CREAT SERPL-MCNC: 0.42 MG/DL (ref 0.55–1.02)
ERYTHROCYTE [DISTWIDTH] IN BLOOD BY AUTOMATED COUNT: 17.9 % (ref 11.5–14.5)
GLUCOSE BLD STRIP.AUTO-MCNC: 102 MG/DL (ref 65–117)
GLUCOSE BLD STRIP.AUTO-MCNC: 130 MG/DL (ref 65–117)
GLUCOSE BLD STRIP.AUTO-MCNC: 131 MG/DL (ref 65–117)
GLUCOSE BLD STRIP.AUTO-MCNC: 81 MG/DL (ref 65–117)
GLUCOSE SERPL-MCNC: 75 MG/DL (ref 65–100)
HCT VFR BLD AUTO: 28.3 % (ref 35–47)
HGB BLD-MCNC: 8.7 G/DL (ref 11.5–16)
MAGNESIUM SERPL-MCNC: 2.1 MG/DL (ref 1.6–2.4)
MCH RBC QN AUTO: 30.6 PG (ref 26–34)
MCHC RBC AUTO-ENTMCNC: 30.7 G/DL (ref 30–36.5)
MCV RBC AUTO: 99.6 FL (ref 80–99)
NRBC # BLD: 0 K/UL (ref 0–0.01)
NRBC BLD-RTO: 0 PER 100 WBC
PHOSPHATE SERPL-MCNC: 3.5 MG/DL (ref 2.6–4.7)
PLATELET # BLD AUTO: 242 K/UL (ref 150–400)
PMV BLD AUTO: 10.7 FL (ref 8.9–12.9)
POTASSIUM SERPL-SCNC: 5 MMOL/L (ref 3.5–5.1)
RBC # BLD AUTO: 2.84 M/UL (ref 3.8–5.2)
SERVICE CMNT-IMP: ABNORMAL
SERVICE CMNT-IMP: ABNORMAL
SERVICE CMNT-IMP: NORMAL
SERVICE CMNT-IMP: NORMAL
SODIUM SERPL-SCNC: 137 MMOL/L (ref 136–145)
T4 FREE SERPL-MCNC: 0.8 NG/DL (ref 0.8–1.5)
TSH SERPL DL<=0.05 MIU/L-ACNC: 1.35 UIU/ML (ref 0.36–3.74)
WBC # BLD AUTO: 14.1 K/UL (ref 3.6–11)

## 2025-07-23 PROCEDURE — 97530 THERAPEUTIC ACTIVITIES: CPT

## 2025-07-23 PROCEDURE — 2500000003 HC RX 250 WO HCPCS: Performed by: SURGERY

## 2025-07-23 PROCEDURE — 2580000003 HC RX 258: Performed by: INTERNAL MEDICINE

## 2025-07-23 PROCEDURE — 85027 COMPLETE CBC AUTOMATED: CPT

## 2025-07-23 PROCEDURE — 6360000002 HC RX W HCPCS: Performed by: INTERNAL MEDICINE

## 2025-07-23 PROCEDURE — 6370000000 HC RX 637 (ALT 250 FOR IP): Performed by: SURGERY

## 2025-07-23 PROCEDURE — 84443 ASSAY THYROID STIM HORMONE: CPT

## 2025-07-23 PROCEDURE — 2700000000 HC OXYGEN THERAPY PER DAY

## 2025-07-23 PROCEDURE — 6360000002 HC RX W HCPCS: Performed by: SURGERY

## 2025-07-23 PROCEDURE — 1100000000 HC RM PRIVATE

## 2025-07-23 PROCEDURE — 97535 SELF CARE MNGMENT TRAINING: CPT

## 2025-07-23 PROCEDURE — 94640 AIRWAY INHALATION TREATMENT: CPT

## 2025-07-23 PROCEDURE — 6370000000 HC RX 637 (ALT 250 FOR IP): Performed by: NURSE PRACTITIONER

## 2025-07-23 PROCEDURE — 6370000000 HC RX 637 (ALT 250 FOR IP): Performed by: INTERNAL MEDICINE

## 2025-07-23 PROCEDURE — 84439 ASSAY OF FREE THYROXINE: CPT

## 2025-07-23 PROCEDURE — 94761 N-INVAS EAR/PLS OXIMETRY MLT: CPT

## 2025-07-23 PROCEDURE — 82962 GLUCOSE BLOOD TEST: CPT

## 2025-07-23 PROCEDURE — 80048 BASIC METABOLIC PNL TOTAL CA: CPT

## 2025-07-23 PROCEDURE — 84100 ASSAY OF PHOSPHORUS: CPT

## 2025-07-23 PROCEDURE — 36415 COLL VENOUS BLD VENIPUNCTURE: CPT

## 2025-07-23 PROCEDURE — 99223 1ST HOSP IP/OBS HIGH 75: CPT | Performed by: INTERNAL MEDICINE

## 2025-07-23 PROCEDURE — 2500000003 HC RX 250 WO HCPCS: Performed by: INTERNAL MEDICINE

## 2025-07-23 PROCEDURE — 83735 ASSAY OF MAGNESIUM: CPT

## 2025-07-23 PROCEDURE — 2580000003 HC RX 258: Performed by: SURGERY

## 2025-07-23 PROCEDURE — 99024 POSTOP FOLLOW-UP VISIT: CPT | Performed by: NURSE PRACTITIONER

## 2025-07-23 RX ORDER — ENOXAPARIN SODIUM 100 MG/ML
40 INJECTION SUBCUTANEOUS DAILY
Status: DISCONTINUED | OUTPATIENT
Start: 2025-07-24 | End: 2025-07-28

## 2025-07-23 RX ADMIN — CITALOPRAM HYDROBROMIDE 20 MG: 20 TABLET ORAL at 11:14

## 2025-07-23 RX ADMIN — ACETAMINOPHEN 650 MG: 325 TABLET ORAL at 11:15

## 2025-07-23 RX ADMIN — Medication 1 AMPULE: at 22:21

## 2025-07-23 RX ADMIN — SODIUM CHLORIDE, PRESERVATIVE FREE 10 ML: 5 INJECTION INTRAVENOUS at 22:25

## 2025-07-23 RX ADMIN — METHIMAZOLE 5 MG: 5 TABLET ORAL at 11:15

## 2025-07-23 RX ADMIN — IPRATROPIUM BROMIDE 0.5 MG: 0.5 SOLUTION RESPIRATORY (INHALATION) at 09:16

## 2025-07-23 RX ADMIN — ACETAMINOPHEN 650 MG: 325 TABLET ORAL at 00:05

## 2025-07-23 RX ADMIN — OXYCODONE 5 MG: 5 TABLET ORAL at 18:17

## 2025-07-23 RX ADMIN — SODIUM CHLORIDE, PRESERVATIVE FREE 10 ML: 5 INJECTION INTRAVENOUS at 11:15

## 2025-07-23 RX ADMIN — CALCIUM GLUCONATE: 98 INJECTION, SOLUTION INTRAVENOUS at 18:27

## 2025-07-23 RX ADMIN — SODIUM CHLORIDE, PRESERVATIVE FREE 10 ML: 5 INJECTION INTRAVENOUS at 00:06

## 2025-07-23 RX ADMIN — OXYCODONE 5 MG: 5 TABLET ORAL at 00:05

## 2025-07-23 RX ADMIN — ACETAMINOPHEN 650 MG: 325 TABLET ORAL at 18:17

## 2025-07-23 RX ADMIN — CYANOCOBALAMIN TAB 500 MCG 1000 MCG: 500 TAB at 11:15

## 2025-07-23 RX ADMIN — Medication: at 00:06

## 2025-07-23 RX ADMIN — ENOXAPARIN SODIUM 30 MG: 100 INJECTION SUBCUTANEOUS at 11:15

## 2025-07-23 RX ADMIN — Medication: at 11:16

## 2025-07-23 RX ADMIN — PANTOPRAZOLE SODIUM 40 MG: 40 INJECTION, POWDER, FOR SOLUTION INTRAVENOUS at 14:53

## 2025-07-23 RX ADMIN — PIPERACILLIN AND TAZOBACTAM 3375 MG: 3; .375 INJECTION, POWDER, LYOPHILIZED, FOR SOLUTION INTRAVENOUS at 11:26

## 2025-07-23 RX ADMIN — Medication 1 AMPULE: at 00:05

## 2025-07-23 RX ADMIN — PIPERACILLIN AND TAZOBACTAM 3375 MG: 3; .375 INJECTION, POWDER, FOR SOLUTION INTRAVENOUS at 18:28

## 2025-07-23 RX ADMIN — SODIUM CHLORIDE: 0.9 INJECTION, SOLUTION INTRAVENOUS at 22:46

## 2025-07-23 RX ADMIN — FLUCONAZOLE 200 MG: 2 INJECTION, SOLUTION INTRAVENOUS at 03:41

## 2025-07-23 RX ADMIN — PANTOPRAZOLE SODIUM 40 MG: 40 INJECTION, POWDER, FOR SOLUTION INTRAVENOUS at 03:40

## 2025-07-23 ASSESSMENT — PAIN SCALES - GENERAL
PAINLEVEL_OUTOF10: 7
PAINLEVEL_OUTOF10: 0
PAINLEVEL_OUTOF10: 8
PAINLEVEL_OUTOF10: 0
PAINLEVEL_OUTOF10: 3
PAINLEVEL_OUTOF10: 2

## 2025-07-23 ASSESSMENT — PAIN DESCRIPTION - DESCRIPTORS
DESCRIPTORS: ACHING
DESCRIPTORS: ACHING

## 2025-07-23 ASSESSMENT — PAIN - FUNCTIONAL ASSESSMENT: PAIN_FUNCTIONAL_ASSESSMENT: PREVENTS OR INTERFERES SOME ACTIVE ACTIVITIES AND ADLS

## 2025-07-23 ASSESSMENT — PAIN DESCRIPTION - LOCATION
LOCATION: ABDOMEN
LOCATION: BACK;ABDOMEN

## 2025-07-23 ASSESSMENT — PAIN DESCRIPTION - ORIENTATION
ORIENTATION: MID
ORIENTATION: MID

## 2025-07-23 NOTE — PROGRESS NOTES
Physician Progress Note      PATIENT:               STANISLAV FISHMAN  CSN #:                  [id number]  :                       1945  ADMIT DATE:       2025 1:38 PM  DISCH DATE:  RESPONDING  PROVIDER #:        Gurpreet Petersen MD          QUERY TEXT:    Please clarify whether the Multiple thoracic and lumbar compression fractures   documented in OP note at 7/10 by Honey Mccarthy MD is related to osteopenia    The clinical indicators include:  Patient present with compression fractures    Female/79 years old, with COPD, RA, ATN, Long term use of systemic steroids    \"Recently was diagnosed to have lumbar compression fracture-Multiple thoracic   and lumbar compression fractures POA- sp Kyphoplasty by IR T6-7, L2, L4 (IM   Progress Notes by Gurpreet Petersen MD 7/10)    MRI at [facility], T6, T7, L2, and L4 Compression fracture without retropulsion.   Refused kyphoplasty over there, was discharged to SNF returned back as meds   were making her mentation worse  S/p kyphoplasty now- Rehab placement underway. Use Tylenol and Toradol for   pain control, low-dose oxycodone.\" (IM Progress Notes by Gurpreet Petersen MD at 2025)    This female with multilevel thoracic and lumbar vertebral compression   fractures. Plan for T6, T7, L2 and L4 kyphoplasty tomorrow with general   anesthesia. (Radiology Consults by Veto Méndez Jr., APRN - NP at   2025)    XR abdomen : Osseous structures show osteopenia and multilevel   augmentation cement with no acute fracture and otherwise are age appropriate.    -S/P kyphoplasty, Rehab, IV dexamethasone, ketorolac, Xray abdomen, monitoring   etc.    Sanjay MERCADO  [facility], CDS  Options provided:  -- Pathological fracture related to Osteopenia  -- Other - I will add my own diagnosis  -- Disagree - Not applicable / Not valid  -- Disagree - Clinically unable to determine / Unknown  -- Refer to Clinical Documentation Reviewer    PROVIDER RESPONSE TEXT:    The

## 2025-07-23 NOTE — CONSULTS
PSYCHIATRY CONSULT NOTE    REASON FOR CONSULT: Refusing care      HISTORY OF PRESENTING COMPLAINT:  Nicole Galeano is a 79 y.o. White (non-) female who is currently admitted to [facility].    Nicole Galeano is an elderly female with a history of depression, anxiety with panic attacks, and multiple recent medical complications (in the last six months) including a fractured ankle (requiring surgery and extensive rehabilitation), vertebral fractures attributed to severe osteoporosis, hypoplastic surgery, and a ruptured intestinal ulcer. She was recently admitted to the hospital for acute metabolic encephalopathy, as reported by her daughter.    During the consultation, she appeared tired but calm, alert, and cooperative. She was able to state her full name, date of birth, her current location, accurately identified the current year and president. When asked why she was refusing physical therapy, she made statements such as “I don't care” and “I'm going to schedule to die.” However, she denied current suicidal plan or intent and explained that she is simply exhausted and discouraged, but open to trying PT tomorrow after resting.    Patient's nurse who was present at the time of consult confirmed that Nicole has been cooperative throughout her hospitalization and took all her prescribed medications without issue. Her refusal of care has been isolated to physical therapy, and not indicative of broader noncompliance. Her daughter, who was present for the interview, believes Nicole's lack of participation for PT likely stems from her rheumatologist warning her that even mild exertion or activity could cause fractures due to how brittle her bones are.     The patient's statements appear to reflect emotional fatigue and situational frustration, rather than fixed suicidal ideation or psychiatric instability. She has been through a lot of medical procedures and surgeries in the last six months.     Mental

## 2025-07-23 NOTE — PROGRESS NOTES
Admit Date: 2025    POD 8 Days Post-Op    Procedure:  Perforated ulcer repair    Patient seen with Dr. Han     Subjective:     Patient continues to have varied mental status complicated with anxiety which has limited the care we can provide as she is refusing interventions and work-up. Medicine aware of interdisciplinary team request for psych eval.     Objective:     Blood pressure 103/66, pulse 70, temperature 97.5 °F (36.4 °C), resp. rate 18, height 1.575 m (5' 2.01\"), weight 72.8 kg (160 lb 7.9 oz), SpO2 94%.    Temp (24hrs), Av.8 °F (36.6 °C), Min:97.5 °F (36.4 °C), Max:98.4 °F (36.9 °C)      Physical Exam:  GENERAL: appears stated age, LUNG: clear to auscultation bilaterally, HEART: regular rate and rhythm, ABDOMEN: soft, ND, wounds c/d/I, VERONICA o/p cloudy serous EXTREMITIES:  extremities normal, atraumatic, no cyanosis or edema    Labs:   Recent Results (from the past 24 hours)   Urinalysis with Reflex to Culture    Collection Time: 25  5:17 PM    Specimen: Urine   Result Value Ref Range    Color, UA YELLOW/STRAW      Appearance CLOUDY (A) CLEAR      Specific Gravity, UA 1.030      pH, Urine 5.0 5.0 - 8.0      Protein, UA TRACE (A) NEG mg/dL    Glucose, Ur 250 (A) NEG mg/dL    Ketones, Urine Negative NEG mg/dL    Bilirubin, Urine Negative NEG      Blood, Urine LARGE (A) NEG      Urobilinogen, Urine 0.2 0.2 - 1.0 EU/dL    Nitrite, Urine Negative NEG      Leukocyte Esterase, Urine TRACE (A) NEG      WBC, UA 0-4 0 - 4 /hpf    RBC, UA 10-20 0 - 5 /hpf    Epithelial Cells, UA MODERATE (A) FEW /lpf    BACTERIA, URINE 2+ (A) NEG /hpf    Urine Culture if Indicated CULTURE NOT INDICATED BY UA RESULT CNI      Hyaline Casts, UA 2-5 0 - 2 /lpf   POCT Glucose    Collection Time: 25  5:29 PM   Result Value Ref Range    POC Glucose 156 (H) 65 - 117 mg/dL    Performed by: RUTHY Hua PCT    POCT Glucose    Collection Time: 25 11:40 PM   Result Value Ref Range    POC Glucose 118 (H) 65 - 117 mg/dL

## 2025-07-23 NOTE — PROGRESS NOTES
End of Shift Note    Bedside shift change report given to Ricky (oncoming nurse) by Mariel Landeros RN (offgoing nurse).  Report included the following information SBAR    Shift worked:  7A-7P     Shift summary and any significant changes:     Patient refusing blood Cultures. Refusing to work with PT/ OT , stated she will be open to trying tomorrow. Patient met with Psych via zoom explained she is just tired. VERONICA drain removed. Oxy given once for Pain.     Concerns for physician to address:         Zone phone for oncoming shift:            Activity:  Level of Assistance: Moderate assist, patient does 50-74%  Number times ambulated in hallways past shift: 0  Number of times OOB to chair past shift: 0    Cardiac:   Cardiac Monitoring: Yes      Cardiac Rhythm: Sinus rhythm    Access:  Current line(s): PIV  and PICC    Genitourinary:   Urinary Status: Voiding, External catheter    Respiratory:   O2 Device: Nasal cannula  Chronic home O2 use?: NO  Incentive spirometer at bedside: YES    GI:  Last BM (including prior to admit): 07/22/25  Current diet:  DIET ONE TIME MESSAGE;  ADULT DIET; Full Liquid  ADULT ORAL NUTRITION SUPPLEMENT; Breakfast, Lunch, Dinner; Standard High Calorie/High Protein Oral Supplement  PN-Adult 2-in-1 Central Line (Standard)  PN-Adult 2-in-1 Central Line (Standard)  Passing flatus: YES    Pain Management:   Patient states pain is manageable on current regimen: YES    Skin:  Rafa Scale Score: 13  Interventions: Wound Offloading (Prevention Methods): Repositioning, Pillows, Turning    Patient Safety:  Fall Risk: Nursing Judgement-Fall Risk High(Add Comments): Yes  Fall Risk Interventions  Nursing Judgement-Fall Risk High(Add Comments): Yes  Toilet Every 2 Hours-In Advance of Need: Yes  Hourly Visual Checks: In bed, Eyes closed  Fall Visual Posted: Armband, Socks  Room Door Open: Yes  Alarm On: Bed  Patient Moved Closer to Nursing Station: No    Active Consults:   IP CONSULT TO INTERVENTIONAL

## 2025-07-23 NOTE — CONSULTS
07/14/25 2147    Order Status: Completed Specimen: Blood Updated: 07/20/25 0834     Special Requests NO SPECIAL REQUESTS        Culture NO GROWTH 5 DAYS               Xray Result (most recent):  XR CHEST PORTABLE 07/22/2025    Narrative  EXAM:  XR CHEST PORTABLE    INDICATION: Infectious workup    COMPARISON: CT abdomen/pelvis 7/21/2025 although chest radiograph 7/18/2025    TECHNIQUE: Semiupright portable chest AP view    FINDINGS: The right-sided PICC has been retracted and now terminates at the  superior cavoatrial junction. Stable enlargement of the cardiomediastinal  silhouette. The pulmonary vasculature is within normal limits.    The lungs and pleural spaces are clear. Multilevel thoracic kyphoplasty.    Impression  Small pleural effusions with bibasilar atelectasis      Electronically signed by Honey Mccarthy      XR CHEST PORTABLE  Result Date: 7/22/2025  EXAM:  XR CHEST PORTABLE INDICATION: Infectious workup COMPARISON: CT abdomen/pelvis 7/21/2025 although chest radiograph 7/18/2025 TECHNIQUE: Semiupright portable chest AP view FINDINGS: The right-sided PICC has been retracted and now terminates at the superior cavoatrial junction. Stable enlargement of the cardiomediastinal silhouette. The pulmonary vasculature is within normal limits. The lungs and pleural spaces are clear. Multilevel thoracic kyphoplasty.     Small pleural effusions with bibasilar atelectasis Electronically signed by Honey Fabio    CT ABDOMEN PELVIS W IV CONTRAST Additional Contrast? Oral (via NGT)  Result Date: 7/21/2025  EXAM: CT ABDOMEN PELVIS W IV CONTRAST INDICATION: peptic ulcer repair interval study, eval for leak COMPARISON: CT abdomen/pelvis 7/14/2025 CONTRAST: 100 mL of Isovue-370. ORAL CONTRAST: Oral contrast was administered to better evaluate the bowel. TECHNIQUE: Following intravenous administration of contrast, thin axial images were obtained through the abdomen and pelvis. Coronal and sagittal reconstructions were  FINDINGS: Nasogastric tube tip overlies the gastric body. A surgical drain again overlies the upper abdomen. No dilated small bowel. Stool and gas are present in the colon. No pathologic calcification. Post kyphoplasty changes are present in the lumbar spine. There is persistent airspace disease at the left lung base.     Nasogastric tube appears to be in satisfactory position. Electronically signed by Shiv Jay    XR CHEST PORTABLE  Result Date: 7/18/2025  EXAM:  XR CHEST PORTABLE INDICATION: verify PICC placement COMPARISON: 7/18/2025 at 0246 hours TECHNIQUE: 1228 hours portable chest AP view FINDINGS: The right-sided PICC line overlies the upper portion of the right atrium. This could be retracted 4 cm and then the tip would be in the superior vena cava. NG tube courses into the stomach. The distal tip is not seen. There are persistent bilateral pleural effusions and bibasilar atelectasis left greater than right.     1. The right-sided PICC line overlies the right atrium. Electronically signed by Sachin Kowalski    XR CHEST PORTABLE  Result Date: 7/18/2025  EXAM:  XR CHEST PORTABLE INDICATION: Hypoxia COMPARISON: Chest x-ray 7/15/2025 and CT 1/18/2023. TECHNIQUE: Upright portable chest AP view FINDINGS: Enteric tube traverses expected course to below the diaphragm into the left upper quadrant. Atherosclerotic calcifications affect the aortic arch and the thoracic aorta is tortuous. The cardiac silhouette is within normal limits. The pulmonary vasculature is within normal limits. The lungs and pleural spaces show bilateral pleural effusions with overlying atelectasis or other airspace disease and otherwise. The visualized bones and upper abdomen are show mid thoracic compression deformities with augmentation cement and otherwise age-appropriate.     Bilateral pleural effusions with overlying atelectasis versus other airspace disease. Electronically signed by Willie Carranza    XR ABDOMEN (KUB) (SINGLE AP

## 2025-07-23 NOTE — PROGRESS NOTES
Pharmacy TPN Management Note    TPN Indication: s/p Ex lap and duodenal perforation repair     TPN Route: Central     Home TPN? No    Macronutrients:  Protein: 80g  Dextrose: 300 g  Lipids: none while on antifungal    Rate: 75 mL/hr    Labs:  Recent Labs     Units 07/22/25  0458 07/21/25  0504 07/20/25  1240   NA mmol/L 134* 137 137   K mmol/L 4.6 4.6 4.2   CL mmol/L 104 105 103   CO2 mmol/L 27 30 33*   MG mg/dL 2.0 2.1 2.1   PHOS MG/DL 2.8 2.9 2.3*   BUN MG/DL 25* 21* 26*     No results for input(s): \"AST\", \"ALT\" in the last 72 hours.    Invalid input(s): \"BILIRUBIN\", \"AP\"    Recent Labs     Units 07/22/25  0458   WBC K/uL 23.3*   HGB g/dL 10.0*   HCT % 32.4*     Electrolytes per LITER:  Electrolytes per LITER:    Sodium 55mEq, Potasium 29 mEq, Phosphate 20mM, Magnesium 5mEq, Calcium 4.5mEq    Other additives in TPN: multivitamins , trace elements, and insulin 15 units    Impression/Plan:   TPN macronutrient/rate changes: none  TPN electrolyte changes: dec potassium  TPN insulin changes: none       Pharmacy will continue to monitor enteral nutrition plan, electrolytes, renal function, and dietician recommendations and adjust parenteral nutrition as needed.    Thank you,  Ricky Wisdom [facility]

## 2025-07-23 NOTE — PROGRESS NOTES
End of Shift Note    Bedside shift change report given to Tanya PARRA  (oncoming nurse) by Keesha Macile LPN (offgoing nurse).  Report included the following information SBAR, Intake/Output, MAR, Accordion, Recent Results, and Med Rec Status    Shift worked:  7p-7a     Shift summary and any significant changes:    Patient complaint of pain x1, see MAR. On 2L NC. Patient turned q2hrs and heels elevated. Patient had 4 loose BM and voided 750ml of urine. Labs completed. Matthew drained 20ml of tan/milky drainage.   Patient refused blood cultures.    Concerns for physician to address:  none     Zone phone for oncoming shift:   none       Activity:  Level of Assistance: Moderate assist, patient does 50-74%  Number times ambulated in hallways past shift: 0  Number of times OOB to chair past shift: 0    Cardiac:   Cardiac Monitoring: Yes      Cardiac Rhythm: Sinus rhythm    Access:  Current line(s): PIV  and PICC    Genitourinary:   Urinary Status: Voiding    Respiratory:   O2 Device: Nasal cannula  Chronic home O2 use?: YES  Incentive spirometer at bedside: N/A    GI:  Last BM (including prior to admit): 07/22/25  Current diet:  DIET ONE TIME MESSAGE;  ADULT DIET; Full Liquid  ADULT ORAL NUTRITION SUPPLEMENT; Breakfast, Lunch, Dinner; Standard High Calorie/High Protein Oral Supplement  PN-Adult 2-in-1 Central Line (Standard)  Passing flatus: YES    Pain Management:   Patient states pain is manageable on current regimen: YES    Skin:  Rafa Scale Score: 13  Interventions: Wound Offloading (Prevention Methods): Elevate heels, Foam silicone, Pillows, Repositioning    Patient Safety:  Fall Risk: Nursing Judgement-Fall Risk High(Add Comments): Yes  Fall Risk Interventions  Nursing Judgement-Fall Risk High(Add Comments): Yes  Toilet Every 2 Hours-In Advance of Need: Yes  Hourly Visual Checks: Eyes closed  Fall Visual Posted: Armband  Room Door Open: Yes  Alarm On: Bed  Patient Moved Closer to Nursing Station: No    Active

## 2025-07-23 NOTE — PROGRESS NOTES
Surgery NP Note    Discussed with IR and fluid collections not amenable to drainage at this time.     Anabell Stanford, APRN - NP

## 2025-07-23 NOTE — PLAN OF CARE
Problem: Occupational Therapy - Adult  Goal: By Discharge: Performs self-care activities at highest level of function for planned discharge setting.  See evaluation for individualized goals.  Description: FUNCTIONAL STATUS PRIOR TO ADMISSION:  Patient questionable historian secondary to confusion. Per chart review, she was admitted from SNF rehab. She reported she was independent in ADLs and mobility prior to admission.    ,  ,  ,  ,  ,  ,  ,  ,  ,  ,       HOME SUPPORT: Patient lived with daughter and son in law.    Occupational Therapy Goals:    Reeval 7/17/2025:  1. Patient will perform grooming seated in chair with minimal assist within 7 days  2. Patient will perform UB bathing with min a within 7 days  3. Patient will maintain stand with min a to aide with LB self care within 7 days.  4. Patient will perform toilet or BSC transfer with mod a within 7 days.  5. Patient will participate in UE therapeutic exercise/activity with supervision for 5 minutes within 7 days.    Initiated 7/7/2025  1.  Patient will perform grooming standing at sink with Supervision within 7 day(s).  2.  Patient will perform upper body dressing with Modified Mathews within 7 day(s).  3.  Patient will perform lower body dressing with Supervision within 7 day(s).  4.  Patient will perform toilet transfers with Supervision  within 7 day(s).  5.  Patient will perform all aspects of toileting with Supervision within 7 day(s).  6.  Patient will participate in upper extremity therapeutic exercise/activities with Supervision for 5 minutes within 7 day(s).    Outcome: Not Progressing    OCCUPATIONAL THERAPY TREATMENT/DISCHARGE  Patient: Nicole Galeano (79 y.o. female)  Date: 7/23/2025  Primary Diagnosis: Acute encephalopathy [G93.40]  Acute midline low back pain without sciatica [M54.50]  Acute metabolic encephalopathy [G93.41]  Procedure(s) (LRB):  LAPAROSCOPY DIAGNOSTIC (N/A) 8 Days Post-Op   Precautions: Bed Alarm, Fall Risk

## 2025-07-23 NOTE — PROGRESS NOTES
Hospitalist Progress Note    NAME:   Nicole Galeano   : 1945   MRN: [id number]     Date/Time: 2025 8:16 AM  Patient PCP: Tessie Martinez FNP    Estimated discharge date:  Barriers: Clinical improvement      Assessment / Plan:  HPI: The patient is a 80 y/o female PMHx of COPD, rheumatoid arthritis, and remote hx breast cancer admitted () by hospital medicine for altered mental status. Recent dx of multiple vertebral compression fractures at OSH, and was Discharged with Narocotics leading to Confusion so admitted for kyphoplasty.Underwent kyphoplasty by IR on (7/10). Aggressive bowel regimen added d/t constipation. Evening (7/15) noted to be mottled with hypotension. CT performed demonstrating pneumoperitoneum. Surgery consulted taken to the OR, now s/p ex- lap/repair for perforated duodenal ulcer. Transferred to the ICU post op on mechanical ventilation.  Patient extubated on .     Acute encephalopathy  Hospital-acquired delirium  Septic shock - in the setting of duodenal perforation. S/p laparotomy on 7/15 with sung patch placement.   -Off levophed gtt since   -CT abdomen -no leak of enteric contrast, improved loculated ascites and pneumoperitoneum, few small collection of intraperitoneal fluid remains  -HEMOPHILUS PARAINFLUENZA noted in fluid culture - sensitivity not back yet. Pharmacy to check if Zosyn will cover.   -Continue Zosyn and fluconazole, WBC increased today though afebrile, monitor for now     : Patient is more confused today, ordered infection workup including 2 sets of blood cultures, chest x-ray, urinalysis, infectious disease consulted because of 2 small ascites fluid collection present that could be source of infection.  Reviewed general surgery not planning to talk to the IR for possible drainage.  Discussed with ID: Possible antibiotic duration will be 2 weeks, with conversion to Augmentin on discharge     white blood cell count

## 2025-07-23 NOTE — PLAN OF CARE
Problem: Physical Therapy - Adult  Goal: By Discharge: Performs mobility at highest level of function for planned discharge setting.  See evaluation for individualized goals.  Description: FUNCTIONAL STATUS PRIOR TO ADMISSION: Questionable historian. Per chart pt was at [facility] for rehab.    HOME SUPPORT PRIOR TO ADMISSION: Pt was at [facility] for rehab.    Physical Therapy Goals  Re-evaluation performed 7/17/2025 after transition to ICU care and complex medical course. New goals listed below:  1. Pt will perform bed mobility with min A within 7 day(s).   2.Pt will perform transfers with LRAD and Mod A within 7 day(s).  3.Pt will tolerate initial gait assessment with LRAD and assist within 7 day(s).  4.Pt will tolerate sitting EOB >5min without support within 7 day(s) for improved tolerance to upright.    Initiated 7/7/2025.   1.  Patient will move from supine to sit and sit to supine in bed with independence within 7 day(s).    2.  Patient will perform sit to stand with modified independence within 7 day(s).  3.  Patient will transfer from bed to chair and chair to bed with modified independence using the least restrictive device within 7 day(s).  4.  Patient will ambulate with modified independence for 100 feet with the least restrictive device within 7 day(s).       Outcome: Not Progressing     PHYSICAL THERAPY TREATMENT/DISCHARGE    Patient: Nicole Galeano (79 y.o. female)  Date: 7/23/2025  Diagnosis: Acute encephalopathy [G93.40]  Acute midline low back pain without sciatica [M54.50]  Acute metabolic encephalopathy [G93.41] Acute metabolic encephalopathy  Procedure(s) (LRB):  LAPAROSCOPY DIAGNOSTIC (N/A) 8 Days Post-Op  Precautions: Restrictions/Precautions  Restrictions/Precautions: Bed Alarm, Fall Risk          ASSESSMENT:  Patient has been followed by skilled PT services and has not progressed towards goals. Patient with poor participation in PT session with poor volition and stating that she  Impaired  Problem Solving: Impaired  Insights: Impaired  Initiation: Impaired  Sequencing: Impaired  Cognition Comment: poor volition    Functional Mobility Training:  Bed Mobility:  Bed Mobility Training  Rolling: Substantial/Maximal assistance  Supine to Sit: Substantial/Maximal assistance;2 Person assistance;Dependent/Total  Sit to Supine: Substantial/Maximal assistance;2 Person assistance  Scooting: Dependent/Total  Transfers:     Balance:      Ambulation/Gait Training:                        Pain Rating:  Abdominal pain     Pain Intervention(s):   rest and repositioning    Activity Tolerance:   Poor    After treatment:   Patient left in no apparent distress in bed, Call bell within reach, Bed/ chair alarm activated, Side rails x3, Heels elevated for pressure relief, and Patient offloaded in partial R side lying for pressure relief    COMMUNICATION/EDUCATION:   The patient's plan of care was discussed with: occupational therapist and registered nurse    Patient Education  Education Given To: Patient  Education Provided: Role of Therapy;Plan of Care;Mobility Training  Education Method: Verbal  Barriers to Learning: Readiness to Learn  Education Outcome: Unable to demonstrate understanding      Ayana Shin, PT  Minutes: 27

## 2025-07-23 NOTE — PROGRESS NOTES
P&T-Approved DVT Prophylaxis Dosing      Recent Labs     07/21/25  0504 07/22/25  0458 07/23/25  0701   HGB 9.8* 10.0* 8.7*    245 242       Estimated Creatinine Clearance: 98 mL/min (A) (based on SCr of 0.42 mg/dL (L)).     Wt Readings from Last 1 Encounters:   07/19/25 72.8 kg (160 lb 7.9 oz)     Per P&T Committee-approved protocol enoxaparin 30 mg daily has been adjusted to 40 mg daily based on weight and renal function as shown in the table below.            Ricky Wisdom, [facility]

## 2025-07-24 PROBLEM — S22.000A COMPRESSION FRACTURE OF BODY OF THORACIC VERTEBRA (HCC): Status: ACTIVE | Noted: 2025-07-24

## 2025-07-24 PROBLEM — A49.2 HAEMOPHILUS INFLUENZAE INFECTION: Status: ACTIVE | Noted: 2025-07-24

## 2025-07-24 PROBLEM — K65.9 PERITONITIS (HCC): Status: ACTIVE | Noted: 2025-07-24

## 2025-07-24 PROBLEM — A41.9 SEPTIC SHOCK (HCC): Status: ACTIVE | Noted: 2025-07-24

## 2025-07-24 PROBLEM — K63.1 DUODENAL PERFORATION (HCC): Status: ACTIVE | Noted: 2025-07-24

## 2025-07-24 PROBLEM — B37.9 YEAST INFECTION: Status: ACTIVE | Noted: 2025-07-24

## 2025-07-24 PROBLEM — R65.21 SEPTIC SHOCK (HCC): Status: ACTIVE | Noted: 2025-07-24

## 2025-07-24 PROBLEM — G93.41 METABOLIC ENCEPHALOPATHY: Status: ACTIVE | Noted: 2025-07-24

## 2025-07-24 PROBLEM — R65.20 SEVERE SEPSIS (HCC): Status: ACTIVE | Noted: 2025-07-24

## 2025-07-24 PROBLEM — A41.9 SEVERE SEPSIS (HCC): Status: ACTIVE | Noted: 2025-07-24

## 2025-07-24 PROBLEM — S32.050A COMPRESSION FRACTURE OF FIFTH LUMBAR VERTEBRA (HCC): Status: ACTIVE | Noted: 2025-07-24

## 2025-07-24 PROBLEM — I48.0 PAROXYSMAL ATRIAL FIBRILLATION (HCC): Status: ACTIVE | Noted: 2025-07-24

## 2025-07-24 PROBLEM — N17.9 AKI (ACUTE KIDNEY INJURY): Status: ACTIVE | Noted: 2025-07-24

## 2025-07-24 LAB
ANION GAP SERPL CALC-SCNC: 2 MMOL/L (ref 2–12)
BUN SERPL-MCNC: 21 MG/DL (ref 6–20)
BUN/CREAT SERPL: 49 (ref 12–20)
CALCIUM SERPL-MCNC: 8.1 MG/DL (ref 8.5–10.1)
CHLORIDE SERPL-SCNC: 105 MMOL/L (ref 97–108)
CO2 SERPL-SCNC: 29 MMOL/L (ref 21–32)
CREAT SERPL-MCNC: 0.43 MG/DL (ref 0.55–1.02)
ERYTHROCYTE [DISTWIDTH] IN BLOOD BY AUTOMATED COUNT: 17.6 % (ref 11.5–14.5)
GLUCOSE BLD STRIP.AUTO-MCNC: 109 MG/DL (ref 65–117)
GLUCOSE BLD STRIP.AUTO-MCNC: 117 MG/DL (ref 65–117)
GLUCOSE BLD STRIP.AUTO-MCNC: 120 MG/DL (ref 65–117)
GLUCOSE BLD STRIP.AUTO-MCNC: 132 MG/DL (ref 65–117)
GLUCOSE BLD STRIP.AUTO-MCNC: 134 MG/DL (ref 65–117)
GLUCOSE SERPL-MCNC: 108 MG/DL (ref 65–100)
HCT VFR BLD AUTO: 27.6 % (ref 35–47)
HGB BLD-MCNC: 8.7 G/DL (ref 11.5–16)
MAGNESIUM SERPL-MCNC: 2.1 MG/DL (ref 1.6–2.4)
MCH RBC QN AUTO: 30.9 PG (ref 26–34)
MCHC RBC AUTO-ENTMCNC: 31.5 G/DL (ref 30–36.5)
MCV RBC AUTO: 97.9 FL (ref 80–99)
NRBC # BLD: 0 K/UL (ref 0–0.01)
NRBC BLD-RTO: 0 PER 100 WBC
PHOSPHATE SERPL-MCNC: 3.5 MG/DL (ref 2.6–4.7)
PLATELET # BLD AUTO: 239 K/UL (ref 150–400)
PMV BLD AUTO: 10.5 FL (ref 8.9–12.9)
POTASSIUM SERPL-SCNC: 4.4 MMOL/L (ref 3.5–5.1)
RBC # BLD AUTO: 2.82 M/UL (ref 3.8–5.2)
SERVICE CMNT-IMP: ABNORMAL
SERVICE CMNT-IMP: NORMAL
SERVICE CMNT-IMP: NORMAL
SODIUM SERPL-SCNC: 136 MMOL/L (ref 136–145)
WBC # BLD AUTO: 12.5 K/UL (ref 3.6–11)

## 2025-07-24 PROCEDURE — 2500000003 HC RX 250 WO HCPCS: Performed by: SURGERY

## 2025-07-24 PROCEDURE — 6370000000 HC RX 637 (ALT 250 FOR IP): Performed by: NURSE PRACTITIONER

## 2025-07-24 PROCEDURE — 2500000003 HC RX 250 WO HCPCS: Performed by: INTERNAL MEDICINE

## 2025-07-24 PROCEDURE — 6370000000 HC RX 637 (ALT 250 FOR IP): Performed by: INTERNAL MEDICINE

## 2025-07-24 PROCEDURE — 2580000003 HC RX 258: Performed by: INTERNAL MEDICINE

## 2025-07-24 PROCEDURE — 6360000002 HC RX W HCPCS: Performed by: INTERNAL MEDICINE

## 2025-07-24 PROCEDURE — 6370000000 HC RX 637 (ALT 250 FOR IP): Performed by: SURGERY

## 2025-07-24 PROCEDURE — 94761 N-INVAS EAR/PLS OXIMETRY MLT: CPT

## 2025-07-24 PROCEDURE — 82962 GLUCOSE BLOOD TEST: CPT

## 2025-07-24 PROCEDURE — 84100 ASSAY OF PHOSPHORUS: CPT

## 2025-07-24 PROCEDURE — 6360000002 HC RX W HCPCS: Performed by: SURGERY

## 2025-07-24 PROCEDURE — 36415 COLL VENOUS BLD VENIPUNCTURE: CPT

## 2025-07-24 PROCEDURE — 85027 COMPLETE CBC AUTOMATED: CPT

## 2025-07-24 PROCEDURE — 1100000000 HC RM PRIVATE

## 2025-07-24 PROCEDURE — 83735 ASSAY OF MAGNESIUM: CPT

## 2025-07-24 PROCEDURE — 2700000000 HC OXYGEN THERAPY PER DAY

## 2025-07-24 PROCEDURE — 80048 BASIC METABOLIC PNL TOTAL CA: CPT

## 2025-07-24 RX ORDER — METHIMAZOLE 5 MG/1
2.5 TABLET ORAL DAILY
Status: DISCONTINUED | OUTPATIENT
Start: 2025-07-24 | End: 2025-07-31

## 2025-07-24 RX ORDER — PROCHLORPERAZINE EDISYLATE 5 MG/ML
5 INJECTION INTRAMUSCULAR; INTRAVENOUS EVERY 6 HOURS PRN
Status: DISCONTINUED | OUTPATIENT
Start: 2025-07-24 | End: 2025-07-31 | Stop reason: HOSPADM

## 2025-07-24 RX ORDER — NICOTINE 21 MG/24HR
1 PATCH, TRANSDERMAL 24 HOURS TRANSDERMAL DAILY
Status: DISCONTINUED | OUTPATIENT
Start: 2025-07-24 | End: 2025-07-31

## 2025-07-24 RX ADMIN — ACETAMINOPHEN 650 MG: 325 TABLET ORAL at 05:21

## 2025-07-24 RX ADMIN — Medication: at 09:30

## 2025-07-24 RX ADMIN — Medication 1 AMPULE: at 20:57

## 2025-07-24 RX ADMIN — PIPERACILLIN AND TAZOBACTAM 3375 MG: 3; .375 INJECTION, POWDER, FOR SOLUTION INTRAVENOUS at 18:37

## 2025-07-24 RX ADMIN — ACETAMINOPHEN 650 MG: 325 TABLET ORAL at 18:34

## 2025-07-24 RX ADMIN — PANTOPRAZOLE SODIUM 40 MG: 40 INJECTION, POWDER, FOR SOLUTION INTRAVENOUS at 18:53

## 2025-07-24 RX ADMIN — CALCIUM GLUCONATE: 98 INJECTION, SOLUTION INTRAVENOUS at 18:42

## 2025-07-24 RX ADMIN — Medication: at 20:58

## 2025-07-24 RX ADMIN — ACETAMINOPHEN 650 MG: 325 TABLET ORAL at 23:58

## 2025-07-24 RX ADMIN — PIPERACILLIN AND TAZOBACTAM 3375 MG: 3; .375 INJECTION, POWDER, FOR SOLUTION INTRAVENOUS at 13:05

## 2025-07-24 RX ADMIN — CITALOPRAM HYDROBROMIDE 20 MG: 20 TABLET ORAL at 09:29

## 2025-07-24 RX ADMIN — PIPERACILLIN AND TAZOBACTAM 3375 MG: 3; .375 INJECTION, POWDER, FOR SOLUTION INTRAVENOUS at 03:26

## 2025-07-24 RX ADMIN — PANTOPRAZOLE SODIUM 40 MG: 40 INJECTION, POWDER, FOR SOLUTION INTRAVENOUS at 03:27

## 2025-07-24 RX ADMIN — SODIUM CHLORIDE, PRESERVATIVE FREE 10 ML: 5 INJECTION INTRAVENOUS at 20:59

## 2025-07-24 RX ADMIN — Medication 1 AMPULE: at 09:30

## 2025-07-24 RX ADMIN — CYANOCOBALAMIN TAB 500 MCG 1000 MCG: 500 TAB at 09:30

## 2025-07-24 RX ADMIN — METHIMAZOLE 2.5 MG: 5 TABLET ORAL at 09:30

## 2025-07-24 RX ADMIN — ENOXAPARIN SODIUM 40 MG: 100 INJECTION SUBCUTANEOUS at 09:30

## 2025-07-24 RX ADMIN — ACETAMINOPHEN 650 MG: 325 TABLET ORAL at 13:01

## 2025-07-24 RX ADMIN — FLUCONAZOLE 200 MG: 2 INJECTION, SOLUTION INTRAVENOUS at 05:21

## 2025-07-24 RX ADMIN — OXYCODONE 5 MG: 5 TABLET ORAL at 13:17

## 2025-07-24 ASSESSMENT — PAIN DESCRIPTION - ORIENTATION
ORIENTATION: POSTERIOR
ORIENTATION: ANTERIOR
ORIENTATION: POSTERIOR

## 2025-07-24 ASSESSMENT — PAIN SCALES - GENERAL
PAINLEVEL_OUTOF10: 5
PAINLEVEL_OUTOF10: 4
PAINLEVEL_OUTOF10: 2
PAINLEVEL_OUTOF10: 0
PAINLEVEL_OUTOF10: 5
PAINLEVEL_OUTOF10: 4

## 2025-07-24 ASSESSMENT — PAIN DESCRIPTION - DESCRIPTORS
DESCRIPTORS: ACHING

## 2025-07-24 ASSESSMENT — PAIN DESCRIPTION - LOCATION
LOCATION: ABDOMEN
LOCATION: BACK
LOCATION: BACK
LOCATION: GENERALIZED

## 2025-07-24 NOTE — PLAN OF CARE
Problem: Musculoskeletal - Adult  Goal: Return mobility to safest level of function  7/24/2025 0146 by Ricky Parks RN  Outcome: Not Progressing  7/24/2025 0146 by Ricky Parks RN  Outcome: Progressing  Goal: Return ADL status to a safe level of function  7/24/2025 0146 by Ricky Parks RN  Outcome: Not Progressing  7/24/2025 0146 by Ricky Parks RN  Outcome: Progressing     Problem: Occupational Therapy - Adult  Goal: By Discharge: Performs self-care activities at highest level of function for planned discharge setting.  See evaluation for individualized goals.  Description: FUNCTIONAL STATUS PRIOR TO ADMISSION:  Patient questionable historian secondary to confusion. Per chart review, she was admitted from SNF rehab. She reported she was independent in ADLs and mobility prior to admission.    ,  ,  ,  ,  ,  ,  ,  ,  ,  ,       HOME SUPPORT: Patient lived with daughter and son in law.    Occupational Therapy Goals:    Reeval 7/17/2025:  1. Patient will perform grooming seated in chair with minimal assist within 7 days  2. Patient will perform UB bathing with min a within 7 days  3. Patient will maintain stand with min a to aide with LB self care within 7 days.  4. Patient will perform toilet or BSC transfer with mod a within 7 days.  5. Patient will participate in UE therapeutic exercise/activity with supervision for 5 minutes within 7 days.    Initiated 7/7/2025  1.  Patient will perform grooming standing at sink with Supervision within 7 day(s).  2.  Patient will perform upper body dressing with Modified [place] within 7 day(s).  3.  Patient will perform lower body dressing with Supervision within 7 day(s).  4.  Patient will perform toilet transfers with Supervision  within 7 day(s).  5.  Patient will perform all aspects of toileting with Supervision within 7 day(s).  6.  Patient will participate in upper extremity therapeutic exercise/activities with Supervision for 5 minutes within 7 day(s).    7/23/2025 1214  Supervision for 5 minutes within 7 day(s).    7/23/2025 1327 by Natali Miranda, OT  Outcome: Not Progressing

## 2025-07-24 NOTE — PROGRESS NOTES
I reviewed her most recent TFTs. She has been off the MMI for several days, but back on for 2 days.   Her TSH has normalized and her FT4 is low normal.   In the face of her recent Afib and AMS I recommend continuing the MMI, but reducing the dose to 2.5mg daily, so not to cause her to become hypothyroid.    I will change her prescription now.

## 2025-07-24 NOTE — PROGRESS NOTES
Pharmacy TPN Management Note    TPN Indication: s/p Ex lap and duodenal perforation repair     TPN Route: Central     Home TPN? No    Macronutrients:  Protein: 80g  Dextrose: 300 g  Lipids: none while on antifungal    Rate: 75 mL/hr    Labs:  Recent Labs     Units 25  1154 25  0702 25  0458   NA mmol/L 136 137 134*   K mmol/L 4.4 5.0 4.6   CL mmol/L 105 107 104   CO2 mmol/L 29 29 27   MG mg/dL 2.1 2.1 2.0   PHOS MG/DL 3.5 3.5 2.8   BUN MG/DL 21* 24* 25*     No results for input(s): \"AST\", \"ALT\" in the last 72 hours.    Invalid input(s): \"BILIRUBIN\", \"AP\"    Recent Labs     Units 25  1154   WBC K/uL 12.5*   HGB g/dL 8.7*   HCT % 27.6*     Electrolytes per LITER:  Na: 55 meq/L; K: 29 meq/L; Phos: 20 mmol/L; M meq/L; Ca 4.5 meq/L    Other additives in TPN: multivitamins , trace elements, and insulin 10 units    Impression/Plan:   TPN macronutrient/rate changes: none  TPN electrolyte changes: none  TPN insulin changes: insulin regular 13 -> 10units     Pharmacy will continue to monitor enteral nutrition plan, electrolytes, renal function, and dietician recommendations and adjust parenteral nutrition as needed.    Thank you,  PARISA BUTLER [facility]

## 2025-07-24 NOTE — PROGRESS NOTES
Hospitalist Progress Note    NAME:   Nicole Galeano   : 1945   MRN: [id number]     Date/Time: 2025 8:58 AM  Patient PCP: Tessie Martinez FNP    Estimated discharge date:  Barriers: Clinical improvement      Assessment / Plan:  HPI: The patient is a 80 y/o female PMHx of COPD, rheumatoid arthritis, and remote hx breast cancer admitted () by hospital medicine for altered mental status. Recent dx of multiple vertebral compression fractures at OSH, and was Discharged with Narocotics leading to Confusion so admitted for kyphoplasty.Underwent kyphoplasty by IR on (7/10). Aggressive bowel regimen added d/t constipation. Evening (7/15) noted to be mottled with hypotension. CT performed demonstrating pneumoperitoneum. Surgery consulted taken to the OR, now s/p ex- lap/repair for perforated duodenal ulcer. Transferred to the ICU post op on mechanical ventilation.  Patient extubated on .     Acute encephalopathy  Hospital-acquired delirium  Septic shock - in the setting of duodenal perforation. S/p laparotomy on 7/15 with sung patch placement.   -Off levophed gtt since   -CT abdomen -no leak of enteric contrast, improved loculated ascites and pneumoperitoneum, few small collection of intraperitoneal fluid remains  -HEMOPHILUS PARAINFLUENZA noted in fluid culture - sensitivity not back yet. Pharmacy to check if Zosyn will cover.   -Continue Zosyn and fluconazole, WBC increased today though afebrile, monitor for now     : Patient is more confused today, ordered infection workup including 2 sets of blood cultures, chest x-ray, urinalysis, infectious disease consulted because of 2 small ascites fluid collection present that could be source of infection.  Reviewed general surgery not planning to talk to the IR for possible drainage.  Discussed with ID: Possible antibiotic duration will be 2 weeks, with conversion to Augmentin on discharge     white blood cell count

## 2025-07-24 NOTE — CARE COORDINATION
Transition of Care Plan:    RUR: 18%  Prior Level of Functioning:   Disposition: SNF  CELIA:   If SNF or IPR: Date FOC offered:   Date FOC received:   [facility]  Date authorization started with reference number:   Date authorization received and expires:   Follow up appointments:   DME needed: n/a  Trans[facility] at discharge: [facility]  IM/IMM Medicare/ letter given: 2nd IM needed  Is patient a [place] and connected with [facility]?    If yes, was  transfer form completed and [facility] notified?   Caregiver Contact:   Discharge Caregiver contacted prior to discharge?   Care Conference needed?   Barriers to discharge:     Per pt's request, CM spoke with pt's daughter via phone, regarding d/c plan.   Daughter is really hoping to get [facility]   CM left a vm for [facility] this morning, but have not heard from her yet.   [facility] has accepted, but daughter wants to tour facility.   CM will continue to follow.    3:32  [facility] one bed available.   CM re-sent referrals to [facility].    4:01  [facility] is reviewing referral.    Lory Melgoza  Ext [phone]

## 2025-07-24 NOTE — PROGRESS NOTES
End of Shift Note    Bedside shift change report given to Veto PARRA (oncoming nurse) by Marilyn Ibarra RN (offgoing nurse).  Report included the following information SBAR, Kardex, OR Summary, Procedure Summary, Intake/Output, MAR, Accordion, and Recent Results    Shift worked:  7a-7p     Shift summary and any significant changes:     Pt A&O x 1-2, pt spoke with daughter on phone today. Abd lap sites CDI, drsgs removed and cleaned with CHG wipes, PICC line cleaned with CHG wipes.    Pt refusing blood cx, notified Dr Hughes via Perfect serve.     Concerns for physician to address:  none     Zone phone for oncoming shift:   [phone]       Activity:  Level of Assistance: Moderate assist, patient does 50-74%  Number times ambulated in hallways past shift: 0  Number of times OOB to chair past shift: 0    Cardiac:   Cardiac Monitoring: Yes      Cardiac Rhythm: Sinus rhythm    Access:  Current line(s): PIV  and PICC    Genitourinary:   Urinary Status: Voiding, External catheter    Respiratory:   O2 Device: Nasal cannula  Chronic home O2 use?: NO  Incentive spirometer at bedside: NO    GI:  Last BM (including prior to admit): 07/22/25  Current diet:  DIET ONE TIME MESSAGE;  ADULT DIET; Full Liquid  ADULT ORAL NUTRITION SUPPLEMENT; Breakfast, Lunch, Dinner; Standard High Calorie/High Protein Oral Supplement  PN-Adult 2-in-1 Central Line (Standard)  Passing flatus: YES    Pain Management:   Patient states pain is manageable on current regimen: YES    Skin:  Rafa Scale Score: 13  Interventions: Wound Offloading (Prevention Methods): Pillows, Turning    Patient Safety:  Fall Risk: Nursing Judgement-Fall Risk High(Add Comments): Yes  Fall Risk Interventions  Nursing Judgement-Fall Risk High(Add Comments): Yes  Toilet Every 2 Hours-In Advance of Need: No (Comment) (external catheter)  Hourly Visual Checks: Awake  Fall Visual Posted: Socks, Fall sign posted  Room Door Open: Yes  Alarm On: Bed  Patient Moved Closer to Nursing  Station: No    Active Consults:   IP CONSULT TO INTERVENTIONAL RADIOLOGY  IP CONSULT TO ENDOCRINOLOGY  IP CONSULT TO PULMONOLOGY  IP CONSULT TO PSYCHIATRY  IP CONSULT TO PHARMACY  IP CONSULT TO PHARMACY  IP CONSULT TO VASCULAR ACCESS TEAM  IP CONSULT TO CASE MANAGEMENT  IP CONSULT TO INFECTIOUS DISEASES  IP CONSULT TO PSYCHIATRY    Length of Stay:  Expected LOS: 20  Actual LOS: 18    Marilyn Ibarra RN

## 2025-07-25 LAB
ANION GAP SERPL CALC-SCNC: 5 MMOL/L (ref 2–12)
BASOPHILS # BLD: 0.02 K/UL (ref 0–0.1)
BASOPHILS NFR BLD: 0.2 % (ref 0–1)
BUN SERPL-MCNC: 17 MG/DL (ref 6–20)
BUN/CREAT SERPL: 50 (ref 12–20)
CALCIUM SERPL-MCNC: 7.8 MG/DL (ref 8.5–10.1)
CHLORIDE SERPL-SCNC: 103 MMOL/L (ref 97–108)
CO2 SERPL-SCNC: 27 MMOL/L (ref 21–32)
CREAT SERPL-MCNC: 0.34 MG/DL (ref 0.55–1.02)
DIFFERENTIAL METHOD BLD: ABNORMAL
EOSINOPHIL # BLD: 0.12 K/UL (ref 0–0.4)
EOSINOPHIL NFR BLD: 1.1 % (ref 0–7)
ERYTHROCYTE [DISTWIDTH] IN BLOOD BY AUTOMATED COUNT: 17.4 % (ref 11.5–14.5)
GLUCOSE BLD STRIP.AUTO-MCNC: 120 MG/DL (ref 65–117)
GLUCOSE BLD STRIP.AUTO-MCNC: 146 MG/DL (ref 65–117)
GLUCOSE BLD STRIP.AUTO-MCNC: 150 MG/DL (ref 65–117)
GLUCOSE SERPL-MCNC: 129 MG/DL (ref 65–100)
HCT VFR BLD AUTO: 25.8 % (ref 35–47)
HGB BLD-MCNC: 8.2 G/DL (ref 11.5–16)
IMM GRANULOCYTES # BLD AUTO: 0.09 K/UL (ref 0–0.04)
IMM GRANULOCYTES NFR BLD AUTO: 0.8 % (ref 0–0.5)
LYMPHOCYTES # BLD: 0.57 K/UL (ref 0.8–3.5)
LYMPHOCYTES NFR BLD: 5.2 % (ref 12–49)
MAGNESIUM SERPL-MCNC: 2 MG/DL (ref 1.6–2.4)
MCH RBC QN AUTO: 30.9 PG (ref 26–34)
MCHC RBC AUTO-ENTMCNC: 31.8 G/DL (ref 30–36.5)
MCV RBC AUTO: 97.4 FL (ref 80–99)
MONOCYTES # BLD: 0.75 K/UL (ref 0–1)
MONOCYTES NFR BLD: 6.8 % (ref 5–13)
NEUTS SEG # BLD: 9.45 K/UL (ref 1.8–8)
NEUTS SEG NFR BLD: 85.9 % (ref 32–75)
NRBC # BLD: 0 K/UL (ref 0–0.01)
NRBC BLD-RTO: 0 PER 100 WBC
PHOSPHATE SERPL-MCNC: 3.1 MG/DL (ref 2.6–4.7)
PLATELET # BLD AUTO: 249 K/UL (ref 150–400)
PMV BLD AUTO: 10.4 FL (ref 8.9–12.9)
POTASSIUM SERPL-SCNC: 4 MMOL/L (ref 3.5–5.1)
RBC # BLD AUTO: 2.65 M/UL (ref 3.8–5.2)
RBC MORPH BLD: ABNORMAL
SERVICE CMNT-IMP: ABNORMAL
SODIUM SERPL-SCNC: 135 MMOL/L (ref 136–145)
WBC # BLD AUTO: 11 K/UL (ref 3.6–11)

## 2025-07-25 PROCEDURE — 6370000000 HC RX 637 (ALT 250 FOR IP): Performed by: INTERNAL MEDICINE

## 2025-07-25 PROCEDURE — 85025 COMPLETE CBC W/AUTO DIFF WBC: CPT

## 2025-07-25 PROCEDURE — 99233 SBSQ HOSP IP/OBS HIGH 50: CPT | Performed by: INTERNAL MEDICINE

## 2025-07-25 PROCEDURE — 2500000003 HC RX 250 WO HCPCS: Performed by: SURGERY

## 2025-07-25 PROCEDURE — 6360000002 HC RX W HCPCS: Performed by: INTERNAL MEDICINE

## 2025-07-25 PROCEDURE — 2580000003 HC RX 258: Performed by: INTERNAL MEDICINE

## 2025-07-25 PROCEDURE — 6370000000 HC RX 637 (ALT 250 FOR IP): Performed by: SURGERY

## 2025-07-25 PROCEDURE — 2580000003 HC RX 258: Performed by: SURGERY

## 2025-07-25 PROCEDURE — 6360000002 HC RX W HCPCS: Performed by: SURGERY

## 2025-07-25 PROCEDURE — 83735 ASSAY OF MAGNESIUM: CPT

## 2025-07-25 PROCEDURE — 80048 BASIC METABOLIC PNL TOTAL CA: CPT

## 2025-07-25 PROCEDURE — 36415 COLL VENOUS BLD VENIPUNCTURE: CPT

## 2025-07-25 PROCEDURE — 84100 ASSAY OF PHOSPHORUS: CPT

## 2025-07-25 PROCEDURE — 82962 GLUCOSE BLOOD TEST: CPT

## 2025-07-25 PROCEDURE — 99222 1ST HOSP IP/OBS MODERATE 55: CPT

## 2025-07-25 PROCEDURE — 2700000000 HC OXYGEN THERAPY PER DAY

## 2025-07-25 PROCEDURE — 94761 N-INVAS EAR/PLS OXIMETRY MLT: CPT

## 2025-07-25 PROCEDURE — 1100000000 HC RM PRIVATE

## 2025-07-25 PROCEDURE — 6370000000 HC RX 637 (ALT 250 FOR IP): Performed by: NURSE PRACTITIONER

## 2025-07-25 RX ADMIN — Medication 1 AMPULE: at 20:54

## 2025-07-25 RX ADMIN — Medication: at 21:01

## 2025-07-25 RX ADMIN — PANTOPRAZOLE SODIUM 40 MG: 40 INJECTION, POWDER, FOR SOLUTION INTRAVENOUS at 17:14

## 2025-07-25 RX ADMIN — CITALOPRAM HYDROBROMIDE 20 MG: 20 TABLET ORAL at 08:36

## 2025-07-25 RX ADMIN — PIPERACILLIN AND TAZOBACTAM 3375 MG: 3; .375 INJECTION, POWDER, FOR SOLUTION INTRAVENOUS at 11:51

## 2025-07-25 RX ADMIN — SODIUM CHLORIDE, PRESERVATIVE FREE 10 ML: 5 INJECTION INTRAVENOUS at 21:02

## 2025-07-25 RX ADMIN — SODIUM CHLORIDE, PRESERVATIVE FREE 10 ML: 5 INJECTION INTRAVENOUS at 08:38

## 2025-07-25 RX ADMIN — FLUCONAZOLE 200 MG: 2 INJECTION, SOLUTION INTRAVENOUS at 03:46

## 2025-07-25 RX ADMIN — ACETAMINOPHEN 650 MG: 325 TABLET ORAL at 05:37

## 2025-07-25 RX ADMIN — ENOXAPARIN SODIUM 40 MG: 100 INJECTION SUBCUTANEOUS at 08:36

## 2025-07-25 RX ADMIN — OXYCODONE 5 MG: 5 TABLET ORAL at 00:54

## 2025-07-25 RX ADMIN — ONDANSETRON HYDROCHLORIDE 4 MG: 2 INJECTION, SOLUTION INTRAMUSCULAR; INTRAVENOUS at 20:52

## 2025-07-25 RX ADMIN — METHIMAZOLE 2.5 MG: 5 TABLET ORAL at 08:36

## 2025-07-25 RX ADMIN — PIPERACILLIN AND TAZOBACTAM 3375 MG: 3; .375 INJECTION, POWDER, FOR SOLUTION INTRAVENOUS at 18:40

## 2025-07-25 RX ADMIN — ACETAMINOPHEN 650 MG: 325 TABLET ORAL at 11:54

## 2025-07-25 RX ADMIN — OXYCODONE 5 MG: 5 TABLET ORAL at 08:36

## 2025-07-25 RX ADMIN — PANTOPRAZOLE SODIUM 40 MG: 40 INJECTION, POWDER, FOR SOLUTION INTRAVENOUS at 02:45

## 2025-07-25 RX ADMIN — PIPERACILLIN AND TAZOBACTAM 3375 MG: 3; .375 INJECTION, POWDER, FOR SOLUTION INTRAVENOUS at 02:42

## 2025-07-25 RX ADMIN — Medication: at 08:37

## 2025-07-25 RX ADMIN — CYANOCOBALAMIN TAB 500 MCG 1000 MCG: 500 TAB at 08:36

## 2025-07-25 RX ADMIN — SODIUM CHLORIDE 50 ML: 0.9 INJECTION, SOLUTION INTRAVENOUS at 11:50

## 2025-07-25 RX ADMIN — ACETAMINOPHEN 650 MG: 325 TABLET ORAL at 23:21

## 2025-07-25 RX ADMIN — ACETAMINOPHEN 650 MG: 325 TABLET ORAL at 17:14

## 2025-07-25 RX ADMIN — MELATONIN 3 MG: at 23:20

## 2025-07-25 ASSESSMENT — PAIN DESCRIPTION - DESCRIPTORS
DESCRIPTORS: ACHING

## 2025-07-25 ASSESSMENT — PAIN SCALES - GENERAL
PAINLEVEL_OUTOF10: 4
PAINLEVEL_OUTOF10: 0
PAINLEVEL_OUTOF10: 0
PAINLEVEL_OUTOF10: 8
PAINLEVEL_OUTOF10: 6
PAINLEVEL_OUTOF10: 0
PAINLEVEL_OUTOF10: 0

## 2025-07-25 ASSESSMENT — PAIN DESCRIPTION - LOCATION
LOCATION: GENERALIZED;BACK
LOCATION: BACK

## 2025-07-25 ASSESSMENT — PAIN DESCRIPTION - ORIENTATION: ORIENTATION: OTHER (COMMENT)

## 2025-07-25 ASSESSMENT — PAIN DESCRIPTION - PAIN TYPE
TYPE: ACUTE PAIN
TYPE: ACUTE PAIN

## 2025-07-25 ASSESSMENT — PAIN DESCRIPTION - FREQUENCY: FREQUENCY: INTERMITTENT

## 2025-07-25 NOTE — CARE COORDINATION
Transition of Care Plan:     RUR: 18%  Prior Level of Functioning:   Disposition: SNF  CELIA:   If SNF or IPR: Date FOC offered:   Date FOC received:   Accepting facility: [facility]  Date authorization started with reference number:   Date authorization received and expires:   Follow up appointments:   DME needed: n/a  [facility]: [facility]  IM/IMM Medicare/ letter given: 2nd IM needed  Is patient a [place] and connected with [facility]?               If yes, was  transfer form completed and [facility] notified?   Caregiver Contact:   Discharge Caregiver contacted prior to discharge?   Care Conference needed?   Barriers to discharge:     [facility] declined.   CM will f/u with daughter.    2:14  CM left vm for daughter, requesting a return call.   CM also [employer] MD asking if pt is ready for d/c.        Lory Melgoza  Ext [phone]

## 2025-07-25 NOTE — PROGRESS NOTES
hours)   CBC    Collection Time: 07/24/25 11:54 AM   Result Value Ref Range    WBC 12.5 (H) 3.6 - 11.0 K/uL    RBC 2.82 (L) 3.80 - 5.20 M/uL    Hemoglobin 8.7 (L) 11.5 - 16.0 g/dL    Hematocrit 27.6 (L) 35.0 - 47.0 %    MCV 97.9 80.0 - 99.0 FL    MCH 30.9 26.0 - 34.0 PG    MCHC 31.5 30.0 - 36.5 g/dL    RDW 17.6 (H) 11.5 - 14.5 %    Platelets 239 150 - 400 K/uL    MPV 10.5 8.9 - 12.9 FL    Nucleated RBCs 0.0 0  WBC    nRBC 0.00 0.00 - 0.01 K/uL   Basic Metabolic Panel    Collection Time: 07/24/25 11:54 AM   Result Value Ref Range    Sodium 136 136 - 145 mmol/L    Potassium 4.4 3.5 - 5.1 mmol/L    Chloride 105 97 - 108 mmol/L    CO2 29 21 - 32 mmol/L    Anion Gap 2 2 - 12 mmol/L    Glucose 108 (H) 65 - 100 mg/dL    BUN 21 (H) 6 - 20 MG/DL    Creatinine 0.43 (L) 0.55 - 1.02 MG/DL    BUN/Creatinine Ratio 49 (H) 12 - 20      Est, Glom Filt Rate >90 >60 ml/min/1.73m2    Calcium 8.1 (L) 8.5 - 10.1 MG/DL   Magnesium    Collection Time: 07/24/25 11:54 AM   Result Value Ref Range    Magnesium 2.1 1.6 - 2.4 mg/dL   Phosphorus    Collection Time: 07/24/25 11:54 AM   Result Value Ref Range    Phosphorus 3.5 2.6 - 4.7 MG/DL   POCT Glucose    Collection Time: 07/24/25 12:17 PM   Result Value Ref Range    POC Glucose 120 (H) 65 - 117 mg/dL    Performed by: Corey Blair PCT    POCT Glucose    Collection Time: 07/24/25  5:03 PM   Result Value Ref Range    POC Glucose 134 (H) 65 - 117 mg/dL    Performed by: Corey Blair PCT    POCT Glucose    Collection Time: 07/24/25  8:12 PM   Result Value Ref Range    POC Glucose 132 (H) 65 - 117 mg/dL    Performed by: Derrek Ames PCT    CBC with Auto Differential    Collection Time: 07/25/25  4:44 AM   Result Value Ref Range    WBC 11.0 3.6 - 11.0 K/uL    RBC 2.65 (L) 3.80 - 5.20 M/uL    Hemoglobin 8.2 (L) 11.5 - 16.0 g/dL    Hematocrit 25.8 (L) 35.0 - 47.0 %    MCV 97.4 80.0 - 99.0 FL    MCH 30.9 26.0 - 34.0 PG    MCHC 31.8 30.0 - 36.5 g/dL    RDW 17.4 (H) 11.5 - 14.5 %    Platelets  249 150 - 400 K/uL    MPV 10.4 8.9 - 12.9 FL    Nucleated RBCs 0.0 0  WBC    nRBC 0.00 0.00 - 0.01 K/uL    Neutrophils % 85.9 (H) 32.0 - 75.0 %    Lymphocytes % 5.2 (L) 12.0 - 49.0 %    Monocytes % 6.8 5.0 - 13.0 %    Eosinophils % 1.1 0.0 - 7.0 %    Basophils % 0.2 0.0 - 1.0 %    Immature Granulocytes % 0.8 (H) 0.0 - 0.5 %    Neutrophils Absolute 9.45 (H) 1.80 - 8.00 K/UL    Lymphocytes Absolute 0.57 (L) 0.80 - 3.50 K/UL    Monocytes Absolute 0.75 0.00 - 1.00 K/UL    Eosinophils Absolute 0.12 0.00 - 0.40 K/UL    Basophils Absolute 0.02 0.00 - 0.10 K/UL    Immature Granulocytes Absolute 0.09 (H) 0.00 - 0.04 K/UL    Differential Type AUTOMATED      RBC Comment ANISOCYTOSIS  1+       Basic Metabolic Panel    Collection Time: 07/25/25  4:44 AM   Result Value Ref Range    Sodium 135 (L) 136 - 145 mmol/L    Potassium 4.0 3.5 - 5.1 mmol/L    Chloride 103 97 - 108 mmol/L    CO2 27 21 - 32 mmol/L    Anion Gap 5 2 - 12 mmol/L    Glucose 129 (H) 65 - 100 mg/dL    BUN 17 6 - 20 MG/DL    Creatinine 0.34 (L) 0.55 - 1.02 MG/DL    BUN/Creatinine Ratio 50 (H) 12 - 20      Est, Glom Filt Rate >90 >60 ml/min/1.73m2    Calcium 7.8 (L) 8.5 - 10.1 MG/DL   Phosphorus    Collection Time: 07/25/25  4:44 AM   Result Value Ref Range    Phosphorus 3.1 2.6 - 4.7 MG/DL   Magnesium    Collection Time: 07/25/25  4:44 AM   Result Value Ref Range    Magnesium 2.0 1.6 - 2.4 mg/dL   POCT Glucose    Collection Time: 07/25/25  6:46 AM   Result Value Ref Range    POC Glucose 146 (H) 65 - 117 mg/dL    Performed by: Derrek Ames PCT        Results       Procedure Component Value Units Date/Time    Culture, Blood 1 [[id number]]     Order Status: Sent Specimen: Blood     Culture, Blood 2 [[id number]]     Order Status: Sent Specimen: Blood     Culture, Blood 1 [[id number]] Collected: 07/22/25 1100    Order Status: Canceled Specimen: Blood     Culture, Blood 2 [[id number]] Collected: 07/22/25 1100    Order Status: Canceled Specimen: Blood     Culture,

## 2025-07-25 NOTE — PROGRESS NOTES
Comprehensive Nutrition Assessment    Type and Reason for Visit:  Reassess    Nutrition Recommendations/Plan:   Diet per surgery  Rec wean TPN, 1/2 rate for 2hrs then off  Continue ensure plus HP 3x/day  Encourage PO intakes, honor preferences as able, provide assistance PRN  Monitor and record PO intakes, supplement acceptance, and Bms in I/Os     Malnutrition Assessment:  Malnutrition Status:  Moderate malnutrition (07/15/25 1450)    Context:  Chronic Illness     Findings of the 6 clinical characteristics of malnutrition:  Energy Intake:  75% or less estimated energy requirements for 1 month or longer  Weight Loss:  7.5% over 3 months     Body Fat Loss:  No body fat loss     Muscle Mass Loss:  No muscle mass loss    Fluid Accumulation:  No fluid accumulation     Strength:  Not Performed    Nutrition Assessment:    Follow up. Pt with 0% of tray consumed at bedside. Requesting green beans, reinforced full liquid diet education. No additional questions or concerns for RD at this time. Discussed with pharmacy, plan to wean TPN today per surgery.    Nutrition Related Findings:    Labs: Na 135, K 4.0, Mag 2.0, Phos 3.1, Gluc 150. Meds: insulin lispro, nicotine, pantoprazole, 0.9%NaCl. Trace b/l LE edema. BM 7/22. Wound Type: Surgical Incision       Current Nutrition Intake & Therapies:    Average Meal Intake: 0%  Average Supplements Intake: 0%  DIET ONE TIME MESSAGE;  ADULT DIET; Full Liquid  ADULT ORAL NUTRITION SUPPLEMENT; Breakfast, Lunch, Dinner; Standard High Calorie/High Protein Oral Supplement  PN-Adult 2-in-1 Central Line (Standard)    Anthropometric Measures:  Height: 157.5 cm (5' 2.01\")  Ideal Body Weight (IBW): 110 lbs (50 kg)    Current Body Weight: 72.8 kg (160 lb 7.9 oz), 145.9 % IBW. Weight Source: Bed scale  Current BMI (kg/m2): 29.3  Usual Body Weight: 72.6 kg (160 lb) (4 months ago)  % Weight Change (Calculated): -7.1  BMI Categories: Overweight (BMI 25.0-29.9)    Estimated Daily Nutrient

## 2025-07-25 NOTE — PLAN OF CARE
Problem: Respiratory - Adult  Goal: Achieves optimal ventilation and oxygenation  Outcome: Progressing     Problem: Neurosensory - Adult  Goal: Achieves stable or improved neurological status  Outcome: Progressing     Problem: Cardiovascular - Adult  Goal: Absence of cardiac dysrhythmias or at baseline  Outcome: Progressing     Problem: Skin/Tissue Integrity - Adult  Goal: Skin integrity remains intact  Description: 1.  Monitor for areas of redness and/or skin breakdown  2.  Assess vascular access sites hourly  3.  Every 4-6 hours minimum:  Change oxygen saturation probe site  4.  Every 4-6 hours:  If on nasal continuous positive airway pressure, respiratory therapy assess nares and determine need for appliance change or resting period  Outcome: Progressing  Goal: Incisions, wounds, or drain sites healing without S/S of infection  Outcome: Progressing     Problem: Genitourinary - Adult  Goal: Absence of urinary retention  Outcome: Progressing     Problem: Pain  Goal: Verbalizes/displays adequate comfort level or baseline comfort level  Outcome: Progressing     Problem: Safety - Adult  Goal: Free from fall injury  Outcome: Progressing     Problem: Confusion  Goal: Confusion, delirium, dementia, or psychosis is improved or at baseline  Description: INTERVENTIONS:  1. Assess for possible contributors to thought disturbance, including medications, impaired vision or hearing, underlying metabolic abnormalities, dehydration, psychiatric diagnoses, and notify attending LIP  2. [place] high risk fall precautions, as indicated  3. Provide frequent short contacts to provide reality reorientation, refocusing and direction  4. Decrease environmental stimuli, including noise as appropriate  5. Monitor and intervene to maintain adequate nutrition, hydration, elimination, sleep and activity  6. If unable to ensure safety without constant attention obtain sitter and review sitter guidelines with assigned personnel  7. Initiate

## 2025-07-25 NOTE — PLAN OF CARE
Problem: Respiratory - Adult  Goal: Achieves optimal ventilation and oxygenation  7/25/2025 1111 by Chely Gibbons RN  Outcome: Progressing  Flowsheets (Taken 7/25/2025 0715)  Achieves optimal ventilation and oxygenation: Assess for changes in respiratory status  7/24/2025 2224 by Veto Morris, RN  Outcome: Progressing     Problem: Pain  Goal: Verbalizes/displays adequate comfort level or baseline comfort level  7/25/2025 1111 by Chely Gibbons, RN  Outcome: Progressing  7/24/2025 2224 by Veto Morris RN  Outcome: Progressing     Problem: Neurosensory - Adult  Goal: Achieves stable or improved neurological status  7/25/2025 1111 by Chely Gibbons RN  Outcome: Progressing  Flowsheets (Taken 7/25/2025 0715)  Achieves stable or improved neurological status: Assess for and report changes in neurological status  7/24/2025 2224 by Veto Morris, RN  Outcome: Progressing  Goal: Achieves maximal functionality and self care  Outcome: Progressing  Flowsheets (Taken 7/25/2025 0715)  Achieves maximal functionality and self care: Monitor swallowing and airway patency with patient fatigue and changes in neurological status     Problem: Cardiovascular - Adult  Goal: Absence of cardiac dysrhythmias or at baseline  7/25/2025 1111 by Chely Gibbons RN  Outcome: Progressing  Flowsheets (Taken 7/25/2025 0715)  Absence of cardiac dysrhythmias or at baseline: Monitor cardiac rate and rhythm  7/24/2025 2224 by Veto Morris RN  Outcome: Progressing     Problem: Skin/Tissue Integrity - Adult  Goal: Skin integrity remains intact  Description: 1.  Monitor for areas of redness and/or skin breakdown  2.  Assess vascular access sites hourly  3.  Every 4-6 hours minimum:  Change oxygen saturation probe site  4.  Every 4-6 hours:  If on nasal continuous positive airway pressure, respiratory therapy assess nares and determine need for appliance change or resting period  7/25/2025 1111 by Chely Gibbons, FIDEL  Outcome:

## 2025-07-25 NOTE — PROGRESS NOTES
End of Shift Note    Bedside shift change report given to Chely Gibbons RN (oncoming nurse) by Veto Morris RN (offgoing nurse).  Report included the following information SBAR, Kardex, ED Summary, Intake/Output, MAR, Recent Results, and Cardiac Rhythm      Shift worked:  5238-7762     Shift summary and any significant changes:     Remain confused at times.. Afebrile.TPN ongoing.     Concerns for physician to address:       Zone phone for oncoming shift:   [phone]       Activity:  Level of Assistance: Moderate assist, patient does 50-74%  Number times ambulated in hallways past shift: 0  Number of times OOB to chair past shift: 0    Cardiac:   Cardiac Monitoring: Yes      Cardiac Rhythm: Sinus rhythm    Access:  Current line(s): PICC    Genitourinary:   Urinary Status: Voiding, External catheter    Respiratory:   O2 Device: Nasal cannula  Chronic home O2 use?: NO  Incentive spirometer at bedside: YES    GI:  Last BM (including prior to admit): 07/22/25  Current diet:  DIET ONE TIME MESSAGE;  ADULT DIET; Full Liquid  ADULT ORAL NUTRITION SUPPLEMENT; Breakfast, Lunch, Dinner; Standard High Calorie/High Protein Oral Supplement  PN-Adult 2-in-1 Central Line (Standard)  Passing flatus: NO    Pain Management:   Patient states pain is manageable on current regimen: YES    Skin:  Rafa Scale Score: 16  Interventions: Wound Offloading (Prevention Methods): Pillows, Turning    Patient Safety:  Fall Risk: Nursing Judgement-Fall Risk High(Add Comments): Yes  Fall Risk Interventions  Nursing Judgement-Fall Risk High(Add Comments): Yes  Toilet Every 2 Hours-In Advance of Need: No (Comment) (external catheter)  Hourly Visual Checks: In bed, Eyes closed, Confused  Fall Visual Posted: Socks, Fall sign posted  Room Door Open: Yes  Alarm On: Bed  Patient Moved Closer to Nursing Station: No    Active Consults:   IP CONSULT TO INTERVENTIONAL RADIOLOGY  IP CONSULT TO ENDOCRINOLOGY  IP CONSULT TO PULMONOLOGY  IP CONSULT TO

## 2025-07-25 NOTE — CONSULTS
Palliative Medicine  Patient Name: Nicole Galeano  YOB: 1945  MRN: [id number]  Age: 79 y.o.  Gender: female    Date of Initial Consult: 7/25/2025  Date of Service: 7/25/2025  Time: 2:37 PM  Provider: ISAIAH Greene CNP  Hospital Day: 20  Admit Date: 7/6/2025  Referring Provider: Grecia Shaikh MD      Reasons for Consultation:  Goals of Care    HISTORY OF PRESENT ILLNESS (HPI):   Nicole Galeano is a 79 y.o. female with a past medical history of COPD, rheumatoid arthritis, and remote hx breast cancer, who was admitted on 7/6/2025 from [facility] with complaints of back pain.     Recent dx of multiple vertebral compression fractures at OSH, and was Discharged with Narocotics leading to Confusion so admitted for kyphoplasty.Underwent kyphoplasty by IR on (7/10). Aggressive bowel regimen added d/t constipation. Evening (7/15) noted to be mottled with hypotension. CT performed demonstrating pneumoperitoneum. Surgery consulted taken to the OR, now s/p ex- lap/repair for perforated duodenal ulcer. Transferred to the ICU post op on mechanical ventilation. Patient extubated on 7/16.     Psychosocial: Patient  with two children (Nancy and Jas).  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      PALLIATIVE DIAGNOSES:    AMS  Acute back pain - s/p kypoplasty  Generalized weakness  Physical debility  Goals of care  Palliative care encounter      ASSESSMENT AND PLAN:   Palliative team has been asked to meet with Nicole Galeano to address goals of care.  Reviewed medical chart including admit H&P, consultant notes, MAR, and recent labs/imaging.  Ms. Galeano was seen and evaluated, no family at bedside. Introduced self and role of palliative.   At time of my arrival, she was resting in bed in no acute distress.  On exam the patient is lethargic, but arousable to voice and when awake is AAOx4.  Pt states that she feels  with ventilator  NVPS Score : 2    RDOS:  RDOS  Heart rate per minute: less than 90  Respiratory Rate per Minute: less than 19  Restlessness:non-purposeful: None  Paradoxical breathing pattern:abdomen moves in on inspiration: None  Accessory muscle use: rise in clavicle during inspiration: None  Grunting at end-expiration: guttural sound: None  Nasal flaring: involuntary movement of nares: None  Look of fear: None  Total : 0      Vital Signs: Blood pressure (!) 125/92, pulse 72, temperature 98.1 °F (36.7 °C), resp. rate 20, height 1.575 m (5' 2.01\"), weight 72.8 kg (160 lb 7.9 oz), SpO2 97%.    PHYSICAL ASSESSMENT:   General: [x] Oriented x 2  [] Well appearing  [] Intubated  []Ill appearing  []Other:  Mental Status: [] Normal mental status exam  [] Drowsy  [x] Confused  []Other:  Cardiovascular: [x] Regular rate/rhythm  [] Arrhythmia  [] Other:  Chest: [x] Effort normal  []Lungs clear  [] Respiratory distress  []Tachypnea  [] Other:  Abdomen: [] Soft/non-tender  [] Normal appearance  [x] Distended  [] Ascites  [] Other:  Neurological: [] Normal speech  [] Normal sensation  [x]Deficits present:  Extremity: [] Normal skin color/temp  [] Clubbing/cyanosis  [] No edema  [] Other:    Wt Readings from Last 15 Encounters:   07/19/25 72.8 kg (160 lb 7.9 oz)   03/04/25 72.6 kg (160 lb)   08/30/24 70.3 kg (155 lb)   07/28/24 70.3 kg (155 lb)   10/21/22 67.9 kg (149 lb 12.8 oz)   09/26/22 70.8 kg (156 lb)   06/23/22 71.7 kg (158 lb)   04/27/22 68.9 kg (152 lb)   08/12/21 72.4 kg (159 lb 9.6 oz)   07/30/21 72.6 kg (160 lb)   06/25/21 74.1 kg (163 lb 6.4 oz)   04/28/21 74.8 kg (165 lb)   03/31/21 72.6 kg (160 lb)        Current Diet: DIET ONE TIME MESSAGE;  ADULT DIET; Full Liquid  ADULT ORAL NUTRITION SUPPLEMENT; Breakfast, Lunch, Dinner; Standard High Calorie/High Protein Oral Supplement  PN-Adult 2-in-1 Central Line (Standard)       PSYCHOSOCIAL/SPIRITUAL SCREENING:   Palliative IDT has assessed this patient for cultural

## 2025-07-26 LAB
GLUCOSE BLD STRIP.AUTO-MCNC: 148 MG/DL (ref 65–117)
GLUCOSE BLD STRIP.AUTO-MCNC: 66 MG/DL (ref 65–117)
GLUCOSE BLD STRIP.AUTO-MCNC: 67 MG/DL (ref 65–117)
GLUCOSE BLD STRIP.AUTO-MCNC: 68 MG/DL (ref 65–117)
GLUCOSE BLD STRIP.AUTO-MCNC: 68 MG/DL (ref 65–117)
GLUCOSE BLD STRIP.AUTO-MCNC: 75 MG/DL (ref 65–117)
GLUCOSE BLD STRIP.AUTO-MCNC: 79 MG/DL (ref 65–117)
GLUCOSE BLD STRIP.AUTO-MCNC: 82 MG/DL (ref 65–117)
GLUCOSE BLD STRIP.AUTO-MCNC: 93 MG/DL (ref 65–117)
SERVICE CMNT-IMP: ABNORMAL
SERVICE CMNT-IMP: NORMAL

## 2025-07-26 PROCEDURE — 6370000000 HC RX 637 (ALT 250 FOR IP): Performed by: INTERNAL MEDICINE

## 2025-07-26 PROCEDURE — 94760 N-INVAS EAR/PLS OXIMETRY 1: CPT

## 2025-07-26 PROCEDURE — 6370000000 HC RX 637 (ALT 250 FOR IP): Performed by: SURGERY

## 2025-07-26 PROCEDURE — 2500000003 HC RX 250 WO HCPCS: Performed by: SURGERY

## 2025-07-26 PROCEDURE — 6370000000 HC RX 637 (ALT 250 FOR IP): Performed by: NURSE PRACTITIONER

## 2025-07-26 PROCEDURE — 2580000003 HC RX 258: Performed by: INTERNAL MEDICINE

## 2025-07-26 PROCEDURE — 6360000002 HC RX W HCPCS: Performed by: INTERNAL MEDICINE

## 2025-07-26 PROCEDURE — 82962 GLUCOSE BLOOD TEST: CPT

## 2025-07-26 PROCEDURE — 6360000002 HC RX W HCPCS: Performed by: SURGERY

## 2025-07-26 PROCEDURE — 2580000003 HC RX 258: Performed by: SURGERY

## 2025-07-26 PROCEDURE — 1100000000 HC RM PRIVATE

## 2025-07-26 RX ADMIN — ACETAMINOPHEN 650 MG: 325 TABLET ORAL at 04:20

## 2025-07-26 RX ADMIN — SODIUM CHLORIDE, PRESERVATIVE FREE 10 ML: 5 INJECTION INTRAVENOUS at 20:59

## 2025-07-26 RX ADMIN — ACETAMINOPHEN 650 MG: 325 TABLET ORAL at 17:32

## 2025-07-26 RX ADMIN — Medication: at 20:56

## 2025-07-26 RX ADMIN — METHIMAZOLE 2.5 MG: 5 TABLET ORAL at 08:54

## 2025-07-26 RX ADMIN — CITALOPRAM HYDROBROMIDE 20 MG: 20 TABLET ORAL at 08:54

## 2025-07-26 RX ADMIN — PANTOPRAZOLE SODIUM 40 MG: 40 INJECTION, POWDER, FOR SOLUTION INTRAVENOUS at 03:54

## 2025-07-26 RX ADMIN — SODIUM CHLORIDE, PRESERVATIVE FREE 10 ML: 5 INJECTION INTRAVENOUS at 08:56

## 2025-07-26 RX ADMIN — Medication: at 08:57

## 2025-07-26 RX ADMIN — PANTOPRAZOLE SODIUM 40 MG: 40 INJECTION, POWDER, FOR SOLUTION INTRAVENOUS at 17:33

## 2025-07-26 RX ADMIN — SODIUM CHLORIDE: 0.9 INJECTION, SOLUTION INTRAVENOUS at 03:58

## 2025-07-26 RX ADMIN — ONDANSETRON HYDROCHLORIDE 4 MG: 2 INJECTION, SOLUTION INTRAMUSCULAR; INTRAVENOUS at 12:07

## 2025-07-26 RX ADMIN — ONDANSETRON HYDROCHLORIDE 4 MG: 2 INJECTION, SOLUTION INTRAMUSCULAR; INTRAVENOUS at 23:48

## 2025-07-26 RX ADMIN — PIPERACILLIN AND TAZOBACTAM 3375 MG: 3; .375 INJECTION, POWDER, FOR SOLUTION INTRAVENOUS at 04:00

## 2025-07-26 RX ADMIN — Medication 16 G: at 12:07

## 2025-07-26 RX ADMIN — OXYCODONE 5 MG: 5 TABLET ORAL at 12:06

## 2025-07-26 RX ADMIN — MELATONIN 3 MG: at 21:02

## 2025-07-26 RX ADMIN — ENOXAPARIN SODIUM 40 MG: 100 INJECTION SUBCUTANEOUS at 08:54

## 2025-07-26 RX ADMIN — CYANOCOBALAMIN TAB 500 MCG 1000 MCG: 500 TAB at 08:56

## 2025-07-26 RX ADMIN — Medication 1 AMPULE: at 20:53

## 2025-07-26 RX ADMIN — ACETAMINOPHEN 650 MG: 325 TABLET ORAL at 11:46

## 2025-07-26 RX ADMIN — DEXTROSE 125 ML: 10 SOLUTION INTRAVENOUS at 12:53

## 2025-07-26 RX ADMIN — ACETAMINOPHEN 650 MG: 325 TABLET ORAL at 23:48

## 2025-07-26 RX ADMIN — OXYCODONE 5 MG: 5 TABLET ORAL at 20:52

## 2025-07-26 RX ADMIN — Medication 1 AMPULE: at 08:54

## 2025-07-26 RX ADMIN — FLUCONAZOLE 200 MG: 2 INJECTION, SOLUTION INTRAVENOUS at 04:10

## 2025-07-26 ASSESSMENT — PAIN DESCRIPTION - DESCRIPTORS
DESCRIPTORS: DISCOMFORT
DESCRIPTORS: ACHING;DISCOMFORT
DESCRIPTORS: DISCOMFORT

## 2025-07-26 ASSESSMENT — PAIN SCALES - GENERAL
PAINLEVEL_OUTOF10: 7
PAINLEVEL_OUTOF10: 7
PAINLEVEL_OUTOF10: 9
PAINLEVEL_OUTOF10: 3
PAINLEVEL_OUTOF10: 0

## 2025-07-26 ASSESSMENT — PAIN DESCRIPTION - ORIENTATION: ORIENTATION: LOWER

## 2025-07-26 ASSESSMENT — PAIN DESCRIPTION - LOCATION
LOCATION: GENERALIZED
LOCATION: GENERALIZED
LOCATION: ABDOMEN;BACK

## 2025-07-26 ASSESSMENT — PAIN SCALES - WONG BAKER: WONGBAKER_NUMERICALRESPONSE: HURTS A LITTLE BIT

## 2025-07-26 ASSESSMENT — PAIN - FUNCTIONAL ASSESSMENT: PAIN_FUNCTIONAL_ASSESSMENT: ACTIVITIES ARE NOT PREVENTED

## 2025-07-26 NOTE — PROGRESS NOTES
End of Shift Note    Bedside shift change report given to FIDEL Gasca (oncoming nurse) by Anju Whitaker LPN (offgoing nurse).  Report included the following information SBAR, Kardex, Intake/Output, MAR, and Recent Results    Shift worked:  7p-730a     Shift summary and any significant changes:     Patient had an uneventful night. VS wnl, BP on the softer side. Patient slept most of the night after family left. Patient tolerated scheduled medications. Patient received scheduled tylenol. While patient was receiving bed bath and wipe down she kept falling asleep during care, waking up to answer questions and follow command. Complete linen and gown change provided. Patient asked for pain medication again, nurse educated patient on allowing current pain medication to take effect before taking addition medications, patient expressed understanding. PRN melatonin given per request. Patient slept throughout night. Purewick in place- draining patent. LBM 7/25/2025     Concerns for physician to address:       Zone phone for oncoming shift:       Activity:     Number times ambulated in hallways past shift: 0  Number of times OOB to chair past shift: 0    Cardiac:   Cardiac Monitoring: Yes           Access:   Current line(s): PICC     Genitourinary:   Urinary status: External catheter in place     Respiratory:      Chronic home O2 use?: NO  Incentive spirometer at bedside: YES       GI:     Current diet:  DIET ONE TIME MESSAGE;  ADULT DIET; Full Liquid  ADULT ORAL NUTRITION SUPPLEMENT; Breakfast, Lunch, Dinner; Standard High Calorie/High Protein Oral Supplement  Passing flatus: Yes  Tolerating current diet: Yes       Pain Management:   Patient states pain is manageable on current regimen: Yes    Skin:     Interventions: No new skin issues or concerns    Patient Safety:  Fall Score: PA Fall Risk Score: 97.97    Interventions:           Length of Stay:  Expected LOS: 20  Actual LOS: 20      Anju Whitaker

## 2025-07-26 NOTE — PROGRESS NOTES
Hospitalist Progress Note    NAME:   Nicole Galeano   : 1945   MRN: [id number]     Date/Time: 2025 6:49 AM  Patient PCP: Tessie Martinez FNP    Estimated discharge date:  Barriers: Clinical improvement      Assessment / Plan:  HPI: The patient is a 78 y/o female PMHx of COPD, rheumatoid arthritis, and remote hx breast cancer admitted () by hospital medicine for altered mental status. Recent dx of multiple vertebral compression fractures at OSH, and was Discharged with Narocotics leading to Confusion so admitted for kyphoplasty.Underwent kyphoplasty by IR on (7/10). Aggressive bowel regimen added d/t constipation. Evening (7/15) noted to be mottled with hypotension. CT performed demonstrating pneumoperitoneum. Surgery consulted taken to the OR, now s/p ex- lap/repair for perforated duodenal ulcer. Transferred to the ICU post op on mechanical ventilation.  Patient extubated on .     Acute encephalopathy  Hospital-acquired delirium  Septic shock - in the setting of duodenal perforation. S/p laparotomy on 7/15 with sung patch placement.   -Off levophed gtt since   -CT abdomen -no leak of enteric contrast, improved loculated ascites and pneumoperitoneum, few small collection of intraperitoneal fluid remains  -HEMOPHILUS PARAINFLUENZA noted in fluid culture - sensitivity not back yet. Pharmacy to check if Zosyn will cover.   -Continue Zosyn and fluconazole, WBC increased today though afebrile, monitor for now     : Patient is more confused today, ordered infection workup including 2 sets of blood cultures, chest x-ray, urinalysis, infectious disease consulted because of 2 small ascites fluid collection present that could be source of infection.  Reviewed general surgery not planning to talk to the IR for possible drainage.  Discussed with ID: Possible antibiotic duration will be 2 weeks, with conversion to Augmentin on discharge     white blood cell count

## 2025-07-26 NOTE — PROGRESS NOTES
Hospitalist Progress Note    NAME:   Nicole Galeano   : 1945   MRN: [id number]     Date/Time: 2025 11:41 AM  Patient PCP: Tessie Martinez FNP    Estimated discharge date:   Barriers: Clinical improvement, placement?, family meeting Monday      Assessment / Plan:    HPI: The patient is a 78 y/o female PMHx of COPD, rheumatoid arthritis, and remote hx breast cancer admitted () by hospital medicine for altered mental status. Recent dx of multiple vertebral compression fractures at OSH, and was Discharged with Narocotics leading to Confusion so admitted for kyphoplasty.Underwent kyphoplasty by IR on (7/10). Aggressive bowel regimen added d/t constipation. Evening (7/15) noted to be mottled with hypotension. CT performed demonstrating pneumoperitoneum. Surgery consulted taken to the OR, now s/p ex- lap/repair for perforated duodenal ulcer. Transferred to the ICU post op on mechanical ventilation.  Patient extubated on .     Acute encephalopathy  Hospital-acquired delirium  Septic shock - in the setting of duodenal perforation. S/p laparotomy on 7/15 with sung patch placement.   Duodenal ulcer - perforation. S/p sung patch on 7/15 by Dr. Han.  -Off levophed gtt since   -CT abdomen -no leak of enteric contrast, improved loculated ascites and pneumoperitoneum, few small collection of intraperitoneal fluid remains  -HEMOPHILUS PARAINFLUENZA beta lactamase negative noted in fluid culture - sensitivity not back yet. Pharmacy to check if Zosyn will cover.   -Continue Zosyn and fluconazole  -Daily labs  -ID on board, expertise appreciated-Possible antibiotic duration will be 2 weeks, with conversion to Augmentin on discharge  - It appears pt does not wish for further intervention.  Psych consulted on , at this time, there was no acute psychiatric intervention needed - patient is cooperative, calm, and has capacity. She voiced wishing to transition to comfort

## 2025-07-26 NOTE — PROGRESS NOTES
..End of Shift Note    Bedside shift change report given to FIDEL George (oncoming nurse) by Campos Ko RN (offgoing nurse).  Report included the following information SBAR, Intake/Output, MAR, and Recent Results    Shift worked:  1581-0261     Shift summary and any significant changes:    Pt given prescribed meds per MAR. Pt refused dose of zosyn, stated that it made her nauseous with the previous dose. PRN zofran and oxycodone given. Glucose below 70, pt given juice and refused to drink more than a few sips. Pt then given glucose tabs and only took 1.5 tabs of the 4 tabs dose. Spoke with attending provider and received verbal order to give dextrose bolus. Glucose 148 post bolus. Caring rounds completed.        Campos Ko RN

## 2025-07-27 LAB
ANION GAP SERPL CALC-SCNC: 4 MMOL/L (ref 2–12)
BASOPHILS # BLD: 0.03 K/UL (ref 0–0.1)
BASOPHILS NFR BLD: 0.3 % (ref 0–1)
BUN SERPL-MCNC: 13 MG/DL (ref 6–20)
BUN/CREAT SERPL: 30 (ref 12–20)
CALCIUM SERPL-MCNC: 8.1 MG/DL (ref 8.5–10.1)
CHLORIDE SERPL-SCNC: 105 MMOL/L (ref 97–108)
CO2 SERPL-SCNC: 28 MMOL/L (ref 21–32)
CREAT SERPL-MCNC: 0.44 MG/DL (ref 0.55–1.02)
DIFFERENTIAL METHOD BLD: ABNORMAL
EOSINOPHIL # BLD: 0.1 K/UL (ref 0–0.4)
EOSINOPHIL NFR BLD: 1.1 % (ref 0–7)
ERYTHROCYTE [DISTWIDTH] IN BLOOD BY AUTOMATED COUNT: 17.9 % (ref 11.5–14.5)
GLUCOSE BLD STRIP.AUTO-MCNC: 71 MG/DL (ref 65–117)
GLUCOSE BLD STRIP.AUTO-MCNC: 74 MG/DL (ref 65–117)
GLUCOSE BLD STRIP.AUTO-MCNC: 75 MG/DL (ref 65–117)
GLUCOSE BLD STRIP.AUTO-MCNC: 83 MG/DL (ref 65–117)
GLUCOSE SERPL-MCNC: 71 MG/DL (ref 65–100)
HCT VFR BLD AUTO: 25.5 % (ref 35–47)
HGB BLD-MCNC: 8 G/DL (ref 11.5–16)
IMM GRANULOCYTES # BLD AUTO: 0.07 K/UL (ref 0–0.04)
IMM GRANULOCYTES NFR BLD AUTO: 0.8 % (ref 0–0.5)
LYMPHOCYTES # BLD: 0.74 K/UL (ref 0.8–3.5)
LYMPHOCYTES NFR BLD: 8.3 % (ref 12–49)
MAGNESIUM SERPL-MCNC: 2 MG/DL (ref 1.6–2.4)
MCH RBC QN AUTO: 30.4 PG (ref 26–34)
MCHC RBC AUTO-ENTMCNC: 31.4 G/DL (ref 30–36.5)
MCV RBC AUTO: 97 FL (ref 80–99)
MONOCYTES # BLD: 0.57 K/UL (ref 0–1)
MONOCYTES NFR BLD: 6.4 % (ref 5–13)
NEUTS SEG # BLD: 7.4 K/UL (ref 1.8–8)
NEUTS SEG NFR BLD: 83.1 % (ref 32–75)
NRBC # BLD: 0 K/UL (ref 0–0.01)
NRBC BLD-RTO: 0 PER 100 WBC
PHOSPHATE SERPL-MCNC: 3 MG/DL (ref 2.6–4.7)
PLATELET # BLD AUTO: 283 K/UL (ref 150–400)
PMV BLD AUTO: 10.2 FL (ref 8.9–12.9)
POTASSIUM SERPL-SCNC: 4.1 MMOL/L (ref 3.5–5.1)
RBC # BLD AUTO: 2.63 M/UL (ref 3.8–5.2)
RBC MORPH BLD: ABNORMAL
RBC MORPH BLD: ABNORMAL
SERVICE CMNT-IMP: NORMAL
SODIUM SERPL-SCNC: 137 MMOL/L (ref 136–145)
WBC # BLD AUTO: 8.9 K/UL (ref 3.6–11)

## 2025-07-27 PROCEDURE — 1100000000 HC RM PRIVATE

## 2025-07-27 PROCEDURE — 36415 COLL VENOUS BLD VENIPUNCTURE: CPT

## 2025-07-27 PROCEDURE — 84100 ASSAY OF PHOSPHORUS: CPT

## 2025-07-27 PROCEDURE — 6370000000 HC RX 637 (ALT 250 FOR IP): Performed by: SURGERY

## 2025-07-27 PROCEDURE — 6370000000 HC RX 637 (ALT 250 FOR IP): Performed by: NURSE PRACTITIONER

## 2025-07-27 PROCEDURE — 6360000002 HC RX W HCPCS: Performed by: SURGERY

## 2025-07-27 PROCEDURE — 2500000003 HC RX 250 WO HCPCS: Performed by: SURGERY

## 2025-07-27 PROCEDURE — 6370000000 HC RX 637 (ALT 250 FOR IP): Performed by: INTERNAL MEDICINE

## 2025-07-27 PROCEDURE — 2700000000 HC OXYGEN THERAPY PER DAY

## 2025-07-27 PROCEDURE — 82962 GLUCOSE BLOOD TEST: CPT

## 2025-07-27 PROCEDURE — 85025 COMPLETE CBC W/AUTO DIFF WBC: CPT

## 2025-07-27 PROCEDURE — 94760 N-INVAS EAR/PLS OXIMETRY 1: CPT

## 2025-07-27 PROCEDURE — 6360000002 HC RX W HCPCS: Performed by: INTERNAL MEDICINE

## 2025-07-27 PROCEDURE — 83735 ASSAY OF MAGNESIUM: CPT

## 2025-07-27 PROCEDURE — 80048 BASIC METABOLIC PNL TOTAL CA: CPT

## 2025-07-27 RX ADMIN — SODIUM CHLORIDE, PRESERVATIVE FREE 10 ML: 5 INJECTION INTRAVENOUS at 09:11

## 2025-07-27 RX ADMIN — METHIMAZOLE 2.5 MG: 5 TABLET ORAL at 09:10

## 2025-07-27 RX ADMIN — Medication 1 AMPULE: at 20:24

## 2025-07-27 RX ADMIN — Medication: at 20:27

## 2025-07-27 RX ADMIN — CYANOCOBALAMIN TAB 500 MCG 1000 MCG: 500 TAB at 09:10

## 2025-07-27 RX ADMIN — PANTOPRAZOLE SODIUM 40 MG: 40 INJECTION, POWDER, FOR SOLUTION INTRAVENOUS at 03:43

## 2025-07-27 RX ADMIN — CITALOPRAM HYDROBROMIDE 20 MG: 20 TABLET ORAL at 09:10

## 2025-07-27 RX ADMIN — SODIUM CHLORIDE, PRESERVATIVE FREE 10 ML: 5 INJECTION INTRAVENOUS at 20:27

## 2025-07-27 RX ADMIN — ENOXAPARIN SODIUM 40 MG: 100 INJECTION SUBCUTANEOUS at 09:10

## 2025-07-27 RX ADMIN — PANTOPRAZOLE SODIUM 40 MG: 40 INJECTION, POWDER, FOR SOLUTION INTRAVENOUS at 16:55

## 2025-07-27 RX ADMIN — ONDANSETRON HYDROCHLORIDE 4 MG: 2 INJECTION, SOLUTION INTRAMUSCULAR; INTRAVENOUS at 06:25

## 2025-07-27 RX ADMIN — ONDANSETRON 4 MG: 4 TABLET, ORALLY DISINTEGRATING ORAL at 12:07

## 2025-07-27 RX ADMIN — ACETAMINOPHEN 650 MG: 325 TABLET ORAL at 06:23

## 2025-07-27 RX ADMIN — ACETAMINOPHEN 650 MG: 325 TABLET ORAL at 12:01

## 2025-07-27 RX ADMIN — Medication: at 09:11

## 2025-07-27 RX ADMIN — ACETAMINOPHEN 650 MG: 325 TABLET ORAL at 16:55

## 2025-07-27 RX ADMIN — Medication 1 AMPULE: at 09:10

## 2025-07-27 NOTE — PROGRESS NOTES
..End of Shift Note    Bedside shift change report given to FIDEL Cameron (oncoming nurse) by Campos Ko RN (offgoing nurse).  Report included the following information SBAR, Intake/Output, MAR, and Recent Results    Shift worked:  5480-9309     Shift summary and any significant changes:    Pt given prescribed meds per MAR. Pt refusing zosyn, stating it makes her feel \"nauseous\" and that she is \"done\" taking antibiotics. PRN zofran given. Caring rounds completed.          Campos Ko RN

## 2025-07-27 NOTE — CARE COORDINATION
CM noted consult to assist with placement if appropriate; chart reviewed. Pt and family scheduled for meeting with Palliative team tomorrow to clarify goals, CM will follow and assist with arranging services as needed.    Inna Mata, [facility]  Care Management  [phone]

## 2025-07-27 NOTE — PLAN OF CARE
Problem: Pain  Goal: Verbalizes/displays adequate comfort level or baseline comfort level  Outcome: Progressing     Problem: Skin/Tissue Integrity - Adult  Goal: Skin integrity remains intact  Description: 1.  Monitor for areas of redness and/or skin breakdown  2.  Assess vascular access sites hourly  3.  Every 4-6 hours minimum:  Change oxygen saturation probe site  4.  Every 4-6 hours:  If on nasal continuous positive airway pressure, respiratory therapy assess nares and determine need for appliance change or resting period  Outcome: Progressing     Problem: Musculoskeletal - Adult  Goal: Return mobility to safest level of function  Outcome: Progressing     Problem: Skin/Tissue Integrity  Goal: Skin integrity remains intact  Description: 1.  Monitor for areas of redness and/or skin breakdown  2.  Assess vascular access sites hourly  3.  Every 4-6 hours minimum:  Change oxygen saturation probe site  4.  Every 4-6 hours:  If on nasal continuous positive airway pressure, respiratory therapy assess nares and determine need for appliance change or resting period  Outcome: Progressing     Problem: Discharge Planning  Goal: Discharge to home or other facility with appropriate resources  Outcome: Progressing

## 2025-07-27 NOTE — PROGRESS NOTES
Hospitalist Progress Note    NAME:   Nicole Galeano   : 1945   MRN: [id number]     Date/Time: 2025 10:32 AM  Patient PCP: Tessie Martinez FNP    Estimated discharge date:   Barriers: Clinical improvement, placement?, family meeting Monday      Assessment / Plan:    HPI: The patient is a 80 y/o female PMHx of COPD, rheumatoid arthritis, and remote hx breast cancer admitted () by hospital medicine for altered mental status. Recent dx of multiple vertebral compression fractures at OSH, and was Discharged with Narocotics leading to Confusion so admitted for kyphoplasty.Underwent kyphoplasty by IR on (7/10). Aggressive bowel regimen added d/t constipation. Evening (7/15) noted to be mottled with hypotension. CT performed demonstrating pneumoperitoneum. Surgery consulted taken to the OR, now s/p ex- lap/repair for perforated duodenal ulcer. Transferred to the ICU post op on mechanical ventilation.  Patient extubated on .     Acute encephalopathy  Hospital-acquired delirium  Septic shock - in the setting of duodenal perforation. S/p laparotomy on 7/15 with sung patch placement.   Duodenal ulcer - perforation. S/p sung patch on 7/15 by Dr. Han.  -Off levophed gtt since   -CT abdomen -no leak of enteric contrast, improved loculated ascites and pneumoperitoneum, few small collection of intraperitoneal fluid remains  -HEMOPHILUS PARAINFLUENZA beta lactamase negative noted in fluid culture - sensitivity not back yet. Pharmacy to check if Zosyn will cover.   -Continue Zosyn and fluconazole  -Daily labs  -ID on board, expertise appreciated-Possible antibiotic duration will be 2 weeks, with conversion to Augmentin on discharge. Of note patient   - It appears pt does not wish for further intervention.  Psych consulted on , at this time, there was no acute psychiatric intervention needed - patient is cooperative, calm, and has capacity. She voiced wishing to

## 2025-07-27 NOTE — PROGRESS NOTES
End of Shift Note    Bedside shift change report given to FIDEL Gasca (oncoming nurse) by KERI Mo RN (offgoing nurse).  Report included the following information SBAR, Intake/Output, and MAR    Shift worked:  7pm-7am     Shift summary and any significant changes:     Pain managed with PRN oxycodone x1 and scheduled tylenol.  Zofran given x2 for nausea in this shift see MAR. Pt refused her antibiotics she said that they are not doing her good and that she has the right to refuse, the RN tried to educate her on importance of antibiotic and taking the required dose but she still declined.       Concerns for physician to address:  Pt declining her antibiotic     Zone phone for oncoming shift:   [phone]         KERI Mo RN

## 2025-07-28 PROBLEM — R53.1 GENERALIZED WEAKNESS: Status: ACTIVE | Noted: 2025-07-28

## 2025-07-28 PROBLEM — Z66 DNR (DO NOT RESUSCITATE): Status: ACTIVE | Noted: 2025-07-28

## 2025-07-28 PROBLEM — Z71.89 GOALS OF CARE, COUNSELING/DISCUSSION: Status: ACTIVE | Noted: 2025-07-28

## 2025-07-28 PROBLEM — R53.81 PHYSICAL DEBILITY: Status: ACTIVE | Noted: 2025-07-28

## 2025-07-28 PROBLEM — M54.50 ACUTE MIDLINE LOW BACK PAIN WITHOUT SCIATICA: Status: ACTIVE | Noted: 2025-07-28

## 2025-07-28 PROBLEM — Z51.5 PALLIATIVE CARE ENCOUNTER: Status: ACTIVE | Noted: 2025-07-28

## 2025-07-28 PROBLEM — R41.82 ALTERED MENTAL STATUS: Status: ACTIVE | Noted: 2025-07-28

## 2025-07-28 LAB
ANION GAP SERPL CALC-SCNC: 5 MMOL/L (ref 2–12)
BASOPHILS # BLD: 0.04 K/UL (ref 0–0.1)
BASOPHILS NFR BLD: 0.4 % (ref 0–1)
BUN SERPL-MCNC: 10 MG/DL (ref 6–20)
BUN/CREAT SERPL: 30 (ref 12–20)
CALCIUM SERPL-MCNC: 8.1 MG/DL (ref 8.5–10.1)
CHLORIDE SERPL-SCNC: 105 MMOL/L (ref 97–108)
CO2 SERPL-SCNC: 27 MMOL/L (ref 21–32)
CREAT SERPL-MCNC: 0.33 MG/DL (ref 0.55–1.02)
DIFFERENTIAL METHOD BLD: ABNORMAL
EOSINOPHIL # BLD: 0.12 K/UL (ref 0–0.4)
EOSINOPHIL NFR BLD: 1.3 % (ref 0–7)
ERYTHROCYTE [DISTWIDTH] IN BLOOD BY AUTOMATED COUNT: 18 % (ref 11.5–14.5)
GLUCOSE BLD STRIP.AUTO-MCNC: 83 MG/DL (ref 65–117)
GLUCOSE BLD STRIP.AUTO-MCNC: 83 MG/DL (ref 65–117)
GLUCOSE SERPL-MCNC: 73 MG/DL (ref 65–100)
HCT VFR BLD AUTO: 26.3 % (ref 35–47)
HGB BLD-MCNC: 8.1 G/DL (ref 11.5–16)
IMM GRANULOCYTES # BLD AUTO: 0.06 K/UL (ref 0–0.04)
IMM GRANULOCYTES NFR BLD AUTO: 0.7 % (ref 0–0.5)
LYMPHOCYTES # BLD: 0.82 K/UL (ref 0.8–3.5)
LYMPHOCYTES NFR BLD: 8.9 % (ref 12–49)
MCH RBC QN AUTO: 30.7 PG (ref 26–34)
MCHC RBC AUTO-ENTMCNC: 30.8 G/DL (ref 30–36.5)
MCV RBC AUTO: 99.6 FL (ref 80–99)
MONOCYTES # BLD: 0.43 K/UL (ref 0–1)
MONOCYTES NFR BLD: 4.7 % (ref 5–13)
NEUTS SEG # BLD: 7.76 K/UL (ref 1.8–8)
NEUTS SEG NFR BLD: 84 % (ref 32–75)
NRBC # BLD: 0 K/UL (ref 0–0.01)
NRBC BLD-RTO: 0 PER 100 WBC
PLATELET # BLD AUTO: 280 K/UL (ref 150–400)
PMV BLD AUTO: 9.8 FL (ref 8.9–12.9)
POTASSIUM SERPL-SCNC: 4.1 MMOL/L (ref 3.5–5.1)
RBC # BLD AUTO: 2.64 M/UL (ref 3.8–5.2)
SERVICE CMNT-IMP: NORMAL
SERVICE CMNT-IMP: NORMAL
SODIUM SERPL-SCNC: 137 MMOL/L (ref 136–145)
WBC # BLD AUTO: 9.2 K/UL (ref 3.6–11)

## 2025-07-28 PROCEDURE — 6370000000 HC RX 637 (ALT 250 FOR IP): Performed by: INTERNAL MEDICINE

## 2025-07-28 PROCEDURE — 1100000000 HC RM PRIVATE

## 2025-07-28 PROCEDURE — 85025 COMPLETE CBC W/AUTO DIFF WBC: CPT

## 2025-07-28 PROCEDURE — 99233 SBSQ HOSP IP/OBS HIGH 50: CPT

## 2025-07-28 PROCEDURE — 80048 BASIC METABOLIC PNL TOTAL CA: CPT

## 2025-07-28 PROCEDURE — 94760 N-INVAS EAR/PLS OXIMETRY 1: CPT

## 2025-07-28 PROCEDURE — 6360000002 HC RX W HCPCS: Performed by: INTERNAL MEDICINE

## 2025-07-28 PROCEDURE — 2700000000 HC OXYGEN THERAPY PER DAY

## 2025-07-28 PROCEDURE — 6370000000 HC RX 637 (ALT 250 FOR IP): Performed by: SURGERY

## 2025-07-28 PROCEDURE — 6370000000 HC RX 637 (ALT 250 FOR IP): Performed by: STUDENT IN AN ORGANIZED HEALTH CARE EDUCATION/TRAINING PROGRAM

## 2025-07-28 PROCEDURE — 2500000003 HC RX 250 WO HCPCS: Performed by: SURGERY

## 2025-07-28 PROCEDURE — 99497 ADVNCD CARE PLAN 30 MIN: CPT

## 2025-07-28 PROCEDURE — 82962 GLUCOSE BLOOD TEST: CPT

## 2025-07-28 PROCEDURE — 36415 COLL VENOUS BLD VENIPUNCTURE: CPT

## 2025-07-28 PROCEDURE — 6370000000 HC RX 637 (ALT 250 FOR IP): Performed by: NURSE PRACTITIONER

## 2025-07-28 PROCEDURE — 6360000002 HC RX W HCPCS: Performed by: SURGERY

## 2025-07-28 RX ORDER — LORAZEPAM 2 MG/ML
1 CONCENTRATE ORAL
Status: DISCONTINUED | OUTPATIENT
Start: 2025-07-28 | End: 2025-07-31 | Stop reason: HOSPADM

## 2025-07-28 RX ORDER — PANTOPRAZOLE SODIUM 40 MG/1
40 TABLET, DELAYED RELEASE ORAL
Status: DISCONTINUED | OUTPATIENT
Start: 2025-07-28 | End: 2025-07-31

## 2025-07-28 RX ORDER — ACETAMINOPHEN 325 MG/1
325 TABLET ORAL EVERY 4 HOURS PRN
Status: DISCONTINUED | OUTPATIENT
Start: 2025-07-28 | End: 2025-07-31 | Stop reason: HOSPADM

## 2025-07-28 RX ORDER — SODIUM PHOSPHATE, DIBASIC AND SODIUM PHOSPHATE, MONOBASIC 7; 19 G/230ML; G/230ML
1 ENEMA RECTAL DAILY PRN
Status: DISCONTINUED | OUTPATIENT
Start: 2025-07-28 | End: 2025-07-31 | Stop reason: HOSPADM

## 2025-07-28 RX ORDER — BISACODYL 5 MG/1
5 TABLET, DELAYED RELEASE ORAL DAILY PRN
Status: DISCONTINUED | OUTPATIENT
Start: 2025-07-28 | End: 2025-07-31 | Stop reason: HOSPADM

## 2025-07-28 RX ORDER — FLUCONAZOLE 200 MG/1
200 TABLET ORAL ONCE
Status: COMPLETED | OUTPATIENT
Start: 2025-07-28 | End: 2025-07-28

## 2025-07-28 RX ORDER — MORPHINE SULFATE 20 MG/ML
10 SOLUTION ORAL
Refills: 0 | Status: DISCONTINUED | OUTPATIENT
Start: 2025-07-28 | End: 2025-07-31 | Stop reason: HOSPADM

## 2025-07-28 RX ORDER — CALCIUM CARBONATE 500 MG/1
500 TABLET, CHEWABLE ORAL 3 TIMES DAILY PRN
Status: DISCONTINUED | OUTPATIENT
Start: 2025-07-28 | End: 2025-07-31 | Stop reason: HOSPADM

## 2025-07-28 RX ORDER — GLYCOPYRROLATE 0.2 MG/ML
0.2 INJECTION INTRAMUSCULAR; INTRAVENOUS EVERY 4 HOURS PRN
Status: DISCONTINUED | OUTPATIENT
Start: 2025-07-28 | End: 2025-07-31 | Stop reason: HOSPADM

## 2025-07-28 RX ORDER — METOCLOPRAMIDE 10 MG/1
10 TABLET ORAL
Status: DISCONTINUED | OUTPATIENT
Start: 2025-07-28 | End: 2025-07-31

## 2025-07-28 RX ORDER — LOPERAMIDE HYDROCHLORIDE 2 MG/1
2 CAPSULE ORAL PRN
Status: DISCONTINUED | OUTPATIENT
Start: 2025-07-28 | End: 2025-07-31 | Stop reason: HOSPADM

## 2025-07-28 RX ADMIN — PANTOPRAZOLE SODIUM 40 MG: 40 INJECTION, POWDER, FOR SOLUTION INTRAVENOUS at 04:43

## 2025-07-28 RX ADMIN — ACETAMINOPHEN 325 MG: 325 TABLET ORAL at 17:41

## 2025-07-28 RX ADMIN — METHIMAZOLE 2.5 MG: 5 TABLET ORAL at 08:35

## 2025-07-28 RX ADMIN — MORPHINE SULFATE 10 MG: 100 SOLUTION ORAL at 15:40

## 2025-07-28 RX ADMIN — ENOXAPARIN SODIUM 40 MG: 100 INJECTION SUBCUTANEOUS at 08:34

## 2025-07-28 RX ADMIN — CALCIUM CARBONATE (ANTACID) CHEW TAB 500 MG 500 MG: 500 CHEW TAB at 14:24

## 2025-07-28 RX ADMIN — ACETAMINOPHEN 650 MG: 325 TABLET ORAL at 06:21

## 2025-07-28 RX ADMIN — CITALOPRAM HYDROBROMIDE 20 MG: 20 TABLET ORAL at 08:35

## 2025-07-28 RX ADMIN — Medication: at 21:43

## 2025-07-28 RX ADMIN — Medication: at 08:51

## 2025-07-28 RX ADMIN — DIAZEPAM 2 MG: 5 INJECTION, SOLUTION INTRAMUSCULAR; INTRAVENOUS at 20:51

## 2025-07-28 RX ADMIN — SODIUM CHLORIDE, PRESERVATIVE FREE 10 ML: 5 INJECTION INTRAVENOUS at 08:52

## 2025-07-28 RX ADMIN — FLUCONAZOLE 200 MG: 200 TABLET ORAL at 17:42

## 2025-07-28 RX ADMIN — MORPHINE SULFATE 10 MG: 100 SOLUTION ORAL at 17:41

## 2025-07-28 RX ADMIN — MELATONIN 3 MG: at 20:40

## 2025-07-28 RX ADMIN — MORPHINE SULFATE 10 MG: 100 SOLUTION ORAL at 20:40

## 2025-07-28 RX ADMIN — Medication 1 AMPULE: at 08:34

## 2025-07-28 RX ADMIN — PANTOPRAZOLE SODIUM 40 MG: 40 TABLET, DELAYED RELEASE ORAL at 14:24

## 2025-07-28 RX ADMIN — OXYCODONE 5 MG: 5 TABLET ORAL at 08:44

## 2025-07-28 RX ADMIN — CYANOCOBALAMIN TAB 500 MCG 1000 MCG: 500 TAB at 08:35

## 2025-07-28 RX ADMIN — SODIUM CHLORIDE, PRESERVATIVE FREE 10 ML: 5 INJECTION INTRAVENOUS at 20:37

## 2025-07-28 RX ADMIN — MORPHINE SULFATE 10 MG: 100 SOLUTION ORAL at 13:33

## 2025-07-28 RX ADMIN — METOCLOPRAMIDE 10 MG: 10 TABLET ORAL at 20:39

## 2025-07-28 RX ADMIN — ACETAMINOPHEN 650 MG: 325 TABLET ORAL at 00:37

## 2025-07-28 ASSESSMENT — PAIN SCALES - GENERAL
PAINLEVEL_OUTOF10: 0
PAINLEVEL_OUTOF10: 6
PAINLEVEL_OUTOF10: 7
PAINLEVEL_OUTOF10: 7
PAINLEVEL_OUTOF10: 9
PAINLEVEL_OUTOF10: 10
PAINLEVEL_OUTOF10: 8
PAINLEVEL_OUTOF10: 9
PAINLEVEL_OUTOF10: 0
PAINLEVEL_OUTOF10: 6

## 2025-07-28 ASSESSMENT — PAIN - FUNCTIONAL ASSESSMENT
PAIN_FUNCTIONAL_ASSESSMENT: PREVENTS OR INTERFERES SOME ACTIVE ACTIVITIES AND ADLS
PAIN_FUNCTIONAL_ASSESSMENT: PREVENTS OR INTERFERES WITH MANY ACTIVE NOT PASSIVE ACTIVITIES
PAIN_FUNCTIONAL_ASSESSMENT: ACTIVITIES ARE NOT PREVENTED

## 2025-07-28 ASSESSMENT — PAIN DESCRIPTION - DESCRIPTORS
DESCRIPTORS: DISCOMFORT;ACHING;THROBBING
DESCRIPTORS: ACHING
DESCRIPTORS: CRAMPING
DESCRIPTORS: ACHING
DESCRIPTORS: ACHING

## 2025-07-28 ASSESSMENT — PAIN DESCRIPTION - ONSET
ONSET: PROGRESSIVE
ONSET: ON-GOING
ONSET: PROGRESSIVE

## 2025-07-28 ASSESSMENT — PAIN DESCRIPTION - FREQUENCY
FREQUENCY: INTERMITTENT
FREQUENCY: CONTINUOUS
FREQUENCY: INTERMITTENT
FREQUENCY: INTERMITTENT

## 2025-07-28 ASSESSMENT — PAIN DESCRIPTION - ORIENTATION
ORIENTATION: ANTERIOR
ORIENTATION: LEFT;RIGHT;MID
ORIENTATION: LOWER

## 2025-07-28 ASSESSMENT — PAIN DESCRIPTION - LOCATION
LOCATION: ABDOMEN;BACK
LOCATION: ABDOMEN

## 2025-07-28 ASSESSMENT — PAIN DESCRIPTION - PAIN TYPE
TYPE: ACUTE PAIN

## 2025-07-28 ASSESSMENT — PAIN DESCRIPTION - DIRECTION
RADIATING_TOWARDS: NON
RADIATING_TOWARDS: NON

## 2025-07-28 NOTE — PLAN OF CARE
Problem: Respiratory - Adult  Goal: Achieves optimal ventilation and oxygenation  7/28/2025 1941 by Honey Mccracken, RN  Outcome: Progressing  7/28/2025 1047 by Keesha Maciel LPN  Outcome: Progressing     Problem: Neurosensory - Adult  Goal: Achieves stable or improved neurological status  Outcome: Progressing     Problem: Skin/Tissue Integrity - Adult  Goal: Skin integrity remains intact  Description: 1.  Monitor for areas of redness and/or skin breakdown  2.  Assess vascular access sites hourly  3.  Every 4-6 hours minimum:  Change oxygen saturation probe site  4.  Every 4-6 hours:  If on nasal continuous positive airway pressure, respiratory therapy assess nares and determine need for appliance change or resting period  7/28/2025 1047 by Keesha Maciel LPN  Outcome: Progressing     Problem: Pain  Goal: Verbalizes/displays adequate comfort level or baseline comfort level  7/28/2025 1047 by Keesha Maciel LPN  Outcome: Progressing     Problem: Safety - Adult  Goal: Free from fall injury  7/28/2025 1941 by Honey Mccracken RN  Outcome: Progressing  7/28/2025 1047 by Keesha Maciel LPN  Outcome: Progressing

## 2025-07-28 NOTE — PLAN OF CARE
Problem: Pain  Goal: Verbalizes/displays adequate comfort level or baseline comfort level  Outcome: Progressing     Problem: Skin/Tissue Integrity - Adult  Goal: Skin integrity remains intact  Description: 1.  Monitor for areas of redness and/or skin breakdown  2.  Assess vascular access sites hourly  3.  Every 4-6 hours minimum:  Change oxygen saturation probe site  4.  Every 4-6 hours:  If on nasal continuous positive airway pressure, respiratory therapy assess nares and determine need for appliance change or resting period  Outcome: Progressing     Problem: Skin/Tissue Integrity  Goal: Skin integrity remains intact  Description: 1.  Monitor for areas of redness and/or skin breakdown  2.  Assess vascular access sites hourly  3.  Every 4-6 hours minimum:  Change oxygen saturation probe site  4.  Every 4-6 hours:  If on nasal continuous positive airway pressure, respiratory therapy assess nares and determine need for appliance change or resting period  Outcome: Progressing     Problem: Discharge Planning  Goal: Discharge to home or other facility with appropriate resources  Outcome: Progressing

## 2025-07-28 NOTE — ACP (ADVANCE CARE PLANNING)
Advance Care Planning      Palliative Medicine Provider (MD/NP)  Advance Care Planning (ACP) Conversation      Date of Conversation: 07/28/25  The patient and/or authorized decision maker consented to a voluntary Advance Care Planning conversation.   Individuals present for the conversation:   Patient with decision making capacity, Son Jas, and Daughter Nancy    Legal Healthcare Agent(s):    Primary Decision Maker: Nancy Frances - Child - [phone]    ACP documents available in EMR prior to discussion:  -None    Primary Palliative Diagnosis(es):  COPD    Conversation Summary:  Code Status:  -We talked about resuscitation status, and you have elected “Do Not Attempt Resuscitation” status and signed the DNR form today. This will protect you from harsh things that will not help you.      Discussed that a resuscitation attempt is unlikely to be effective or beneficial for patient and that having a DNR would allow her to pass as naturally and as comfortably as possible without the pain of CPR.      Resuscitation Status:    Code Status: DNR    Outcomes / Completed Documentation:  An explanation of advance directives and their importance was provided and the following forms completed:    -Portable DNR    If new document completed, original was provided to patient and/or family member.    Copy was placed for scanning into the [facility] EMR.      I spent 30 minutes providing separately identifiable ACP services with the patient and/or surrogate decision maker in a voluntary, in-person conversation discussing the patient's wishes and goals as detailed in the above note.       Dash Camarena, APRN - CNP

## 2025-07-28 NOTE — PROGRESS NOTES
Hospitalist Progress Note    NAME:   Nicole Galeano   : 1945   MRN: [id number]     Date/Time: 2025 12:26 PM  Patient PCP: Tessie Martinez FNP    Estimated discharge date: Medically stable for hospice discharge  Barriers: Medically stable for hospice discharge      Assessment / Plan:    HPI: The patient is a 80 y/o female PMHx of COPD, rheumatoid arthritis, and remote hx breast cancer admitted () by hospital medicine for altered mental status. Recent dx of multiple vertebral compression fractures at OSH, and was Discharged with Narocotics leading to Confusion so admitted for kyphoplasty.Underwent kyphoplasty by IR on (7/10). Aggressive bowel regimen added d/t constipation. Evening (7/15) noted to be mottled with hypotension. CT performed demonstrating pneumoperitoneum. Surgery consulted taken to the OR, now s/p ex- lap/repair for perforated duodenal ulcer. Transferred to the ICU post op on mechanical ventilation.  Patient extubated on .     Acute encephalopathy  Hospital-acquired delirium  Septic shock - in the setting of duodenal perforation. S/p laparotomy on 7/15 with sung patch placement.   Duodenal ulcer - perforation. S/p sung patch on 7/15 by Dr. Han.  -Off levophed gtt since   -CT abdomen -no leak of enteric contrast, improved loculated ascites and pneumoperitoneum, few small collection of intraperitoneal fluid remains  -HEMOPHILUS PARAINFLUENZA beta lactamase negative noted in fluid culture - sensitivity not back yet.     -ID on board, expertise appreciated-plan was for total 14 days of coverage. Patient intolerant of treatment, desires hospice. Abx discontinued.  - It appears pt does not wish for further intervention.  Psych consulted on , at this time, there was no acute psychiatric intervention needed - patient is cooperative, calm, and has capacity. She voiced wishing to transition to comfort measures, asked if this was \"too selfish since her

## 2025-07-28 NOTE — PROGRESS NOTES
tube for a defined trial period       Please refer to Palliative Medicine ACP notes for further details.    PALLIATIVE ASSESSMENT:      Palliative Performance Scale (PPS):  PPS: 40    ECOG:   ECOG Status : Completely disabled [4]    Modified ESAS:       Clinical Pain Assessment (nonverbal scale for severity on nonverbal patients):           NVPS:  Adult Nonverbal Pain Scale (NVPS)  Face: Occasional grimace, tearing, frowning, wrinkled forehead  Activity (Movement): Laid quietly, normal position  Guarding: Lying quietly, no positioning of hands over areas of bod  Physiology (Vital Signs): Change in any of the following: SBP > 20 mm HG or HR > 20/minute  Respiratory: Baseline RR/SpO2 compliant with ventilator  NVPS Score : 2    RDOS:  RDOS  Heart rate per minute: less than 90  Respiratory Rate per Minute: less than 19  Restlessness:non-purposeful: None  Paradoxical breathing pattern:abdomen moves in on inspiration: None  Accessory muscle use: rise in clavicle during inspiration: None  Grunting at end-expiration: guttural sound: None  Nasal flaring: involuntary movement of nares: None  Look of fear: None  Total : 0      Vital Signs: Blood pressure 135/83, pulse 69, temperature 97.5 °F (36.4 °C), temperature source Oral, resp. rate 16, height 1.575 m (5' 2.01\"), weight 72.8 kg (160 lb 7.9 oz), SpO2 95%.    PHYSICAL ASSESSMENT:   General: [x] Oriented x 2  [] Well appearing  [] Intubated  []Ill appearing  []Other:  Mental Status: [x] Normal mental status exam  [] Drowsy  [] Confused  []Other:  Cardiovascular: [x] Regular rate/rhythm  [] Arrhythmia  [] Other:  Chest: [x] Effort normal  []Lungs clear  [] Respiratory distress  []Tachypnea  [] Other:  Abdomen: [] Soft/non-tender  [] Normal appearance  [x] Distended  [] Ascites  [] Other:  Neurological: [] Normal speech  [] Normal sensation  [x]Deficits present:  Extremity: [] Normal skin color/temp  [] Clubbing/cyanosis  [] No edema  [] Other:    Wt Readings from Last 15  This time does not include time spent in any separately reportable services.    Electronically signed by   ISAIAH Greene - CNP  Palliative Care Team  on 7/28/2025 at 10:35 AM

## 2025-07-28 NOTE — PROGRESS NOTES
End of Shift Note    Bedside shift change report given to FIDEL LIAO (oncoming nurse) by KERI Mo RN (offgoing nurse).  Report included the following information SBAR, Intake/Output, and MAR    Shift worked:  7pm-7am     Shift summary and any significant changes:     Pt slept most of the hours this night. No complain of pain,nausea/vomit. Continue to decline her antibiotic, other scheduled meds  given as per MAR. Poor feeding but encourage to drink orange juice which she did. Incontinence care completed.     Concerns for physician to address:       Zone phone for oncoming shift:   [phone]           KERI Mo RN

## 2025-07-28 NOTE — PROGRESS NOTES
End of Shift Note    Bedside shift change report given to Carrol PARRA  (oncoming nurse) by Keesha Maciel LPN (offgoing nurse).  Report included the following information SBAR, Kardex, Intake/Output, MAR, Accordion, Recent Results, and Med Rec Status    Shift worked:  7a-11a     Shift summary and any significant changes:     VSS. Patient turned q2hrs and heels in heel protectors. Given scheduled medications along with oxycodone for pain. No urine output or BM. On 2L NC  No active complaints at this time.   Transferred to [facility].   Concerns for physician to address:  none     Zone phone for oncoming shift:  [phone]       Activity:  Level of Assistance: Moderate assist, patient does 50-74%  Number times ambulated in hallways past shift: 0  Number of times OOB to chair past shift: 0    Cardiac:   Cardiac Monitoring: Yes      Cardiac Rhythm: Sinus rhythm    Access:  Current line(s): PICC    Genitourinary:   Urinary Status: Voiding    Respiratory:   O2 Device: Nasal cannula  Chronic home O2 use?: NO  Incentive spirometer at bedside: NO    GI:  Last BM (including prior to admit): 07/28/25  Current diet:  DIET ONE TIME MESSAGE;  ADULT ORAL NUTRITION SUPPLEMENT; Breakfast, Lunch, Dinner; Standard High Calorie/High Protein Oral Supplement  DIET ONE TIME MESSAGE;  ADULT DIET; Regular; Low Fiber  Passing flatus: NO    Pain Management:   Patient states pain is manageable on current regimen: YES    Skin:  Rafa Scale Score: 14  Interventions: Wound Offloading (Prevention Methods): Repositioning, Pillows, Heel boots    Patient Safety:  Fall Risk: Nursing Judgement-Fall Risk High(Add Comments): Yes  Fall Risk Interventions  Nursing Judgement-Fall Risk High(Add Comments): Yes  Toilet Every 2 Hours-In Advance of Need: Yes  Hourly Visual Checks: Awake, In bed  Fall Visual Posted: Socks  Room Door Open: Yes  Alarm On: Bed  Patient Moved Closer to Nursing Station: No    Active Consults:   IP CONSULT TO INTERVENTIONAL RADIOLOGY  IP CONSULT TO

## 2025-07-28 NOTE — CARE COORDINATION
Transition of Care Plan:    RUR:  19%  Prior Level of Functioning: needs assist with ADL's  Disposition: likely LTC with hospice  CELIA: 7/27/2025  If SNF or IPR: Date FOC offered: reoffered on 7/29/2025  Date FOC received:   Accepting facility: pending for LTC  Date authorization started with reference number:   Date authorization received and expires:   Follow up appointments:   DME needed:   Transportation at discharge: likely BLS  IM/IMM Medicare/ letter given: 7/28/2025  Is patient a  and connected with [facility]?    If yes, was  transfer form completed and [facility] notified?   Caregiver Contact: Nancy arredondo [phone]  Jas Daniels [phone]  Discharge Caregiver contacted prior to discharge? yes  Care Conference needed?   Barriers to discharge: ID clearance, placement    Palliative met with Patient and family and they have decided on long term care with hospice- CM explained that long term beds are not readily available and mass referrals to be sent out - family would like to try [facility] -  to send out referrals for long term care and await acceptance at this time. SHANNAN PEREZ, MSW  [phone]

## 2025-07-29 PROCEDURE — 94760 N-INVAS EAR/PLS OXIMETRY 1: CPT

## 2025-07-29 PROCEDURE — 2700000000 HC OXYGEN THERAPY PER DAY

## 2025-07-29 PROCEDURE — 6370000000 HC RX 637 (ALT 250 FOR IP): Performed by: INTERNAL MEDICINE

## 2025-07-29 PROCEDURE — 1100000000 HC RM PRIVATE

## 2025-07-29 PROCEDURE — 99232 SBSQ HOSP IP/OBS MODERATE 35: CPT

## 2025-07-29 PROCEDURE — 6370000000 HC RX 637 (ALT 250 FOR IP): Performed by: SURGERY

## 2025-07-29 PROCEDURE — 6370000000 HC RX 637 (ALT 250 FOR IP): Performed by: STUDENT IN AN ORGANIZED HEALTH CARE EDUCATION/TRAINING PROGRAM

## 2025-07-29 PROCEDURE — 2500000003 HC RX 250 WO HCPCS: Performed by: SURGERY

## 2025-07-29 RX ADMIN — MELATONIN 3 MG: at 20:12

## 2025-07-29 RX ADMIN — METOCLOPRAMIDE 10 MG: 10 TABLET ORAL at 06:14

## 2025-07-29 RX ADMIN — METHIMAZOLE 2.5 MG: 5 TABLET ORAL at 08:26

## 2025-07-29 RX ADMIN — Medication: at 20:15

## 2025-07-29 RX ADMIN — SODIUM CHLORIDE, PRESERVATIVE FREE 10 ML: 5 INJECTION INTRAVENOUS at 08:27

## 2025-07-29 RX ADMIN — Medication: at 08:27

## 2025-07-29 RX ADMIN — METOCLOPRAMIDE 10 MG: 10 TABLET ORAL at 20:12

## 2025-07-29 RX ADMIN — CITALOPRAM HYDROBROMIDE 20 MG: 20 TABLET ORAL at 08:26

## 2025-07-29 RX ADMIN — MORPHINE SULFATE 10 MG: 100 SOLUTION ORAL at 08:26

## 2025-07-29 RX ADMIN — CYANOCOBALAMIN TAB 500 MCG 1000 MCG: 500 TAB at 08:26

## 2025-07-29 RX ADMIN — PANTOPRAZOLE SODIUM 40 MG: 40 TABLET, DELAYED RELEASE ORAL at 06:14

## 2025-07-29 RX ADMIN — SODIUM CHLORIDE, PRESERVATIVE FREE 10 ML: 5 INJECTION INTRAVENOUS at 19:19

## 2025-07-29 RX ADMIN — METOCLOPRAMIDE 10 MG: 10 TABLET ORAL at 11:11

## 2025-07-29 RX ADMIN — PANTOPRAZOLE SODIUM 40 MG: 40 TABLET, DELAYED RELEASE ORAL at 17:13

## 2025-07-29 RX ADMIN — METOCLOPRAMIDE 10 MG: 10 TABLET ORAL at 17:13

## 2025-07-29 ASSESSMENT — PAIN DESCRIPTION - FREQUENCY: FREQUENCY: CONTINUOUS

## 2025-07-29 ASSESSMENT — PAIN DESCRIPTION - PAIN TYPE: TYPE: ACUTE PAIN

## 2025-07-29 ASSESSMENT — PAIN DESCRIPTION - LOCATION: LOCATION: BACK

## 2025-07-29 ASSESSMENT — PAIN DESCRIPTION - DESCRIPTORS: DESCRIPTORS: ACHING

## 2025-07-29 ASSESSMENT — PAIN DESCRIPTION - ORIENTATION: ORIENTATION: MID;UPPER;LOWER

## 2025-07-29 ASSESSMENT — PAIN SCALES - GENERAL
PAINLEVEL_OUTOF10: 3
PAINLEVEL_OUTOF10: 0
PAINLEVEL_OUTOF10: 3
PAINLEVEL_OUTOF10: 7
PAINLEVEL_OUTOF10: 3

## 2025-07-29 ASSESSMENT — PAIN - FUNCTIONAL ASSESSMENT: PAIN_FUNCTIONAL_ASSESSMENT: PREVENTS OR INTERFERES SOME ACTIVE ACTIVITIES AND ADLS

## 2025-07-29 ASSESSMENT — PAIN DESCRIPTION - DIRECTION: RADIATING_TOWARDS: NO

## 2025-07-29 ASSESSMENT — PAIN DESCRIPTION - ONSET: ONSET: ON-GOING

## 2025-07-29 NOTE — PROGRESS NOTES
End of Shift Note    Bedside shift change report given to Honey PARRA (oncoming nurse) by Carrol Mauricio RN (offgoing nurse).  Report included the following information SBAR, Kardex, Intake/Output, and MAR    Shift worked:  7A - 7P      Shift summary and any significant changes:    Pt transferred from surg tele. Pt made comfortable. Medications given per MAR. PRN morphine given x3 this shift. PRN tylenol given x1.     Patient complaining of severe indigestion - PRN tums ordered and given. Scheduled Protonix given. Patient was still complaining of indigestion after interventions - Freedom CUMMINS made aware and Reglan and pepto bismol ordered.     Patient stated that she wanted to continue her yeast infection medication. Freedom CUMMINS made aware and Diflucan PO ordered for one time dose as it was her last dose.     Caring rounds completed      Concerns for physician to address:     Zone phone for oncoming shift:        Activity:  Level of Assistance: Moderate assist, patient does 50-74%    Cardiac:   Cardiac Monitoring:  no    Access:  Current line(s): PICC    Genitourinary:   Urinary Status: External catheter    Respiratory:   O2 Device: Nasal cannula    GI:  Current diet: DIET ONE TIME MESSAGE;  ADULT ORAL NUTRITION SUPPLEMENT; Breakfast, Lunch, Dinner; Standard High Calorie/High Protein Oral Supplement  DIET ONE TIME MESSAGE;  ADULT DIET; Regular; Low Fiber    Pain Management:   Patient states pain is manageable on current regimen: YES    Skin:  Rafa Scale Score: 14  Interventions: Wound Offloading (Prevention Methods): Repositioning, Pillows, Turning, Elevate heels  Pressure injury: no    Patient Safety:  Fall Score: Fish Total Score: 55  Fall Risk Interventions  Nursing Judgement-Fall Risk High(Add Comments): Yes  Toilet Every 2 Hours-In Advance of Need: Yes  Hourly Visual Checks: Awake, In bed  Fall Visual Posted: Socks  Room Door Open: Yes  Alarm On: Bed  Patient Moved Closer to Nursing Station: No    Active

## 2025-07-29 NOTE — PROGRESS NOTES
End of Shift Note    Bedside shift change report given to FIDEL Altamirano (oncoming nurse) by Honey Mccracken RN (offgoing nurse).  Report included the following information SBAR, Kardex, and MAR    Shift worked:  1900-700     Shift summary and any significant changes:    Pt received scheduled meds per MAR. Pt received PRN melatonin, PRN Valium, and PRN morphine. Line care complete. Pt safety and caring rounds complete.      Concerns for physician to address: Place actual comfort measures order    Can pt keep PICC for IV meds until pt is discharged to avoid sticking pt for a PIV?      Zone phone for oncoming shift:  [phone]       Activity:  Level of Assistance: Moderate assist, patient does 50-74%  Number times ambulated in hallways past shift: 0  Number of times OOB to chair past shift: 0    Cardiac:   Cardiac Monitoring: No      Cardiac Rhythm: Sinus rhythm    Access:  Current line(s): PICC    Genitourinary:   Urinary Status: External catheter    Respiratory:   O2 Device: Nasal cannula  Chronic home O2 use?: YES  Incentive spirometer at bedside: NO    GI:  Last BM (including prior to admit): 07/28/25  Current diet:  DIET ONE TIME MESSAGE;  ADULT ORAL NUTRITION SUPPLEMENT; Breakfast, Lunch, Dinner; Standard High Calorie/High Protein Oral Supplement  DIET ONE TIME MESSAGE;  ADULT DIET; Regular; Low Fiber  Passing flatus: YES    Pain Management:   Patient states pain is manageable on current regimen: YES    Skin:  Rafa Scale Score: 14  Interventions: Wound Offloading (Prevention Methods): Repositioning, Pillows, Turning, Elevate heels    Patient Safety:  Fall Risk: Nursing Judgement-Fall Risk High(Add Comments): Yes  Fall Risk Interventions  Nursing Judgement-Fall Risk High(Add Comments): Yes  Toilet Every 2 Hours-In Advance of Need: Yes  Hourly Visual Checks: Awake, In bed  Fall Visual Posted: Socks  Room Door Open: Yes  Alarm On: Bed  Patient Moved Closer to Nursing Station: No    Active Consults:   IP CONSULT TO

## 2025-07-29 NOTE — PLAN OF CARE
Problem: Respiratory - Adult  Goal: Achieves optimal ventilation and oxygenation  7/28/2025 2001 by Carrol Mauricio RN  Outcome: Progressing  7/28/2025 1941 by Honey Mccracken RN  Outcome: Progressing  7/28/2025 1047 by Keesha Maciel LPN  Outcome: Progressing     Problem: Pain  Goal: Verbalizes/displays adequate comfort level or baseline comfort level  7/28/2025 2001 by Carrol Mauricio RN  Outcome: Progressing  7/28/2025 1047 by Keesha Maciel LPN  Outcome: Progressing     Problem: Neurosensory - Adult  Goal: Achieves stable or improved neurological status  7/28/2025 2001 by Carrol Mauricio RN  Outcome: Progressing  7/28/2025 1941 by Honey Mccracken RN  Outcome: Progressing  Goal: Achieves maximal functionality and self care  Outcome: Progressing     Problem: Cardiovascular - Adult  Goal: Absence of cardiac dysrhythmias or at baseline  Outcome: Progressing     Problem: Skin/Tissue Integrity - Adult  Goal: Skin integrity remains intact  Description: 1.  Monitor for areas of redness and/or skin breakdown  2.  Assess vascular access sites hourly  3.  Every 4-6 hours minimum:  Change oxygen saturation probe site  4.  Every 4-6 hours:  If on nasal continuous positive airway pressure, respiratory therapy assess nares and determine need for appliance change or resting period  7/28/2025 2001 by Carrol Mauricio RN  Outcome: Progressing  7/28/2025 1047 by Keesha Maciel LPN  Outcome: Progressing  Goal: Incisions, wounds, or drain sites healing without S/S of infection  Outcome: Progressing     Problem: Musculoskeletal - Adult  Goal: Return mobility to safest level of function  Outcome: Progressing  Goal: Return ADL status to a safe level of function  Outcome: Progressing     Problem: Gastrointestinal - Adult  Goal: Maintains or returns to baseline bowel function  Outcome: Progressing     Problem: Genitourinary - Adult  Goal: Absence of urinary retention  Outcome: Progressing     Problem: Infection - Adult  Goal: Absence  of infection during hospitalization  Outcome: Progressing     Problem: Metabolic/Fluid and Electrolytes - Adult  Goal: Electrolytes maintained within normal limits  Outcome: Progressing     Problem: Safety - Adult  Goal: Free from fall injury  7/28/2025 2001 by Carrol Mauricio RN  Outcome: Progressing  7/28/2025 1941 by Honey Mccracken RN  Outcome: Progressing  7/28/2025 1047 by Keesha Maciel LPN  Outcome: Progressing     Problem: Confusion  Goal: Confusion, delirium, dementia, or psychosis is improved or at baseline  Description: INTERVENTIONS:  1. Assess for possible contributors to thought disturbance, including medications, impaired vision or hearing, underlying metabolic abnormalities, dehydration, psychiatric diagnoses, and notify attending LIP  2. [place] high risk fall precautions, as indicated  3. Provide frequent short contacts to provide reality reorientation, refocusing and direction  4. Decrease environmental stimuli, including noise as appropriate  5. Monitor and intervene to maintain adequate nutrition, hydration, elimination, sleep and activity  6. If unable to ensure safety without constant attention obtain sitter and review sitter guidelines with assigned personnel  7. Initiate Psychosocial CNS and Spiritual Care consult, as indicated  7/28/2025 2001 by Carrol Mauricio RN  Outcome: Progressing  7/28/2025 1941 by Honey Mccracken RN  Outcome: Progressing     Problem: Skin/Tissue Integrity  Goal: Skin integrity remains intact  Description: 1.  Monitor for areas of redness and/or skin breakdown  2.  Assess vascular access sites hourly  3.  Every 4-6 hours minimum:  Change oxygen saturation probe site  4.  Every 4-6 hours:  If on nasal continuous positive airway pressure, respiratory therapy assess nares and determine need for appliance change or resting period  7/28/2025 2001 by Carrol Mauricio RN  Outcome: Progressing  7/28/2025 1047 by Keesha Maciel LPN  Outcome: Progressing     Problem:

## 2025-07-29 NOTE — PROGRESS NOTES
Palliative Medicine  Patient Name: Nicole Galeano  YOB: 1945  MRN: [id number]  Age: 79 y.o.  Gender: female    Date of Initial Consult: 7/25/2025  Date of Service: 7/29/2025  Time: 12:12 PM  Provider: ISAIAH Greene CNP  Hospital Day: 24  Admit Date: 7/6/2025  Referring Provider: Grecia Shaikh MD      Reasons for Consultation:  Goals of Care    HISTORY OF PRESENT ILLNESS (HPI):   Nicole Galeano is a 79 y.o. female with a past medical history of COPD, rheumatoid arthritis, and remote hx breast cancer, who was admitted on 7/6/2025 from [facility] with complaints of back pain.     Recent dx of multiple vertebral compression fractures at OSH, and was Discharged with Narocotics leading to Confusion so admitted for kyphoplasty.Underwent kyphoplasty by IR on (7/10). Aggressive bowel regimen added d/t constipation. Evening (7/15) noted to be mottled with hypotension. CT performed demonstrating pneumoperitoneum. Surgery consulted taken to the OR, now s/p ex- lap/repair for perforated duodenal ulcer. Transferred to the ICU post op on mechanical ventilation. Patient extubated on 7/16.     Psychosocial: Patient  with two children (Nancy and Jas).  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      PALLIATIVE DIAGNOSES:    Acute back pain - s/p kypoplasty  Generalized weakness  Physical debility  DNR  Goals of care  Palliative care encounter      ASSESSMENT AND PLAN:   Today, I am following up with Nicole Galeano to address goals of care.  Reviewed medical chart including admit H&P, consultant notes, MAR, and recent labs/imaging.  Ms. Galeano was seen and evaluated, no family at bedside.   At time of my arrival, she was resting in bed in no acute distress.  On exam the patient awake is AAOx4  and able to participate in meaningful discussion.   Patient reported feeling \"great\", she denies pain, shortness of breath,

## 2025-07-29 NOTE — PROGRESS NOTES
Nutrition Note    Noted CMO orders, RD team to sign off, please re-consult PRN.    Electronically signed by Maria Antonia Rincon RD on 7/29/25 at 9:36 AM EDT    Contact: [phone]

## 2025-07-29 NOTE — PROGRESS NOTES
End of Shift Note    Bedside shift change report given to FIDEL Méndez (oncoming nurse) by VINOD REINA RN (offgoing nurse).  Report included the following information SBAR, Kardex, and MAR    Shift worked:  7am-7pm     Shift summary and any significant changes:     Pt tolerated care fairly well. Medications were given and education was provided. Hourly rounding completed. Pt complaints of pain were treated with PRN pain medications (See MAR); PRN Morphine given x1. Incontinence care completed. Comfort measures provided. Line care completed. PICC line dressing changed.      Concerns for physician to address:  N/A     [facility] phone for oncoming shift:   [phone]       Activity:  Level of Assistance: Moderate assist, patient does 50-74%  Number times ambulated in hallways past shift: 0  Number of times OOB to chair past shift: 0    Cardiac:   Cardiac Monitoring: No      Cardiac Rhythm: Sinus rhythm    Access:  Current line(s): PICC    Genitourinary:   Urinary Status: Voiding, External catheter    Respiratory:   O2 Device: Nasal cannula  Chronic home O2 use?: NO  Incentive spirometer at bedside: N/A    GI:  Last BM (including prior to admit): 07/28/25  Current diet:  DIET ONE TIME MESSAGE;  ADULT ORAL NUTRITION SUPPLEMENT; Breakfast, Lunch, Dinner; Standard High Calorie/High Protein Oral Supplement  DIET ONE TIME MESSAGE;  ADULT DIET; Regular; Low Fiber  Passing flatus: NO    Pain Management:   Patient states pain is manageable on current regimen: YES    Skin:  Rafa Scale Score: 15  Interventions: Wound Offloading (Prevention Methods): Bed, pressure reduction mattress, Elevate heels, Pillows, Repositioning, Turning    Patient Safety:  Fall Risk: Nursing Judgement-Fall Risk High(Add Comments): Yes  Fall Risk Interventions  Nursing Judgement-Fall Risk High(Add Comments): Yes  Toilet Every 2 Hours-In Advance of Need: Yes  Hourly Visual Checks: Awake, In bed  Fall Visual Posted: Fall sign posted, Socks  Room Door Open:

## 2025-07-29 NOTE — CARE COORDINATION
CM followed up with referrals that were sent for [facility].  Four options are available for pt and family to choose from:    [facility]   [facility]    CM spoke with pts son: Jas, via telephone regarding the following placement options.CM provided Jas with the following options.  Jas reported that family will review and provide CM with the following options.  Jas inquired about facilities in the [facility].  CM informed Jas that referrals were sent, and currently no responses yet.  CM informed Jas that bed availability  is often limited for LTC placement with hospice.    CM informed Jas that pt is potentially medically stable to d/c and choice for placement are pending d/c.  Jas reported that he will be able to give CM a call regarding potential choice by [facility],    CM will escalate the following to CM  for additional support.    LOLIS Davis CM  [phone]

## 2025-07-30 ENCOUNTER — TELEPHONE (OUTPATIENT)
Age: 80
End: 2025-07-30

## 2025-07-30 PROCEDURE — 99232 SBSQ HOSP IP/OBS MODERATE 35: CPT

## 2025-07-30 PROCEDURE — 2500000003 HC RX 250 WO HCPCS: Performed by: SURGERY

## 2025-07-30 PROCEDURE — 1100000000 HC RM PRIVATE

## 2025-07-30 PROCEDURE — 6370000000 HC RX 637 (ALT 250 FOR IP): Performed by: INTERNAL MEDICINE

## 2025-07-30 PROCEDURE — 6370000000 HC RX 637 (ALT 250 FOR IP): Performed by: SURGERY

## 2025-07-30 PROCEDURE — 6360000002 HC RX W HCPCS: Performed by: INTERNAL MEDICINE

## 2025-07-30 PROCEDURE — 6370000000 HC RX 637 (ALT 250 FOR IP): Performed by: STUDENT IN AN ORGANIZED HEALTH CARE EDUCATION/TRAINING PROGRAM

## 2025-07-30 PROCEDURE — 94760 N-INVAS EAR/PLS OXIMETRY 1: CPT

## 2025-07-30 PROCEDURE — 2700000000 HC OXYGEN THERAPY PER DAY

## 2025-07-30 RX ADMIN — METHIMAZOLE 2.5 MG: 5 TABLET ORAL at 07:34

## 2025-07-30 RX ADMIN — Medication: at 21:31

## 2025-07-30 RX ADMIN — SODIUM CHLORIDE, PRESERVATIVE FREE 10 ML: 5 INJECTION INTRAVENOUS at 21:00

## 2025-07-30 RX ADMIN — PANTOPRAZOLE SODIUM 40 MG: 40 TABLET, DELAYED RELEASE ORAL at 06:19

## 2025-07-30 RX ADMIN — CITALOPRAM HYDROBROMIDE 20 MG: 20 TABLET ORAL at 07:34

## 2025-07-30 RX ADMIN — LORAZEPAM 1 MG: 2 SOLUTION, CONCENTRATE ORAL at 07:43

## 2025-07-30 RX ADMIN — MORPHINE SULFATE 10 MG: 100 SOLUTION ORAL at 07:32

## 2025-07-30 RX ADMIN — METOCLOPRAMIDE 10 MG: 10 TABLET ORAL at 06:19

## 2025-07-30 RX ADMIN — Medication: at 07:36

## 2025-07-30 RX ADMIN — CYANOCOBALAMIN TAB 500 MCG 1000 MCG: 500 TAB at 07:34

## 2025-07-30 RX ADMIN — ACETAMINOPHEN 325 MG: 325 TABLET ORAL at 02:04

## 2025-07-30 RX ADMIN — METOCLOPRAMIDE 10 MG: 10 TABLET ORAL at 21:26

## 2025-07-30 RX ADMIN — SODIUM CHLORIDE, PRESERVATIVE FREE 10 ML: 5 INJECTION INTRAVENOUS at 07:43

## 2025-07-30 RX ADMIN — DIAZEPAM 2 MG: 5 INJECTION, SOLUTION INTRAMUSCULAR; INTRAVENOUS at 02:04

## 2025-07-30 ASSESSMENT — PAIN DESCRIPTION - DIRECTION: RADIATING_TOWARDS: NO

## 2025-07-30 ASSESSMENT — PAIN DESCRIPTION - PAIN TYPE
TYPE: CHRONIC PAIN
TYPE: CHRONIC PAIN

## 2025-07-30 ASSESSMENT — PAIN - FUNCTIONAL ASSESSMENT
PAIN_FUNCTIONAL_ASSESSMENT: PREVENTS OR INTERFERES SOME ACTIVE ACTIVITIES AND ADLS
PAIN_FUNCTIONAL_ASSESSMENT: ACTIVITIES ARE NOT PREVENTED

## 2025-07-30 ASSESSMENT — PAIN DESCRIPTION - ORIENTATION
ORIENTATION: LOWER
ORIENTATION: MID

## 2025-07-30 ASSESSMENT — PAIN SCALES - GENERAL
PAINLEVEL_OUTOF10: 0
PAINLEVEL_OUTOF10: 6
PAINLEVEL_OUTOF10: 7

## 2025-07-30 ASSESSMENT — PAIN DESCRIPTION - LOCATION
LOCATION: ABDOMEN
LOCATION: BACK

## 2025-07-30 ASSESSMENT — PAIN DESCRIPTION - FREQUENCY
FREQUENCY: CONTINUOUS
FREQUENCY: CONTINUOUS

## 2025-07-30 ASSESSMENT — PAIN DESCRIPTION - ONSET
ONSET: ON-GOING
ONSET: ON-GOING

## 2025-07-30 ASSESSMENT — PAIN DESCRIPTION - DESCRIPTORS
DESCRIPTORS: ACHING
DESCRIPTORS: THROBBING;ACHING;SHARP

## 2025-07-30 NOTE — TELEPHONE ENCOUNTER
Patients daughter Nancy Frances called in wanted to speak to \"Gail\" the  about care for her mom moving forward     CB: [phone]

## 2025-07-30 NOTE — BSMART NOTE
BSMART Liaison Team Note     LOS:  24 days     Patient goal(s) for today: take medications as prescribed, make needs known in an appropriate manner  BSMART Liaison team focus/goals: assess needs, provide support and education, coordinate care    Progress note: Liaison attempted to meet f/f with pt 2x in her room on the medical unit at [facility], but she was sleeping soundly and could not be aroused for session together. Liaison did consult with pt's primary nurse. She reports that pt has been referred for hospice care. Referrals for care providers reportedly being sought by case management. Per nurse, pt has had periods of slight confusion, but pt mostly oriented x 4. Pt reportedly able to verbalize her needs.     Admission Status: Voluntary    Barriers to Discharge: case management for referrals   Insurance info/prescription coverage:  MEDICARE PART A AND B,   [facility] MEDICAID [facility], [place]ICAID [place]   Outpatient provider(s): Psychiatric provider, Dr. Waldrop and psychologist, Dr. Milton    Diagnosis:   Past Medical History:   Diagnosis Date    Arthritis     Breast cancer (HCC) 10/2020    Breast cancer, left breast (HCC) 2003, 2012    lumpectomy/chemo/XRT, then mastectomy and anastrozole in 2012    Cancer (HCC)     SEVERAL SKIN - MELANOMA    COPD (chronic obstructive pulmonary disease) (HCC)     Hypertension     Ill-defined condition     MACULAR DEGENERATION      Plan:  Defer to medical and case management team notes. Liaison team to monitor and to support. Pt being referred for hospice care per primary nurse.    Follow up Psych Consult placed? No  Psych Consult completed? No   Psychiatrist updated? No        Participating treatment team members: Nicole Galeano, Mynor Cervantes, MONTSE

## 2025-07-30 NOTE — PROGRESS NOTES
End of Shift Note    Bedside shift change report given to Apple LOPEZ (oncoming nurse) by Carrol Mauricio RN (offgoing nurse).  Report included the following information SBAR, Kardex, Intake/Output, and MAR    Shift worked:  7a - 7p     Shift summary and any significant changes:    Pt tolerated care. Pt did become more lethargic at end of shift. Medications given per MAR, Evening reglan and protnix held due to patient being lethargic. PRN Ativan and morphine given in beginning of shift, per pt request.     CMO orders in place.    Pt refused all three meals. Encouragement provided    Patient repositioned throughout shift.     Caring rounds completed      Concerns for physician to address:      Zone phone for oncoming shift:       Activity:  Level of Assistance: Moderate assist, patient does 50-74%    Cardiac:   Cardiac Monitoring:  no    Access:  Current line(s): PICC    Genitourinary:   Urinary Status: Voiding, External catheter    Respiratory:   O2 Device: Nasal cannula    GI:  Current diet: DIET ONE TIME MESSAGE;  ADULT ORAL NUTRITION SUPPLEMENT; Breakfast, Lunch, Dinner; Standard High Calorie/High Protein Oral Supplement  DIET ONE TIME MESSAGE;  ADULT DIET; Regular; Low Fiber    Pain Management:   Patient states pain is manageable on current regimen: YES    Skin:  Rafa Scale Score: 15  Interventions: Wound Offloading (Prevention Methods): Repositioning, Pillows, Turning, Elevate heels  Pressure injury: no    Patient Safety:  Fall Score: Fish Total Score: 55  Fall Risk Interventions  Nursing Judgement-Fall Risk High(Add Comments): Yes  Toilet Every 2 Hours-In Advance of Need: Yes  Hourly Visual Checks: Awake, In bed  Fall Visual Posted: Fall sign posted, Socks  Room Door Open: Yes  Alarm On: Bed  Patient Moved Closer to Nursing Station: No    Active Consults:  IP CONSULT TO INTERVENTIONAL RADIOLOGY  IP CONSULT TO ENDOCRINOLOGY  IP CONSULT TO PULMONOLOGY  IP CONSULT TO PHARMACY  IP CONSULT TO VASCULAR ACCESS

## 2025-07-30 NOTE — PLAN OF CARE
Problem: Respiratory - Adult  Goal: Achieves optimal ventilation and oxygenation  7/29/2025 2017 by Honey Mccracken RN  Outcome: Progressing  7/29/2025 0905 by Ivonne Vick RN  Outcome: Progressing     Problem: Neurosensory - Adult  Goal: Achieves stable or improved neurological status  7/29/2025 2017 by Honey Mccracken RN  Outcome: Progressing  7/29/2025 0905 by Ivonne Vick RN  Outcome: Progressing  Goal: Achieves maximal functionality and self care  7/29/2025 0905 by Ivonne Vick RN  Outcome: Not Progressing     Problem: Cardiovascular - Adult  Goal: Absence of cardiac dysrhythmias or at baseline  7/29/2025 0905 by Ivonne Vick RN  Outcome: Progressing     Problem: Skin/Tissue Integrity - Adult  Goal: Skin integrity remains intact  Description: 1.  Monitor for areas of redness and/or skin breakdown  2.  Assess vascular access sites hourly  3.  Every 4-6 hours minimum:  Change oxygen saturation probe site  4.  Every 4-6 hours:  If on nasal continuous positive airway pressure, respiratory therapy assess nares and determine need for appliance change or resting period  7/29/2025 2017 by Honey Mccracken RN  Outcome: Progressing  7/29/2025 0905 by Ivonne Vick RN  Outcome: Not Progressing  Flowsheets (Taken 7/29/2025 0903)  Skin Integrity Remains Intact:   Monitor for areas of redness and/or skin breakdown   Assess vascular access sites hourly  Goal: Incisions, wounds, or drain sites healing without S/S of infection  7/29/2025 2017 by Honey Mccracken RN  Outcome: Progressing  7/29/2025 0905 by Ivonne Vick RN  Outcome: Progressing         Problem: Musculoskeletal - Adult  Goal: Return mobility to safest level of function  7/29/2025 0905 by Ivonne Vick RN  Outcome: Not Progressing  Goal: Return ADL status to a safe level of function  7/29/2025 0905 by Ivonne Vick RN  Outcome: Not Progressing     Problem: Gastrointestinal - Adult  Goal: Maintains or returns to baseline

## 2025-07-30 NOTE — PROGRESS NOTES
Palliative Medicine  Patient Name: Nicole Galeano  YOB: 1945  MRN: [id number]  Age: 79 y.o.  Gender: female    Date of Initial Consult: 7/25/2025  Date of Service: 7/30/2025  Time: 12:38 PM  Provider: ISAIAH Greene CNP  Hospital Day: 25  Admit Date: 7/6/2025  Referring Provider: Grecia Shaikh MD      Reasons for Consultation:  Goals of Care    HISTORY OF PRESENT ILLNESS (HPI):   Nicole Galeano is a 79 y.o. female with a past medical history of COPD, rheumatoid arthritis, and remote hx breast cancer, who was admitted on 7/6/2025 from [facility] with complaints of back pain.     Recent dx of multiple vertebral compression fractures at OSH, and was Discharged with Narocotics leading to Confusion so admitted for kyphoplasty.Underwent kyphoplasty by IR on (7/10). Aggressive bowel regimen added d/t constipation. Evening (7/15) noted to be mottled with hypotension. CT performed demonstrating pneumoperitoneum. Surgery consulted taken to the OR, now s/p ex- lap/repair for perforated duodenal ulcer. Transferred to the ICU post op on mechanical ventilation. Patient extubated on 7/16.     Psychosocial: Patient  with two children (Nancy and Jas).  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      PALLIATIVE DIAGNOSES:    Acute back pain - s/p kypoplasty  Generalized weakness  Physical debility  Goals of care  Palliative care encounter      ASSESSMENT AND PLAN:   Today, I am following up with Nicole Galeano to address goals of care.  Reviewed medical chart including admit H&P, consultant notes, MAR, and recent labs/imaging.  Ms. Galeano was seen and evaluated, no family at bedside.   At time of my arrival, she was resting in bed in no acute distress.  On exam the patient awake is AAOx4  and able to participate in meaningful discussion.   Patient reported feeling \"great\", she denies pain, shortness of breath, nausea

## 2025-07-30 NOTE — PLAN OF CARE
Problem: Respiratory - Adult  Goal: Achieves optimal ventilation and oxygenation  7/30/2025 0812 by Carrol Mauricio RN  Outcome: Progressing  7/29/2025 2017 by Honey Mccracken RN  Outcome: Progressing     Problem: Pain  Goal: Verbalizes/displays adequate comfort level or baseline comfort level  7/30/2025 0812 by Carrol Mauricio RN  Outcome: Progressing  7/29/2025 2017 by Honey Mccracken RN  Outcome: Progressing     Problem: Neurosensory - Adult  Goal: Achieves stable or improved neurological status  7/30/2025 0812 by Carrol Mauricio RN  Outcome: Progressing  7/29/2025 2017 by Honey Mccracken RN  Outcome: Progressing  Goal: Achieves maximal functionality and self care  Outcome: Progressing     Problem: Cardiovascular - Adult  Goal: Absence of cardiac dysrhythmias or at baseline  Outcome: Progressing     Problem: Skin/Tissue Integrity - Adult  Goal: Skin integrity remains intact  Description: 1.  Monitor for areas of redness and/or skin breakdown  2.  Assess vascular access sites hourly  3.  Every 4-6 hours minimum:  Change oxygen saturation probe site  4.  Every 4-6 hours:  If on nasal continuous positive airway pressure, respiratory therapy assess nares and determine need for appliance change or resting period  7/30/2025 0812 by Carrol Mauricio RN  Outcome: Progressing  7/29/2025 2017 by Honey Mccracken RN  Outcome: Progressing  Goal: Incisions, wounds, or drain sites healing without S/S of infection  7/30/2025 0812 by Carrol Mauricio RN  Outcome: Progressing  7/29/2025 2017 by Honey Mccracken RN  Outcome: Progressing     Problem: Musculoskeletal - Adult  Goal: Return mobility to safest level of function  Outcome: Progressing  Goal: Return ADL status to a safe level of function  Outcome: Progressing     Problem: Gastrointestinal - Adult  Goal: Maintains or returns to baseline bowel function  Outcome: Progressing     Problem: Genitourinary - Adult  Goal: Absence of urinary retention  Outcome: Progressing     Problem:

## 2025-07-30 NOTE — PROGRESS NOTES
End of Shift Note    Bedside shift change report given to FIDEL Branham (oncoming nurse) by Honey Mccracken RN (offgoing nurse).  Report included the following information SBAR, Kardex, and MAR    Shift worked:  1900-700     Shift summary and any significant changes:    Pt received scheduled meds per MAR. Pt received PRN melatonin, PRN tylenol, and PRN valium. Central line care and CHG bath complete. Pt safety and caring rounds complete.      Concerns for physician to address: None   Zone phone for oncoming shift:  [phone]       Activity:  Level of Assistance: Moderate assist, patient does 50-74%  Number times ambulated in hallways past shift: 0  Number of times OOB to chair past shift: 0    Cardiac:   Cardiac Monitoring: No      Cardiac Rhythm: Sinus rhythm    Access:  Current line(s): PICC    Genitourinary:   Urinary Status: Voiding, External catheter    Respiratory:   O2 Device: Nasal cannula  Chronic home O2 use?: YES  Incentive spirometer at bedside: NO    GI:  Last BM (including prior to admit): 07/28/25  Current diet:  DIET ONE TIME MESSAGE;  ADULT ORAL NUTRITION SUPPLEMENT; Breakfast, Lunch, Dinner; Standard High Calorie/High Protein Oral Supplement  DIET ONE TIME MESSAGE;  ADULT DIET; Regular; Low Fiber  Passing flatus: YES    Pain Management:   Patient states pain is manageable on current regimen: YES    Skin:  Rafa Scale Score: 15  Interventions: Wound Offloading (Prevention Methods): Bed, pressure reduction mattress, Elevate heels, Pillows, Repositioning, Turning    Patient Safety:  Fall Risk: Nursing Judgement-Fall Risk High(Add Comments): Yes  Fall Risk Interventions  Nursing Judgement-Fall Risk High(Add Comments): Yes  Toilet Every 2 Hours-In Advance of Need: Yes  Hourly Visual Checks: Awake, In bed  Fall Visual Posted: Fall sign posted, Socks  Room Door Open: Yes  Alarm On: Bed  Patient Moved Closer to Nursing Station: No    Active Consults:   IP CONSULT TO INTERVENTIONAL RADIOLOGY  IP CONSULT TO

## 2025-07-30 NOTE — CARE COORDINATION
UPDATE: 3:57PM    CM informed that hospice agency is willing to accept pt-[facility].    CM spoke with pts daughter: Nancy, via telephone to inform her of the following.  Nancy reported that she is agreeable of following plan and is planning to have family meeting on tonight with spouse and her brother: Jas, to ensure everyone is onboard with plan.    Nancy reported that she is planning to prepare pts room, for DME to be delivered on 7/31, and stated that she will be prepared to accept pt on 8/1.    CM informed Nancy that d/c is pending on physician, due to pt being medically stable for some time.  CM informed Nancy that she will staff case with clinical team and physician to verify if this plan is appropriate.  KENDRICK will arrange transport as of now for 8/1 at 9A, until case is staffed.    Pt address:    [address] [place] [address] [place] [address]    LOLIS Davis CM  [phone]            UPDATE: 12:52PM     CM spoke with Palliative NP, regarding pts d/c needs and plans.  Family are wanting a referral to be sent to [facility].    CM will send referral, via [facility].    LOLIS Davis CM  [phone]    INITIAL NOTE: KENDRICK spoke with pts son: Jas, via telephone regarding pts d/c needs and plans.  Jas reported that he and sister have been attempting to contact facilities that were provided by CM.  Attempts were unsuccessful.      Jas reported that now he and sister are planning to transition pt home (with daughter) with hospice at home.  Jas requested information for caregiver support.    KENDRICK provided Jas with [employer] contact information for independent contact.  Jas informed that pt is medically stable to d/c, and CM will require an option regarding pts d/c plans: either home hospice or facility w/hospice.      CM will escalate concerns to CM  for additional support.    LOLIS Davis CM  [phone]

## 2025-07-30 NOTE — TELEPHONE ENCOUNTER
Returned call to  patient's daughter Nancy Frances.  She says that she is trying to reach [facility] Care Manager Gail Washington regarding hospice and LTC placement for her mother Nicole Galeano who is currently a patient on the 3rd floor Medical Oncology unit at [facility].      This nurse explained that her call came to the outpatient Palliative Medicine office.  This nurse suggested that Ms. Frances try reaching Gail Washington at [phone] or call the main [facility] #, [phone] and ask to be transferred to the Medical Oncology unit.

## 2025-07-31 ENCOUNTER — HOSPITAL ENCOUNTER (INPATIENT)
Facility: HOSPITAL | Age: 80
LOS: 3 days | DRG: 951 | End: 2025-08-03
Attending: INTERNAL MEDICINE | Admitting: INTERNAL MEDICINE
Payer: COMMERCIAL

## 2025-07-31 VITALS
DIASTOLIC BLOOD PRESSURE: 70 MMHG | HEIGHT: 62 IN | SYSTOLIC BLOOD PRESSURE: 128 MMHG | HEART RATE: 76 BPM | RESPIRATION RATE: 15 BRPM | TEMPERATURE: 97.7 F | OXYGEN SATURATION: 92 % | WEIGHT: 160.5 LBS | BODY MASS INDEX: 29.53 KG/M2

## 2025-07-31 PROBLEM — Z51.5 END OF LIFE CARE: Status: ACTIVE | Noted: 2025-07-31

## 2025-07-31 PROBLEM — G89.18 POSTOPERATIVE ABDOMINAL PAIN: Status: ACTIVE | Noted: 2025-07-31

## 2025-07-31 PROBLEM — R10.9 POSTOPERATIVE ABDOMINAL PAIN: Status: ACTIVE | Noted: 2025-07-31

## 2025-07-31 PROCEDURE — 6370000000 HC RX 637 (ALT 250 FOR IP): Performed by: STUDENT IN AN ORGANIZED HEALTH CARE EDUCATION/TRAINING PROGRAM

## 2025-07-31 PROCEDURE — 6360000002 HC RX W HCPCS: Performed by: INTERNAL MEDICINE

## 2025-07-31 PROCEDURE — 94760 N-INVAS EAR/PLS OXIMETRY 1: CPT

## 2025-07-31 PROCEDURE — 6370000000 HC RX 637 (ALT 250 FOR IP): Performed by: INTERNAL MEDICINE

## 2025-07-31 PROCEDURE — 51798 US URINE CAPACITY MEASURE: CPT

## 2025-07-31 PROCEDURE — 2500000003 HC RX 250 WO HCPCS: Performed by: SURGERY

## 2025-07-31 PROCEDURE — 99232 SBSQ HOSP IP/OBS MODERATE 35: CPT | Performed by: INTERNAL MEDICINE

## 2025-07-31 PROCEDURE — 2700000000 HC OXYGEN THERAPY PER DAY

## 2025-07-31 PROCEDURE — 6560000002 HC HOSPICE GENERAL INPATIENT

## 2025-07-31 RX ORDER — FENTANYL 25 UG/1
1 PATCH TRANSDERMAL
Refills: 0 | Status: DISCONTINUED | OUTPATIENT
Start: 2025-07-31 | End: 2025-08-03 | Stop reason: HOSPADM

## 2025-07-31 RX ORDER — GLYCOPYRROLATE 0.2 MG/ML
0.1 INJECTION INTRAMUSCULAR; INTRAVENOUS 3 TIMES DAILY PRN
Status: DISCONTINUED | OUTPATIENT
Start: 2025-07-31 | End: 2025-08-03 | Stop reason: HOSPADM

## 2025-07-31 RX ORDER — DIAZEPAM 10 MG/2ML
2.5 INJECTION, SOLUTION INTRAMUSCULAR; INTRAVENOUS EVERY EVENING
Status: DISCONTINUED | OUTPATIENT
Start: 2025-07-31 | End: 2025-08-03 | Stop reason: HOSPADM

## 2025-07-31 RX ORDER — SCOPOLAMINE 1 MG/3D
1 PATCH, EXTENDED RELEASE TRANSDERMAL
Status: DISCONTINUED | OUTPATIENT
Start: 2025-07-31 | End: 2025-08-03 | Stop reason: HOSPADM

## 2025-07-31 RX ORDER — DIAZEPAM 10 MG/2ML
2.5 INJECTION, SOLUTION INTRAMUSCULAR; INTRAVENOUS EVERY 4 HOURS PRN
Status: DISCONTINUED | OUTPATIENT
Start: 2025-07-31 | End: 2025-08-01

## 2025-07-31 RX ORDER — MORPHINE SULFATE 2 MG/ML
2 INJECTION, SOLUTION INTRAMUSCULAR; INTRAVENOUS
Status: DISCONTINUED | OUTPATIENT
Start: 2025-07-31 | End: 2025-08-01

## 2025-07-31 RX ORDER — LORAZEPAM 2 MG/ML
1 CONCENTRATE ORAL
Status: DISCONTINUED | OUTPATIENT
Start: 2025-07-31 | End: 2025-08-03 | Stop reason: HOSPADM

## 2025-07-31 RX ORDER — BISACODYL 5 MG/1
5 TABLET, DELAYED RELEASE ORAL DAILY PRN
Status: DISCONTINUED | OUTPATIENT
Start: 2025-07-31 | End: 2025-08-03 | Stop reason: HOSPADM

## 2025-07-31 RX ADMIN — MORPHINE SULFATE 10 MG: 100 SOLUTION ORAL at 08:44

## 2025-07-31 RX ADMIN — Medication: at 08:47

## 2025-07-31 RX ADMIN — LORAZEPAM 1 MG: 2 SOLUTION, CONCENTRATE ORAL at 08:45

## 2025-07-31 RX ADMIN — MORPHINE SULFATE 2 MG: 2 INJECTION, SOLUTION INTRAMUSCULAR; INTRAVENOUS at 14:36

## 2025-07-31 RX ADMIN — DIAZEPAM 2.5 MG: 5 INJECTION, SOLUTION INTRAMUSCULAR; INTRAVENOUS at 17:26

## 2025-07-31 RX ADMIN — LORAZEPAM 1 MG: 2 SOLUTION, CONCENTRATE ORAL at 14:37

## 2025-07-31 RX ADMIN — DIAZEPAM 2.5 MG: 5 INJECTION, SOLUTION INTRAMUSCULAR; INTRAVENOUS at 15:37

## 2025-07-31 RX ADMIN — MORPHINE SULFATE 10 MG: 100 SOLUTION ORAL at 01:35

## 2025-07-31 RX ADMIN — LORAZEPAM 1 MG: 2 SOLUTION, CONCENTRATE ORAL at 01:35

## 2025-07-31 RX ADMIN — SODIUM CHLORIDE, PRESERVATIVE FREE 10 ML: 5 INJECTION INTRAVENOUS at 08:47

## 2025-07-31 RX ADMIN — MORPHINE SULFATE 2 MG: 2 INJECTION, SOLUTION INTRAMUSCULAR; INTRAVENOUS at 15:38

## 2025-07-31 RX ADMIN — DIAZEPAM 2 MG: 5 INJECTION, SOLUTION INTRAMUSCULAR; INTRAVENOUS at 03:15

## 2025-07-31 ASSESSMENT — PAIN SCALES - GENERAL: PAINLEVEL_OUTOF10: 0

## 2025-07-31 ASSESSMENT — PAIN SCALES - WONG BAKER
WONGBAKER_NUMERICALRESPONSE: HURTS EVEN MORE
WONGBAKER_NUMERICALRESPONSE: NO HURT

## 2025-07-31 NOTE — PROGRESS NOTES
Hospitalist Progress Note     Demographics    Patient Name  Nicole Galeano   Date of Birth 1945   Medical Record Number  [id number]      Age  79 y.o.   PCP Tessie Martinez FNP   Admit date:  7/6/2025  1:38 PM     Room Number  3401/01  @ [facility]           Admission Diagnoses:  Acute metabolic encephalopathy   Admission Summary:  \" The patient is a 78 y/o female PMHx of COPD, rheumatoid arthritis, and remote hx breast cancer admitted (7/6) by hospital medicine for altered mental status. Recent dx of multiple vertebral compression fractures at OSH, and was Discharged with Narocotics leading to Confusion so admitted for kyphoplasty.Underwent kyphoplasty by IR on (7/10). Aggressive bowel regimen added d/t constipation. Evening (7/15) noted to be mottled with hypotension. CT performed demonstrating pneumoperitoneum. Surgery consulted taken to the OR, now s/p ex- lap/repair for perforated duodenal ulcer. Transferred to the ICU post op on mechanical ventilation. Patient extubated on 7/16.  \"     Assessment and plan:   Septic shock   S/p resuscitation with pressors etc.   Duodenal ulcer perforation 07/15/25  S/p repair and sung patch  [facility] acquired delirium   VICKIE   Chronic Atrial fibrillation and secondary hypercoagulable status due to AFib   Compression fracture of spine multiple   S/p Kyphoplasty   B12 deficiency   COPD   Hx of Breast CA   Hx of Alcohol abuse   Anxiety disorder   Rheumatoid arthritis   Hyperthyroid status   Complex hospital course   Please refer to older records for hospital course   Appreciate help from palliative care team   At this time, CM reported that the pt is being evaluated for home hospice.   Await family's decision     Body mass index is 29.35 kg/m². -   Reference: BMI greater than 30 is classified as obesity and greater than 40 is classified as morbid obesity.          CODE STATUS   DNR   Functional Status      Surrogate decision

## 2025-07-31 NOTE — CARE COORDINATION
CM received call from [facility] advising daughter has taken off tomorrow, equipment will be delivered today and pt can be discharged 8/1/2025 at 1pm (transport needed).   Family is working with [facility] to provide 24/7 care.  CM arranged [facility] [employer] Order Form to arrange BLS w/oxygen transport to pt's residence: [id number] , [facility] [address] for 8/1/2025 at 1300.     1110 - pt's nurse advised Dr. Walker spoke with pt's daughter and order placed for [facility], [facility], CM made the connection for [facility] to call Dr. Walker.     1117 - CM sending [facility] Hospice request to [facility] via [employer]ination.     1121 - CM called [facility] ([phone], #1 [place]) advising Dr. Walker cancelled d/c today, spoke with daughter and pt transitioning to [facility] w/[facility].     1125 - CM cancelled transportation for 1pm today via [facility] [employer].        Brenda Throckmorton RN  [facility]

## 2025-07-31 NOTE — PROGRESS NOTES
Hospitalist Progress Note     Demographics    Patient Name  Nicole Galeano   Date of Birth 1945   Medical Record Number  [id number]      Age  79 y.o.   PCP Tessie Martinez FNP   Admit date:  7/6/2025  1:38 PM     Room Number  3401/01  @ [facility]           Admission Diagnoses:  Acute metabolic encephalopathy   Admission Summary:  \" The patient is a 80 y/o female PMHx of COPD, rheumatoid arthritis, and remote hx breast cancer admitted (7/6) by hospital medicine for altered mental status. Recent dx of multiple vertebral compression fractures at OSH, and was Discharged with Narocotics leading to Confusion so admitted for kyphoplasty.Underwent kyphoplasty by IR on (7/10). Aggressive bowel regimen added d/t constipation. Evening (7/15) noted to be mottled with hypotension. CT performed demonstrating pneumoperitoneum. Surgery consulted taken to the OR, now s/p ex- lap/repair for perforated duodenal ulcer. Transferred to the ICU post op on mechanical ventilation. Patient extubated on 7/16.  \"     Assessment and plan:   Septic shock   S/p resuscitation with pressors etc.   Duodenal ulcer perforation 07/15/25  S/p repair and sung patch  [facility] acquired delirium   VICKIE   Chronic Atrial fibrillation and secondary hypercoagulable status due to AFib   Compression fracture of spine multiple   S/p Kyphoplasty   B12 deficiency   COPD   Hx of Breast CA   Hx of Alcohol abuse   Anxiety disorder   Rheumatoid arthritis   Hyperthyroid status   Complex hospital course   Please refer to older records for hospital course   Appreciate help from palliative care team   At this time, CM reported that the pt is being evaluated for home hospice.   7/31/25 : I spoke with Carrol patient's daughter over telephone.  I learned that Nancy is not ready to accept the patient in her home for terminal care of the patient.  Patient's daughter Nancy is working full-time she has not even had a chance

## 2025-07-31 NOTE — PROGRESS NOTES
Hospitalist Progress Note     Demographics    Patient Name  Nicole Galeano   Date of Birth 1945   Medical Record Number  [id number]      Age  79 y.o.   PCP Tessie Martinez FNP   Admit date:  7/6/2025  1:38 PM     Room Number  3401/01  @ [facility]           Admission Diagnoses:  Acute metabolic encephalopathy   Admission Summary:  \" The patient is a 80 y/o female PMHx of COPD, rheumatoid arthritis, and remote hx breast cancer admitted (7/6) by hospital medicine for altered mental status. Recent dx of multiple vertebral compression fractures at OSH, and was Discharged with Narocotics leading to Confusion so admitted for kyphoplasty.Underwent kyphoplasty by IR on (7/10). Aggressive bowel regimen added d/t constipation. Evening (7/15) noted to be mottled with hypotension. CT performed demonstrating pneumoperitoneum. Surgery consulted taken to the OR, now s/p ex- lap/repair for perforated duodenal ulcer. Transferred to the ICU post op on mechanical ventilation. Patient extubated on 7/16.  \"     Assessment and plan:   Septic shock   S/p resuscitation with pressors etc.   Duodenal ulcer perforation 07/15/25  S/p repair and sung patch  [facility] acquired delirium   VICKIE   Chronic Atrial fibrillation and secondary hypercoagulable status due to AFib   Compression fracture of spine multiple   S/p Kyphoplasty   B12 deficiency   COPD   Hx of Breast CA   Hx of Alcohol abuse   Anxiety disorder   Rheumatoid arthritis   Hyperthyroid status   Complex hospital course   Please refer to older records for hospital course   At this time, pt has been evaluated by palliative care team.   Await family's decision     Body mass index is 29.35 kg/m². -   Reference: BMI greater than 30 is classified as obesity and greater than 40 is classified as morbid obesity.          CODE STATUS   DNR   Functional Status      Surrogate decision maker:  Pt's children      Prophylaxis   Lovenox

## 2025-07-31 NOTE — CONSULTS
Palliative Medicine  Patient Name: Nicole Galeano  YOB: 1945  MRN: [id number]  Age: 79 y.o.  Gender: female    Date of Initial Consult: 7/25/2025  Date of Service: 7/31/2025  Time: 1:17 PM  Provider: Koki Alfredo MD  Hospital Day: 26  Admit Date: 7/6/2025  Referring Provider: Grecia Shaikh MD      Reasons for Consultation:  Goals of Care    HISTORY OF PRESENT ILLNESS (HPI):   Nicole Galeano is a 79 y.o. female with a past medical history of COPD, rheumatoid arthritis, and remote hx breast cancer, who was admitted on 7/6/2025 from [facility] with complaints of back pain.     Recent dx of multiple vertebral compression fractures at OSH, and was Discharged with Narocotics leading to Confusion so admitted for kyphoplasty.Underwent kyphoplasty by IR on (7/10). Aggressive bowel regimen added d/t constipation. Evening (7/15) noted to be mottled with hypotension. CT performed demonstrating pneumoperitoneum. Surgery consulted taken to the OR, now s/p ex- lap/repair for perforated duodenal ulcer. Transferred to the ICU post op on mechanical ventilation. Patient extubated on 7/16.     Patient met with palliative team 7/28 and decision made to focus on comfort, work on transition home with hospice support.  Patient now with decline in last day or two, no longer eating, some uncontrolled pain and agitation-- difficult time with taking SL medications, not as responsive and family discussed with attending MD, concern about he ability to make it home, asking hospice to eval for GIP status.     Psychosocial: Patient  with two children (Nancy and Jas).  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  Has a 20 year old cat at home (Machuca)       PALLIATIVE DIAGNOSES:    Acute back pain - s/p kypoplasty  Generalized weakness  Physical debility  Goals of care  Palliative care encounter      ASSESSMENT AND PLAN:   Met with dtr at

## 2025-07-31 NOTE — PLAN OF CARE
Problem: Respiratory - Adult  Goal: Achieves optimal ventilation and oxygenation  Outcome: Progressing     Problem: Pain  Goal: Verbalizes/displays adequate comfort level or baseline comfort level  Outcome: Progressing     Problem: Cardiovascular - Adult  Goal: Absence of cardiac dysrhythmias or at baseline  Outcome: Progressing     Problem: Skin/Tissue Integrity - Adult  Goal: Skin integrity remains intact  Description: 1.  Monitor for areas of redness and/or skin breakdown  2.  Assess vascular access sites hourly  3.  Every 4-6 hours minimum:  Change oxygen saturation probe site  4.  Every 4-6 hours:  If on nasal continuous positive airway pressure, respiratory therapy assess nares and determine need for appliance change or resting period  Outcome: Progressing  Goal: Incisions, wounds, or drain sites healing without S/S of infection  Outcome: Progressing     Problem: Genitourinary - Adult  Goal: Absence of urinary retention  Outcome: Progressing     Problem: Infection - Adult  Goal: Absence of infection during hospitalization  Outcome: Progressing     Problem: Metabolic/Fluid and Electrolytes - Adult  Goal: Electrolytes maintained within normal limits  Outcome: Progressing     Problem: Safety - Adult  Goal: Free from fall injury  Outcome: Progressing     Problem: Confusion  Goal: Confusion, delirium, dementia, or psychosis is improved or at baseline  Description: INTERVENTIONS:  1. Assess for possible contributors to thought disturbance, including medications, impaired vision or hearing, underlying metabolic abnormalities, dehydration, psychiatric diagnoses, and notify attending LIP  2. [place] high risk fall precautions, as indicated  3. Provide frequent short contacts to provide reality reorientation, refocusing and direction  4. Decrease environmental stimuli, including noise as appropriate  5. Monitor and intervene to maintain adequate nutrition, hydration, elimination, sleep and activity  6. If unable to

## 2025-08-01 PROCEDURE — 51702 INSERT TEMP BLADDER CATH: CPT

## 2025-08-01 PROCEDURE — 6360000002 HC RX W HCPCS: Performed by: INTERNAL MEDICINE

## 2025-08-01 PROCEDURE — 6560000002 HC HOSPICE GENERAL INPATIENT

## 2025-08-01 RX ORDER — MORPHINE SULFATE 4 MG/ML
4 INJECTION, SOLUTION INTRAMUSCULAR; INTRAVENOUS EVERY 6 HOURS
Status: DISCONTINUED | OUTPATIENT
Start: 2025-08-01 | End: 2025-08-03 | Stop reason: HOSPADM

## 2025-08-01 RX ORDER — DIAZEPAM 10 MG/2ML
5 INJECTION, SOLUTION INTRAMUSCULAR; INTRAVENOUS
Status: DISCONTINUED | OUTPATIENT
Start: 2025-08-01 | End: 2025-08-03 | Stop reason: HOSPADM

## 2025-08-01 RX ORDER — DIAZEPAM 10 MG/2ML
2.5 INJECTION, SOLUTION INTRAMUSCULAR; INTRAVENOUS
Status: DISCONTINUED | OUTPATIENT
Start: 2025-08-01 | End: 2025-08-01

## 2025-08-01 RX ORDER — MORPHINE SULFATE 4 MG/ML
4 INJECTION, SOLUTION INTRAMUSCULAR; INTRAVENOUS
Status: DISCONTINUED | OUTPATIENT
Start: 2025-08-01 | End: 2025-08-03 | Stop reason: HOSPADM

## 2025-08-01 RX ADMIN — GLYCOPYRROLATE 0.1 MG: 0.2 INJECTION INTRAMUSCULAR; INTRAVENOUS at 09:58

## 2025-08-01 RX ADMIN — MORPHINE SULFATE 4 MG: 4 INJECTION, SOLUTION INTRAMUSCULAR; INTRAVENOUS at 22:55

## 2025-08-01 RX ADMIN — DIAZEPAM 2.5 MG: 5 INJECTION, SOLUTION INTRAMUSCULAR; INTRAVENOUS at 09:57

## 2025-08-01 RX ADMIN — MORPHINE SULFATE 2 MG: 2 INJECTION, SOLUTION INTRAMUSCULAR; INTRAVENOUS at 09:57

## 2025-08-01 RX ADMIN — MORPHINE SULFATE 2 MG: 2 INJECTION, SOLUTION INTRAMUSCULAR; INTRAVENOUS at 04:18

## 2025-08-01 RX ADMIN — GLYCOPYRROLATE 0.1 MG: 0.2 INJECTION INTRAMUSCULAR; INTRAVENOUS at 04:17

## 2025-08-01 RX ADMIN — DIAZEPAM 5 MG: 5 INJECTION, SOLUTION INTRAMUSCULAR; INTRAVENOUS at 14:15

## 2025-08-01 RX ADMIN — DIAZEPAM 2.5 MG: 5 INJECTION, SOLUTION INTRAMUSCULAR; INTRAVENOUS at 06:37

## 2025-08-01 RX ADMIN — DIAZEPAM 2.5 MG: 5 INJECTION, SOLUTION INTRAMUSCULAR; INTRAVENOUS at 17:58

## 2025-08-01 RX ADMIN — MORPHINE SULFATE 4 MG: 4 INJECTION, SOLUTION INTRAMUSCULAR; INTRAVENOUS at 11:05

## 2025-08-01 RX ADMIN — MORPHINE SULFATE 4 MG: 4 INJECTION, SOLUTION INTRAMUSCULAR; INTRAVENOUS at 17:57

## 2025-08-01 ASSESSMENT — PAIN DESCRIPTION - ONSET
ONSET: UNABLE TO COMMUNICATE
ONSET: UNABLE TO COMMUNICATE

## 2025-08-01 ASSESSMENT — PAIN SCALES - GENERAL
PAINLEVEL_OUTOF10: 0
PAINLEVEL_OUTOF10: 0
PAINLEVEL_OUTOF10: 4
PAINLEVEL_OUTOF10: 0
PAINLEVEL_OUTOF10: 4
PAINLEVEL_OUTOF10: 0

## 2025-08-01 ASSESSMENT — PAIN DESCRIPTION - PAIN TYPE
TYPE: ACUTE PAIN
TYPE: ACUTE PAIN

## 2025-08-01 ASSESSMENT — PAIN DESCRIPTION - DESCRIPTORS
DESCRIPTORS: PATIENT UNABLE TO DESCRIBE
DESCRIPTORS: PATIENT UNABLE TO DESCRIBE

## 2025-08-01 ASSESSMENT — PAIN DESCRIPTION - FREQUENCY: FREQUENCY: INTERMITTENT

## 2025-08-02 VITALS
TEMPERATURE: 99.5 F | SYSTOLIC BLOOD PRESSURE: 81 MMHG | OXYGEN SATURATION: 71 % | RESPIRATION RATE: 20 BRPM | HEART RATE: 100 BPM | DIASTOLIC BLOOD PRESSURE: 60 MMHG

## 2025-08-02 PROCEDURE — 6360000002 HC RX W HCPCS: Performed by: INTERNAL MEDICINE

## 2025-08-02 PROCEDURE — 6560000002 HC HOSPICE GENERAL INPATIENT

## 2025-08-02 RX ADMIN — MORPHINE SULFATE 4 MG: 4 INJECTION, SOLUTION INTRAMUSCULAR; INTRAVENOUS at 17:31

## 2025-08-02 RX ADMIN — MORPHINE SULFATE 4 MG: 4 INJECTION, SOLUTION INTRAMUSCULAR; INTRAVENOUS at 22:44

## 2025-08-02 RX ADMIN — DIAZEPAM 2.5 MG: 5 INJECTION, SOLUTION INTRAMUSCULAR; INTRAVENOUS at 17:31

## 2025-08-02 RX ADMIN — MORPHINE SULFATE 4 MG: 4 INJECTION, SOLUTION INTRAMUSCULAR; INTRAVENOUS at 10:36

## 2025-08-02 RX ADMIN — GLYCOPYRROLATE 0.1 MG: 0.2 INJECTION INTRAMUSCULAR; INTRAVENOUS at 17:31

## 2025-08-02 RX ADMIN — MORPHINE SULFATE 4 MG: 4 INJECTION, SOLUTION INTRAMUSCULAR; INTRAVENOUS at 06:35

## 2025-08-02 ASSESSMENT — PAIN SCALES - GENERAL: PAINLEVEL_OUTOF10: 0

## (undated) DEVICE — BANDAGE COMPR W6INXL12FT SMOOTH FOR LIMB EXSANG ESMARCH

## (undated) DEVICE — SOL IRR SOD CL 0.9% 1000ML BTL --

## (undated) DEVICE — CANISTER, RIGID, 3000CC: Brand: MEDLINE INDUSTRIES, INC.

## (undated) DEVICE — GLOVE ORTHO 8   MSG9480

## (undated) DEVICE — SYRINGE IRRIG 60ML SFT PLIABLE BLB EZ TO GRP 1 HND USE W/

## (undated) DEVICE — TROCAR: Brand: KII FIOS FIRST ENTRY

## (undated) DEVICE — ADHESIVE SKIN CLOSURE HI VISC MIC 0.5 CC PREMIERPRO EXOFIN

## (undated) DEVICE — GLOVE ORANGE PI 7 1/2   MSG9075

## (undated) DEVICE — STRAP,POSITIONING,KNEE/BODY,FOAM,4X60": Brand: MEDLINE

## (undated) DEVICE — SUTURE MONOCRYL SZ 4-0 L27IN ABSRB UD L19MM PS-2 1/2 CIR PRIM Y426H

## (undated) DEVICE — UNDERPAD HOSP W30XL36IN WHT SUP ABSRB POLYMER AIR PERM DISP

## (undated) DEVICE — DRAIN SURG 19FR 100% SIL RADPQ RND CHN FULL FLUT

## (undated) DEVICE — DRILL BIT 2.5

## (undated) DEVICE — DRESSING,GAUZE,XEROFORM,CURAD,5"X9",ST: Brand: CURAD

## (undated) DEVICE — GARMENT,MEDLINE,DVT,INT,CALF,MED, GEN2: Brand: MEDLINE

## (undated) DEVICE — PADDING CAST W6INXL4YD ST COT RAYON MICROPLEATED HIGHLY

## (undated) DEVICE — HANDLE LT SNAP ON ULT DURABLE LENS FOR TRUMPF ALC DISPOSABLE

## (undated) DEVICE — PIN FIX THRD DISP BB-TAK

## (undated) DEVICE — SOLUTION IRRIG 1000ML H2O PIC PLAS SHATTERPROOF CONTAINER

## (undated) DEVICE — GOWN,NON-REINFORCED,XXL: Brand: MEDLINE

## (undated) DEVICE — SUTURE NONABSORBABLE MONOFILAMENT 3-0 PS-1 18 IN BLK ETHILON 1663H

## (undated) DEVICE — PREP SKN CHLRAPRP APL 26ML STR --

## (undated) DEVICE — PACK,BASIC,SIRUS,V: Brand: MEDLINE

## (undated) DEVICE — GLOVE SURG SZ 75 L12IN FNGR THK79MIL GRN LTX FREE

## (undated) DEVICE — NEEDLE HYPO 22GA L1.5IN BLK S STL HUB POLYPR SHLD REG BVL

## (undated) DEVICE — BIT DRL DIA2.5MM FOR ANK FRAC MGMT SYS

## (undated) DEVICE — INSULATED BLADE ELECTRODE: Brand: EDGE

## (undated) DEVICE — ZIMMER® STERILE DISPOSABLE TOURNIQUET CUFF WITH PROTECTIVE SLEEVE AND PLC, DUAL PORT, SINGLE BLADDER, 34 IN. (86 CM)

## (undated) DEVICE — SMOKE EVACUATION PENCIL: Brand: VALLEYLAB

## (undated) DEVICE — SOLUTION SCRB 4% CHG RED ANTIMIC SKIN CLN PREOPERATIVE DISP

## (undated) DEVICE — DRAPE,REIN 53X77,STERILE: Brand: MEDLINE

## (undated) DEVICE — SPONGE GZ W4XL4IN COT 12 PLY TYP VII WVN C FLD DSGN STERILE

## (undated) DEVICE — SUTURE PERMA-HAND SZ 2-0 L30IN NONABSORBABLE BLK L26MM SH K833H

## (undated) DEVICE — TOWEL SURG W17XL27IN STD BLU COT NONFENESTRATED PREWASHED

## (undated) DEVICE — DBM T42210 2.5CMX5CM 2 EACH GRAFTON MATR
Type: IMPLANTABLE DEVICE | Site: ANKLE | Status: NON-FUNCTIONAL
Brand: GRAFTON®AND GRAFTON PLUS®DEMINERALIZED BONE MATRIX (DBM)

## (undated) DEVICE — TRANSFER SET 3": Brand: MEDLINE INDUSTRIES, INC.

## (undated) DEVICE — REM POLYHESIVE ADULT PATIENT RETURN ELECTRODE: Brand: VALLEYLAB

## (undated) DEVICE — INFECTION CONTROL KIT SYS

## (undated) DEVICE — BANDAGE COMPR W6INXL10YD ST M E WHITE/BEIGE

## (undated) DEVICE — DRAPE,CHEST,FENES,15X10,STERIL: Brand: MEDLINE

## (undated) DEVICE — TAPE,CLOTH/SILK,CURAD,3"X10YD,LF,40/CS: Brand: CURAD

## (undated) DEVICE — STERILE POLYISOPRENE POWDER-FREE SURGICAL GLOVES: Brand: PROTEXIS

## (undated) DEVICE — INTENDED FOR TISSUE SEPARATION, AND OTHER PROCEDURES THAT REQUIRE A SHARP SURGICAL BLADE TO PUNCTURE OR CUT.: Brand: BARD-PARKER ® CARBON RIB-BACK BLADES

## (undated) DEVICE — SOLUTION IV 500ML 0.9% SOD BOTTLE CHL LTWT DURABLE SHATTERPROOF

## (undated) DEVICE — SUTURE MCRYL SZ 4-0 L27IN ABSRB UD L19MM PS-2 1/2 CIR PRIM Y426H

## (undated) DEVICE — SUTURE VCRL SZ 3-0 L27IN ABSRB UD L26MM SH 1/2 CIR J416H

## (undated) DEVICE — TOWEL,OR,DSP,ST,BLUE,STD,4/PK,20PK/CS: Brand: MEDLINE

## (undated) DEVICE — SUTURE VICRYL SZ 0 L36IN ABSRB UD L36MM CT-1 1/2 CIR J946H

## (undated) DEVICE — SMOKE EVACUATION TUBING WITH 8 IN INTEGRAL WAND AND SPONGE GUARD: Brand: BUFFALO FILTER

## (undated) DEVICE — GENERAL LAPAROSCOPY-MRMC: Brand: MEDLINE INDUSTRIES, INC.

## (undated) DEVICE — SUTURE VICRYL + SZ 0 L27IN ABSRB VLT L26MM UR-6 5/8 CIR VCP603H

## (undated) DEVICE — SYR 10ML LUER LOK 1/5ML GRAD --

## (undated) DEVICE — SYRINGE MED 10ML LUERLOCK TIP W/O SFTY DISP

## (undated) DEVICE — GLOVE SURG SZ 85 L12IN FNGR THK79MIL GRN LTX FREE

## (undated) DEVICE — DRILL BIT 2.0

## (undated) DEVICE — KIT ARMOR C DRP COLLAPSIBLE AND SELF EXP TOP CVR FOR FLUOROSCOPIC

## (undated) DEVICE — SUTURE MONOCRYL SZ 3-0 L27IN ABSRB UD L19MM PS-2 3/8 CIR PRIM Y427H

## (undated) DEVICE — HYPODERMIC SAFETY NEEDLE: Brand: MAGELLAN

## (undated) DEVICE — TROCAR: Brand: KII SLEEVE

## (undated) DEVICE — PAD,ABDOMINAL,5"X9",ST,LF,25/BX: Brand: MEDLINE INDUSTRIES, INC.

## (undated) DEVICE — SPLINT ORTH W5XL30IN PLSTR OF PARIS LO EXOTHERM SMOOTH

## (undated) DEVICE — SPONGE,LAP,18"X18",STD,XR,ST,5/PK,40PK/C: Brand: MEDLINE

## (undated) DEVICE — EXTREMITY-SFMCASU: Brand: MEDLINE INDUSTRIES, INC.

## (undated) DEVICE — SUT SLK 2-0SH 30IN BLK --

## (undated) DEVICE — SURGICAL PROCEDURE PACK BASIN MAJ SET CUST NO CAUT